# Patient Record
Sex: MALE | Race: WHITE | NOT HISPANIC OR LATINO | Employment: OTHER | URBAN - METROPOLITAN AREA
[De-identification: names, ages, dates, MRNs, and addresses within clinical notes are randomized per-mention and may not be internally consistent; named-entity substitution may affect disease eponyms.]

---

## 2017-03-28 ENCOUNTER — AMBULATORY - HEALTHEAST (OUTPATIENT)
Dept: CARDIOLOGY | Facility: CLINIC | Age: 72
End: 2017-03-28

## 2017-03-31 ENCOUNTER — OFFICE VISIT - HEALTHEAST (OUTPATIENT)
Dept: CARDIOLOGY | Facility: CLINIC | Age: 72
End: 2017-03-31

## 2017-03-31 ENCOUNTER — AMBULATORY - HEALTHEAST (OUTPATIENT)
Dept: CARDIOLOGY | Facility: CLINIC | Age: 72
End: 2017-03-31

## 2017-03-31 DIAGNOSIS — I10 ESSENTIAL HYPERTENSION: ICD-10-CM

## 2017-03-31 DIAGNOSIS — R00.1 BRADYCARDIA: ICD-10-CM

## 2017-03-31 LAB
ATRIAL RATE - MUSE: 43 BPM
DIASTOLIC BLOOD PRESSURE - MUSE: NORMAL MMHG
INTERPRETATION ECG - MUSE: NORMAL
P AXIS - MUSE: 3 DEGREES
PR INTERVAL - MUSE: 142 MS
QRS DURATION - MUSE: 104 MS
QT - MUSE: 480 MS
QTC - MUSE: 405 MS
R AXIS - MUSE: 72 DEGREES
SYSTOLIC BLOOD PRESSURE - MUSE: NORMAL MMHG
T AXIS - MUSE: 11 DEGREES
VENTRICULAR RATE- MUSE: 43 BPM

## 2017-03-31 ASSESSMENT — MIFFLIN-ST. JEOR: SCORE: 1751.86

## 2017-04-03 ENCOUNTER — COMMUNICATION - HEALTHEAST (OUTPATIENT)
Dept: CARDIOLOGY | Facility: CLINIC | Age: 72
End: 2017-04-03

## 2017-04-03 ENCOUNTER — AMBULATORY - HEALTHEAST (OUTPATIENT)
Dept: CARDIOLOGY | Facility: CLINIC | Age: 72
End: 2017-04-03

## 2017-04-03 DIAGNOSIS — R00.1 BRADYCARDIA: ICD-10-CM

## 2017-04-03 DIAGNOSIS — I10 HTN (HYPERTENSION): ICD-10-CM

## 2017-04-07 ENCOUNTER — HOSPITAL ENCOUNTER (OUTPATIENT)
Dept: CARDIOLOGY | Facility: HOSPITAL | Age: 72
Discharge: HOME | End: 2017-04-07
Attending: INTERNAL MEDICINE

## 2017-04-07 DIAGNOSIS — I10 HTN (HYPERTENSION): ICD-10-CM

## 2017-04-07 DIAGNOSIS — R00.1 BRADYCARDIA: ICD-10-CM

## 2017-04-07 LAB
CV STRESS CURRENT BP HE: NORMAL
CV STRESS CURRENT HR HE: 103
CV STRESS CURRENT HR HE: 109
CV STRESS CURRENT HR HE: 49
CV STRESS CURRENT HR HE: 51
CV STRESS CURRENT HR HE: 56
CV STRESS CURRENT HR HE: 56
CV STRESS CURRENT HR HE: 57
CV STRESS CURRENT HR HE: 58
CV STRESS CURRENT HR HE: 60
CV STRESS CURRENT HR HE: 63
CV STRESS CURRENT HR HE: 65
CV STRESS CURRENT HR HE: 66
CV STRESS CURRENT HR HE: 67
CV STRESS CURRENT HR HE: 69
CV STRESS CURRENT HR HE: 74
CV STRESS CURRENT HR HE: 76
CV STRESS CURRENT HR HE: 77
CV STRESS CURRENT HR HE: 82
CV STRESS CURRENT HR HE: 93
CV STRESS CURRENT HR HE: 98
CV STRESS DEVIATION TIME HE: NORMAL
CV STRESS ECHO PERCENT HR HE: NORMAL
CV STRESS EXERCISE STAGE HE: NORMAL
CV STRESS FINAL RESTING BP HE: NORMAL
CV STRESS FINAL RESTING HR HE: 56
CV STRESS MAX HR HE: 111
CV STRESS MAX TREADMILL GRADE HE: 12
CV STRESS MAX TREADMILL SPEED HE: 2.5
CV STRESS PEAK DIA BP HE: NORMAL
CV STRESS PEAK SYS BP HE: NORMAL
CV STRESS PHASE HE: NORMAL
CV STRESS PROTOCOL HE: NORMAL
CV STRESS RESTING PT POSITION HE: NORMAL
CV STRESS RESTING PT POSITION HE: NORMAL
CV STRESS ST DEVIATION AMOUNT HE: NORMAL
CV STRESS ST DEVIATION ELEVATION HE: NORMAL
CV STRESS ST EVELATION AMOUNT HE: NORMAL
CV STRESS TEST TYPE HE: NORMAL
CV STRESS TOTAL STAGE TIME MIN 1 HE: NORMAL
STRESS ECHO BASELINE BP: NORMAL
STRESS ECHO BASELINE HR: 52
STRESS ECHO CALCULATED PERCENT HR: 74 %
STRESS ECHO LAST STRESS BP: NORMAL
STRESS ECHO LAST STRESS HR: 98
STRESS ECHO POST ESTIMATED WORKLOAD: 7.1
STRESS ECHO POST EXERCISE DUR MIN: 5
STRESS ECHO POST EXERCISE DUR SEC: 45
STRESS ECHO TARGET HR: 127

## 2017-07-07 ENCOUNTER — RECORDS - HEALTHEAST (OUTPATIENT)
Dept: LAB | Facility: CLINIC | Age: 72
End: 2017-07-07

## 2017-07-07 LAB
CHOLEST SERPL-MCNC: 176 MG/DL
FASTING STATUS PATIENT QL REPORTED: NORMAL
HDLC SERPL-MCNC: 46 MG/DL
LDLC SERPL CALC-MCNC: 116 MG/DL
PSA SERPL-MCNC: 0.3 NG/ML (ref 0–6.5)
TRIGL SERPL-MCNC: 70 MG/DL

## 2019-01-24 ENCOUNTER — RECORDS - HEALTHEAST (OUTPATIENT)
Dept: LAB | Facility: CLINIC | Age: 74
End: 2019-01-24

## 2019-01-24 LAB
ALBUMIN SERPL-MCNC: 3.7 G/DL (ref 3.5–5)
ALP SERPL-CCNC: 66 U/L (ref 45–120)
ALT SERPL W P-5'-P-CCNC: 25 U/L (ref 0–45)
ANION GAP SERPL CALCULATED.3IONS-SCNC: 14 MMOL/L (ref 5–18)
AST SERPL W P-5'-P-CCNC: 22 U/L (ref 0–40)
BILIRUB SERPL-MCNC: 0.4 MG/DL (ref 0–1)
BUN SERPL-MCNC: 20 MG/DL (ref 8–28)
CALCIUM SERPL-MCNC: 9.3 MG/DL (ref 8.5–10.5)
CHLORIDE BLD-SCNC: 101 MMOL/L (ref 98–107)
CO2 SERPL-SCNC: 25 MMOL/L (ref 22–31)
CREAT SERPL-MCNC: 0.97 MG/DL (ref 0.7–1.3)
GFR SERPL CREATININE-BSD FRML MDRD: >60 ML/MIN/1.73M2
GLUCOSE BLD-MCNC: 94 MG/DL (ref 70–125)
POTASSIUM BLD-SCNC: 4.3 MMOL/L (ref 3.5–5)
PROT SERPL-MCNC: 6.9 G/DL (ref 6–8)
PSA SERPL-MCNC: 0.4 NG/ML (ref 0–6.5)
SODIUM SERPL-SCNC: 140 MMOL/L (ref 136–145)

## 2019-02-12 ENCOUNTER — SURGERY - HEALTHEAST (OUTPATIENT)
Dept: CARDIOLOGY | Facility: CLINIC | Age: 74
End: 2019-02-12

## 2019-02-12 ASSESSMENT — MIFFLIN-ST. JEOR
SCORE: 1841.23
SCORE: 1838.96

## 2019-02-19 ENCOUNTER — AMBULATORY - HEALTHEAST (OUTPATIENT)
Dept: CARDIOLOGY | Facility: CLINIC | Age: 74
End: 2019-02-19

## 2019-02-19 DIAGNOSIS — Z95.0 CARDIAC PACEMAKER IN SITU: ICD-10-CM

## 2019-02-19 LAB
HCC DEVICE COMMENTS: NORMAL
HCC DEVICE IMPLANTING PROVIDER: NORMAL
HCC DEVICE MANUFACTURE: NORMAL
HCC DEVICE MODEL: NORMAL
HCC DEVICE SERIAL NUMBER: NORMAL
HCC DEVICE TYPE: NORMAL

## 2019-02-19 ASSESSMENT — MIFFLIN-ST. JEOR: SCORE: 1800.4

## 2019-03-14 ENCOUNTER — AMBULATORY - HEALTHEAST (OUTPATIENT)
Dept: CARDIOLOGY | Facility: CLINIC | Age: 74
End: 2019-03-14

## 2019-03-14 DIAGNOSIS — Z95.0 CARDIAC PACEMAKER IN SITU: ICD-10-CM

## 2019-03-20 ENCOUNTER — DOCUMENTATION ONLY (OUTPATIENT)
Dept: CARE COORDINATION | Facility: CLINIC | Age: 74
End: 2019-03-20

## 2019-03-26 ENCOUNTER — DOCUMENTATION ONLY (OUTPATIENT)
Dept: NEUROLOGY | Facility: CLINIC | Age: 74
End: 2019-03-26

## 2019-04-10 NOTE — TELEPHONE ENCOUNTER
RECORDS RECEIVED FROM: [facility]   DATE RECEIVED: 4/18/19   NOTES (FOR ALL VISITS) STATUS DETAILS   OFFICE NOTE from referring provider Received 3/13/19  12/10/18   OFFICE NOTE from other specialist N/A    DISCHARGE SUMMARY from hospital Care Everywhere [facility]:  2/11/19-2/12/19   DISCHARGE REPORT from the ER N/A    OPERATIVE REPORT N/A    MEDICATION LIST Received    IMAGING  (FOR ALL VISITS)     EMG N/A    EEG N/A    ECT N/A    MRI (HEAD, NECK, SPINE) N/A    CT (HEAD, NECK, SPINE) Care Everywhere [facility]:  CT Head 2/11/19     Phone Call:     Contact Name [facility]   Outcome Images will be pushed

## 2019-04-13 ASSESSMENT — ENCOUNTER SYMPTOMS
DIZZINESS: 1
TREMORS: 1
TINGLING: 1
HEMOPTYSIS: 0
NUMBNESS: 1
COUGH DISTURBING SLEEP: 0
DEPRESSION: 1
POSTURAL DYSPNEA: 0
COUGH: 1
SEIZURES: 0
PANIC: 0
PARALYSIS: 0
SPEECH CHANGE: 0
LOSS OF CONSCIOUSNESS: 0
DECREASED CONCENTRATION: 1
DISTURBANCES IN COORDINATION: 1
NERVOUS/ANXIOUS: 1
WHEEZING: 1
HEADACHES: 0
SHORTNESS OF BREATH: 1
SPUTUM PRODUCTION: 1
INSOMNIA: 1
MEMORY LOSS: 0
SNORES LOUDLY: 1
DYSPNEA ON EXERTION: 1

## 2019-04-15 PROBLEM — I49.9 CARDIAC ARRHYTHMIA: Status: ACTIVE | Noted: 2019-02-12

## 2019-04-15 PROBLEM — R00.1 SYMPTOMATIC SINUS BRADYCARDIA: Status: ACTIVE | Noted: 2019-04-15

## 2019-04-15 PROBLEM — R00.1 BRADYCARDIA: Status: ACTIVE | Noted: 2017-03-31

## 2019-04-15 RX ORDER — DULOXETIN HYDROCHLORIDE 30 MG/1
CAPSULE, DELAYED RELEASE ORAL
COMMUNITY
Start: 2019-01-02 | End: 2019-04-18

## 2019-04-15 RX ORDER — CLOBETASOL PROPIONATE 0.5 MG/G
OINTMENT TOPICAL
COMMUNITY
Start: 2015-09-16 | End: 2019-04-18

## 2019-04-15 RX ORDER — ACETAMINOPHEN 500 MG
500 TABLET ORAL
COMMUNITY
Start: 2019-02-13 | End: 2019-04-18

## 2019-04-15 RX ORDER — PREGABALIN 200 MG/1
CAPSULE ORAL
COMMUNITY
Start: 2018-12-15 | End: 2019-04-18

## 2019-04-15 RX ORDER — FLUOCINONIDE 0.5 MG/G
CREAM TOPICAL
COMMUNITY
Start: 2015-09-01 | End: 2019-04-18

## 2019-04-15 RX ORDER — PRIMIDONE 50 MG/1
50 TABLET ORAL
COMMUNITY
Start: 2010-01-02 | End: 2019-04-18

## 2019-04-15 RX ORDER — PROPRANOLOL HYDROCHLORIDE 80 MG/1
80 CAPSULE, EXTENDED RELEASE ORAL
COMMUNITY
End: 2019-04-18

## 2019-04-15 RX ORDER — HYDROXYZINE PAMOATE 25 MG/1
50 CAPSULE ORAL
COMMUNITY
End: 2019-04-18

## 2019-04-15 RX ORDER — TOPIRAMATE SPINKLE 25 MG/1
CAPSULE ORAL
COMMUNITY
Start: 2019-01-10 | End: 2019-04-18

## 2019-04-15 RX ORDER — ASCORBATE CALCIUM 500 MG
500 TABLET ORAL
COMMUNITY
End: 2019-04-18

## 2019-04-15 RX ORDER — LISINOPRIL 10 MG/1
TABLET ORAL
COMMUNITY
Start: 2018-01-01 | End: 2019-04-18

## 2019-04-15 RX ORDER — PRIMIDONE 250 MG/1
250 TABLET ORAL
COMMUNITY
End: 2019-04-18

## 2019-04-15 RX ORDER — METHADONE HYDROCHLORIDE 10 MG/1
TABLET ORAL
COMMUNITY
Start: 2019-04-04 | End: 2019-04-18

## 2019-04-15 RX ORDER — METHADONE HYDROCHLORIDE 10 MG/1
5-10 TABLET ORAL
COMMUNITY
Start: 2016-01-02 | End: 2019-04-18

## 2019-04-15 RX ORDER — SERTRALINE HYDROCHLORIDE 100 MG/1
50 TABLET, FILM COATED ORAL
COMMUNITY
Start: 2019-03-13 | End: 2019-04-18

## 2019-04-15 RX ORDER — IBUPROFEN 200 MG
200 TABLET ORAL
COMMUNITY
End: 2019-04-18

## 2019-04-15 RX ORDER — LIDOCAINE 50 MG/G
OINTMENT TOPICAL
COMMUNITY
Start: 2018-12-28 | End: 2019-04-18

## 2019-04-15 RX ORDER — TOPIRAMATE 25 MG/1
TABLET, FILM COATED ORAL
COMMUNITY
Start: 2018-12-10 | End: 2019-04-18

## 2019-04-15 RX ORDER — DIPHENHYDRAMINE HCL 25 MG
50 CAPSULE ORAL
COMMUNITY
End: 2019-04-18

## 2019-04-15 RX ORDER — METHADONE HYDROCHLORIDE 10 MG/1
TABLET ORAL
COMMUNITY
Start: 2019-03-01 | End: 2019-04-18

## 2019-04-15 RX ORDER — LISINOPRIL 5 MG/1
TABLET ORAL
COMMUNITY
Start: 2008-01-02 | End: 2019-04-18

## 2019-04-15 NOTE — PROGRESS NOTES
Summary and Recommendations:     Tremor and Neuropathy   He does not drink but he has had a response to alcohol  Medications on arrival    Medications     9am 12/1pm 5/6pm 11pm    Calcium carbonate vitamin D Should take it twice daily       Clobetasol  As needed       Diphenhydramine  As needed       Duloxetine cymbalta 1   1    Fluocinonide lidex As needed       Ibuprofen advil/motrin As needed       Lidocaine xylocaine 2.5% or 5% daily       Lisinopril prinivil/zestril 10mg  1/2       Magnesium dose 1 1 1     Methadone 10mg 1 1/2 1 1    Omega 3 fatty acids fish oil 1000mg 2       Pregabalin lyrica 200mg 1 1  1    Primidone mysoline 50mg  1  1     Topiramate topamax 25mg 1  1     Vitamin B complex 1                                 Pharmacy consultation to review his medications for neuropathic pain and the medications for tremor.   Discussed the complexity of his condition and the fact that he has both a severe neuropathy and tremor.   Discussed DBS class  Discussed surgical therapies for tremor including DBS, gamma knife radiosurgery, focused beam ultrasound, thalamotomy  He has tried some assistive devices - daughter has done some work in speech therapy and has tried to help on this.     Return to be determined.     PLAN  DBS class  Pharmacy consultation with Nenita Polo Monday.   Workup to be determined for tremor.   He set up kristina Corey MD  _____________________________________________________________________  PATIENT: Romaine Farah  73 year old male   : 1945  LORENA: 2019    Consult requested by pcp/other        Outside records reviewed and revealed - inserted.       History obtained from patient      History of Present Illness  74 yo man with tremor.    Did not tolerate a higher dose of primidone from 50mg twice daily to 250mg twice daily    Two sisters with tremor  Father had tremor and had alcohol issues  One of two Daughters with tremor.     He has had tremor since his early 40s.    He is right handed and has bilateral tremor  He had initial right hand tremor.   Thinks his left hand tremor is worse.   He has problems with ADLS such as using tools,, playing cards, board games, drinking from a cup.     He does not have falls but has a tendency to stagger at times and feels like he is going to lose his  Balance.     He has neuropathy for 20 or 30 yrs.  He has idiopathic neuropathy  But his mother and grandfather h ad diabetes  He has not been diagnosed with diabetes  Paola - sister has a history of neuropathy and tremor  She lives in [facility]    Ros  Cataract surgery  He had borderline glaucoma and is normal now.   Hearing is decreased.   He has not lost his sense of smell.   Denies lost of taste  He had right hip surgery  He denies other surgeries but had a pacemaker for low heart rate in 2019.  He has one wire.  Not clear if he is mri compatible but think it is compatible.   He has a SavySwap  Denies heart attack  He has had high blood pressure for a while and his dose of medication was reduced.   Denies skin cancer.   He has problems sleeping at night. Has problems falling asleep due to his feet  He thinks too much that affects his sleep  He snores at night but has not had a sleep study  Not clear if he has sleep apnea.   Not clear if he talks in his sleep.   Mood  - frustrated and anxious about his health.   Neuro - denies headaches  Has has constipation which he is managing with magnesium - most likely related to  Methadone  Bladder control is okay  He has arthritis.   Lungs  - last winter he had double pneumonia. Former smoker for 30 yrs. asbestos exposure. This was related to his work at a .   He has not had renal stones. He is taking topiramate.     Problems with high dose of primidone    Memory/cognition - he has had some problems with his memory.     Pregabalin/lyrica is helping the neuropath  Methadone as well   Primidone may be helping his tremor.    topiramate - topamax - for ? Tremor.   Duloxetine (cymbalta) - for ?neuropathy ?       Medications     9am 12/1pm 5/6pm 11pm    Acetaminophen tylenol 500mg Not taking       Calcium ascorbate Not taking       Calcium carbonate vitamin D Should take it twice daily       Clobetasol  As needed       Diphenhydramine  As needed       Duloxetine cymbalta 1   1    Fluocinonide lidex As needed       Hydroxyzine vistaril Not taking       Ibuprofen advil/motrin As needed       Lidocaine xylocaine 2.5% or 5% daily       Lisinopril prinivil/zestril 10mg  1/2       Magnesium dose 1 1 1     Methadone 10mg 1 1/2 1 1    Omega 3 fatty acids fish oil 1000mg 2       Pregabalin lyrica 200mg 1 1  1    Primidone mysoline 50mg  1  1     Topiramate topamax 25mg 1  1     Vitamin B complex 1                                     Medications     9am 12/1pm 5/6pm 11pm    Calcium carbonate vitamin D Should take it twice daily       Clobetasol  As needed       Diphenhydramine  As needed       Duloxetine cymbalta 1   1    Fluocinonide lidex As needed       Ibuprofen advil/motrin As needed       Lidocaine xylocaine 2.5% or 5% daily       Lisinopril prinivil/zestril 10mg  1/2       Magnesium dose 1 1 1     Methadone 10mg 1 1/2 1 1    Omega 3 fatty acids fish oil 1000mg 2       Pregabalin lyrica 200mg 1 1  1    Primidone mysoline 50mg  1  1     Topiramate topamax 25mg 1  1     Vitamin B complex 1                                 Answers for HPI/ROS submitted by the patient on 4/13/2019   General Symptoms: No  Skin Symptoms: No  HENT Symptoms: No  EYE SYMPTOMS: No  HEART SYMPTOMS: No  LUNG SYMPTOMS: Yes  INTESTINAL SYMPTOMS: No  URINARY SYMPTOMS: No  REPRODUCTIVE SYMPTOMS: No  SKELETAL SYMPTOMS: No  BLOOD SYMPTOMS: No  NERVOUS SYSTEM SYMPTOMS: Yes  MENTAL HEALTH SYMPTOMS: Yes  Cough: Yes  Sputum or phlegm: Yes  Coughing up blood: No  Difficulty breating or shortness of breath: Yes  Snoring: Yes  Wheezing: Yes  Difficulty breathing on exertion:  Yes  Nighttime Cough: No  Difficulty breathing when lying flat: No  Trouble with coordination: Yes  Dizziness or trouble with balance: Yes  Fainting or black-out spells: No  Memory loss: No  Headache: No  Seizures: No  Speech problems: No  Tingling: Yes  Tremor: Yes  Difficulty walking: No  Paralysis: No  Numbness: Yes  Nervous or Anxious: Yes  Depression: Yes  Trouble sleeping: Yes  Trouble thinking or concentrating: Yes  Mood changes: No  Panic attacks: No    14 Review of systems  are negative except for   Patient Active Problem List   Diagnosis     Acute blood loss anemia     Bradycardia     Cardiac arrhythmia     Cardiac pacemaker in situ     Chronic constipation     Controlled substance agreement signed     Essential tremor     High blood pressure     H/O asbestos exposure     Primary hypertension     History of repair of hip joint     Osteoarthrosis     Polyarthropathy or polyarthritis, hand     Osteoarthrosis, unspecified whether generalized or localized, pelvic region and thigh     Peripheral neuropathy     Sinus node dysfunction (H)     Symptomatic sinus bradycardia      Not on File  No past surgical history on file.  No past medical history on file.  Social History     Socioeconomic History     Marital status:      Spouse name: Not on file     Number of children: Not on file     Years of education: Not on file     Highest education level: Not on file   Occupational History     Not on file   Social Needs     Financial resource strain: Not on file     Food insecurity:     Worry: Not on file     Inability: Not on file     Transportation needs:     Medical: Not on file     Non-medical: Not on file   Tobacco Use     Smoking status: Not on file   Substance and Sexual Activity     Alcohol use: Not on file     Drug use: Not on file     Comment: cigars     Sexual activity: Not on file   Lifestyle     Physical activity:     Days per week: Not on file     Minutes per session: Not on file     Stress: Not on  file   Relationships     Social connections:     Talks on phone: Not on file     Gets together: Not on file     Attends Restorationism service: Not on file     Active member of club or organization: Not on file     Attends meetings of clubs or organizations: Not on file     Relationship status: Not on file     Intimate partner violence:     Fear of current or ex partner: Not on file     Emotionally abused: Not on file     Physically abused: Not on file     Forced sexual activity: Not on file   Other Topics Concern     Not on file   Social History Narrative     Not on file     No family history on file.  Current Outpatient Medications   Medication Sig Dispense Refill     acetaminophen (TYLENOL) 500 MG tablet Take 500 mg by mouth       clobetasol (TEMOVATE) 0.05 % external ointment        DULoxetine (CYMBALTA) 30 MG capsule        fluocinonide (LIDEX) 0.05 % external cream        lidocaine (XYLOCAINE) 2 % external gel        lidocaine (XYLOCAINE) 5 % external ointment Apply 4 Gm. TID both feet. Pain.       lisinopril (PRINIVIL/ZESTRIL) 10 MG tablet        lisinopril (PRINIVIL/ZESTRIL) 5 MG tablet        methadone (DOLOPHINE) 10 MG tablet 1 p.o. A.M ; 0.5 p.o. Noon; 1 p.o. Evening; 1 at H.S 03/04/2019-04/05/2019  max: 3.0-3.5 tabs. A day.       methadone (DOLOPHINE) 10 MG tablet Take 5-10 mg by mouth       Pregabalin (LYRICA) 200 MG capsule        primidone (MYSOLINE) 50 MG tablet Take 50 mg by mouth       sertraline (ZOLOFT) 100 MG tablet Take 50 mg by mouth       topiramate (TOPAMAX) 25 MG capsule        topiramate (TOPAMAX) 25 MG tablet        calcium ascorbate 500 MG TABS Take 500 mg by mouth       calcium carbonate-vitamin D (OYSTER SHELL CALCIUM/D) 500-200 MG-UNIT tablet Take 2 tablets by mouth       diphenhydrAMINE (BENADRYL) 25 MG capsule Take 50 mg by mouth       DOCOSAHEXAENOIC ACID PO Take 2 g by mouth       hydrOXYzine (VISTARIL) 25 MG capsule Take 50 mg by mouth       ibuprofen (ADVIL/MOTRIN) 200 MG tablet Take  200 mg by mouth       MAGNESIUM PO Take 2 tablets by mouth       primidone (MYSOLINE) 250 MG tablet Take 250 mg by mouth       propranolol ER (INDERAL LA) 80 MG 24 hr capsule Take 80 mg by mouth       vitamin (B COMPLEX) tablet Take 1 tablet by mouth       Examination  B/P: Data Unavailable, T: Data Unavailable, P: Data Unavailable, R: Data Unavailable 0 lbs 0 oz  There were no vitals taken for this visit., There is no height or weight on file to calculate BMI.    Vitals signs were added and reviewed if not above. Please refer to the chart from this visit.    General examination: well developed, nourished and normal affect  Carotid: No bruits. Chest CTA, Heart regular without gallops or murmurs. Abdomen soft nontender, no masses, bowel sounds intact. Periphery: normal pulses without edema. No skin lesions. MENTAL STATUS:  Alert, oriented x3.  Speech fluent with normal naming, repetition, comprehension.  Good right-left orientation, Can remember 3/3 objects.   5/5 on spelling world backwards.  CRANIAL NERVES:  Disks flat. Pupils are equal, round, reactive to light.  Normal vascularity and fields. Extraocular movements full.  Facial sensation and movement normal.  Hearing intact. Palate moves symmetrically.  Tongue midline.  Sternocleidomastoid and trapezius strength intact.  Neck strength was normal.  NEUROLOGIC:  Tone: normal and no bradykinesia. Motor in upper and lower extremities. 5/5.  Reflexes 0-1/4.  Toe signs downgoing.  Good finger-nose-finger, fine finger movement, heel-shin maneuver, sensation to light touch, position sense and vibration and temperature was abnormal with absent vibration right foot and reduced left foot and has decreased pp/temperature at the ankle Gait normal except for  - very slow and antalgic with good arm swing. Romberg and postural stability   Intact.  Tremor  - postural and action tremor. - especially proximally. `    Answers for HPI/ROS submitted by the patient on 4/13/2019    General Symptoms: No  Skin Symptoms: No  HENT Symptoms: No  EYE SYMPTOMS: No  HEART SYMPTOMS: No  LUNG SYMPTOMS: Yes  INTESTINAL SYMPTOMS: No  URINARY SYMPTOMS: No  REPRODUCTIVE SYMPTOMS: No  SKELETAL SYMPTOMS: No  BLOOD SYMPTOMS: No  NERVOUS SYSTEM SYMPTOMS: Yes  MENTAL HEALTH SYMPTOMS: Yes  Cough: Yes  Sputum or phlegm: Yes  Coughing up blood: No  Difficulty breating or shortness of breath: Yes  Snoring: Yes  Wheezing: Yes  Difficulty breathing on exertion: Yes  Nighttime Cough: No  Difficulty breathing when lying flat: No  Trouble with coordination: Yes  Dizziness or trouble with balance: Yes  Fainting or black-out spells: No  Memory loss: No  Headache: No  Seizures: No  Speech problems: No  Tingling: Yes  Tremor: Yes  Difficulty walking: No  Paralysis: No  Numbness: Yes  Nervous or Anxious: Yes  Depression: Yes  Trouble sleeping: Yes  Trouble thinking or concentrating: Yes  Mood changes: No  Panic attacks: No        Patient Demographics  - 73 y.o. Male; born [birth year]     Patient Address Communication Language Race / Ethnicity   [address] [place] [id number] [phone] (Home)  [phone] (Work) English - Written (Preferred) White / Not  or      Allergies      No Known Allergies    Medications      Medication Sig Dispensed Refills Start Date End Date Status   methadone (DOLOPHINE) 10 MG tablet   Take 5-10 mg by mouth see administration instructions. 10mg qam , 5mg(1/2 tab) noon, 10mg evening and 10mg qhs       0     Active   magnesium 250 mg Tab   Take 2 tablets by mouth daily.        0     Active   b complex vitamins tablet   Take 1 tablet by mouth daily.   0     Active   ascorbic acid (ASCORBIC ACID WITH KHADIJAH HIPS) 500 MG tablet   Take 500 mg by mouth daily.   0     Active   OMEGA-3/DHA/EPA/FISH OIL (FISH OIL-OMEGA-3 FATTY ACIDS) 300-1,000 mg capsule   Take 2 g by mouth daily.   0     Active   calcium-vitamin D 500 mg(1,250mg) -200 unit per tablet   Take 2 tablets by mouth  daily.   0     Active   primidone (MYSOLINE) 250 MG tablet   Take 250 mg by mouth 2 (two) times a day.   0     Active   diphenhydrAMINE (BENADRYL) 25 mg capsule   Take 50 mg by mouth 2 (two) times a day as needed for itching.   0     Active   hydrOXYzine pamoate (VISTARIL) 25 MG capsule   Take 25 mg by mouth 2 (two) times a day. 25mg qam and noon   0     Active   hydrOXYzine pamoate (VISTARIL) 25 MG capsule   Take 50 mg by mouth 2 (two) times a day. 50mg qevening and hs   0     Active   lidocaine (XYLOCAINE) 5 % ointment   Apply 1 application topically 3 (three) times a day as needed.   0     Active   topiramate (TOPAMAX) 25 MG tablet   Take 50 mg by mouth 2 (two) times a day.   0     Active   propranolol (INDERAL LA) 80 mg 24 hr capsule   Take 80 mg by mouth daily.   0     Active   pregabalin (LYRICA) 200 MG capsule   Take 200 mg by mouth 3 (three) times a day.   0     Active   lisinopril (PRINIVIL,ZESTRIL) 10 MG tablet   Take 10 mg by mouth daily.   0     Active   clobetasol (TEMOVATE) 0.05 % ointment   Apply 1 application topically 2 (two) times a day as needed.   0     Active   fluocinonide (LIDEX) 0.05 % cream   Apply 1 application topically 2 (two) times a day as needed.   0     Active   acetaminophen (TYLENOL) 500 MG tablet    Indications: Pain Take 1 tablet (500 mg total) by mouth every 6 (six) hours as needed for pain or fever.   0 02/13/2019   Active   ibuprofen (ADVIL,MOTRIN) 200 MG tablet   Take 200 mg by mouth as needed for pain.   0     Active     Active Problems      Problem Noted Date   Cardiac pacemaker in situ 02/19/2019   Overview:     LensAR L310 ACCOLADE MRI  DOI: 2/12/2019     Cardiac arrhythmia 02/12/2019   Bradycardia 03/31/2017   Acute blood loss anemia 02/03/2015   Status post right hip replacement 02/02/2015   Chronic constipation 02/02/2015   Osteoarthritis     Overview:     Created by Conversion    Replacement Utility updated for latest IMO load     Osteoarthritis Of The Hip      Overview:     Created by Conversion       Polyarthritis Of The Hand     Overview:     Created by Conversion    Replacement Utility updated for latest IMO load     H/O asbestos exposure     Tremor, essential     Primary hypertension     Neuropathy     Symptomatic sinus bradycardia     Sinus node dysfunction       Encounters  - from Last 3 Months    Date Type Specialty Care Team Description   03/14/2019 Device Check Cardiology   Cardiac pacemaker in situ (Primary Dx)   02/19/2019 RN Device Check Cardiology   Cardiac pacemaker in situ   02/12/2019 Surgery Cardiology Micheal Mahoney MD   EP Pacemaker Insertion   02/12/2019 - 02/13/2019 Hospital Encounter Cardiac Intensive Care Micheal Mahoney MD   Pain (Primary Dx);   Bradycardia   02/11/2019 - 02/12/2019 Hospital Encounter Cardiology Coty Thrasher DO K.C., Binod, MD   Bradycardia (Primary Dx);   Dizziness;   Hypotension, unspecified hypotension type;   Acute kidney injury (H)   02/11/2019 Travel           Immunizations  Reconcile with Patient's Chart    Name Administration Dates Next Due   Influenza, inj, historic,unspecified 10/13/2017, 11/02/2014     Pneumo Polysac 23-V 11/02/2014       Surgical History      Surgery Date Site/Laterality Comments   TOTAL HIP ARTHROPLASTY 2/2/2015 Hip/Right Procedure: #3 HIP TOTAL ARTHROPLASTY, RIGHT; Surgeon: Aryan Glass MD; Location: [facility]; Service:     Medical devices from this surgery are in the Implants section.     INSERT / REPLACE / REMOVE PACEMAKER 02/12/2019        EP PACEMAKER INSERT 2/12/2019 N/A Procedure: EP Pacemaker Insertion; Surgeon: Micheal Mahoney MD; Location: [facility]; Service: Cardiology    Medical devices from this surgery are in the Implants section.       Medical History      Medical History Date Comments   Hypertension       Neuropathy (H)       H/O asbestos exposure       Erectile dysfunction       Essential tremor       Chronic pain   toes   Bradycardia          Family History      Medical History Relation Name Comments   Esophageal cancer Father       COPD Mother       Diabetes Mother         Relation Name Status Comments   Father        Mother          Social History      Tobacco Use Types Packs/Day Years Used Date   Former Smoker           Smokeless Tobacco: Former User           Alcohol Use Drinks/Week oz/Week Comments   Yes     occasional     Sex Assigned at Birth Date Recorded   Not on file       Job Start Date Occupation Industry   Not on file Not on file Not on file     Travel History Travel Start Travel End   No recent travel history available.       [id number] [place] [address] [phone] (Home)  [phone] (Mobile) English - Spoken (Preferred) White / Not  or      Allergies      No Known Allergies    Medications  Reconcile with Patient's Chart    Medication Sig Dispensed Refills Start Date End Date Status   primidone (MYSOLINE) 50 mg tablet   Take 1 tablet by mouth 3 times daily.   0 2014   Active   lidocaine (XYLOCAINE) 2 % gel       11 2015   Active   fluocinonide 0.05% topical (LIDEX) 0.05 % cream       0 2015   Active   clobetasol 0.05% (TEMOVATE 0.05% OINTMENT) 0.05 % ointment       6 2015   Active   lisinopril (PRINIVIL; ZESTRIL) 10 mg tablet       1 2018   Active   LYRICA 200 mg capsule       1 12/15/2018   Active   topiramate (TOPAMAX) 25 mg tablet       3 12/10/2018   Active   lidocaine 5 % oint topical ointment    Indications: Neuropathy Apply 4 Gm. TID both feet. Pain. 240 g   2 2018   Active   sertraline (ZOLOFT) 100 mg tablet    Indications: Mood disorder (HC) Take 0.5 tablets by mouth once daily. 45 tablet   2 2019   Active   methadone (DOLOPHINE) 10 mg tablet    Indications: Neuropathy 1 p.o. A.M ; 0.5 p.o. Noon; 1 p.o. Evening; 1 at H.S Use dates: 3/29/19-19 max: 3.0-3.5 tabs. a day. 100 tablet   0 2019   Active   methadone (DOLOPHINE)  10 mg tablet    Indications: Neuropathy 1 p.o. A.M ; 0.5 p.o. Noon; 1 p.o. Evening; 1 at H.S 03/04/2019-04/05/2019 max: 3.0-3.5 tabs. A day. 95 tablet   0 03/01/2019 03/27/2019 Discontinued     Active Problems      Problem Noted Date   Controlled substance agreement signed 06/05/2018   Overview:     90 day reminder has been signed by pt       Controlled substance agreement signed 09/05/2014   Overview:     [facility]-9/2/14     Neuropathy 09/02/2014     Encounters  - from Last 3 Months    Date Type Specialty Care Team Description   03/27/2019 Telephone Pain Management Grazyna Barrett, NP   Refill Request (Methadone-[place] to [facility] pharm. Pt states he will be out of methadone tomorrow. Pt states his foot pain is unbearable without it. )   03/13/2019 Office Visit Pain Management Grazyna Barrett, NP   Follow Up (Feet pain)   03/13/2019 Travel         02/26/2019 Telephone Pain Management Grazyna Barrett, NP   Medication Management (hydroxixine (?) not avaailable from . please call to send something else)   02/22/2019 Refill Pain Management Grazyna Barrett, NP   Refill Request (med refil for his 2 medications methadone and hydroxine??? please refill and have Grazyna call him thank you)   01/31/2019 Telephone Pain Management Grazyna Barrett, NP   Refill Request (pt is completely out and had an appt with Grazyna today and was addressing his refill for methadone 4 tablets a day please call if any questions and when it is sent to [facility] [phone])   01/28/2019 Telephone Pain Management Grazyna Barrett, NP   Refill Request (Hydroxyzine Pamoate)     Medical History      Medical History Date Comments   Idiopathic peripheral neuropathy       HTN (hypertension)       Tobacco abuse   4 cigars a day.      Social History      Tobacco Use Types Packs/Day Years Used Date   Former Smoker Cigars     Quit: 12/01/2014   Smokeless Tobacco: Never Used           Comments: zero.      Alcohol Use  Drinks/Week oz/Week Comments   No 0 Standard drinks or equivalent   0.0 quit once put on Vicodin.      Sex Assigned at Birth Date Recorded   Not on file       Job Start Date Occupation Industry   Not on file Not on file Not on file     Travel History Travel Start Travel End   No recent travel history available.

## 2019-04-15 NOTE — TELEPHONE ENCOUNTER
Imaging Received  [facility]   Image Type (x): Disc:   PACS: X   Exam Date/Name CT Head 2/11/19 Comments: Images confirmed in PACS

## 2019-04-18 ENCOUNTER — PRE VISIT (OUTPATIENT)
Dept: NEUROLOGY | Facility: CLINIC | Age: 74
End: 2019-04-18

## 2019-04-18 ENCOUNTER — OFFICE VISIT (OUTPATIENT)
Dept: NEUROLOGY | Facility: CLINIC | Age: 74
End: 2019-04-18
Payer: MEDICARE

## 2019-04-18 VITALS
SYSTOLIC BLOOD PRESSURE: 117 MMHG | OXYGEN SATURATION: 95 % | HEIGHT: 71 IN | HEART RATE: 66 BPM | DIASTOLIC BLOOD PRESSURE: 78 MMHG | BODY MASS INDEX: 32.48 KG/M2 | TEMPERATURE: 98.2 F | WEIGHT: 232 LBS | RESPIRATION RATE: 16 BRPM

## 2019-04-18 DIAGNOSIS — G25.9 MOVEMENT DISORDER: Primary | ICD-10-CM

## 2019-04-18 DIAGNOSIS — R25.1 TREMOR: ICD-10-CM

## 2019-04-18 RX ORDER — LIDOCAINE 50 MG/G
OINTMENT TOPICAL DAILY
COMMUNITY
Start: 2019-04-18 | End: 2019-04-22

## 2019-04-18 RX ORDER — TOPIRAMATE 25 MG/1
50 TABLET, FILM COATED ORAL 2 TIMES DAILY
Qty: 180 TABLET | Refills: 3 | COMMUNITY
Start: 2019-04-18 | End: 2019-05-16

## 2019-04-18 RX ORDER — TOPIRAMATE 25 MG/1
TABLET, FILM COATED ORAL
Refills: 3 | COMMUNITY
Start: 2019-04-04 | End: 2019-04-18

## 2019-04-18 RX ORDER — PREGABALIN 200 MG/1
CAPSULE ORAL
Qty: 270 CAPSULE | Refills: 3 | COMMUNITY
Start: 2019-04-18 | End: 2021-03-01

## 2019-04-18 RX ORDER — FLUOCINONIDE 0.5 MG/G
CREAM TOPICAL 2 TIMES DAILY PRN
COMMUNITY
Start: 2019-04-18

## 2019-04-18 RX ORDER — LIDOCAINE 50 MG/G
OINTMENT TOPICAL DAILY
COMMUNITY
Start: 2019-04-18 | End: 2019-04-18

## 2019-04-18 RX ORDER — LISINOPRIL 5 MG/1
TABLET ORAL
Qty: 90 TABLET | Refills: 3 | COMMUNITY
Start: 2019-04-18 | End: 2019-04-18

## 2019-04-18 RX ORDER — OMEGA-3S/DHA/EPA/FISH OIL/D3 300MG-1000
2 CAPSULE ORAL EVERY MORNING
COMMUNITY
Start: 2019-04-18

## 2019-04-18 RX ORDER — DULOXETIN HYDROCHLORIDE 30 MG/1
60 CAPSULE, DELAYED RELEASE ORAL DAILY
Qty: 180 CAPSULE | Refills: 3 | COMMUNITY
Start: 2019-04-18 | End: 2021-06-18

## 2019-04-18 RX ORDER — OMEGA-3S/DHA/EPA/FISH OIL/D3 300MG-1000
CAPSULE ORAL
COMMUNITY
Start: 2019-04-18 | End: 2019-04-18

## 2019-04-18 RX ORDER — ALBUTEROL SULFATE 90 UG/1
AEROSOL, METERED RESPIRATORY (INHALATION)
Refills: 0 | COMMUNITY
Start: 2018-06-04 | End: 2019-04-18

## 2019-04-18 RX ORDER — CLOBETASOL PROPIONATE 0.5 MG/G
OINTMENT TOPICAL 2 TIMES DAILY PRN
Qty: 60 G | Refills: 11 | COMMUNITY
Start: 2019-04-18

## 2019-04-18 RX ORDER — TOPIRAMATE 25 MG/1
25 TABLET, FILM COATED ORAL 2 TIMES DAILY
Refills: 3 | COMMUNITY
Start: 2019-04-18 | End: 2019-04-18

## 2019-04-18 RX ORDER — METHADONE HYDROCHLORIDE 10 MG/1
10 TABLET ORAL 2 TIMES DAILY
Qty: 1 TABLET | Refills: 0 | Status: ON HOLD | COMMUNITY
Start: 2019-04-18 | End: 2024-10-02

## 2019-04-18 RX ORDER — IBUPROFEN 200 MG
200 TABLET ORAL EVERY 8 HOURS PRN
COMMUNITY
Start: 2019-04-18

## 2019-04-18 RX ORDER — LISINOPRIL 10 MG/1
TABLET ORAL
Qty: 45 TABLET | Refills: 3 | COMMUNITY
Start: 2019-04-18 | End: 2021-03-01

## 2019-04-18 RX ORDER — LISINOPRIL 5 MG/1
5 TABLET ORAL DAILY
COMMUNITY
Start: 2019-04-18 | End: 2019-04-18

## 2019-04-18 RX ORDER — PRIMIDONE 50 MG/1
50 TABLET ORAL 2 TIMES DAILY
Qty: 180 TABLET | Refills: 3 | COMMUNITY
Start: 2019-04-18 | End: 2019-04-18

## 2019-04-18 RX ORDER — GABAPENTIN 300 MG/1
CAPSULE ORAL
Refills: 11 | COMMUNITY
Start: 2019-01-08 | End: 2019-04-18

## 2019-04-18 RX ORDER — DIPHENHYDRAMINE HCL 25 MG
50 CAPSULE ORAL DAILY PRN
COMMUNITY
Start: 2019-04-18 | End: 2019-04-22

## 2019-04-18 RX ORDER — PRIMIDONE 50 MG/1
TABLET ORAL
Qty: 180 TABLET | Refills: 3 | COMMUNITY
Start: 2019-04-18 | End: 2019-05-16 | Stop reason: SINTOL

## 2019-04-18 ASSESSMENT — MIFFLIN-ST. JEOR: SCORE: 1819.48

## 2019-04-18 ASSESSMENT — PAIN SCALES - GENERAL: PAINLEVEL: NO PAIN (0)

## 2019-04-18 NOTE — PATIENT INSTRUCTIONS
Tremor and Neuropathy   He does not drink but he has had a response to alcohol  Medications on arrival    Medications     9am 12/1pm 5/6pm 11pm    Calcium carbonate vitamin D Should take it twice daily       Clobetasol  As needed       Diphenhydramine  As needed       Duloxetine cymbalta 1   1    Fluocinonide lidex As needed       Ibuprofen advil/motrin As needed       Lidocaine xylocaine 2.5% or 5% daily       Lisinopril prinivil/zestril 10mg  1/2       Magnesium dose 1 1 1     Methadone 10mg 1 1/2 1 1    Omega 3 fatty acids fish oil 1000mg 2       Pregabalin lyrica 200mg 1 1  1    Primidone mysoline 50mg  1  1     Topiramate topamax 25mg 1  1     Vitamin B complex 1                                 Pharmacy consultation to review his medications for neuropathic pain and the medications for tremor.   Discussed the complexity of his condition and the fact that he has both a severe neuropathy and tremor.   Discussed DBS class  Discussed surgical therapies for tremor including DBS, gamma knife radiosurgery, focused beam ultrasound, thalamotomy  He has tried some assistive devices - daughter has done some work in speech therapy and has tried to help on this.     Return to be determined.     PLAN  DBS class  Pharmacy consultation with Nenita Polo Monday.   Workup to be determined for tremor.   [facility]

## 2019-04-18 NOTE — LETTER
2019       RE: Romaine Farah  [address] [place] [id number]     Dear Colleague,    Thank you for referring your patient, Romaine Farah, to the [facility] NEUROLOGY at [facility]. Please see a copy of my visit note below.    Summary and Recommendations:     Tremor and Neuropathy   He does not drink but he has had a response to alcohol  Medications on arrival    Medications     9am 12/1pm 5/6pm 11pm    Calcium carbonate vitamin D Should take it twice daily       Clobetasol  As needed       Diphenhydramine  As needed       Duloxetine cymbalta 1   1    Fluocinonide lidex As needed       Ibuprofen advil/motrin As needed       Lidocaine xylocaine 2.5% or 5% daily       Lisinopril prinivil/zestril 10mg  1/2       Magnesium dose 1 1 1     Methadone 10mg 1 1/2 1 1    Omega 3 fatty acids fish oil 1000mg 2       Pregabalin lyrica 200mg 1 1  1    Primidone mysoline 50mg  1  1     Topiramate topamax 25mg 1  1     Vitamin B complex 1                                 Pharmacy consultation to review his medications for neuropathic pain and the medications for tremor.   Discussed the complexity of his condition and the fact that he has both a severe neuropathy and tremor.   Discussed DBS class  Discussed surgical therapies for tremor including DBS, gamma knife radiosurgery, focused beam ultrasound, thalamotomy  He has tried some assistive devices - daughter has done some work in speech therapy and has tried to help on this.     Return to be determined.     PLAN  DBS class  Pharmacy consultation with Nenita Polo Monday.   Workup to be determined for tremor.   He set up kristina Corey MD  _____________________________________________________________________  PATIENT: Romaine Farah  73 year old male   : 1945  LORENA: 2019    Consult requested by pcp/other        Outside records reviewed and revealed - inserted.       History obtained from patient      History of  Present Illness  74 yo man with tremor.    Did not tolerate a higher dose of primidone from 50mg twice daily to 250mg twice daily    Two sisters with tremor  Father had tremor and had alcohol issues  One of two Daughters with tremor.     He has had tremor since his early 40s.   He is right handed and has bilateral tremor  He had initial right hand tremor.   Thinks his left hand tremor is worse.   He has problems with ADLS such as using tools,, playing cards, board games, drinking from a cup.     He does not have falls but has a tendency to stagger at times and feels like he is going to lose his  Balance.     He has neuropathy for 20 or 30 yrs.  He has idiopathic neuropathy  But his mother and grandfather h ad diabetes  He has not been diagnosed with diabetes  Paola - sister has a history of neuropathy and tremor  She lives in [facility]    Ros  Cataract surgery  He had borderline glaucoma and is normal now.   Hearing is decreased.   He has not lost his sense of smell.   Denies lost of taste  He had right hip surgery  He denies other surgeries but had a pacemaker for low heart rate in 2019.  He has one wire.  Not clear if he is mri compatible but think it is compatible.   He has a Transcriptic  Denies heart attack  He has had high blood pressure for a while and his dose of medication was reduced.   Denies skin cancer.   He has problems sleeping at night. Has problems falling asleep due to his feet  He thinks too much that affects his sleep  He snores at night but has not had a sleep study  Not clear if he has sleep apnea.   Not clear if he talks in his sleep.   Mood  - frustrated and anxious about his health.   Neuro - denies headaches  Has has constipation which he is managing with magnesium - most likely related to  Methadone  Bladder control is okay  He has arthritis.   Lungs  - last winter he had double pneumonia. Former smoker for 30 yrs. asbestos exposure. This was related to his work at a  [employer].   He has not had renal stones. He is taking topiramate.     Problems with high dose of primidone    Memory/cognition - he has had some problems with his memory.     Pregabalin/lyrica is helping the neuropath  Methadone as well   Primidone may be helping his tremor.   topiramate - topamax - for ? Tremor.   Duloxetine (cymbalta) - for ?neuropathy ?       Medications     9am 12/1pm 5/6pm 11pm    Acetaminophen tylenol 500mg Not taking       Calcium ascorbate Not taking       Calcium carbonate vitamin D Should take it twice daily       Clobetasol  As needed       Diphenhydramine  As needed       Duloxetine cymbalta 1   1    Fluocinonide lidex As needed       Hydroxyzine vistaril Not taking       Ibuprofen advil/motrin As needed       Lidocaine xylocaine 2.5% or 5% daily       Lisinopril prinivil/zestril 10mg  1/2       Magnesium dose 1 1 1     Methadone 10mg 1 1/2 1 1    Omega 3 fatty acids fish oil 1000mg 2       Pregabalin lyrica 200mg 1 1  1    Primidone mysoline 50mg  1  1     Topiramate topamax 25mg 1  1     Vitamin B complex 1                                     Medications     9am 12/1pm 5/6pm 11pm    Calcium carbonate vitamin D Should take it twice daily       Clobetasol  As needed       Diphenhydramine  As needed       Duloxetine cymbalta 1   1    Fluocinonide lidex As needed       Ibuprofen advil/motrin As needed       Lidocaine xylocaine 2.5% or 5% daily       Lisinopril prinivil/zestril 10mg  1/2       Magnesium dose 1 1 1     Methadone 10mg 1 1/2 1 1    Omega 3 fatty acids fish oil 1000mg 2       Pregabalin lyrica 200mg 1 1  1    Primidone mysoline 50mg  1  1     Topiramate topamax 25mg 1  1     Vitamin B complex 1                                 Answers for HPI/ROS submitted by the patient on 4/13/2019   General Symptoms: No  Skin Symptoms: No  HENT Symptoms: No  EYE SYMPTOMS: No  HEART SYMPTOMS: No  LUNG SYMPTOMS: Yes  INTESTINAL SYMPTOMS: No  URINARY SYMPTOMS: No  REPRODUCTIVE SYMPTOMS:  No  SKELETAL SYMPTOMS: No  BLOOD SYMPTOMS: No  NERVOUS SYSTEM SYMPTOMS: Yes  MENTAL HEALTH SYMPTOMS: Yes  Cough: Yes  Sputum or phlegm: Yes  Coughing up blood: No  Difficulty breating or shortness of breath: Yes  Snoring: Yes  Wheezing: Yes  Difficulty breathing on exertion: Yes  Nighttime Cough: No  Difficulty breathing when lying flat: No  Trouble with coordination: Yes  Dizziness or trouble with balance: Yes  Fainting or black-out spells: No  Memory loss: No  Headache: No  Seizures: No  Speech problems: No  Tingling: Yes  Tremor: Yes  Difficulty walking: No  Paralysis: No  Numbness: Yes  Nervous or Anxious: Yes  Depression: Yes  Trouble sleeping: Yes  Trouble thinking or concentrating: Yes  Mood changes: No  Panic attacks: No    14 Review of systems  are negative except for   Patient Active Problem List   Diagnosis     Acute blood loss anemia     Bradycardia     Cardiac arrhythmia     Cardiac pacemaker in situ     Chronic constipation     Controlled substance agreement signed     Essential tremor     High blood pressure     H/O asbestos exposure     Primary hypertension     History of repair of hip joint     Osteoarthrosis     Polyarthropathy or polyarthritis, hand     Osteoarthrosis, unspecified whether generalized or localized, pelvic region and thigh     Peripheral neuropathy     Sinus node dysfunction (H)     Symptomatic sinus bradycardia      Not on File  No past surgical history on file.  No past medical history on file.  Social History     Socioeconomic History     Marital status:      Spouse name: Not on file     Number of children: Not on file     Years of education: Not on file     Highest education level: Not on file   Occupational History     Not on file   Social Needs     Financial resource strain: Not on file     Food insecurity:     Worry: Not on file     Inability: Not on file     Transportation needs:     Medical: Not on file     Non-medical: Not on file   Tobacco Use     Smoking status:  Not on file   Substance and Sexual Activity     Alcohol use: Not on file     Drug use: Not on file     Comment: cigars     Sexual activity: Not on file   Lifestyle     Physical activity:     Days per week: Not on file     Minutes per session: Not on file     Stress: Not on file   Relationships     Social connections:     Talks on phone: Not on file     Gets together: Not on file     Attends Zoroastrianism service: Not on file     Active member of club or organization: Not on file     Attends meetings of clubs or organizations: Not on file     Relationship status: Not on file     Intimate partner violence:     Fear of current or ex partner: Not on file     Emotionally abused: Not on file     Physically abused: Not on file     Forced sexual activity: Not on file   Other Topics Concern     Not on file   Social History Narrative     Not on file     No family history on file.  Current Outpatient Medications   Medication Sig Dispense Refill     acetaminophen (TYLENOL) 500 MG tablet Take 500 mg by mouth       clobetasol (TEMOVATE) 0.05 % external ointment        DULoxetine (CYMBALTA) 30 MG capsule        fluocinonide (LIDEX) 0.05 % external cream        lidocaine (XYLOCAINE) 2 % external gel        lidocaine (XYLOCAINE) 5 % external ointment Apply 4 Gm. TID both feet. Pain.       lisinopril (PRINIVIL/ZESTRIL) 10 MG tablet        lisinopril (PRINIVIL/ZESTRIL) 5 MG tablet        methadone (DOLOPHINE) 10 MG tablet 1 p.o. A.M ; 0.5 p.o. Noon; 1 p.o. Evening; 1 at H.S 03/04/2019-04/05/2019  max: 3.0-3.5 tabs. A day.       methadone (DOLOPHINE) 10 MG tablet Take 5-10 mg by mouth       Pregabalin (LYRICA) 200 MG capsule        primidone (MYSOLINE) 50 MG tablet Take 50 mg by mouth       sertraline (ZOLOFT) 100 MG tablet Take 50 mg by mouth       topiramate (TOPAMAX) 25 MG capsule        topiramate (TOPAMAX) 25 MG tablet        calcium ascorbate 500 MG TABS Take 500 mg by mouth       calcium carbonate-vitamin D (OYSTER SHELL CALCIUM/D)  500-200 MG-UNIT tablet Take 2 tablets by mouth       diphenhydrAMINE (BENADRYL) 25 MG capsule Take 50 mg by mouth       DOCOSAHEXAENOIC ACID PO Take 2 g by mouth       hydrOXYzine (VISTARIL) 25 MG capsule Take 50 mg by mouth       ibuprofen (ADVIL/MOTRIN) 200 MG tablet Take 200 mg by mouth       MAGNESIUM PO Take 2 tablets by mouth       primidone (MYSOLINE) 250 MG tablet Take 250 mg by mouth       propranolol ER (INDERAL LA) 80 MG 24 hr capsule Take 80 mg by mouth       vitamin (B COMPLEX) tablet Take 1 tablet by mouth       Examination  B/P: Data Unavailable, T: Data Unavailable, P: Data Unavailable, R: Data Unavailable 0 lbs 0 oz  There were no vitals taken for this visit., There is no height or weight on file to calculate BMI.    Vitals signs were added and reviewed if not above. Please refer to the chart from this visit.    General examination: well developed, nourished and normal affect  Carotid: No bruits. Chest CTA, Heart regular without gallops or murmurs. Abdomen soft nontender, no masses, bowel sounds intact. Periphery: normal pulses without edema. No skin lesions. MENTAL STATUS:  Alert, oriented x3.  Speech fluent with normal naming, repetition, comprehension.  Good right-left orientation, Can remember 3/3 objects.   5/5 on spelling world backwards.  CRANIAL NERVES:  Disks flat. Pupils are equal, round, reactive to light.  Normal vascularity and fields. Extraocular movements full.  Facial sensation and movement normal.  Hearing intact. Palate moves symmetrically.  Tongue midline.  Sternocleidomastoid and trapezius strength intact.  Neck strength was normal.  NEUROLOGIC:  Tone: normal and no bradykinesia. Motor in upper and lower extremities. 5/5.  Reflexes 0-1/4.  Toe signs downgoing.  Good finger-nose-finger, fine finger movement, heel-shin maneuver, sensation to light touch, position sense and vibration and temperature was abnormal with absent vibration right foot and reduced left foot and has  decreased pp/temperature at the ankle Gait normal except for  - very slow and antalgic with good arm swing. Romberg and postural stability   Intact.  Tremor  - postural and action tremor. - especially proximally. `    Answers for HPI/ROS submitted by the patient on 4/13/2019   General Symptoms: No  Skin Symptoms: No  HENT Symptoms: No  EYE SYMPTOMS: No  HEART SYMPTOMS: No  LUNG SYMPTOMS: Yes  INTESTINAL SYMPTOMS: No  URINARY SYMPTOMS: No  REPRODUCTIVE SYMPTOMS: No  SKELETAL SYMPTOMS: No  BLOOD SYMPTOMS: No  NERVOUS SYSTEM SYMPTOMS: Yes  MENTAL HEALTH SYMPTOMS: Yes  Cough: Yes  Sputum or phlegm: Yes  Coughing up blood: No  Difficulty breating or shortness of breath: Yes  Snoring: Yes  Wheezing: Yes  Difficulty breathing on exertion: Yes  Nighttime Cough: No  Difficulty breathing when lying flat: No  Trouble with coordination: Yes  Dizziness or trouble with balance: Yes  Fainting or black-out spells: No  Memory loss: No  Headache: No  Seizures: No  Speech problems: No  Tingling: Yes  Tremor: Yes  Difficulty walking: No  Paralysis: No  Numbness: Yes  Nervous or Anxious: Yes  Depression: Yes  Trouble sleeping: Yes  Trouble thinking or concentrating: Yes  Mood changes: No  Panic attacks: No        Patient Demographics  - 73 y.o. Male; born [birth year]     Patient Address Communication Language Race / Ethnicity   [address] [place] [address] [phone] (Home)  [phone] (Work) English - Written (Preferred) White / Not  or      Allergies      No Known Allergies    Medications      Medication Sig Dispensed Refills Start Date End Date Status   methadone (DOLOPHINE) 10 MG tablet   Take 5-10 mg by mouth see administration instructions. 10mg qam , 5mg(1/2 tab) noon, 10mg evening and 10mg qhs       0     Active   magnesium 250 mg Tab   Take 2 tablets by mouth daily.        0     Active   b complex vitamins tablet   Take 1 tablet by mouth daily.   0     Active   ascorbic acid (ASCORBIC ACID  WITH KHADIJAH HIPS) 500 MG tablet   Take 500 mg by mouth daily.   0     Active   OMEGA-3/DHA/EPA/FISH OIL (FISH OIL-OMEGA-3 FATTY ACIDS) 300-1,000 mg capsule   Take 2 g by mouth daily.   0     Active   calcium-vitamin D 500 mg(1,250mg) -200 unit per tablet   Take 2 tablets by mouth daily.   0     Active   primidone (MYSOLINE) 250 MG tablet   Take 250 mg by mouth 2 (two) times a day.   0     Active   diphenhydrAMINE (BENADRYL) 25 mg capsule   Take 50 mg by mouth 2 (two) times a day as needed for itching.   0     Active   hydrOXYzine pamoate (VISTARIL) 25 MG capsule   Take 25 mg by mouth 2 (two) times a day. 25mg qam and noon   0     Active   hydrOXYzine pamoate (VISTARIL) 25 MG capsule   Take 50 mg by mouth 2 (two) times a day. 50mg qevening and hs   0     Active   lidocaine (XYLOCAINE) 5 % ointment   Apply 1 application topically 3 (three) times a day as needed.   0     Active   topiramate (TOPAMAX) 25 MG tablet   Take 50 mg by mouth 2 (two) times a day.   0     Active   propranolol (INDERAL LA) 80 mg 24 hr capsule   Take 80 mg by mouth daily.   0     Active   pregabalin (LYRICA) 200 MG capsule   Take 200 mg by mouth 3 (three) times a day.   0     Active   lisinopril (PRINIVIL,ZESTRIL) 10 MG tablet   Take 10 mg by mouth daily.   0     Active   clobetasol (TEMOVATE) 0.05 % ointment   Apply 1 application topically 2 (two) times a day as needed.   0     Active   fluocinonide (LIDEX) 0.05 % cream   Apply 1 application topically 2 (two) times a day as needed.   0     Active   acetaminophen (TYLENOL) 500 MG tablet    Indications: Pain Take 1 tablet (500 mg total) by mouth every 6 (six) hours as needed for pain or fever.   0 02/13/2019   Active   ibuprofen (ADVIL,MOTRIN) 200 MG tablet   Take 200 mg by mouth as needed for pain.   0     Active     Active Problems      Problem Noted Date   Cardiac pacemaker in situ 02/19/2019   Overview:     1EQ L310 ACCOLADE MRI  DOI: 2/12/2019     Cardiac arrhythmia 02/12/2019    Bradycardia 03/31/2017   Acute blood loss anemia 02/03/2015   Status post right hip replacement 02/02/2015   Chronic constipation 02/02/2015   Osteoarthritis     Overview:     Created by Conversion    Replacement Utility updated for latest IMO load     Osteoarthritis Of The Hip     Overview:     Created by Conversion       Polyarthritis Of The Hand     Overview:     Created by Conversion    Replacement Utility updated for latest IMO load     H/O asbestos exposure     Tremor, essential     Primary hypertension     Neuropathy     Symptomatic sinus bradycardia     Sinus node dysfunction       Encounters  - from Last 3 Months    Date Type Specialty Care Team Description   03/14/2019 Device Check Cardiology   Cardiac pacemaker in situ (Primary Dx)   02/19/2019 RN Device Check Cardiology   Cardiac pacemaker in situ   02/12/2019 Surgery Cardiology Micheal Mahoney MD   EP Pacemaker Insertion   02/12/2019 - 02/13/2019 Hospital Encounter Cardiac Intensive Care Micheal Mahoney MD   Pain (Primary Dx);   Bradycardia   02/11/2019 - 02/12/2019 Hospital Encounter Cardiology Coty Thrasher DO K.C., Binod, MD   Bradycardia (Primary Dx);   Dizziness;   Hypotension, unspecified hypotension type;   Acute kidney injury (H)   02/11/2019 Travel           Immunizations  Reconcile with Patient's Chart    Name Administration Dates Next Due   Influenza, inj, historic,unspecified 10/13/2017, 11/02/2014     Pneumo Polysac 23-V 11/02/2014       Surgical History      Surgery Date Site/Laterality Comments   TOTAL HIP ARTHROPLASTY 2/2/2015 Hip/Right Procedure: #3 HIP TOTAL ARTHROPLASTY, RIGHT; Surgeon: Aryan Glass MD; Location: [facility]; Service:     Medical devices from this surgery are in the Implants section.     INSERT / REPLACE / REMOVE PACEMAKER 02/12/2019        EP PACEMAKER INSERT 2/12/2019 N/A Procedure: EP Pacemaker Insertion; Surgeon: Micheal Mahoney MD; Location: [facility]; Service: Cardiology     Medical devices from this surgery are in the Implants section.       Medical History      Medical History Date Comments   Hypertension       Neuropathy (H)       H/O asbestos exposure       Erectile dysfunction       Essential tremor       Chronic pain   toes   Bradycardia         Family History      Medical History Relation Name Comments   Esophageal cancer Father       COPD Mother       Diabetes Mother         Relation Name Status Comments   Father        Mother          Social History      Tobacco Use Types Packs/Day Years Used Date   Former Smoker           Smokeless Tobacco: Former User           Alcohol Use Drinks/Week oz/Week Comments   Yes     occasional     Sex Assigned at Birth Date Recorded   Not on file       Job Start Date Occupation Industry   Not on file Not on file Not on file     Travel History Travel Start Travel End   No recent travel history available.       [address] [place] [address] [phone] (Home)  [phone] (Mobile) English - Spoken (Preferred) White / Not  or      Allergies      No Known Allergies    Medications  Reconcile with Patient's Chart    Medication Sig Dispensed Refills Start Date End Date Status   primidone (MYSOLINE) 50 mg tablet   Take 1 tablet by mouth 3 times daily.   0 2014   Active   lidocaine (XYLOCAINE) 2 % gel       11 2015   Active   fluocinonide 0.05% topical (LIDEX) 0.05 % cream       0 2015   Active   clobetasol 0.05% (TEMOVATE 0.05% OINTMENT) 0.05 % ointment       6 2015   Active   lisinopril (PRINIVIL; ZESTRIL) 10 mg tablet       1 2018   Active   LYRICA 200 mg capsule       1 12/15/2018   Active   topiramate (TOPAMAX) 25 mg tablet       3 12/10/2018   Active   lidocaine 5 % oint topical ointment    Indications: Neuropathy Apply 4 Gm. TID both feet. Pain. 240 g   2 2018   Active   sertraline (ZOLOFT) 100 mg tablet    Indications: Mood disorder (HC) Take 0.5 tablets by  mouth once daily. 45 tablet   2 03/13/2019   Active   methadone (DOLOPHINE) 10 mg tablet    Indications: Neuropathy 1 p.o. A.M ; 0.5 p.o. Noon; 1 p.o. Evening; 1 at H.S Use dates: 3/29/19-4/27/19 max: 3.0-3.5 tabs. a day. 100 tablet   0 04/04/2019   Active   methadone (DOLOPHINE) 10 mg tablet    Indications: Neuropathy 1 p.o. A.M ; 0.5 p.o. Noon; 1 p.o. Evening; 1 at H.S 03/04/2019-04/05/2019 max: 3.0-3.5 tabs. A day. 95 tablet   0 03/01/2019 03/27/2019 Discontinued     Active Problems      Problem Noted Date   Controlled substance agreement signed 06/05/2018   Overview:     90 day reminder has been signed by pt       Controlled substance agreement signed 09/05/2014   Overview:     [facility]-9/2/14     Neuropathy 09/02/2014     Encounters  - from Last 3 Months    Date Type Specialty Care Team Description   03/27/2019 Telephone Pain Management Grazyna Barrett NP   Refill Request (Methadone-[place] to [facility] pharm. Pt states he will be out of methadone tomorrow. Pt states his foot pain is unbearable without it. )   03/13/2019 Office Visit Pain Management Grazyna Barrett NP   Follow Up (Feet pain)   03/13/2019 Travel         02/26/2019 Telephone Pain Management Grazyna Barrett NP   Medication Management (hydroxixine (?) not avaailable from . please call to send something else)   02/22/2019 Refill Pain Management Grazyna Barrett, NP   Refill Request (med refil for his 2 medications methadone and hydroxine??? please refill and have Grazyna call him thank you)   01/31/2019 Telephone Pain Management Grazyna Barrett NP   Refill Request (pt is completely out and had an appt with Grazyna today and was addressing his refill for methadone 4 tablets a day please call if any questions and when it is sent to [facility] in [facility] b/rice  [phone])   01/28/2019 Telephone Pain Management Grazyna Barrett NP   Refill Request (Hydroxyzine Pamoate)     Medical History      Medical History Date Comments    Idiopathic peripheral neuropathy       HTN (hypertension)       Tobacco abuse   4 cigars a day.      Social History      Tobacco Use Types Packs/Day Years Used Date   Former Smoker Cigars     Quit: 12/01/2014   Smokeless Tobacco: Never Used           Comments: zero.      Alcohol Use Drinks/Week oz/Week Comments   No 0 Standard drinks or equivalent   0.0 quit once put on Vicodin.      Sex Assigned at Birth Date Recorded   Not on file       Job Start Date Occupation Industry   Not on file Not on file Not on file     Travel History Travel Start Travel End   No recent travel history available.         Again, thank you for allowing me to participate in the care of your patient.      Sincerely,    Israel Corey MD

## 2019-04-18 NOTE — Clinical Note
2019       RE: Romaine Farah  [facility] [address] [place] [id number]     Dear Colleague,    Thank you for referring your patient, Romaine Farah, to the [facility] NEUROLOGY at [facility]. Please see a copy of my visit note below.    Summary and Recommendations:         Israel Corey MD  _____________________________________________________________________  PATIENT: Romaine Farah  73 year old male   : 1945  LORENA: 2019    Consult requested by pcp/other        Outside records reviewed and revealed - inserted.       History obtained from patient      History of Present Illness  72 yo man with tremor.      Medications            Acetaminophen tylenol 500mg        Calcium ascorbate        Calcium carbonate        Clobetasol         Diphenhydramine         Docosahexanoic acid         Duloxetine cymbalta        Fluocinonide lidex        Gabapentin neurontin 300mg        Hydroxyzine vistaril        Ibuprofen advil/motrin        Lidocaine xylocaine 2.5% or 5%        Lisinopril prinivil/zestril 10mg or 5mg        Magnesium         Methadone 10mg        Pregabaline lyrica 200mg        Primidone mysoline 250mg        Primidone mysoline 50mg         Propranolol ER inderal LA 80mg 24 hr capsule        Sertraline zoloft 100mg        Topiramate topamax 25mg        Vitamin B complex                                  Answers for HPI/ROS submitted by the patient on 2019   General Symptoms: No  Skin Symptoms: No  HENT Symptoms: No  EYE SYMPTOMS: No  HEART SYMPTOMS: No  LUNG SYMPTOMS: Yes  INTESTINAL SYMPTOMS: No  URINARY SYMPTOMS: No  REPRODUCTIVE SYMPTOMS: No  SKELETAL SYMPTOMS: No  BLOOD SYMPTOMS: No  NERVOUS SYSTEM SYMPTOMS: Yes  MENTAL HEALTH SYMPTOMS: Yes  Cough: Yes  Sputum or phlegm: Yes  Coughing up blood: No  Difficulty breating or shortness of breath: Yes  Snoring: Yes  Wheezing: Yes  Difficulty breathing on exertion: Yes  Nighttime Cough: No  Difficulty breathing  when lying flat: No  Trouble with coordination: Yes  Dizziness or trouble with balance: Yes  Fainting or black-out spells: No  Memory loss: No  Headache: No  Seizures: No  Speech problems: No  Tingling: Yes  Tremor: Yes  Difficulty walking: No  Paralysis: No  Numbness: Yes  Nervous or Anxious: Yes  Depression: Yes  Trouble sleeping: Yes  Trouble thinking or concentrating: Yes  Mood changes: No  Panic attacks: No    14 Review of systems  are negative except for   Patient Active Problem List   Diagnosis     Acute blood loss anemia     Bradycardia     Cardiac arrhythmia     Cardiac pacemaker in situ     Chronic constipation     Controlled substance agreement signed     Essential tremor     High blood pressure     H/O asbestos exposure     Primary hypertension     History of repair of hip joint     Osteoarthrosis     Polyarthropathy or polyarthritis, hand     Osteoarthrosis, unspecified whether generalized or localized, pelvic region and thigh     Peripheral neuropathy     Sinus node dysfunction (H)     Symptomatic sinus bradycardia      Not on File  No past surgical history on file.  No past medical history on file.  Social History     Socioeconomic History     Marital status:      Spouse name: Not on file     Number of children: Not on file     Years of education: Not on file     Highest education level: Not on file   Occupational History     Not on file   Social Needs     Financial resource strain: Not on file     Food insecurity:     Worry: Not on file     Inability: Not on file     Transportation needs:     Medical: Not on file     Non-medical: Not on file   Tobacco Use     Smoking status: Not on file   Substance and Sexual Activity     Alcohol use: Not on file     Drug use: Not on file     Comment: cigars     Sexual activity: Not on file   Lifestyle     Physical activity:     Days per week: Not on file     Minutes per session: Not on file     Stress: Not on file   Relationships     Social connections:      Talks on phone: Not on file     Gets together: Not on file     Attends [employer] service: Not on file     Active member of club or organization: Not on file     Attends meetings of clubs or organizations: Not on file     Relationship status: Not on file     Intimate partner violence:     Fear of current or ex partner: Not on file     Emotionally abused: Not on file     Physically abused: Not on file     Forced sexual activity: Not on file   Other Topics Concern     Not on file   Social History Narrative     Not on file     No family history on file.  Current Outpatient Medications   Medication Sig Dispense Refill     acetaminophen (TYLENOL) 500 MG tablet Take 500 mg by mouth       clobetasol (TEMOVATE) 0.05 % external ointment        DULoxetine (CYMBALTA) 30 MG capsule        fluocinonide (LIDEX) 0.05 % external cream        lidocaine (XYLOCAINE) 2 % external gel        lidocaine (XYLOCAINE) 5 % external ointment Apply 4 Gm. TID both feet. Pain.       lisinopril (PRINIVIL/ZESTRIL) 10 MG tablet        lisinopril (PRINIVIL/ZESTRIL) 5 MG tablet        methadone (DOLOPHINE) 10 MG tablet 1 p.o. A.M ; 0.5 p.o. Noon; 1 p.o. Evening; 1 at H.S 03/04/2019-04/05/2019  max: 3.0-3.5 tabs. A day.       methadone (DOLOPHINE) 10 MG tablet Take 5-10 mg by mouth       Pregabalin (LYRICA) 200 MG capsule        primidone (MYSOLINE) 50 MG tablet Take 50 mg by mouth       sertraline (ZOLOFT) 100 MG tablet Take 50 mg by mouth       topiramate (TOPAMAX) 25 MG capsule        topiramate (TOPAMAX) 25 MG tablet        calcium ascorbate 500 MG TABS Take 500 mg by mouth       calcium carbonate-vitamin D (OYSTER SHELL CALCIUM/D) 500-200 MG-UNIT tablet Take 2 tablets by mouth       diphenhydrAMINE (BENADRYL) 25 MG capsule Take 50 mg by mouth       DOCOSAHEXAENOIC ACID PO Take 2 g by mouth       hydrOXYzine (VISTARIL) 25 MG capsule Take 50 mg by mouth       ibuprofen (ADVIL/MOTRIN) 200 MG tablet Take 200 mg by mouth       MAGNESIUM PO Take 2  tablets by mouth       primidone (MYSOLINE) 250 MG tablet Take 250 mg by mouth       propranolol ER (INDERAL LA) 80 MG 24 hr capsule Take 80 mg by mouth       vitamin (B COMPLEX) tablet Take 1 tablet by mouth       Examination  B/P: Data Unavailable, T: Data Unavailable, P: Data Unavailable, R: Data Unavailable 0 lbs 0 oz  There were no vitals taken for this visit., There is no height or weight on file to calculate BMI.    Vitals signs were added and reviewed if not above. Please refer to the chart from this visit.    General examination: well developed, nourished and normal affect  Carotid: No bruits. Chest CTA, Heart regular without gallops or murmurs. Abdomen soft nontender, no masses, bowel sounds intact. Periphery: normal pulses without edema. No skin lesions. MENTAL STATUS:  Alert, oriented x3.  Speech fluent with normal naming, repetition, comprehension.  Good right-left orientation, Can remember ***/3 objects.   CRANIAL NERVES:  Disks flat. Pupils are equal, round, reactive to light.  Normal vascularity and fields. Extraocular movements full.  Facial sensation and movement normal.  Hearing intact. Palate moves symmetrically.  Tongue midline.  Sternocleidomastoid and trapezius strength intact.  Neck strength was normal.  NEUROLOGIC:  Tone: ***. Motor in upper and lower extremities. 5/5.  Reflexes 2/4.  Toe signs downgoing.  Good finger-nose-finger, fine finger movement, heel-shin maneuver, sensation to light touch, position sense and vibration and temperature was normal. Gait normal except for ***. Romberg and postural stability *** Tremor ***      Answers for HPI/ROS submitted by the patient on 4/13/2019   General Symptoms: No  Skin Symptoms: No  HENT Symptoms: No  EYE SYMPTOMS: No  HEART SYMPTOMS: No  LUNG SYMPTOMS: Yes  INTESTINAL SYMPTOMS: No  URINARY SYMPTOMS: No  REPRODUCTIVE SYMPTOMS: No  SKELETAL SYMPTOMS: No  BLOOD SYMPTOMS: No  NERVOUS SYSTEM SYMPTOMS: Yes  MENTAL HEALTH SYMPTOMS: Yes  Cough:  Yes  Sputum or phlegm: Yes  Coughing up blood: No  Difficulty breating or shortness of breath: Yes  Snoring: Yes  Wheezing: Yes  Difficulty breathing on exertion: Yes  Nighttime Cough: No  Difficulty breathing when lying flat: No  Trouble with coordination: Yes  Dizziness or trouble with balance: Yes  Fainting or black-out spells: No  Memory loss: No  Headache: No  Seizures: No  Speech problems: No  Tingling: Yes  Tremor: Yes  Difficulty walking: No  Paralysis: No  Numbness: Yes  Nervous or Anxious: Yes  Depression: Yes  Trouble sleeping: Yes  Trouble thinking or concentrating: Yes  Mood changes: No  Panic attacks: No        Patient Demographics  - 73 y.o. Male; born [birth year]     Patient Address Communication Language Race / Ethnicity   [address] [place] [id number] [phone] (Home)  [phone] (Work) English - Written (Preferred) White / Not  or      Allergies      No Known Allergies    Medications      Medication Sig Dispensed Refills Start Date End Date Status   methadone (DOLOPHINE) 10 MG tablet   Take 5-10 mg by mouth see administration instructions. 10mg qam , 5mg(1/2 tab) noon, 10mg evening and 10mg qhs       0     Active   magnesium 250 mg Tab   Take 2 tablets by mouth daily.        0     Active   b complex vitamins tablet   Take 1 tablet by mouth daily.   0     Active   ascorbic acid (ASCORBIC ACID WITH KHADIJAH HIPS) 500 MG tablet   Take 500 mg by mouth daily.   0     Active   OMEGA-3/DHA/EPA/FISH OIL (FISH OIL-OMEGA-3 FATTY ACIDS) 300-1,000 mg capsule   Take 2 g by mouth daily.   0     Active   calcium-vitamin D 500 mg(1,250mg) -200 unit per tablet   Take 2 tablets by mouth daily.   0     Active   primidone (MYSOLINE) 250 MG tablet   Take 250 mg by mouth 2 (two) times a day.   0     Active   diphenhydrAMINE (BENADRYL) 25 mg capsule   Take 50 mg by mouth 2 (two) times a day as needed for itching.   0     Active   hydrOXYzine pamoate (VISTARIL) 25 MG capsule   Take 25 mg  by mouth 2 (two) times a day. 25mg qam and noon   0     Active   hydrOXYzine pamoate (VISTARIL) 25 MG capsule   Take 50 mg by mouth 2 (two) times a day. 50mg qevening and hs   0     Active   lidocaine (XYLOCAINE) 5 % ointment   Apply 1 application topically 3 (three) times a day as needed.   0     Active   topiramate (TOPAMAX) 25 MG tablet   Take 50 mg by mouth 2 (two) times a day.   0     Active   propranolol (INDERAL LA) 80 mg 24 hr capsule   Take 80 mg by mouth daily.   0     Active   pregabalin (LYRICA) 200 MG capsule   Take 200 mg by mouth 3 (three) times a day.   0     Active   lisinopril (PRINIVIL,ZESTRIL) 10 MG tablet   Take 10 mg by mouth daily.   0     Active   clobetasol (TEMOVATE) 0.05 % ointment   Apply 1 application topically 2 (two) times a day as needed.   0     Active   fluocinonide (LIDEX) 0.05 % cream   Apply 1 application topically 2 (two) times a day as needed.   0     Active   acetaminophen (TYLENOL) 500 MG tablet    Indications: Pain Take 1 tablet (500 mg total) by mouth every 6 (six) hours as needed for pain or fever.   0 02/13/2019   Active   ibuprofen (ADVIL,MOTRIN) 200 MG tablet   Take 200 mg by mouth as needed for pain.   0     Active     Active Problems      Problem Noted Date   Cardiac pacemaker in situ 02/19/2019   Overview:     Zuse L310 ACCOLADE MRI  DOI: 2/12/2019     Cardiac arrhythmia 02/12/2019   Bradycardia 03/31/2017   Acute blood loss anemia 02/03/2015   Status post right hip replacement 02/02/2015   Chronic constipation 02/02/2015   Osteoarthritis     Overview:     Created by Conversion    Replacement Utility updated for latest IMO load     Osteoarthritis Of The Hip     Overview:     Created by Conversion       Polyarthritis Of The Hand     Overview:     Created by Conversion    Replacement Utility updated for latest IMO load     H/O asbestos exposure     Tremor, essential     Primary hypertension     Neuropathy     Symptomatic sinus bradycardia     Sinus node  dysfunction       Encounters  - from Last 3 Months    Date Type Specialty Care Team Description   2019 Device Check Cardiology   Cardiac pacemaker in situ (Primary Dx)   2019 RN Device Check Cardiology   Cardiac pacemaker in situ   2019 Surgery Cardiology Micheal Mahoney MD   EP Pacemaker Insertion   2019 - 2019 Hospital Encounter Cardiac Intensive Care Micheal Mahoney MD   Pain (Primary Dx);   Bradycardia   2019 - 2019 Hospital Encounter Cardiology Coty Thrasher DO K.C., Binod, MD   Bradycardia (Primary Dx);   Dizziness;   Hypotension, unspecified hypotension type;   Acute kidney injury (H)   2019 Travel           Immunizations  Reconcile with Patient's Chart    Name Administration Dates Next Due   Influenza, inj, historic,unspecified 10/13/2017, 2014     Pneumo Polysac 23-V 2014       Surgical History      Surgery Date Site/Laterality Comments   TOTAL HIP ARTHROPLASTY 2015 Hip/Right Procedure: #3 HIP TOTAL ARTHROPLASTY, RIGHT; Surgeon: Aryan Glass MD; Location: [facility]; Service:     Medical devices from this surgery are in the Implants section.     INSERT / REPLACE / REMOVE PACEMAKER 2019        EP PACEMAKER INSERT 2019 N/A Procedure: EP Pacemaker Insertion; Surgeon: Micheal Mahoney MD; Location: [facility]; Service: Cardiology    Medical devices from this surgery are in the Implants section.       Medical History      Medical History Date Comments   Hypertension       Neuropathy (H)       H/O asbestos exposure       Erectile dysfunction       Essential tremor       Chronic pain   toes   Bradycardia         Family History      Medical History Relation Name Comments   Esophageal cancer Father       COPD Mother       Diabetes Mother         Relation Name Status Comments   Father        Mother          Social History      Tobacco Use Types Packs/Day Years Used Date   Former Smoker            Smokeless Tobacco: Former User           Alcohol Use Drinks/Week oz/Week Comments   Yes     occasional     Sex Assigned at Birth Date Recorded   Not on file       Job Start Date Occupation Industry   Not on file Not on file Not on file     Travel History Travel Start Travel End   No recent travel history available.       [address] [place] [address] [phone] (Home)  [phone] (Mobile) English - Spoken (Preferred) White / Not  or      Allergies      No Known Allergies    Medications  Reconcile with Patient's Chart    Medication Sig Dispensed Refills Start Date End Date Status   primidone (MYSOLINE) 50 mg tablet   Take 1 tablet by mouth 3 times daily.   0 09/02/2014   Active   lidocaine (XYLOCAINE) 2 % gel       11 09/27/2015   Active   fluocinonide 0.05% topical (LIDEX) 0.05 % cream       0 09/01/2015   Active   clobetasol 0.05% (TEMOVATE 0.05% OINTMENT) 0.05 % ointment       6 09/16/2015   Active   lisinopril (PRINIVIL; ZESTRIL) 10 mg tablet       1 01/01/2018   Active   LYRICA 200 mg capsule       1 12/15/2018   Active   topiramate (TOPAMAX) 25 mg tablet       3 12/10/2018   Active   lidocaine 5 % oint topical ointment    Indications: Neuropathy Apply 4 Gm. TID both feet. Pain. 240 g   2 12/28/2018   Active   sertraline (ZOLOFT) 100 mg tablet    Indications: Mood disorder (HC) Take 0.5 tablets by mouth once daily. 45 tablet   2 03/13/2019   Active   methadone (DOLOPHINE) 10 mg tablet    Indications: Neuropathy 1 p.o. A.M ; 0.5 p.o. Noon; 1 p.o. Evening; 1 at H.S Use dates: 3/29/19-4/27/19 max: 3.0-3.5 tabs. a day. 100 tablet   0 04/04/2019   Active   methadone (DOLOPHINE) 10 mg tablet    Indications: Neuropathy 1 p.o. A.M ; 0.5 p.o. Noon; 1 p.o. Evening; 1 at H.S 03/04/2019-04/05/2019 max: 3.0-3.5 tabs. A day. 95 tablet   0 03/01/2019 03/27/2019 Discontinued     Active Problems      Problem Noted Date   Controlled substance agreement signed 06/05/2018   Overview:      90 day reminder has been signed by pt       Controlled substance agreement signed 09/05/2014   Overview:     [facility]-9/2/14     Neuropathy 09/02/2014     Encounters  - from Last 3 Months    Date Type Specialty Care Team Description   03/27/2019 Telephone Pain Management Grazyna Barrett, NP   Refill Request (Methadone-[place] to [facility] pharm. Pt states he will be out of methadone tomorrow. Pt states his foot pain is unbearable without it. )   03/13/2019 Office Visit Pain Management Grazyna Barrett, NP   Follow Up (Feet pain)   03/13/2019 Travel         02/26/2019 Telephone Pain Management Grazyna Barrett, NP   Medication Management (hydroxixine (?) not avaailable from . please call to send something else)   02/22/2019 Refill Pain Management Grazyna Barrett, JOHN   Refill Request (med refil for his 2 medications methadone and hydroxine??? please refill and have Grazyna call him thank you)   01/31/2019 Telephone Pain Management Grazyna Barrett NP   Refill Request (pt is completely out and had an appt with Grazyna today and was addressing his refill for methadone 4 tablets a day please call if any questions and when it is sent to [facility] in [facility] bio[facility] [phone])   01/28/2019 Telephone Pain Management Grazyna Barrett NP   Refill Request (Hydroxyzine Pamoate)     Medical History      Medical History Date Comments   Idiopathic peripheral neuropathy       HTN (hypertension)       Tobacco abuse   4 cigars a day.      Social History      Tobacco Use Types Packs/Day Years Used Date   Former Smoker Cigars     Quit: 12/01/2014   Smokeless Tobacco: Never Used           Comments: zero.      Alcohol Use Drinks/Week oz/Week Comments   No 0 Standard drinks or equivalent   0.0 quit once put on Vicodin.      Sex Assigned at Birth Date Recorded   Not on file       Job Start Date Occupation Industry   Not on file Not on file Not on file     Travel History Travel Start Travel End   No recent travel  history available.           Summary and Recommendations:     Tremor and Neuropathy   He does not drink but he has had a response to alcohol  Medications on arrival    Medications     9am 12/1pm 5/6pm 11pm    Calcium carbonate vitamin D Should take it twice daily       Clobetasol  As needed       Diphenhydramine  As needed       Duloxetine cymbalta 1   1    Fluocinonide lidex As needed       Ibuprofen advil/motrin As needed       Lidocaine xylocaine 2.5% or 5% daily       Lisinopril prinivil/zestril 10mg  1/2       Magnesium dose 1 1 1     Methadone 10mg 1 1/2 1 1    Omega 3 fatty acids fish oil 1000mg 2       Pregabalin lyrica 200mg 1 1  1    Primidone mysoline 50mg  1  1     Topiramate topamax 25mg 1  1     Vitamin B complex 1                                 Pharmacy consultation to review his medications for neuropathic pain and the medications for tremor.   Discussed the complexity of his condition and the fact that he has both a severe neuropathy and tremor.   Discussed DBS class  Discussed surgical therapies for tremor including DBS, gamma knife radiosurgery, focused beam ultrasound, thalamotomy  He has tried some assistive devices - daughter has done some work in speech therapy and has tried to help on this.     Return to be determined.     PLAN  DBS class  Pharmacy consultation with Nenita Polo Monday.   Workup to be determined for tremor.   He set up kristina Corey MD  _____________________________________________________________________  PATIENT: Romaine Farah  73 year old male   : 1945  LORENA: 2019    Consult requested by pcp/other        Outside records reviewed and revealed - inserted.       History obtained from patient      History of Present Illness  72 yo man with tremor.    Did not tolerate a higher dose of primidone from 50mg twice daily to 250mg twice daily    Two sisters with tremor  Father had tremor and had alcohol issues  One of two Daughters with tremor.     He has  had tremor since his early 40s.   He is right handed and has bilateral tremor  He had initial right hand tremor.   Thinks his left hand tremor is worse.   He has problems with ADLS such as using tools,, playing cards, board games, drinking from a cup.     He does not have falls but has a tendency to stagger at times and feels like he is going to lose his  Balance.     He has neuropathy for 20 or 30 yrs.  He has idiopathic neuropathy  But his mother and grandfather h ad diabetes  He has not been diagnosed with diabetes  Paola - sister has a history of neuropathy and tremor  She lives in [facility]    Ros  Cataract surgery  He had borderline glaucoma and is normal now.   Hearing is decreased.   He has not lost his sense of smell.   Denies lost of taste  He had right hip surgery  He denies other surgeries but had a pacemaker for low heart rate in 2019.  He has one wire.  Not clear if he is mri compatible but think it is compatible.   He has a Unata  Denies heart attack  He has had high blood pressure for a while and his dose of medication was reduced.   Denies skin cancer.   He has problems sleeping at night. Has problems falling asleep due to his feet  He thinks too much that affects his sleep  He snores at night but has not had a sleep study  Not clear if he has sleep apnea.   Not clear if he talks in his sleep.   Mood  - frustrated and anxious about his health.   Neuro - denies headaches  Has has constipation which he is managing with magnesium - most likely related to  Methadone  Bladder control is okay  He has arthritis.   Lungs  - last winter he had double pneumonia. Former smoker for 30 yrs. asbestos exposure. This was related to his work at a .   He has not had renal stones. He is taking topiramate.     Problems with high dose of primidone    Memory/cognition - he has had some problems with his memory.     Pregabalin/lyrica is helping the neuropath  Methadone as well   Primidone may  be helping his tremor.   topiramate - topamax - for ? Tremor.   Duloxetine (cymbalta) - for ?neuropathy ?       Medications     9am 12/1pm 5/6pm 11pm    Acetaminophen tylenol 500mg Not taking       Calcium ascorbate Not taking       Calcium carbonate vitamin D Should take it twice daily       Clobetasol  As needed       Diphenhydramine  As needed       Duloxetine cymbalta 1   1    Fluocinonide lidex As needed       Hydroxyzine vistaril Not taking       Ibuprofen advil/motrin As needed       Lidocaine xylocaine 2.5% or 5% daily       Lisinopril prinivil/zestril 10mg  1/2       Magnesium dose 1 1 1     Methadone 10mg 1 1/2 1 1    Omega 3 fatty acids fish oil 1000mg 2       Pregabalin lyrica 200mg 1 1  1    Primidone mysoline 50mg  1  1     Topiramate topamax 25mg 1  1     Vitamin B complex 1                                     Medications     9am 12/1pm 5/6pm 11pm    Calcium carbonate vitamin D Should take it twice daily       Clobetasol  As needed       Diphenhydramine  As needed       Duloxetine cymbalta 1   1    Fluocinonide lidex As needed       Ibuprofen advil/motrin As needed       Lidocaine xylocaine 2.5% or 5% daily       Lisinopril prinivil/zestril 10mg  1/2       Magnesium dose 1 1 1     Methadone 10mg 1 1/2 1 1    Omega 3 fatty acids fish oil 1000mg 2       Pregabalin lyrica 200mg 1 1  1    Primidone mysoline 50mg  1  1     Topiramate topamax 25mg 1  1     Vitamin B complex 1                                 Answers for HPI/ROS submitted by the patient on 4/13/2019   General Symptoms: No  Skin Symptoms: No  HENT Symptoms: No  EYE SYMPTOMS: No  HEART SYMPTOMS: No  LUNG SYMPTOMS: Yes  INTESTINAL SYMPTOMS: No  URINARY SYMPTOMS: No  REPRODUCTIVE SYMPTOMS: No  SKELETAL SYMPTOMS: No  BLOOD SYMPTOMS: No  NERVOUS SYSTEM SYMPTOMS: Yes  MENTAL HEALTH SYMPTOMS: Yes  Cough: Yes  Sputum or phlegm: Yes  Coughing up blood: No  Difficulty breating or shortness of breath: Yes  Snoring: Yes  Wheezing: Yes  Difficulty  breathing on exertion: Yes  Nighttime Cough: No  Difficulty breathing when lying flat: No  Trouble with coordination: Yes  Dizziness or trouble with balance: Yes  Fainting or black-out spells: No  Memory loss: No  Headache: No  Seizures: No  Speech problems: No  Tingling: Yes  Tremor: Yes  Difficulty walking: No  Paralysis: No  Numbness: Yes  Nervous or Anxious: Yes  Depression: Yes  Trouble sleeping: Yes  Trouble thinking or concentrating: Yes  Mood changes: No  Panic attacks: No    14 Review of systems  are negative except for   Patient Active Problem List   Diagnosis     Acute blood loss anemia     Bradycardia     Cardiac arrhythmia     Cardiac pacemaker in situ     Chronic constipation     Controlled substance agreement signed     Essential tremor     High blood pressure     H/O asbestos exposure     Primary hypertension     History of repair of hip joint     Osteoarthrosis     Polyarthropathy or polyarthritis, hand     Osteoarthrosis, unspecified whether generalized or localized, pelvic region and thigh     Peripheral neuropathy     Sinus node dysfunction (H)     Symptomatic sinus bradycardia      Not on File  No past surgical history on file.  No past medical history on file.  Social History     Socioeconomic History     Marital status:      Spouse name: Not on file     Number of children: Not on file     Years of education: Not on file     Highest education level: Not on file   Occupational History     Not on file   Social Needs     Financial resource strain: Not on file     Food insecurity:     Worry: Not on file     Inability: Not on file     Transportation needs:     Medical: Not on file     Non-medical: Not on file   Tobacco Use     Smoking status: Not on file   Substance and Sexual Activity     Alcohol use: Not on file     Drug use: Not on file     Comment: cigars     Sexual activity: Not on file   Lifestyle     Physical activity:     Days per week: Not on file     Minutes per session: Not on file      Stress: Not on file   Relationships     Social connections:     Talks on phone: Not on file     Gets together: Not on file     Attends Islam service: Not on file     Active member of club or organization: Not on file     Attends meetings of clubs or organizations: Not on file     Relationship status: Not on file     Intimate partner violence:     Fear of current or ex partner: Not on file     Emotionally abused: Not on file     Physically abused: Not on file     Forced sexual activity: Not on file   Other Topics Concern     Not on file   Social History Narrative     Not on file     No family history on file.  Current Outpatient Medications   Medication Sig Dispense Refill     acetaminophen (TYLENOL) 500 MG tablet Take 500 mg by mouth       clobetasol (TEMOVATE) 0.05 % external ointment        DULoxetine (CYMBALTA) 30 MG capsule        fluocinonide (LIDEX) 0.05 % external cream        lidocaine (XYLOCAINE) 2 % external gel        lidocaine (XYLOCAINE) 5 % external ointment Apply 4 Gm. TID both feet. Pain.       lisinopril (PRINIVIL/ZESTRIL) 10 MG tablet        lisinopril (PRINIVIL/ZESTRIL) 5 MG tablet        methadone (DOLOPHINE) 10 MG tablet 1 p.o. A.M ; 0.5 p.o. Noon; 1 p.o. Evening; 1 at H.S 03/04/2019-04/05/2019  max: 3.0-3.5 tabs. A day.       methadone (DOLOPHINE) 10 MG tablet Take 5-10 mg by mouth       Pregabalin (LYRICA) 200 MG capsule        primidone (MYSOLINE) 50 MG tablet Take 50 mg by mouth       sertraline (ZOLOFT) 100 MG tablet Take 50 mg by mouth       topiramate (TOPAMAX) 25 MG capsule        topiramate (TOPAMAX) 25 MG tablet        calcium ascorbate 500 MG TABS Take 500 mg by mouth       calcium carbonate-vitamin D (OYSTER SHELL CALCIUM/D) 500-200 MG-UNIT tablet Take 2 tablets by mouth       diphenhydrAMINE (BENADRYL) 25 MG capsule Take 50 mg by mouth       DOCOSAHEXAENOIC ACID PO Take 2 g by mouth       hydrOXYzine (VISTARIL) 25 MG capsule Take 50 mg by mouth       ibuprofen (ADVIL/MOTRIN)  200 MG tablet Take 200 mg by mouth       MAGNESIUM PO Take 2 tablets by mouth       primidone (MYSOLINE) 250 MG tablet Take 250 mg by mouth       propranolol ER (INDERAL LA) 80 MG 24 hr capsule Take 80 mg by mouth       vitamin (B COMPLEX) tablet Take 1 tablet by mouth       Examination  B/P: Data Unavailable, T: Data Unavailable, P: Data Unavailable, R: Data Unavailable 0 lbs 0 oz  There were no vitals taken for this visit., There is no height or weight on file to calculate BMI.    Vitals signs were added and reviewed if not above. Please refer to the chart from this visit.    General examination: well developed, nourished and normal affect  Carotid: No bruits. Chest CTA, Heart regular without gallops or murmurs. Abdomen soft nontender, no masses, bowel sounds intact. Periphery: normal pulses without edema. No skin lesions. MENTAL STATUS:  Alert, oriented x3.  Speech fluent with normal naming, repetition, comprehension.  Good right-left orientation, Can remember 3/3 objects.   5/5 on spelling world backwards.  CRANIAL NERVES:  Disks flat. Pupils are equal, round, reactive to light.  Normal vascularity and fields. Extraocular movements full.  Facial sensation and movement normal.  Hearing intact. Palate moves symmetrically.  Tongue midline.  Sternocleidomastoid and trapezius strength intact.  Neck strength was normal.  NEUROLOGIC:  Tone: normal and no bradykinesia. Motor in upper and lower extremities. 5/5.  Reflexes 0-1/4.  Toe signs downgoing.  Good finger-nose-finger, fine finger movement, heel-shin maneuver, sensation to light touch, position sense and vibration and temperature was abnormal with absent vibration right foot and reduced left foot and has decreased pp/temperature at the ankle Gait normal except for  - very slow and antalgic with good arm swing. Romberg and postural stability   Intact.  Tremor  - postural and action tremor. - especially proximally. `    Answers for HPI/ROS submitted by the patient  on 4/13/2019   General Symptoms: No  Skin Symptoms: No  HENT Symptoms: No  EYE SYMPTOMS: No  HEART SYMPTOMS: No  LUNG SYMPTOMS: Yes  INTESTINAL SYMPTOMS: No  URINARY SYMPTOMS: No  REPRODUCTIVE SYMPTOMS: No  SKELETAL SYMPTOMS: No  BLOOD SYMPTOMS: No  NERVOUS SYSTEM SYMPTOMS: Yes  MENTAL HEALTH SYMPTOMS: Yes  Cough: Yes  Sputum or phlegm: Yes  Coughing up blood: No  Difficulty breating or shortness of breath: Yes  Snoring: Yes  Wheezing: Yes  Difficulty breathing on exertion: Yes  Nighttime Cough: No  Difficulty breathing when lying flat: No  Trouble with coordination: Yes  Dizziness or trouble with balance: Yes  Fainting or black-out spells: No  Memory loss: No  Headache: No  Seizures: No  Speech problems: No  Tingling: Yes  Tremor: Yes  Difficulty walking: No  Paralysis: No  Numbness: Yes  Nervous or Anxious: Yes  Depression: Yes  Trouble sleeping: Yes  Trouble thinking or concentrating: Yes  Mood changes: No  Panic attacks: No        Patient Demographics  - 73 y.o. Male; born [birth year]     Patient Address Communication Language Race / Ethnicity   [address] [place], [place] [id number] [phone] (Home)  [phone] (Work) English - Written (Preferred) White / Not  or      Allergies      No Known Allergies    Medications      Medication Sig Dispensed Refills Start Date End Date Status   methadone (DOLOPHINE) 10 MG tablet   Take 5-10 mg by mouth see administration instructions. 10mg qam , 5mg(1/2 tab) noon, 10mg evening and 10mg qhs       0     Active   magnesium 250 mg Tab   Take 2 tablets by mouth daily.        0     Active   b complex vitamins tablet   Take 1 tablet by mouth daily.   0     Active   ascorbic acid (ASCORBIC ACID WITH KHADIJAH HIPS) 500 MG tablet   Take 500 mg by mouth daily.   0     Active   OMEGA-3/DHA/EPA/FISH OIL (FISH OIL-OMEGA-3 FATTY ACIDS) 300-1,000 mg capsule   Take 2 g by mouth daily.   0     Active   calcium-vitamin D 500 mg(1,250mg) -200 unit per tablet   Take 2  tablets by mouth daily.   0     Active   primidone (MYSOLINE) 250 MG tablet   Take 250 mg by mouth 2 (two) times a day.   0     Active   diphenhydrAMINE (BENADRYL) 25 mg capsule   Take 50 mg by mouth 2 (two) times a day as needed for itching.   0     Active   hydrOXYzine pamoate (VISTARIL) 25 MG capsule   Take 25 mg by mouth 2 (two) times a day. 25mg qam and noon   0     Active   hydrOXYzine pamoate (VISTARIL) 25 MG capsule   Take 50 mg by mouth 2 (two) times a day. 50mg qevening and hs   0     Active   lidocaine (XYLOCAINE) 5 % ointment   Apply 1 application topically 3 (three) times a day as needed.   0     Active   topiramate (TOPAMAX) 25 MG tablet   Take 50 mg by mouth 2 (two) times a day.   0     Active   propranolol (INDERAL LA) 80 mg 24 hr capsule   Take 80 mg by mouth daily.   0     Active   pregabalin (LYRICA) 200 MG capsule   Take 200 mg by mouth 3 (three) times a day.   0     Active   lisinopril (PRINIVIL,ZESTRIL) 10 MG tablet   Take 10 mg by mouth daily.   0     Active   clobetasol (TEMOVATE) 0.05 % ointment   Apply 1 application topically 2 (two) times a day as needed.   0     Active   fluocinonide (LIDEX) 0.05 % cream   Apply 1 application topically 2 (two) times a day as needed.   0     Active   acetaminophen (TYLENOL) 500 MG tablet    Indications: Pain Take 1 tablet (500 mg total) by mouth every 6 (six) hours as needed for pain or fever.   0 02/13/2019   Active   ibuprofen (ADVIL,MOTRIN) 200 MG tablet   Take 200 mg by mouth as needed for pain.   0     Active     Active Problems      Problem Noted Date   Cardiac pacemaker in situ 02/19/2019   Overview:     [employer] L310 ACCOLADE MRI  DOI: 2/12/2019     Cardiac arrhythmia 02/12/2019   Bradycardia 03/31/2017   Acute blood loss anemia 02/03/2015   Status post right hip replacement 02/02/2015   Chronic constipation 02/02/2015   Osteoarthritis     Overview:     Created by Conversion    Replacement Utility updated for latest IMO load      Osteoarthritis Of The Hip     Overview:     Created by Conversion       Polyarthritis Of The Hand     Overview:     Created by Conversion    Replacement Utility updated for latest IMO load     H/O asbestos exposure     Tremor, essential     Primary hypertension     Neuropathy     Symptomatic sinus bradycardia     Sinus node dysfunction       Encounters  - from Last 3 Months    Date Type Specialty Care Team Description   03/14/2019 Device Check Cardiology   Cardiac pacemaker in situ (Primary Dx)   02/19/2019 RN Device Check Cardiology   Cardiac pacemaker in situ   02/12/2019 Surgery Cardiology Micheal Mahoney MD   EP Pacemaker Insertion   02/12/2019 - 02/13/2019 Hospital Encounter Cardiac Intensive Care Micheal Mahoney MD   Pain (Primary Dx);   Bradycardia   02/11/2019 - 02/12/2019 Hospital Encounter Cardiology Coty Thrasher DO K.C., Binod, MD   Bradycardia (Primary Dx);   Dizziness;   Hypotension, unspecified hypotension type;   Acute kidney injury (H)   02/11/2019 Travel           Immunizations  Reconcile with Patient's Chart    Name Administration Dates Next Due   Influenza, inj, historic,unspecified 10/13/2017, 11/02/2014     Pneumo Polysac 23-V 11/02/2014       Surgical History      Surgery Date Site/Laterality Comments   TOTAL HIP ARTHROPLASTY 2/2/2015 Hip/Right Procedure: #3 HIP TOTAL ARTHROPLASTY, RIGHT; Surgeon: Aryan Glass MD; Location: [facility]; Service:     Medical devices from this surgery are in the Implants section.     INSERT / REPLACE / REMOVE PACEMAKER 02/12/2019        EP PACEMAKER INSERT 2/12/2019 N/A Procedure: EP Pacemaker Insertion; Surgeon: Micheal Mahoney MD; Location: [facility]; Service: Cardiology    Medical devices from this surgery are in the Implants section.       Medical History      Medical History Date Comments   Hypertension       Neuropathy (H)       H/O asbestos exposure       Erectile dysfunction       Essential tremor       Chronic  pain   toes   Bradycardia         Family History      Medical History Relation Name Comments   Esophageal cancer Father       COPD Mother       Diabetes Mother         Relation Name Status Comments   Father        Mother          Social History      Tobacco Use Types Packs/Day Years Used Date   Former Smoker           Smokeless Tobacco: Former User           Alcohol Use Drinks/Week oz/Week Comments   Yes     occasional     Sex Assigned at Birth Date Recorded   Not on file       Job Start Date Occupation Industry   Not on file Not on file Not on file     Travel History Travel Start Travel End   No recent travel history available.       [id number] [place] [address] [phone] (Home)  [phone] (Mobile) English - Spoken (Preferred) White / Not  or      Allergies      No Known Allergies    Medications  Reconcile with Patient's Chart    Medication Sig Dispensed Refills Start Date End Date Status   primidone (MYSOLINE) 50 mg tablet   Take 1 tablet by mouth 3 times daily.   0 2014   Active   lidocaine (XYLOCAINE) 2 % gel       11 2015   Active   fluocinonide 0.05% topical (LIDEX) 0.05 % cream       0 2015   Active   clobetasol 0.05% (TEMOVATE 0.05% OINTMENT) 0.05 % ointment       6 2015   Active   lisinopril (PRINIVIL; ZESTRIL) 10 mg tablet       1 2018   Active   LYRICA 200 mg capsule       1 12/15/2018   Active   topiramate (TOPAMAX) 25 mg tablet       3 12/10/2018   Active   lidocaine 5 % oint topical ointment    Indications: Neuropathy Apply 4 Gm. TID both feet. Pain. 240 g   2 2018   Active   sertraline (ZOLOFT) 100 mg tablet    Indications: Mood disorder (HC) Take 0.5 tablets by mouth once daily. 45 tablet   2 2019   Active   methadone (DOLOPHINE) 10 mg tablet    Indications: Neuropathy 1 p.o. A.M ; 0.5 p.o. Noon; 1 p.o. Evening; 1 at H.S Use dates: 3/29/19-19 max: 3.0-3.5 tabs. a day. 100 tablet   0 2019    Active   methadone (DOLOPHINE) 10 mg tablet    Indications: Neuropathy 1 p.o. A.M ; 0.5 p.o. Noon; 1 p.o. Evening; 1 at H.S 03/04/2019-04/05/2019 max: 3.0-3.5 tabs. A day. 95 tablet   0 03/01/2019 03/27/2019 Discontinued     Active Problems      Problem Noted Date   Controlled substance agreement signed 06/05/2018   Overview:     90 day reminder has been signed by pt       Controlled substance agreement signed 09/05/2014   Overview:     [facility]-9/2/14     Neuropathy 09/02/2014     Encounters  - from Last 3 Months    Date Type Specialty Care Team Description   03/27/2019 Telephone Pain Management Grazyna Barrett, NP   Refill Request (Methadone-[place] to [facility] pharm. Pt states he will be out of methadone tomorrow. Pt states his foot pain is unbearable without it. )   03/13/2019 Office Visit Pain Management Grazyna Barrett, NP   Follow Up (Feet pain)   03/13/2019 Travel         02/26/2019 Telephone Pain Management Grazyna Barrett, NP   Medication Management (hydroxixine (?) not avaailable from . please call to send something else)   02/22/2019 Refill Pain Management Grazyna Barrett, NP   Refill Request (med refil for his 2 medications methadone and hydroxine??? please refill and have Grazyna call him thank you)   01/31/2019 Telephone Pain Management Grazyna Barrett, NP   Refill Request (pt is completely out and had an appt with Grazyna today and was addressing his refill for methadone 4 tablets a day please call if any questions and when it is sent to [facility] in [facility] [phone])   01/28/2019 Telephone Pain Management Grazyna Barrett, NP   Refill Request (Hydroxyzine Pamoate)     Medical History      Medical History Date Comments   Idiopathic peripheral neuropathy       HTN (hypertension)       Tobacco abuse   4 cigars a day.      Social History      Tobacco Use Types Packs/Day Years Used Date   Former Smoker Cigars     Quit: 12/01/2014   Smokeless Tobacco: Never Used            Comments: zero.      Alcohol Use Drinks/Week oz/Week Comments   No 0 Standard drinks or equivalent   0.0 quit once put on Vicodin.      Sex Assigned at Birth Date Recorded   Not on file       Job Start Date Occupation Industry   Not on file Not on file Not on file     Travel History Travel Start Travel End   No recent travel history available.           Again, thank you for allowing me to participate in the care of your patient.      Sincerely,    Israel Corey MD

## 2019-04-18 NOTE — NURSING NOTE
Chief Complaint   Patient presents with     Tremors     P NEW MOVEMENT DISORDER- ESSENTIAL TREMORS CONSULT       Luke Toure, EMT

## 2019-04-22 ENCOUNTER — ALLIED HEALTH/NURSE VISIT (OUTPATIENT)
Dept: PHARMACY | Facility: CLINIC | Age: 74
End: 2019-04-22
Payer: COMMERCIAL

## 2019-04-22 DIAGNOSIS — G47.00 INSOMNIA, UNSPECIFIED TYPE: ICD-10-CM

## 2019-04-22 DIAGNOSIS — F32.A DEPRESSION, UNSPECIFIED DEPRESSION TYPE: ICD-10-CM

## 2019-04-22 DIAGNOSIS — I10 PRIMARY HYPERTENSION: ICD-10-CM

## 2019-04-22 DIAGNOSIS — M19.90 OSTEOARTHRITIS, UNSPECIFIED OSTEOARTHRITIS TYPE, UNSPECIFIED SITE: ICD-10-CM

## 2019-04-22 DIAGNOSIS — G25.0 ESSENTIAL TREMOR: Primary | ICD-10-CM

## 2019-04-22 DIAGNOSIS — F41.9 ANXIETY: ICD-10-CM

## 2019-04-22 DIAGNOSIS — L85.3 DRY SKIN: ICD-10-CM

## 2019-04-22 DIAGNOSIS — G62.9 PERIPHERAL POLYNEUROPATHY: ICD-10-CM

## 2019-04-22 DIAGNOSIS — Z78.9 TAKES DIETARY SUPPLEMENTS: ICD-10-CM

## 2019-04-22 PROCEDURE — 99607 MTMS BY PHARM ADDL 15 MIN: CPT | Performed by: PHARMACIST

## 2019-04-22 PROCEDURE — 99605 MTMS BY PHARM NP 15 MIN: CPT | Performed by: PHARMACIST

## 2019-04-22 NOTE — Clinical Note
As you know, this lety is really complicated. Seems like he hasn't had any benefit from primidone for tremor and even when he was on 250 mg BID for 2 months he had no benefit. I think we should taper off and try titrating the topiramate instead. Thoughts?

## 2019-04-22 NOTE — PROGRESS NOTES
"SUBJECTIVE/OBJECTIVE:                           Romaine Farah is a 73 year old male called for an initial visit for Medication Therapy Management.  He was referred to me from Dr. Corey. Wife, Archana, also present on the call today.     Chief Complaint: initial MTM visit     Neurology: Dr. Epperson and Dr. Corey  PCP: Dr. Franko Schmidt - [place] [facility]    Allergies/ADRs: Reviewed in Epic  Tobacco: History of tobacco dependence - quit many years ago  Alcohol: none  Caffeine: not discussed  Activity: not discussed  PMH: Reviewed in Epic    Medication Adherence/Access:  Patient takes medications directly from bottles.  Patient takes medications 4 time(s) per day.   Per patient, misses medication a few times per week - typically suppertime doses  Medication barriers: none.     Tremor: Takes primidone 50 mg twice daily and topiramate 25 mg twice daily. States his tremor is in both hands and primarily affects his hand-writing. If he applies pressure with his hands the tremor increases (ie: screw  or pen). States that he previously tried primidone at a higher dose (250 mg twice daily) and reports that this negatively affected his qualify of life. He had low \"activity level\" and felt \"lazy\" on the high-dose primidone and he stayed on that dose for about 2 months with no change in his tremor. Patient also attempted to take propranolol ER for tremor but reports he had cloudy thinking and felt \"off\" for the 2 weeks he was on it. Patient states that he is unable to tell if adding topiramate has improved his tremor. Does report that alcohol improved tremor but he is not currently drinking alcohol.     Neuropathy: Currently taking duloxetine 30 mg twice daily, pregabalin 200 mg 3 times daily, topiramate 50 mg twice daily, and methadone 10 mg in the morning, 5 mg at 1 pm, 10 mg at 5 pm, and 10 mg at 10-11 pm. States that his neurologist switched gabapentin to pregabalin and patient states he had a significant " "improvement in neurology with that change. In the midst of that change, propranolol and topiramate were also started and wife states that there was an \"extreme change\" with his personality and motivation. These symptoms have improved since going off propranolol. Patient was on gabapentin 900 mg 4 times daily for about 15 years prior to switching to pregabalin. Patient states his Rx for pregabalin indicates he can increase to 4 pills/day. Methadone dose has been decreased since he started pregabalin and he states that goal is to go off methadone. He has been on methadone for 4 years and the highest dose he has taken was 40 mg/day (currently at 35 mg/day). Patient endorses difficulty with word finding but cannot correlate with specific combinations of medications. Patient plans to continue seeing his local neurologist, Dr. Epperson, every 3-6 months.    Anxiety/depression: Currently taking duloxetine 30 mg twice a day. Reports he is no longer on hydroxyzine. He previously took hydroxyzine pamoate and hydroxyzine HCl this year but didn't think they were effective. States that his pain provider prescribed these medications. Patient states that he has been more anxious about his health this year. Since starting duloxetine he feels his mood has improved and family has commented on improvement in his mood. Wife states before duloxetine he seemed \"distanced\" and had less desire to do things.    Insomnia: Not currently taking anything. States that he has difficulty falling asleep but usually can stay asleep. He goes to bed at about 10:30 pm and wakes up at 10:30 am. Typically takes him until 1-1:30 am to fall asleep. Not using Benadryl for sleep (this is for dry skin, itching).     Arthritis pain: Currently taking ibuprofen 4 tablets (800 mg) as needed, about one time per week. Patient states pain is in his lower back, hips, shoulder, elbow. Has had one hip replacement and may have the other one replaced. He also uses " lidocaine gel on his feet.      Hypertension: Current medications include lisinopril 5 mg.  Patient does not self-monitor BP.  Patient reports no current medication side effects. Patient states he had a pacemaker placed this year and his HR was 35 but now is 62 with pacemaker.    BP Readings from Last 3 Encounters:   04/18/19 117/78   08/07/11 120/66     Dry, itchy skin: Using lotion daily and takes diphenhydramine as needed, as well as fluocinonide topically as needed. Patient reports having large red splotches on the trunk of his body. He sees a dermatologist. States he has relatives with eczema and psoriasis and he is unsure what has caused his dermatologic issues.       Vitamins/supplements: Taking magnesium (250 mg each) 3 times/day (for constipation), 2 fish oil 1000 mg in the morning daily (for dry skin), calcium/vitamin D 600 mg/20 mcg 2 tablets daily in the morning, 2 super B complex/Vit C tablets in the morning (for neuropathy). States his wife recommended most of the supplements for him.    ASSESSMENT:                             Current medications were reviewed today. Medicare Part D topics discussed:OTC products, Self-monitoring, Medication safety, Recommendations to doctor, Medication changes and Timing of medication    Medication Adherence: excellent, no issues identified    Tremor: Needs improvement. It appears as though the patient has not had a noticeable benefit of any medications on his tremor. Propranolol and higher doses of primidone caused intolerable side effects. It is unclear if topiramate is providing any benefit and therefore we could consider slowly increasing the dose and monitoring for any additional cognitive side effects with topiramate.     Neuropathy: Needs improvement. Patient has benefited from pregabalin over gabapentin, but 800 mg/day exceeds the recommended maximum dose, so would not recommend increasing beyond the dose he is currently taking. Agree to continue to taper  methadone, as tolerated.    Anxiety/depression: Needs improvement. Patient may benefit from taking his whole dose of duloxetine in the morning and the medication does have a long half life (12 hours) for daily dosing and the second dose may be impairing his sleep.     Insomnia: Needs improvement. Patient seems to have a difficult time with sleep initiation but he is able to sleep long enough into the morning to be well rested during the day. Pateint may benefit from taking melatonin 2-6 hours before bedtime to help shift his circadian rhythm and allow him to go to bed at an early time. Ideally melatonin should be paired with 20-30 min of light therapy with a 10,000 lux lamp.     Arthritis pain: Needs improvement. Patient is taking a high dose of ibuprofen to manage his arthritis pain. We discussed that acetaminophen may be safer given his age, and could be more effective when taken scheduled. Also educated the patient to take ibuprofen with food if he does need to use it.     Hypertension: Stable.     Dry, itchy skin: Needs improvement. Encouraged patient to avoid using diphenhydramine given the risk of cognitive impairment and falls with this medication in elderly patients. He can continue to use his topical products and follow up with dermatology if topicals are not sufficient. Also gave him the option of trying a second-generation antihistamine like cetirizine.     Vitamins/supplements: Needs improvement. Patient likely does not need 2 tablets of Vitamin B complex and could benefit from just taking 1 tablet/day.    PLAN:                            1. Will discuss with Dr. Corey regarding possibly tapering off primidone and optimizing topiramate for tremor and neuropathy. Hold off on increasing pregabalin at this time.   Dr. Corey agrees with tapering off primidone (one-half tablet, 25 mg BID x 1 week then 25 mg daily x 1 week, then stop) and reassessing tremor at that time. He also suggested that the patient  consider deep brain simulation since he hasn't responded to other medications. May titrate topiramate in the future.   2. Consider taking all of the duloxetine dose in the morning.   3. Consider taking melatonin 1-3 mg at night, paired with light therapy (20-30 minutes with 10,000 lux lamp) in the morning, to shift circadian rhythm.  4. Consider taking acetaminophen for arthritis pain. Could try higher doses of 650-1000 mg at a time, up to 3 times daily. If need to take ibuprofen, take with food.   5. Avoid diphenhydramine (Benadryl) for dry, itchy skin. Continue using the lotion and topical products from dermatologist and you could try cetirizine (Zyrtec), loratadine (Claritin), or fexofenadine (Allegra) as an alternative to Benadryl.   6. Decrease Vitamin B complex to 1 tablet daily.     I spent 60 minutes with this patient today (an extra 15 minutes was spent creating the Medication Action Plan). I offer these suggestions for consideration by Dr. Corey. A copy of the visit note was provided to the patient's referring provider.    Will follow up in one month by phone.    The patient was mailed a summary of these recommendations as an after visit summary.     Nenita Polo, Pharm.D.  Medication Therapy Management Pharmacist  Phone: [phone]

## 2019-04-22 NOTE — LETTER
"     [facility] NEUROLOGY CLINIC MT     Date: 2019    Romaine Farah  [address] [place] [id number]    Dear Mr. Farah,    Thank you for talking with me on 19 about your health and medications. Medicare s MTM (Medication Therapy Management) program helps you understand your medications and use them safely.      This letter includes an action plan (Medication Action Plan) and medication list (Personal Medication List). The action plan has steps you should take to help you get the best results from your medications. The medication list will help you keep track of your medications and how to use them the right way.       Have your action plan and medication list with you when you talk with your doctors, pharmacists, and other healthcare providers in your care team.     Ask your doctors, pharmacists, and other healthcare providers to update the action plan and medication list at every visit.     Take your medication list with you if you go to the hospital or emergency room.     Give a copy of the action plan and medication list to your family or caregivers.     If you want to talk about this letter or any of the papers with it, please call   [phone].   We look forward to working with you, your doctors, and other healthcare providers to help you stay healthy.     Sincerely,    Nenita Polo      Enclosed: Medication Action Plan and Personal Medication List    MEDICATION ACTION PLAN FOR Romaine Farah,  1945     This action plan will help you get the best results from your medications if you:   1. Read \"What we talked about.\"   2. Take the steps listed in the \"What I need to do\" boxes.   3. Fill in \"What I did and when I did it.\"   4. Fill in \"My follow-up plan\" and \"Questions I want to ask.\"     Have this action plan with you when you talk with your doctors, pharmacists, and other healthcare providers in your care team. Share this with your family or caregivers too.  DATE PREPARED: " 4/25/2019  What we talked about: tremor medications                                                What I need to do: Taper off of primidone, by decreasing to one-half pill twice a day for a week, then one-half pill once a day for a week then stop. We may increase topiramate in the future.  What I did and when I did it:                                              What we talked about: duloxetine                                                 What I need to do: take the whole dose in the morning (both pills) What I did and when I did it:                                                       What we talked about: sleep                                                 What I need to do: consider trying 1-3 mg of melatonin 2-6 hours before going to sleep; you can also pair this with 20-30 min with 10,000 lux lamp in the morning     What I did and when I did it:                                               What we talked about: arthrtis                                                 What I need to do: try taking acetaminophen (Tylenol) 650-1000 mg up to 3 times daily; if you need to take ibuprofen, take with food   What I did and when I did it:                                               What we talked about: dry skin                                                  What I need to do: avoid taking diphenhydramine (Bendaryl) and instead use your lotion/creams and if you need something else, you could try cetirizine (Zyrtec)   What I did and when I did it:                                               What we talked about: vitamins                                                 What I need to do: decrease the vitamin B complex to 1 pill per day       What I did and when I did it:                                               My follow-up plan:  Have another telephone appointment with Nenita Polo PharmD in one month             Questions I want to ask:              If you have any questions about your action plan,  call Nenita Polo, Phone: [phone] , Monday-Friday 8-4:30pm.           MEDICATION LIST FOR LAURENT Killian 1945     This medication list was made for you after we talked. We also used information from your doctor's chart.      Use blank rows to add new medications. Then fill in the dates you started using them.    Cross out medications when you no longer use them. Then write the date and why you stopped using them.    Ask your doctors, pharmacists, and other healthcare providers to update this list at every visit. Keep this list up-to-date with:       Prescription medications    Over the counter drugs     Herbals    Vitamins    Minerals      If you go to the hospital or emergency room, take this list with you. Share this with your family or caregivers too.     DATE PREPARED: 2019  Allergies or side effects: Hydroxyzine; Primidone; and Propranolol     Medication:  B COMPLEX PO TABS      How I use it:  Take 1 tablet by mouth daily @ 9am      Why I use it:  general health    Prescriber:  OTC      Date I started using it:       Date I stopped using it:         Why I stopped using it:              Medication:  CALCIUM-VITAMIN D 500-200 MG-UNIT PO TABS      How I use it:  Take 2 tabs by mouth daily @ 9am      Why I use it:  general health    Prescriber:  OTC      Date I started using it:       Date I stopped using it:         Why I stopped using it:            Medication:  CLOBETASOL PROPIONATE 0.05 % EX OINT      How I use it:  As needed      Why I use it:  dry skin    Prescriber:  dermatologist      Date I started using it:       Date I stopped using it:         Why I stopped using it:            Medication:  DULOXETINE HCL 30 MG PO CPEP      How I use it:  Take one 30mg capsule by mouth twice daily @ 9am and 11pm      Why I use it:  Anxiety/depression    Prescriber:  Pain provider      Date I started using it:       Date I stopped using it:         Why I stopped using it:            Medication:  FISH  OIL BURP-LESS 1000 MG PO CAPS      How I use it:  Take 2 capsules by mouth daily in morning @ 9am      Why I use it:  dry skin    Prescriber:  OTC      Date I started using it:       Date I stopped using it:         Why I stopped using it:            Medication:  FLUOCINONIDE 0.05 % EX CREA      How I use it:  As needed      Why I use it:  dry skin    Prescriber:  dermatologist      Date I started using it:       Date I stopped using it:         Why I stopped using it:            Medication:  IBUPROFEN 200 MG PO TABS      How I use it:  Take 4 tablets as needed for pain      Why I use it:  arthritis    Prescriber:  OTC      Date I started using it:       Date I stopped using it:         Why I stopped using it:              Medication:  LIDOCAINE HCL 2 % EX GEL      How I use it:  Apply topically daily To feet      Why I use it: pain    Prescriber:  OTC      Date I started using it:       Date I stopped using it:         Why I stopped using it:            Medication:  LISINOPRIL 10 MG PO TABS      How I use it:  Take 1/2 of 10mg tab by mouth daily @ 9am      Why I use it:  high blood pressure    Prescriber:  Dr. Schmidt      Date I started using it:       Date I stopped using it:         Why I stopped using it:            Medication:  MAGNESIUM 250 MG PO TABS      How I use it:  Take 1 tablet by mouth 3 times daily      Why I use it:  constipation    Prescriber:  OTC      Date I started using it:       Date I stopped using it:         Why I stopped using it:            Medication:  METHADONE HCL 10 MG PO TABS      How I use it:  Take 1 pill by mouth daily @ 9am, 1/2 pill @ 1pm, 1 pill @ 5pm and 1 pill @ 10/11pm   - 3.5 pills per day      Why I use it:  Neuropathy    Prescriber:  Pain provider      Date I started using it:       Date I stopped using it:         Why I stopped using it:            Medication:  PREGABALIN 200 MG PO CAPS      How I use it:  Take 200mg capsule by mouth 3/day @9am, 1pm and 10pm      Why I  use it:  Neuropathy    Prescriber:  Dr. Epperson      Date I started using it:       Date I stopped using it:         Why I stopped using it:            Medication:  PRIMIDONE 50 MG PO TABS      How I use it:  Take 50mg tab by mouth twice daily @9am and 530/6pm - tapering off this medication      Why I use it:  Tremor    Prescriber:  Israel Corey MD      Date I started using it:       Date I stopped using it:         Why I stopped using it:            Medication:  TOPIRAMATE 25 MG PO TABS      How I use it:  Take 50 mg by mouth 2 times daily @ 9am and 530/6pm      Why I use it:  Neuropathy and tremor    Prescriber:  Israel Corey MD      Date I started using it:       Date I stopped using it:         Why I stopped using it:            Medication:         How I use it:         Why I use it:      Prescriber:         Date I started using it:       Date I stopped using it:         Why I stopped using it:            Medication:         How I use it:         Why I use it:      Prescriber:         Date I started using it:       Date I stopped using it:         Why I stopped using it:            Medication:         How I use it:         Why I use it:      Prescriber:         Date I started using it:       Date I stopped using it:         Why I stopped using it:              Other Information:     If you have any questions about your action plan, call [phone].    According to the Paperwork Reduction Act of 1995, no persons are required to respond to a collection of information unless it displays a valid OMB control number. The valid OMB number for this information collection is [id number]. The time required to complete this information collection is estimated to average 40 minutes per response, including the time to review instructions, searching existing data resources, gather the data needed, and complete and review the information collection. If you have any comments concerning the accuracy of the time  estimate(s) or suggestions for improving this form, please write to: CMS, Attn: [employer] Reports Clearance Officer, [address] [place] [address], [place], [place] [address].

## 2019-04-25 RX ORDER — MULTIVITAMIN WITH IRON
1 TABLET ORAL 3 TIMES DAILY
COMMUNITY
End: 2024-09-22

## 2019-04-25 NOTE — PATIENT INSTRUCTIONS
Recommendations from today's MTM visit:                                                    MTM (medication therapy management) is a service provided by a clinical pharmacist designed to help you get the most of out of your medicines.     1. We are going to try tapering off primidone, by decreasing to one-half tablet twice a day for a week, then one-half tablet once daily for a week, then stop. We may try increasing topiramate after this. In the meantime, Dr. Corey would like for you to consider deep brain stimulation surgery since other medications have not worked for tremor.  2. Consider taking all of the duloxetine dose (both pills) in the morning.   3. Consider taking melatonin 1-3 mg at night, paired with light therapy (20-30 minutes with 10,000 lux lamp) in the morning, to shift circadian rhythm.  4. Consider taking acetaminophen for arthritis pain. Could try higher doses of 650-1000 mg at a time, up to 3 times daily. If need to take ibuprofen, take with food.   5. Avoid diphenhydramine (Benadryl) for dry, itchy skin. Continue using the lotion and topical products from dermatologist and you could try cetirizine (Zyrtec), loratadine (Claritin), or fexofenadine (Allegra) as an alternative to Benadryl.   6. Decrease Vitamin B complex to 1 tablet daily.     Next MTM visit: 5/16/19 at 11:30 am - I will call you!    To schedule another MTM appointment, please call the clinic directly or you may call the MTM scheduling line at [phone] or toll-free at [phone].     My Clinical Pharmacist's contact information:                                                      It was a pleasure talking with you today!  Please feel free to contact me with any questions or concerns you have.      Nenita Polo, Pharm.D.  Medication Therapy Management Pharmacist  Phone: [phone]    You may receive a survey about the MTM services you received by email and/or US Mail.  I would appreciate your feedback to help me serve you  better in the future. Your comments will be anonymous.

## 2019-04-29 ENCOUNTER — PATIENT OUTREACH (OUTPATIENT)
Dept: NEUROSURGERY | Facility: CLINIC | Age: 74
End: 2019-04-29

## 2019-04-29 NOTE — PROGRESS NOTES
Per Dr. Corey, spoke with pt about our DBS class. Next available is 6/19. I will send a letter with all pertinent information. No further questions at this time.

## 2019-05-01 ENCOUNTER — AMBULATORY - HEALTHEAST (OUTPATIENT)
Dept: CARDIOLOGY | Facility: CLINIC | Age: 74
End: 2019-05-01

## 2019-05-01 ENCOUNTER — RECORDS - HEALTHEAST (OUTPATIENT)
Dept: ADMINISTRATIVE | Facility: OTHER | Age: 74
End: 2019-05-01

## 2019-05-03 ENCOUNTER — AMBULATORY - HEALTHEAST (OUTPATIENT)
Dept: CARDIOLOGY | Facility: CLINIC | Age: 74
End: 2019-05-03

## 2019-05-03 ENCOUNTER — OFFICE VISIT - HEALTHEAST (OUTPATIENT)
Dept: CARDIOLOGY | Facility: CLINIC | Age: 74
End: 2019-05-03

## 2019-05-03 DIAGNOSIS — I49.5 SINUS NODE DYSFUNCTION (H): ICD-10-CM

## 2019-05-03 DIAGNOSIS — Z95.0 CARDIAC PACEMAKER IN SITU: ICD-10-CM

## 2019-05-03 DIAGNOSIS — I10 ESSENTIAL HYPERTENSION: ICD-10-CM

## 2019-05-03 ASSESSMENT — MIFFLIN-ST. JEOR: SCORE: 1775.45

## 2019-05-16 ENCOUNTER — ALLIED HEALTH/NURSE VISIT (OUTPATIENT)
Dept: PHARMACY | Facility: CLINIC | Age: 74
End: 2019-05-16
Payer: COMMERCIAL

## 2019-05-16 DIAGNOSIS — G25.0 ESSENTIAL TREMOR: Primary | ICD-10-CM

## 2019-05-16 DIAGNOSIS — G62.9 PERIPHERAL POLYNEUROPATHY: ICD-10-CM

## 2019-05-16 PROCEDURE — 99606 MTMS BY PHARM EST 15 MIN: CPT | Performed by: PHARMACIST

## 2019-05-16 RX ORDER — TOPIRAMATE 25 MG/1
TABLET, FILM COATED ORAL
Qty: 240 TABLET | Refills: 0 | Status: SHIPPED | OUTPATIENT
Start: 2019-05-16 | End: 2019-06-25

## 2019-05-16 NOTE — Clinical Note
He was able to taper off primidone and has felt a lot better without it - more alert and less instability, but a little worse tremor. He is going to the DBS class next month. In the meantime, we are thinking next step will be titrating up topiramate. He is on topiramate 50 mg BID currently. I was reading that doses above 200 mg are most effective. I'm thinking we try titrating up to 100 mg BID over the next month then reassess? Are you okay with this?

## 2019-05-16 NOTE — PATIENT INSTRUCTIONS
Recommendations from today's MTM visit:                                                      1. Stay off primidone.    2. Increase topiramate as follows:   Week 1: 75 mg AM/50 mg PM  Week 2: 75 mg AM/75 mg PM   Week 3: 100 mg AM/75 mg PM  Week 4: 100 mg AM/100 mg PM     If the higher dose of topiramate works for you, then we can switch to 50 mg or 100 mg pills.     Next MTM visit: 6/20/19 at 11:30 am - I will call you!    To schedule another MTM appointment, please call the clinic directly or you may call the MTM scheduling line at [phone] or toll-free at [phone].     My Clinical Pharmacist's contact information:                                                      It was a pleasure talking with you today!  Please feel free to contact me with any questions or concerns you have.      Nenita Polo, Pharm.D.  Medication Therapy Management Pharmacist  Phone: [phone]    You may receive a survey about the MTM services you received by email and/or US Mail.  I would appreciate your feedback to help me serve you better in the future. Your comments will be anonymous.

## 2019-06-20 ENCOUNTER — ALLIED HEALTH/NURSE VISIT (OUTPATIENT)
Dept: PHARMACY | Facility: CLINIC | Age: 74
End: 2019-06-20
Payer: COMMERCIAL

## 2019-06-20 DIAGNOSIS — J30.2 SEASONAL ALLERGIC RHINITIS, UNSPECIFIED TRIGGER: ICD-10-CM

## 2019-06-20 DIAGNOSIS — G25.0 ESSENTIAL TREMOR: Primary | ICD-10-CM

## 2019-06-20 PROCEDURE — 99606 MTMS BY PHARM EST 15 MIN: CPT | Performed by: PHARMACIST

## 2019-06-20 PROCEDURE — 99607 MTMS BY PHARM ADDL 15 MIN: CPT | Performed by: PHARMACIST

## 2019-06-20 NOTE — PROGRESS NOTES
"SUBJECTIVE/OBJECTIVE:                Romaine Farah is a 73 year old male called for a follow-up visit for Medication Therapy Management.  He was referred to me from Dr. Corey.     Chief Complaint: Follow up from our visit on 5/16/19  Tobacco: History of tobacco dependence - quit many years ago  Alcohol: not currently using    Medication Adherence/Access: no issues reported    Tremor: Currently taking topiramate 75 mg twice daily and primidone 100 mg twice daily. Patient states that he didn't understand he was supposed to stay off primidone after our last visit but feels as though this combination has been somewaht effective in managing his tremor (in both hands), better than other regimens he has been on in the past. He has had tremor for about 30 years. Higher doses of primidone caused him to feel drowsy and \"tippy\" and he would prefer to not increase topiramate dose. Patient states that without primidone tremor is \"awful.\" He uses heavy silverware at home to help him eat. 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 Of note, patient is still interested in DBS and he attempted to go to the DBS class yesterday but states he didn't know where to go in the building and never found the class. Wife states he has had a few unpredictable involuntary movements where he will experience \"jerking\" of his shoulder, hand, or leg. This is not very bothersome, just something they have recently noticed.     Allergic rhinitis: Current medications include levocetirizine as needed OTC. Patient states that he has remained off Benadryl and thinks this has made a \"tremendous\" difference in stability and drowsiness. He does report that he's recovering from an episode of bronchitis and is on a short-course of prednisone for 5 days.     Last vitals:   BP Readings from Last 1 Encounters:   04/18/19 117/78     Pulse Readings from Last 1 Encounters:   04/18/19 66     ASSESSMENT:              Current medications were " reviewed today as discussed above.      Medication Adherence: excellent, no issues identified    Tremor: Improved. Will send message to Cadence Lozoya RN regarding DBS class, as the patient would like to reschedule this.     Allergic rhinitis: Improved.      PLAN:                  1. No medication changes at this time  2. Will inform Cadence Lozoya RN that the patient missed the DBS class last week    I spent 20 minutes with this patient today. All changes were made via verbal approval with Dr. Corey. A copy of the visit note was provided to the patient's referring provider.     Will follow up in one year or sooner if needed    The patient declined a summary of these recommendations as an after visit summary.    Nenita Polo, Pharm.D.  Medication Therapy Management Pharmacist  Phone: [phone]

## 2019-06-20 NOTE — Clinical Note
GELY seems things are stable with his tremor; he missed the DBS class last week so I sent a message to Cadence to get him scheduled for a different class. No meds changes at this time.

## 2019-06-25 ENCOUNTER — TELEPHONE (OUTPATIENT)
Dept: NEUROSURGERY | Facility: CLINIC | Age: 74
End: 2019-06-25

## 2019-06-25 RX ORDER — PRIMIDONE 50 MG/1
100 TABLET ORAL 2 TIMES DAILY
COMMUNITY
End: 2020-12-18

## 2019-06-25 RX ORDER — TOPIRAMATE 25 MG/1
75 TABLET, FILM COATED ORAL 2 TIMES DAILY
COMMUNITY
End: 2019-10-14

## 2019-06-25 RX ORDER — LEVOCETIRIZINE DIHYDROCHLORIDE 5 MG/1
5 TABLET, FILM COATED ORAL EVERY EVENING
COMMUNITY

## 2019-06-25 NOTE — TELEPHONE ENCOUNTER
Pt was supposed to attend DBS class in June but had a mixup and did not attend. Would like to attend DBS class on August 21. Will send letter with all pertinent information.

## 2019-07-24 DIAGNOSIS — G25.0 ESSENTIAL TREMOR: ICD-10-CM

## 2019-07-24 DIAGNOSIS — G62.9 PERIPHERAL POLYNEUROPATHY: ICD-10-CM

## 2019-07-24 RX ORDER — TOPIRAMATE 25 MG/1
TABLET, FILM COATED ORAL
Qty: 540 TABLET | Refills: 1 | Status: SHIPPED | OUTPATIENT
Start: 2019-07-24 | End: 2021-02-22

## 2019-07-24 NOTE — TELEPHONE ENCOUNTER
Last Written Prescription Date:  06/25/2019  Last Fill Quantity: 240,  # refills: none listed (see chart)    Last office visit: 6/20/2019 with prescribing provider:   Future Office Visit:

## 2019-07-29 ENCOUNTER — RECORDS - HEALTHEAST (OUTPATIENT)
Dept: LAB | Facility: CLINIC | Age: 74
End: 2019-07-29

## 2019-07-29 LAB
ALBUMIN SERPL-MCNC: 3.6 G/DL (ref 3.5–5)
ALP SERPL-CCNC: 72 U/L (ref 45–120)
ALT SERPL W P-5'-P-CCNC: 21 U/L (ref 0–45)
ANION GAP SERPL CALCULATED.3IONS-SCNC: 8 MMOL/L (ref 5–18)
AST SERPL W P-5'-P-CCNC: 19 U/L (ref 0–40)
BILIRUB SERPL-MCNC: 0.2 MG/DL (ref 0–1)
BUN SERPL-MCNC: 17 MG/DL (ref 8–28)
CALCIUM SERPL-MCNC: 9.3 MG/DL (ref 8.5–10.5)
CHLORIDE BLD-SCNC: 107 MMOL/L (ref 98–107)
CO2 SERPL-SCNC: 26 MMOL/L (ref 22–31)
CREAT SERPL-MCNC: 1.09 MG/DL (ref 0.7–1.3)
ERYTHROCYTE [DISTWIDTH] IN BLOOD BY AUTOMATED COUNT: 13.8 % (ref 11–14.5)
FOLATE SERPL-MCNC: 4.4 NG/ML
GFR SERPL CREATININE-BSD FRML MDRD: >60 ML/MIN/1.73M2
GLUCOSE BLD-MCNC: 96 MG/DL (ref 70–125)
HCT VFR BLD AUTO: 44.3 % (ref 40–54)
HGB BLD-MCNC: 14.2 G/DL (ref 14–18)
MCH RBC QN AUTO: 29 PG (ref 27–34)
MCHC RBC AUTO-ENTMCNC: 32.1 G/DL (ref 32–36)
MCV RBC AUTO: 90 FL (ref 80–100)
PLATELET # BLD AUTO: 196 THOU/UL (ref 140–440)
PMV BLD AUTO: 10.1 FL (ref 8.5–12.5)
POTASSIUM BLD-SCNC: 4.4 MMOL/L (ref 3.5–5)
PROT SERPL-MCNC: 6.7 G/DL (ref 6–8)
RBC # BLD AUTO: 4.9 MILL/UL (ref 4.4–6.2)
SODIUM SERPL-SCNC: 141 MMOL/L (ref 136–145)
TSH SERPL DL<=0.005 MIU/L-ACNC: 3.64 UIU/ML (ref 0.3–5)
VIT B12 SERPL-MCNC: 449 PG/ML (ref 213–816)
WBC: 7.5 THOU/UL (ref 4–11)

## 2019-07-30 ENCOUNTER — AMBULATORY - HEALTHEAST (OUTPATIENT)
Dept: CARDIOLOGY | Facility: CLINIC | Age: 74
End: 2019-07-30

## 2019-07-30 DIAGNOSIS — Z95.0 CARDIAC PACEMAKER IN SITU: ICD-10-CM

## 2019-10-07 ENCOUNTER — RECORDS - HEALTHEAST (OUTPATIENT)
Dept: ADMINISTRATIVE | Facility: OTHER | Age: 74
End: 2019-10-07

## 2019-10-09 ENCOUNTER — ALLIED HEALTH/NURSE VISIT (OUTPATIENT)
Dept: PHARMACY | Facility: PHYSICIAN GROUP | Age: 74
End: 2019-10-09
Payer: COMMERCIAL

## 2019-10-09 DIAGNOSIS — L85.3 DRY SKIN: ICD-10-CM

## 2019-10-09 DIAGNOSIS — F32.A DEPRESSION, UNSPECIFIED DEPRESSION TYPE: ICD-10-CM

## 2019-10-09 DIAGNOSIS — I10 PRIMARY HYPERTENSION: ICD-10-CM

## 2019-10-09 DIAGNOSIS — G25.0 ESSENTIAL TREMOR: Primary | ICD-10-CM

## 2019-10-09 DIAGNOSIS — F41.9 ANXIETY: ICD-10-CM

## 2019-10-09 DIAGNOSIS — Z78.9 TAKES DIETARY SUPPLEMENTS: ICD-10-CM

## 2019-10-09 DIAGNOSIS — G62.9 PERIPHERAL POLYNEUROPATHY: ICD-10-CM

## 2019-10-09 DIAGNOSIS — G47.00 INSOMNIA, UNSPECIFIED TYPE: ICD-10-CM

## 2019-10-09 DIAGNOSIS — L03.90 CELLULITIS, UNSPECIFIED CELLULITIS SITE: ICD-10-CM

## 2019-10-09 DIAGNOSIS — M19.90 OSTEOARTHRITIS, UNSPECIFIED OSTEOARTHRITIS TYPE, UNSPECIFIED SITE: ICD-10-CM

## 2019-10-09 DIAGNOSIS — J30.2 SEASONAL ALLERGIC RHINITIS, UNSPECIFIED TRIGGER: ICD-10-CM

## 2019-10-09 PROCEDURE — 99607 MTMS BY PHARM ADDL 15 MIN: CPT | Performed by: PHARMACIST

## 2019-10-09 PROCEDURE — 99606 MTMS BY PHARM EST 15 MIN: CPT | Performed by: PHARMACIST

## 2019-10-14 RX ORDER — DOXYCYCLINE HYCLATE 100 MG
100 TABLET ORAL 2 TIMES DAILY
COMMUNITY
Start: 2019-10-07 | End: 2019-10-17

## 2019-10-14 NOTE — PATIENT INSTRUCTIONS
Recommendations from today's MTM visit:                                                    MTM (medication therapy management) is a service provided by a clinical pharmacist designed to help you get the most of out of your medicines.   Today we reviewed what your medicines are for, how to know if they are working, that your medicines are safe and how to make your medicine regimen as easy as possible.     1)  We discussed that you could try using an over the counter Lidocaine 4% patch for the nerve pain in your feet.  These patches can be cut into a smaller piece to fit onto the painful area of your foot.  Some people will wear a sock over the patch, or wrap athletic tape around the patch to keep it in place.  If you do this make sure not to wrap it too tightly.  The lidocaine patched can be placed onto the skin for up to 12 hours, and then require 12 hours off.      It was great to speak with you today.  I value your experience and would be very thankful for your time with providing feedback on our clinic survey. You may receive a survey via email or text message in the next few days.     Next MTM visit: as needed    To schedule another MTM appointment, please call the clinic directly or you may call the MTM scheduling line at [phone] or toll-free at [phone].     My Clinical Pharmacist's contact information:                                                      It was a pleasure talking with you today!  Please feel free to contact me with any questions or concerns you have.      Yun Saini, PharmD  Medication Therapy Management Pharmacist  Pager: [phone]        Using Blood Thinners (Anticoagulants)  Blood thinners or anticoagulants are medicines that help prevent blood clots from forming. They include warfarin, heparin, dabigatran, rivaroxaban, apixaban, and edoxaban. Your healthcare provider will help you decide which medicine is best for you.    Taking an anticoagulant safely  When you are  taking a blood thinner, you will need to take certain steps to stay safe. Too much blood thinner puts you at risk for bleeding. Too little puts you at risk for stroke. Follow these guidelines. Also follow any others that your healthcare provider gives you.    You may be told you need regular lab testing while taking these medicines. Warfarin requires routine testing while the other medicines do not.    Tell your doctor about all medicines you take. This includes over-the-counter medicines, supplements, or herbal remedies. Don't take any medicines (including ones you buy over-the-counter) that your doctor doesn t know about. Some medicines can interact with blood thinners and cause serious problems.    Tell any healthcare provider that you see for care (such as doctors, dentists, chiropractors, home health nurses) that you take a blood thinner.    Carry a medical ID card or wear a medical-alert bracelet that says you take an anticoagulant.    Before taking aspirin, check with your doctor. Aspirin can significantly increase your risk of bleeding.    This medicine makes bleeding harder to stop. To protect yourself:  ? Don't do any activities that may cause injury. If you fall or are injured, contact your healthcare provider right away. Blood thinners prevent clotting, so you could be bleeding inside without realizing it.  ? Use a soft-bristle toothbrush and waxed dental floss. Shave with an electric razor rather than a blade.  ? Don t go barefoot. Don t trim corns or calluses yourself.  Warfarin: Other important information  Several precautions are especially important when you are taking warfarin. Always keep these points in mind:    Be sure to follow your healthcare provider's instructions for taking warfarin.    Take this medicine at the same time each day. Take it with a full glass of water, with or without food. If you miss a dose, contact your doctor to find out how much to take. Don't take a double  dose.    Warfarin is an effective medicine, but it can be dangerous if not taken correctly. It makes your blood less likely to form clots. If you take too much, it can cause serious internal or external bleeding.    You will need to have regular monitoring while you are taking warfarin. This includes blood tests to check your international normalized ratio (INR) and prothrombin time (PT). These tests show how quickly your blood clots. You will also have a complete blood count (CBC) once in a while. This looks at your blood and platelet levels. Both of these need to be followed while you're on warfarin. Talk with your healthcare provider about whether you need to visit the clinic every week, or if services are available for monitoring in your home.    Certain medicines can affect your INR and PT levels. Tell your healthcare provider if there are any changes in your medicines. This includes any over-the-counter medicines, supplements like vitamin K, or herbal remedies.    Your diet can also affect your INR and PT levels. Because of this, it's important to eat a consistent diet. It is especially important to eat a consistent amount of foods that are high in vitamin K. Talk with your healthcare provider before making any big changes in your diet.    Remember that warfarin increases your risk of bleeding. Be careful not to injure yourself. If you have a significant injury, contact your healthcare provider right away. It's important to alert your provider if you've fallen or hurt yourself, even if you don't break your skin. You could be bleeding inside your body without realizing it.     Warfarin: Watch your INR/PT blood levels  Two tests are used to find out how your blood is clotting. One is prothrombin time (PT), the other is the international normalized ratio (INR).    Go for your blood tests (INR/PT) as often as directed. Your diet and the other medicines you take can affect your INR/PT levels.    My next INR/PT  blood draw is due on _____________ (date) at ___________ (time) by ___________ (name of doctor or clinic).    The name of the doctor who is monitoring my anticoagulation therapy is _____________________ and the phone number is _________________.    Follow up with your doctor or as advised by his or her staff. It usually takes a few hours for your doctor to get the results of your clotting tests. Call to get your lab results to find out if your doctor needs to make further changes to your warfarin dose.    If your blood is drawn for these tests at a location other than your doctor's office, tell your doctor as soon as you get your lab results.   Warfarin: Watch what you eat  Vitamin K helps your blood clot. So you have to watch how much you eat of foods that contain vitamin K. These foods can affect the way warfarin works. They don't affect the other non-warfarin blood thinners. Here are some specific tips:    Try to keep your diet about the same each day. If you change your diet for any reason, such as for illness or to lose weight, tell your doctor.    Each day, eat the same amount of foods that are high in vitamin K. These include asparagus, avocado, broccoli, cabbage, kale, spinach, and some other leafy green vegetables. Oils, such as soybean, canola, and olive oils, are also high in vitamin K.    Limit fats to 2 to 4 tablespoons a day.    Ask your healthcare provider if you should not drink alcohol while you are taking a blood thinner.    Avoid teas that contain sweet clover, sweet gilberto, or tonka beans. These can affect how your medicine works.    Talk with your healthcare provider and pharmacist about specific foods or special diets that can affect anticoagulant levels. These include grapefruit juice, cranberries and cranberry juice, fish oil supplements, garlic, andrew, licorice, turmeric, and herbal teas and supplements.  Talk with your healthcare provider if you have concerns about these or other food  products and their effects on warfarin.     When to call your healthcare provider  Call your provider right away if you have any of these:    Bleeding that doesn t stop in 10 minutes    A heavier-than-normal menstrual period or bleeding between periods    Coughing or throwing up blood    Bloody diarrhea or bleeding hemorrhoids     Dark-colored urine or black stools    Red or black-and-blue marks on the skin that get larger    Dizziness or fatigue    Chest pain or trouble breathing  Allergic reactions:    Rash    Itching    Swelling    Trouble swallowing or breathing   Medical conditions and anticoagulants  Before starting a blood thinner, tell your healthcare provider if you have any of these conditions:    Stomach ulcer now or in the past    Vomited blood or had bloody stools (black or red color)    Aneurysm, pericarditis, or pericardial effusion    Blood disorder    Recent surgery, stroke, mini-stroke, or spinal puncture    Kidney or liver disease, uncontrolled high blood pressure, diabetes, vasculitis, heart failure, lupus, or other collagen-vascular disease, or high cholesterol    Pregnancy or breastfeeding    Younger than 18 years old    Recent or planned dental procedure  Medicine interactions and anticoagulants  Many medicines interfere with the effect of blood thinners. Before starting these medicines, tell your healthcare provider about any prescription, over-the-counter, or herbal supplements you take. In particular, tell your provider about:    Antibiotics    Heart medicines    Cimetidine    Aspirin or other anti-inflammatory drugs such as ibuprofen, naproxen, ketoprofen, or other arthritis medicines    Medicines for depression, cancer, HIV (protease inhibitors), diabetes, seizures, gout, high cholesterol, or thyroid replacement    Vitamins containing vitamin K or herbal products such as ginkgo, Co-Q10, garlic, or Millry's wort     Note: This information topic may not include all directions,  precautions, medical conditions, medicine/food interactions, and warnings for these medicines. Check with your doctor, nurse, or pharmacist for any questions you have.

## 2019-10-21 ENCOUNTER — COMMUNICATION - HEALTHEAST (OUTPATIENT)
Dept: ADMINISTRATIVE | Facility: CLINIC | Age: 74
End: 2019-10-21

## 2019-10-21 ENCOUNTER — RECORDS - HEALTHEAST (OUTPATIENT)
Dept: ADMINISTRATIVE | Facility: OTHER | Age: 74
End: 2019-10-21

## 2019-10-21 ENCOUNTER — AMBULATORY - HEALTHEAST (OUTPATIENT)
Dept: PULMONOLOGY | Facility: OTHER | Age: 74
End: 2019-10-21

## 2019-10-21 DIAGNOSIS — R06.02 SOB (SHORTNESS OF BREATH): ICD-10-CM

## 2019-10-25 ENCOUNTER — OFFICE VISIT - HEALTHEAST (OUTPATIENT)
Dept: PHYSICAL THERAPY | Facility: REHABILITATION | Age: 74
End: 2019-10-25

## 2019-10-25 DIAGNOSIS — M62.81 MUSCLE WEAKNESS (GENERALIZED): ICD-10-CM

## 2019-10-25 DIAGNOSIS — R26.81 GAIT INSTABILITY: ICD-10-CM

## 2019-10-25 DIAGNOSIS — R29.818 DIFFICULTY BALANCING: ICD-10-CM

## 2019-10-29 ENCOUNTER — RECORDS - HEALTHEAST (OUTPATIENT)
Dept: ADMINISTRATIVE | Facility: OTHER | Age: 74
End: 2019-10-29

## 2019-10-30 ENCOUNTER — AMBULATORY - HEALTHEAST (OUTPATIENT)
Dept: CARDIOLOGY | Facility: CLINIC | Age: 74
End: 2019-10-30

## 2019-10-30 DIAGNOSIS — I49.5 SICK SINUS SYNDROME (H): ICD-10-CM

## 2019-10-30 DIAGNOSIS — Z95.0 CARDIAC PACEMAKER IN SITU: ICD-10-CM

## 2019-11-08 ENCOUNTER — OFFICE VISIT - HEALTHEAST (OUTPATIENT)
Dept: PHYSICAL THERAPY | Facility: REHABILITATION | Age: 74
End: 2019-11-08

## 2019-11-08 DIAGNOSIS — R26.81 GAIT INSTABILITY: ICD-10-CM

## 2019-11-08 DIAGNOSIS — R29.818 DIFFICULTY BALANCING: ICD-10-CM

## 2019-11-08 DIAGNOSIS — M62.81 MUSCLE WEAKNESS (GENERALIZED): ICD-10-CM

## 2019-11-15 ENCOUNTER — OFFICE VISIT - HEALTHEAST (OUTPATIENT)
Dept: PHYSICAL THERAPY | Facility: REHABILITATION | Age: 74
End: 2019-11-15

## 2019-11-15 DIAGNOSIS — M62.81 MUSCLE WEAKNESS (GENERALIZED): ICD-10-CM

## 2019-11-15 DIAGNOSIS — R26.81 GAIT INSTABILITY: ICD-10-CM

## 2019-11-15 DIAGNOSIS — R29.818 DIFFICULTY BALANCING: ICD-10-CM

## 2019-11-22 ENCOUNTER — OFFICE VISIT - HEALTHEAST (OUTPATIENT)
Dept: PHYSICAL THERAPY | Facility: REHABILITATION | Age: 74
End: 2019-11-22

## 2019-11-22 DIAGNOSIS — R26.81 GAIT INSTABILITY: ICD-10-CM

## 2019-11-22 DIAGNOSIS — M62.81 MUSCLE WEAKNESS (GENERALIZED): ICD-10-CM

## 2019-11-22 DIAGNOSIS — R29.818 DIFFICULTY BALANCING: ICD-10-CM

## 2019-11-27 ENCOUNTER — RECORDS - HEALTHEAST (OUTPATIENT)
Dept: ADMINISTRATIVE | Facility: OTHER | Age: 74
End: 2019-11-27

## 2019-11-27 ENCOUNTER — OFFICE VISIT - HEALTHEAST (OUTPATIENT)
Dept: PULMONOLOGY | Facility: OTHER | Age: 74
End: 2019-11-27

## 2019-11-27 ENCOUNTER — RECORDS - HEALTHEAST (OUTPATIENT)
Dept: PULMONOLOGY | Facility: OTHER | Age: 74
End: 2019-11-27

## 2019-11-27 DIAGNOSIS — J41.0 SIMPLE CHRONIC BRONCHITIS (H): ICD-10-CM

## 2019-11-27 DIAGNOSIS — R06.09 DYSPNEA ON EXERTION: ICD-10-CM

## 2019-11-27 DIAGNOSIS — J18.9 PNEUMONIA OF LEFT LOWER LOBE DUE TO INFECTIOUS ORGANISM: ICD-10-CM

## 2019-11-27 DIAGNOSIS — R06.02 SHORTNESS OF BREATH: ICD-10-CM

## 2019-11-27 LAB — HGB BLD-MCNC: 14.7 G/DL

## 2019-11-27 ASSESSMENT — MIFFLIN-ST. JEOR: SCORE: 1800.4

## 2019-12-06 ENCOUNTER — COMMUNICATION - HEALTHEAST (OUTPATIENT)
Dept: PULMONOLOGY | Facility: OTHER | Age: 74
End: 2019-12-06

## 2019-12-06 ENCOUNTER — OFFICE VISIT - HEALTHEAST (OUTPATIENT)
Dept: PHYSICAL THERAPY | Facility: REHABILITATION | Age: 74
End: 2019-12-06

## 2019-12-06 DIAGNOSIS — R26.81 GAIT INSTABILITY: ICD-10-CM

## 2019-12-06 DIAGNOSIS — M62.81 MUSCLE WEAKNESS (GENERALIZED): ICD-10-CM

## 2019-12-06 DIAGNOSIS — R29.818 DIFFICULTY BALANCING: ICD-10-CM

## 2019-12-16 ENCOUNTER — OFFICE VISIT - HEALTHEAST (OUTPATIENT)
Dept: PHYSICAL THERAPY | Facility: REHABILITATION | Age: 74
End: 2019-12-16

## 2019-12-16 DIAGNOSIS — R29.818 DIFFICULTY BALANCING: ICD-10-CM

## 2019-12-16 DIAGNOSIS — R26.81 GAIT INSTABILITY: ICD-10-CM

## 2019-12-16 DIAGNOSIS — M62.81 MUSCLE WEAKNESS (GENERALIZED): ICD-10-CM

## 2020-01-30 ENCOUNTER — AMBULATORY - HEALTHEAST (OUTPATIENT)
Dept: CARDIOLOGY | Facility: CLINIC | Age: 75
End: 2020-01-30

## 2020-01-30 DIAGNOSIS — Z95.0 CARDIAC PACEMAKER IN SITU: ICD-10-CM

## 2020-01-30 DIAGNOSIS — I49.5 SICK SINUS SYNDROME (H): ICD-10-CM

## 2020-02-03 ENCOUNTER — RECORDS - HEALTHEAST (OUTPATIENT)
Dept: LAB | Facility: CLINIC | Age: 75
End: 2020-02-03

## 2020-02-03 ENCOUNTER — RECORDS - HEALTHEAST (OUTPATIENT)
Dept: ADMINISTRATIVE | Facility: OTHER | Age: 75
End: 2020-02-03

## 2020-02-05 LAB — BACTERIA SPEC CULT: ABNORMAL

## 2020-02-13 ENCOUNTER — AMBULATORY - HEALTHEAST (OUTPATIENT)
Dept: VASCULAR SURGERY | Facility: CLINIC | Age: 75
End: 2020-02-13

## 2020-02-13 ENCOUNTER — RECORDS - HEALTHEAST (OUTPATIENT)
Dept: ADMINISTRATIVE | Facility: OTHER | Age: 75
End: 2020-02-13

## 2020-02-13 DIAGNOSIS — L03.032 CELLULITIS OF LEFT TOE: ICD-10-CM

## 2020-02-14 ENCOUNTER — RECORDS - HEALTHEAST (OUTPATIENT)
Dept: CARDIOLOGY | Facility: CLINIC | Age: 75
End: 2020-02-14

## 2020-02-14 ENCOUNTER — OFFICE VISIT - HEALTHEAST (OUTPATIENT)
Dept: VASCULAR SURGERY | Facility: CLINIC | Age: 75
End: 2020-02-14

## 2020-02-14 ENCOUNTER — AMBULATORY - HEALTHEAST (OUTPATIENT)
Dept: CARDIOLOGY | Facility: CLINIC | Age: 75
End: 2020-02-14

## 2020-02-14 DIAGNOSIS — L03.119 CELLULITIS AND ABSCESS OF FOOT: ICD-10-CM

## 2020-02-14 DIAGNOSIS — L02.619 CELLULITIS AND ABSCESS OF FOOT: ICD-10-CM

## 2020-02-14 DIAGNOSIS — L97.524 SKIN ULCER OF LEFT FOOT WITH NECROSIS OF BONE (H): ICD-10-CM

## 2020-02-14 DIAGNOSIS — M86.9 OSTEOMYELITIS OF ANKLE AND FOOT (H): ICD-10-CM

## 2020-02-14 ASSESSMENT — MIFFLIN-ST. JEOR: SCORE: 1800.4

## 2020-02-17 ENCOUNTER — RECORDS - HEALTHEAST (OUTPATIENT)
Dept: VASCULAR ULTRASOUND | Facility: CLINIC | Age: 75
End: 2020-02-17

## 2020-02-17 ENCOUNTER — AMBULATORY - HEALTHEAST (OUTPATIENT)
Dept: VASCULAR SURGERY | Facility: CLINIC | Age: 75
End: 2020-02-17

## 2020-02-17 DIAGNOSIS — L97.524 NON-PRESSURE CHRONIC ULCER OF OTHER PART OF LEFT FOOT WITH NECROSIS OF BONE (H): ICD-10-CM

## 2020-02-18 ENCOUNTER — HOSPITAL ENCOUNTER (OUTPATIENT)
Dept: MRI IMAGING | Facility: HOSPITAL | Age: 75
Discharge: HOME | End: 2020-02-18
Attending: INTERNAL MEDICINE

## 2020-02-18 ENCOUNTER — SURGERY - HEALTHEAST (OUTPATIENT)
Dept: PODIATRY | Facility: CLINIC | Age: 75
End: 2020-02-18

## 2020-02-18 ENCOUNTER — AMBULATORY - HEALTHEAST (OUTPATIENT)
Dept: VASCULAR SURGERY | Facility: CLINIC | Age: 75
End: 2020-02-18

## 2020-02-18 ENCOUNTER — HOSPITAL ENCOUNTER (OUTPATIENT)
Dept: MRI IMAGING | Facility: HOSPITAL | Age: 75
Discharge: HOME | End: 2020-02-18
Attending: PODIATRIST

## 2020-02-18 ENCOUNTER — HOSPITAL ENCOUNTER (OUTPATIENT)
Dept: RADIOLOGY | Facility: HOSPITAL | Age: 75
Discharge: HOME | End: 2020-02-18
Attending: PODIATRIST

## 2020-02-18 DIAGNOSIS — L02.619 CELLULITIS AND ABSCESS OF FOOT: ICD-10-CM

## 2020-02-18 DIAGNOSIS — L03.119 CELLULITIS AND ABSCESS OF FOOT: ICD-10-CM

## 2020-02-18 DIAGNOSIS — L97.524 SKIN ULCER OF LEFT FOOT WITH NECROSIS OF BONE (H): ICD-10-CM

## 2020-02-18 DIAGNOSIS — M86.9 OSTEOMYELITIS OF ANKLE AND FOOT (H): ICD-10-CM

## 2020-02-18 LAB
CREAT BLD-MCNC: 1.2 MG/DL (ref 0.7–1.3)
GFR SERPL CREATININE-BSD FRML MDRD: 59 ML/MIN/1.73M2

## 2020-02-19 ENCOUNTER — HOSPITAL ENCOUNTER (OUTPATIENT)
Dept: CT IMAGING | Facility: HOSPITAL | Age: 75
Discharge: HOME | End: 2020-02-19
Attending: INTERNAL MEDICINE

## 2020-02-19 ENCOUNTER — OFFICE VISIT - HEALTHEAST (OUTPATIENT)
Dept: PULMONOLOGY | Facility: OTHER | Age: 75
End: 2020-02-19

## 2020-02-19 ENCOUNTER — RECORDS - HEALTHEAST (OUTPATIENT)
Dept: LAB | Facility: CLINIC | Age: 75
End: 2020-02-19

## 2020-02-19 DIAGNOSIS — J18.1 LOBAR PNEUMONIA, UNSPECIFIED ORGANISM (H): ICD-10-CM

## 2020-02-19 DIAGNOSIS — R91.1 PULMONARY NODULE: ICD-10-CM

## 2020-02-19 DIAGNOSIS — J18.9 PNEUMONIA OF LEFT LOWER LOBE DUE TO INFECTIOUS ORGANISM: ICD-10-CM

## 2020-02-19 DIAGNOSIS — J41.0 SIMPLE CHRONIC BRONCHITIS (H): ICD-10-CM

## 2020-02-19 LAB
ANION GAP SERPL CALCULATED.3IONS-SCNC: 8 MMOL/L (ref 5–18)
BUN SERPL-MCNC: 17 MG/DL (ref 8–28)
CALCIUM SERPL-MCNC: 9 MG/DL (ref 8.5–10.5)
CHLORIDE BLD-SCNC: 105 MMOL/L (ref 98–107)
CO2 SERPL-SCNC: 28 MMOL/L (ref 22–31)
CREAT SERPL-MCNC: 1 MG/DL (ref 0.7–1.3)
GFR SERPL CREATININE-BSD FRML MDRD: >60 ML/MIN/1.73M2
GLUCOSE BLD-MCNC: 93 MG/DL (ref 70–125)
POTASSIUM BLD-SCNC: 4.6 MMOL/L (ref 3.5–5)
SODIUM SERPL-SCNC: 141 MMOL/L (ref 136–145)

## 2020-02-19 ASSESSMENT — MIFFLIN-ST. JEOR
SCORE: 1786.8
SCORE: 1795.87

## 2020-02-20 ENCOUNTER — ANESTHESIA - HEALTHEAST (OUTPATIENT)
Dept: SURGERY | Facility: HOSPITAL | Age: 75
End: 2020-02-20

## 2020-02-20 ENCOUNTER — SURGERY - HEALTHEAST (OUTPATIENT)
Dept: SURGERY | Facility: HOSPITAL | Age: 75
End: 2020-02-20

## 2020-02-21 ENCOUNTER — AMBULATORY - HEALTHEAST (OUTPATIENT)
Dept: VASCULAR SURGERY | Facility: CLINIC | Age: 75
End: 2020-02-21

## 2020-02-21 ENCOUNTER — COMMUNICATION - HEALTHEAST (OUTPATIENT)
Dept: VASCULAR SURGERY | Facility: CLINIC | Age: 75
End: 2020-02-21

## 2020-02-21 DIAGNOSIS — L97.524 SKIN ULCER OF LEFT FOOT WITH NECROSIS OF BONE (H): ICD-10-CM

## 2020-02-22 ENCOUNTER — HOME CARE/HOSPICE - HEALTHEAST (OUTPATIENT)
Dept: HOME HEALTH SERVICES | Facility: HOME HEALTH | Age: 75
End: 2020-02-22

## 2020-02-28 ENCOUNTER — OFFICE VISIT - HEALTHEAST (OUTPATIENT)
Dept: VASCULAR SURGERY | Facility: CLINIC | Age: 75
End: 2020-02-28

## 2020-02-28 DIAGNOSIS — M86.9 OSTEOMYELITIS OF ANKLE AND FOOT (H): ICD-10-CM

## 2020-03-01 ENCOUNTER — HEALTH MAINTENANCE LETTER (OUTPATIENT)
Age: 75
End: 2020-03-01

## 2020-03-06 ENCOUNTER — OFFICE VISIT - HEALTHEAST (OUTPATIENT)
Dept: VASCULAR SURGERY | Facility: CLINIC | Age: 75
End: 2020-03-06

## 2020-03-06 DIAGNOSIS — M86.9 OSTEOMYELITIS OF ANKLE AND FOOT (H): ICD-10-CM

## 2020-03-17 ENCOUNTER — OFFICE VISIT - HEALTHEAST (OUTPATIENT)
Dept: PODIATRY | Facility: CLINIC | Age: 75
End: 2020-03-17

## 2020-03-17 DIAGNOSIS — M86.9 OSTEOMYELITIS OF ANKLE AND FOOT (H): ICD-10-CM

## 2020-03-26 ENCOUNTER — COMMUNICATION - HEALTHEAST (OUTPATIENT)
Dept: VASCULAR SURGERY | Facility: CLINIC | Age: 75
End: 2020-03-26

## 2020-03-27 ENCOUNTER — OFFICE VISIT - HEALTHEAST (OUTPATIENT)
Dept: VASCULAR SURGERY | Facility: CLINIC | Age: 75
End: 2020-03-27

## 2020-03-27 DIAGNOSIS — M86.9 OSTEOMYELITIS OF ANKLE AND FOOT (H): ICD-10-CM

## 2020-03-31 ENCOUNTER — AMBULATORY - HEALTHEAST (OUTPATIENT)
Dept: PULMONOLOGY | Facility: OTHER | Age: 75
End: 2020-03-31

## 2020-03-31 ENCOUNTER — COMMUNICATION - HEALTHEAST (OUTPATIENT)
Dept: PULMONOLOGY | Facility: OTHER | Age: 75
End: 2020-03-31

## 2020-03-31 DIAGNOSIS — J41.0 SIMPLE CHRONIC BRONCHITIS (H): ICD-10-CM

## 2020-05-13 ENCOUNTER — OFFICE VISIT - HEALTHEAST (OUTPATIENT)
Dept: PULMONOLOGY | Facility: OTHER | Age: 75
End: 2020-05-13

## 2020-05-13 DIAGNOSIS — J41.0 SIMPLE CHRONIC BRONCHITIS (H): ICD-10-CM

## 2020-05-13 ASSESSMENT — MIFFLIN-ST. JEOR: SCORE: 1800.4

## 2020-05-26 ENCOUNTER — COMMUNICATION - HEALTHEAST (OUTPATIENT)
Dept: VASCULAR SURGERY | Facility: CLINIC | Age: 75
End: 2020-05-26

## 2020-05-29 ENCOUNTER — OFFICE VISIT - HEALTHEAST (OUTPATIENT)
Dept: VASCULAR SURGERY | Facility: CLINIC | Age: 75
End: 2020-05-29

## 2020-05-29 DIAGNOSIS — M20.41 HAMMER TOE OF RIGHT FOOT: ICD-10-CM

## 2020-05-29 DIAGNOSIS — B35.1 ONYCHOMYCOSIS: ICD-10-CM

## 2020-05-29 DIAGNOSIS — L60.1 ONYCHOLYSIS: ICD-10-CM

## 2020-05-29 DIAGNOSIS — L97.511 SKIN ULCER OF RIGHT FOOT, LIMITED TO BREAKDOWN OF SKIN (H): ICD-10-CM

## 2020-05-29 DIAGNOSIS — I73.9 PAD (PERIPHERAL ARTERY DISEASE) (H): ICD-10-CM

## 2020-06-10 ENCOUNTER — OFFICE VISIT - HEALTHEAST (OUTPATIENT)
Dept: PULMONOLOGY | Facility: OTHER | Age: 75
End: 2020-06-10

## 2020-06-10 DIAGNOSIS — J41.0 SIMPLE CHRONIC BRONCHITIS (H): ICD-10-CM

## 2020-06-10 DIAGNOSIS — R91.8 PULMONARY NODULES: ICD-10-CM

## 2020-06-10 DIAGNOSIS — R06.09 DYSPNEA ON EXERTION: ICD-10-CM

## 2020-06-10 ASSESSMENT — MIFFLIN-ST. JEOR: SCORE: 1800.4

## 2020-06-12 ENCOUNTER — COMMUNICATION - HEALTHEAST (OUTPATIENT)
Dept: VASCULAR SURGERY | Facility: CLINIC | Age: 75
End: 2020-06-12

## 2020-06-15 ENCOUNTER — OFFICE VISIT - HEALTHEAST (OUTPATIENT)
Dept: VASCULAR SURGERY | Facility: CLINIC | Age: 75
End: 2020-06-15

## 2020-06-15 ENCOUNTER — RECORDS - HEALTHEAST (OUTPATIENT)
Dept: ADMINISTRATIVE | Facility: OTHER | Age: 75
End: 2020-06-15

## 2020-06-15 DIAGNOSIS — L02.619 CELLULITIS AND ABSCESS OF FOOT: ICD-10-CM

## 2020-06-15 DIAGNOSIS — L03.119 CELLULITIS AND ABSCESS OF FOOT: ICD-10-CM

## 2020-06-15 DIAGNOSIS — M20.41 HAMMER TOE OF RIGHT FOOT: ICD-10-CM

## 2020-06-15 DIAGNOSIS — L97.513 SKIN ULCER OF RIGHT FOOT WITH NECROSIS OF MUSCLE (H): ICD-10-CM

## 2020-06-16 ENCOUNTER — COMMUNICATION - HEALTHEAST (OUTPATIENT)
Dept: VASCULAR SURGERY | Facility: CLINIC | Age: 75
End: 2020-06-16

## 2020-06-16 ENCOUNTER — AMBULATORY - HEALTHEAST (OUTPATIENT)
Dept: CARDIOLOGY | Facility: CLINIC | Age: 75
End: 2020-06-16

## 2020-06-16 DIAGNOSIS — Z95.0 CARDIAC PACEMAKER IN SITU: ICD-10-CM

## 2020-06-18 LAB
BACTERIA SPEC CULT: ABNORMAL
BACTERIA SPEC CULT: ABNORMAL
GRAM STAIN RESULT: ABNORMAL

## 2020-06-25 ENCOUNTER — HOSPITAL ENCOUNTER (OUTPATIENT)
Dept: RADIOLOGY | Facility: CLINIC | Age: 75
Discharge: HOME | End: 2020-06-25
Attending: PODIATRIST

## 2020-06-25 ENCOUNTER — HOSPITAL ENCOUNTER (OUTPATIENT)
Dept: MRI IMAGING | Facility: CLINIC | Age: 75
Discharge: HOME | End: 2020-06-25
Attending: PODIATRIST

## 2020-06-25 ENCOUNTER — RECORDS - HEALTHEAST (OUTPATIENT)
Dept: ADMINISTRATIVE | Facility: OTHER | Age: 75
End: 2020-06-25

## 2020-06-25 DIAGNOSIS — L02.619 CELLULITIS AND ABSCESS OF FOOT: ICD-10-CM

## 2020-06-25 DIAGNOSIS — L03.119 CELLULITIS AND ABSCESS OF FOOT: ICD-10-CM

## 2020-06-25 DIAGNOSIS — M20.41 HAMMER TOE OF RIGHT FOOT: ICD-10-CM

## 2020-06-25 DIAGNOSIS — L97.513 SKIN ULCER OF RIGHT FOOT WITH NECROSIS OF MUSCLE (H): ICD-10-CM

## 2020-06-25 LAB
CREAT BLD-MCNC: 1.2 MG/DL (ref 0.7–1.3)
GFR SERPL CREATININE-BSD FRML MDRD: 59 ML/MIN/1.73M2

## 2020-06-26 ENCOUNTER — AMBULATORY - HEALTHEAST (OUTPATIENT)
Dept: PODIATRY | Facility: CLINIC | Age: 75
End: 2020-06-26

## 2020-06-26 ENCOUNTER — AMBULATORY - HEALTHEAST (OUTPATIENT)
Dept: VASCULAR SURGERY | Facility: CLINIC | Age: 75
End: 2020-06-26

## 2020-06-26 ENCOUNTER — SURGERY - HEALTHEAST (OUTPATIENT)
Dept: PODIATRY | Facility: CLINIC | Age: 75
End: 2020-06-26

## 2020-06-26 DIAGNOSIS — M20.41 HAMMER TOE OF RIGHT FOOT: ICD-10-CM

## 2020-06-26 DIAGNOSIS — Z11.59 ENCOUNTER FOR SCREENING FOR OTHER VIRAL DISEASES: ICD-10-CM

## 2020-06-26 DIAGNOSIS — L97.513 SKIN ULCER OF RIGHT FOOT WITH NECROSIS OF MUSCLE (H): ICD-10-CM

## 2020-06-26 DIAGNOSIS — L02.619 CELLULITIS AND ABSCESS OF FOOT: ICD-10-CM

## 2020-06-26 DIAGNOSIS — L03.119 CELLULITIS AND ABSCESS OF FOOT: ICD-10-CM

## 2020-06-26 DIAGNOSIS — M86.9 OSTEOMYELITIS OF ANKLE AND FOOT (H): ICD-10-CM

## 2020-07-02 ASSESSMENT — MIFFLIN-ST. JEOR: SCORE: 1816.27

## 2020-07-05 ENCOUNTER — AMBULATORY - HEALTHEAST (OUTPATIENT)
Dept: FAMILY MEDICINE | Facility: CLINIC | Age: 75
End: 2020-07-05

## 2020-07-05 DIAGNOSIS — Z11.59 ENCOUNTER FOR SCREENING FOR OTHER VIRAL DISEASES: ICD-10-CM

## 2020-07-06 ENCOUNTER — RECORDS - HEALTHEAST (OUTPATIENT)
Dept: LAB | Facility: CLINIC | Age: 75
End: 2020-07-06

## 2020-07-06 ENCOUNTER — ANESTHESIA - HEALTHEAST (OUTPATIENT)
Dept: SURGERY | Facility: HOSPITAL | Age: 75
End: 2020-07-06

## 2020-07-06 LAB
ANION GAP SERPL CALCULATED.3IONS-SCNC: 10 MMOL/L (ref 5–18)
BUN SERPL-MCNC: 19 MG/DL (ref 8–28)
CALCIUM SERPL-MCNC: 8.9 MG/DL (ref 8.5–10.5)
CHLORIDE BLD-SCNC: 105 MMOL/L (ref 98–107)
CO2 SERPL-SCNC: 25 MMOL/L (ref 22–31)
CREAT SERPL-MCNC: 1.18 MG/DL (ref 0.7–1.3)
GFR SERPL CREATININE-BSD FRML MDRD: 60 ML/MIN/1.73M2
GLUCOSE BLD-MCNC: 89 MG/DL (ref 70–125)
POTASSIUM BLD-SCNC: 4.4 MMOL/L (ref 3.5–5)
SODIUM SERPL-SCNC: 140 MMOL/L (ref 136–145)

## 2020-07-07 ENCOUNTER — SURGERY - HEALTHEAST (OUTPATIENT)
Dept: SURGERY | Facility: HOSPITAL | Age: 75
End: 2020-07-07

## 2020-07-14 ENCOUNTER — OFFICE VISIT - HEALTHEAST (OUTPATIENT)
Dept: PODIATRY | Facility: CLINIC | Age: 75
End: 2020-07-14

## 2020-07-14 DIAGNOSIS — M86.9 OSTEOMYELITIS OF ANKLE AND FOOT (H): ICD-10-CM

## 2020-07-29 ENCOUNTER — OFFICE VISIT - HEALTHEAST (OUTPATIENT)
Dept: VASCULAR SURGERY | Facility: CLINIC | Age: 75
End: 2020-07-29

## 2020-07-29 DIAGNOSIS — L97.511 SKIN ULCER OF RIGHT FOOT, LIMITED TO BREAKDOWN OF SKIN (H): ICD-10-CM

## 2020-07-29 ASSESSMENT — MIFFLIN-ST. JEOR: SCORE: 1800.4

## 2020-08-11 ENCOUNTER — COMMUNICATION - HEALTHEAST (OUTPATIENT)
Dept: VASCULAR SURGERY | Facility: CLINIC | Age: 75
End: 2020-08-11

## 2020-08-19 ENCOUNTER — OFFICE VISIT - HEALTHEAST (OUTPATIENT)
Dept: VASCULAR SURGERY | Facility: CLINIC | Age: 75
End: 2020-08-19

## 2020-08-19 DIAGNOSIS — L97.511 SKIN ULCER OF RIGHT FOOT, LIMITED TO BREAKDOWN OF SKIN (H): ICD-10-CM

## 2020-08-31 ENCOUNTER — OFFICE VISIT - HEALTHEAST (OUTPATIENT)
Dept: VASCULAR SURGERY | Facility: CLINIC | Age: 75
End: 2020-08-31

## 2020-08-31 DIAGNOSIS — L97.511 SKIN ULCER OF RIGHT FOOT, LIMITED TO BREAKDOWN OF SKIN (H): ICD-10-CM

## 2020-08-31 DIAGNOSIS — M20.32 HALLUX HAMMERTOE, LEFT: ICD-10-CM

## 2020-08-31 DIAGNOSIS — B35.1 ONYCHOMYCOSIS: ICD-10-CM

## 2020-09-04 ENCOUNTER — OFFICE VISIT - HEALTHEAST (OUTPATIENT)
Dept: VASCULAR SURGERY | Facility: CLINIC | Age: 75
End: 2020-09-04

## 2020-09-04 DIAGNOSIS — L97.511 SKIN ULCER OF RIGHT FOOT, LIMITED TO BREAKDOWN OF SKIN (H): ICD-10-CM

## 2020-09-25 ENCOUNTER — OFFICE VISIT - HEALTHEAST (OUTPATIENT)
Dept: VASCULAR SURGERY | Facility: CLINIC | Age: 75
End: 2020-09-25

## 2020-09-25 DIAGNOSIS — M25.371 ANKLE INSTABILITY, RIGHT: ICD-10-CM

## 2020-09-25 DIAGNOSIS — L97.511 SKIN ULCER OF RIGHT FOOT, LIMITED TO BREAKDOWN OF SKIN (H): ICD-10-CM

## 2020-10-16 ENCOUNTER — OFFICE VISIT - HEALTHEAST (OUTPATIENT)
Dept: VASCULAR SURGERY | Facility: CLINIC | Age: 75
End: 2020-10-16

## 2020-10-16 DIAGNOSIS — L97.511 SKIN ULCER OF RIGHT FOOT, LIMITED TO BREAKDOWN OF SKIN (H): ICD-10-CM

## 2020-10-22 ENCOUNTER — TELEPHONE (OUTPATIENT)
Dept: NEUROLOGY | Facility: CLINIC | Age: 75
End: 2020-10-22

## 2020-10-22 NOTE — TELEPHONE ENCOUNTER
According to dennis patient was prescribed refills on 2020. Patient confused as to what was needed. I called pharmacy and the issue is not that there are no refills on file, but that the PA has . Will initiate PA to team. Patient is aware.   Bernardino Pete CMA on 10/22/2020 at 3:37 PM

## 2020-10-26 NOTE — TELEPHONE ENCOUNTER
Can I have you review? [facility] rx states the below dose and quantity.   Bernardino Pete, GAY on 10/23/2020 at 7:37 AM    
Central Prior Authorization Team  Phone: [phone]    PA Initiation    Medication: Pregabalin   Insurance Company: [employer] - Phone [phone] Fax [phone]  Pharmacy Filling the Rx: [id number] - [place] [[address]  Filling Pharmacy Phone: [phone]  Filling Pharmacy Fax:    Start Date: 10/22/2020      
Noted thank you, patient informed as well   Bernardino Pete CMA on 10/26/2020 at 12:07 PM    
Please clarify the direction and quantity for this medication. The rx on file shows  taking 1 capsule tid. However, the rx at the pharmacy is 1 QID. Please clarify the pt's current dose.   
Prior Authorization Approval    Authorization Effective Date: 10/26/2020  Authorization Expiration Date: 12/31/2020  Medication: Pregabalin- APPROVED   Approved Dose/Quantity:   Reference #:     Insurance Company: [employer] - Phone [phone] Fax [phone]  Expected CoPay:       CoPay Card Available:      Foundation Assistance Needed:    Which Pharmacy is filling the prescription (Not needed for infusion/clinic administered): [id number] - [place] [[address]  Pharmacy Notified: Yes  Patient Notified:  **Instructed pharmacy to notify patient when script is ready to /ship.**      
Prior Authorization Retail Medication Request    Medication/Dose: Pregabalin 200mg caps PO QID   ICD code (if different than what is on RX):  G60.0  Previously Tried and Failed:    Rationale:      Insurance Name:    Insurance ID:        Pharmacy Information (if different than what is on RX)  Name:  [facility]   Phone:  [phone]    Bernardino Pete CMA on 10/22/2020 at 3:38 PM       
Thank you. That is what was placed in request.   Bernardino Pete CMA on 10/23/2020 at 1:54 PM    
Yes, QID.  Last time we saw him was 4-1-2020, pregabalin prescribed at that time, 200 mg QID. Thanks. 
stated

## 2020-11-11 ENCOUNTER — OFFICE VISIT - HEALTHEAST (OUTPATIENT)
Dept: VASCULAR SURGERY | Facility: CLINIC | Age: 75
End: 2020-11-11

## 2020-11-11 DIAGNOSIS — E55.9 VITAMIN D DEFICIENCY: ICD-10-CM

## 2020-11-11 DIAGNOSIS — L03.119 CELLULITIS AND ABSCESS OF FOOT: ICD-10-CM

## 2020-11-11 DIAGNOSIS — L02.619 CELLULITIS AND ABSCESS OF FOOT: ICD-10-CM

## 2020-11-11 DIAGNOSIS — L97.511 SKIN ULCER OF RIGHT FOOT, LIMITED TO BREAKDOWN OF SKIN (H): ICD-10-CM

## 2020-11-12 LAB — 25(OH)D3 SERPL-MCNC: 28.5 NG/ML (ref 30–80)

## 2020-11-13 LAB
BACTERIA SPEC CULT: NORMAL
GRAM STAIN RESULT: NORMAL
GRAM STAIN RESULT: NORMAL

## 2020-11-15 LAB — VIT C SERPL-MCNC: 17 UMOL/L (ref 23–114)

## 2020-11-18 ENCOUNTER — OFFICE VISIT - HEALTHEAST (OUTPATIENT)
Dept: VASCULAR SURGERY | Facility: CLINIC | Age: 75
End: 2020-11-18

## 2020-11-18 DIAGNOSIS — L97.511 SKIN ULCER OF RIGHT FOOT, LIMITED TO BREAKDOWN OF SKIN (H): ICD-10-CM

## 2020-11-18 DIAGNOSIS — M20.41 HAMMER TOE OF RIGHT FOOT: ICD-10-CM

## 2020-12-09 ENCOUNTER — OFFICE VISIT - HEALTHEAST (OUTPATIENT)
Dept: VASCULAR SURGERY | Facility: CLINIC | Age: 75
End: 2020-12-09

## 2020-12-09 DIAGNOSIS — L97.511 SKIN ULCER OF RIGHT FOOT, LIMITED TO BREAKDOWN OF SKIN (H): ICD-10-CM

## 2020-12-14 ENCOUNTER — HEALTH MAINTENANCE LETTER (OUTPATIENT)
Age: 75
End: 2020-12-14

## 2020-12-18 DIAGNOSIS — G25.9 MOVEMENT DISORDER: Primary | ICD-10-CM

## 2020-12-18 RX ORDER — PRIMIDONE 50 MG/1
100 TABLET ORAL 2 TIMES DAILY
Qty: 120 TABLET | Refills: 0 | Status: SHIPPED | OUTPATIENT
Start: 2020-12-18 | End: 2021-01-29

## 2020-12-18 NOTE — TELEPHONE ENCOUNTER
Due for follow up   Medication T'd for review and signature  Bernardino Pete CMA on 12/18/2020 at 11:06 AM

## 2020-12-30 ENCOUNTER — OFFICE VISIT - HEALTHEAST (OUTPATIENT)
Dept: VASCULAR SURGERY | Facility: CLINIC | Age: 75
End: 2020-12-30

## 2020-12-30 DIAGNOSIS — L97.511 SKIN ULCER OF RIGHT FOOT, LIMITED TO BREAKDOWN OF SKIN (H): ICD-10-CM

## 2021-01-20 ENCOUNTER — OFFICE VISIT - HEALTHEAST (OUTPATIENT)
Dept: VASCULAR SURGERY | Facility: CLINIC | Age: 76
End: 2021-01-20

## 2021-01-20 DIAGNOSIS — I73.9 PAD (PERIPHERAL ARTERY DISEASE) (H): ICD-10-CM

## 2021-01-20 DIAGNOSIS — B35.1 ONYCHOMYCOSIS: ICD-10-CM

## 2021-01-20 DIAGNOSIS — L97.511 SKIN ULCER OF RIGHT FOOT, LIMITED TO BREAKDOWN OF SKIN (H): ICD-10-CM

## 2021-01-29 DIAGNOSIS — G25.9 MOVEMENT DISORDER: ICD-10-CM

## 2021-01-29 RX ORDER — PRIMIDONE 50 MG/1
TABLET ORAL
Qty: 120 TABLET | Refills: 0 | Status: SHIPPED | OUTPATIENT
Start: 2021-01-29 | End: 2021-02-23

## 2021-01-29 NOTE — TELEPHONE ENCOUNTER
Medication T'd for review and signature  Due for follow up   Bernardino Pete CMA on 1/29/2021 at 1:03 PM

## 2021-02-10 ENCOUNTER — HOSPITAL ENCOUNTER (OUTPATIENT)
Dept: CT IMAGING | Facility: HOSPITAL | Age: 76
Discharge: HOME | End: 2021-02-10
Attending: INTERNAL MEDICINE

## 2021-02-10 DIAGNOSIS — R91.8 PULMONARY NODULES: ICD-10-CM

## 2021-02-17 ENCOUNTER — OFFICE VISIT - HEALTHEAST (OUTPATIENT)
Dept: PULMONOLOGY | Facility: OTHER | Age: 76
End: 2021-02-17

## 2021-02-17 DIAGNOSIS — J41.0 SIMPLE CHRONIC BRONCHITIS (H): ICD-10-CM

## 2021-02-17 DIAGNOSIS — R06.09 DYSPNEA ON EXERTION: ICD-10-CM

## 2021-02-22 DIAGNOSIS — G25.0 ESSENTIAL TREMOR: ICD-10-CM

## 2021-02-22 DIAGNOSIS — G62.9 PERIPHERAL POLYNEUROPATHY: ICD-10-CM

## 2021-02-22 RX ORDER — TOPIRAMATE 25 MG/1
TABLET, FILM COATED ORAL
Qty: 540 TABLET | Refills: 1 | Status: SHIPPED | OUTPATIENT
Start: 2021-02-22 | End: 2021-03-01

## 2021-02-22 NOTE — TELEPHONE ENCOUNTER
Pt called requesting Topiramate refill be sent to [facility] pharmacy in [place]. [phone].

## 2021-02-22 NOTE — TELEPHONE ENCOUNTER
Due for follow up   Medication T'd for review and signature  Bernardino Pete CMA on 2/22/2021 at 12:31 PM

## 2021-02-23 DIAGNOSIS — G25.9 MOVEMENT DISORDER: ICD-10-CM

## 2021-02-23 NOTE — TELEPHONE ENCOUNTER
Refill request for primidone.  Pt has upcoming appt on 3/1/21.  Medication T'd for review and signature      Shanita Wu LPN on 2/23/2021 at 3:09 PM

## 2021-02-24 RX ORDER — PRIMIDONE 50 MG/1
100 TABLET ORAL 2 TIMES DAILY
Qty: 120 TABLET | Refills: 0 | Status: SHIPPED | OUTPATIENT
Start: 2021-02-24 | End: 2021-03-01

## 2021-02-26 PROBLEM — Z57.39 OCCUPATIONAL EXPOSURE TO OTHER AIR CONTAMINANTS: Status: ACTIVE | Noted: 2021-02-10

## 2021-02-26 PROBLEM — E66.9 OBESITY: Status: ACTIVE | Noted: 2021-02-26

## 2021-02-26 PROBLEM — L02.619 CELLULITIS AND ABSCESS OF FOOT: Status: ACTIVE | Noted: 2020-11-11

## 2021-02-26 PROBLEM — Z91.81 HISTORY OF FALL: Status: ACTIVE | Noted: 2021-02-10

## 2021-02-26 PROBLEM — Z95.0 CARDIAC PACEMAKER IN SITU: Status: ACTIVE | Noted: 2019-02-12

## 2021-02-26 PROBLEM — Z79.51 LONG TERM CURRENT USE OF INHALED STEROID: Status: ACTIVE | Noted: 2021-02-10

## 2021-02-26 PROBLEM — Z89.429: Status: ACTIVE | Noted: 2021-02-10

## 2021-02-26 PROBLEM — Z79.52 LONG TERM CURRENT USE OF SYSTEMIC STEROIDS: Status: ACTIVE | Noted: 2021-02-10

## 2021-02-26 PROBLEM — L03.119 CELLULITIS AND ABSCESS OF FOOT: Status: ACTIVE | Noted: 2020-11-11

## 2021-02-26 PROBLEM — M86.9 OSTEOMYELITIS OF ANKLE AND FOOT (H): Status: ACTIVE | Noted: 2020-02-18

## 2021-02-26 PROBLEM — R41.82 ALTERED MENTAL STATUS: Status: ACTIVE | Noted: 2020-07-29

## 2021-02-26 PROBLEM — J44.9 CHRONIC OBSTRUCTIVE PULMONARY DISEASE (H): Status: ACTIVE | Noted: 2020-07-29

## 2021-02-26 PROBLEM — L97.511 SKIN ULCER OF RIGHT FOOT, LIMITED TO BREAKDOWN OF SKIN (H): Status: ACTIVE | Noted: 2020-02-14

## 2021-02-26 PROBLEM — G60.0 HEREDITARY SENSORIMOTOR NEUROPATHY: Status: ACTIVE | Noted: 2021-02-26

## 2021-02-26 PROBLEM — R41.3 MEMORY DEFICIT: Status: ACTIVE | Noted: 2020-07-29

## 2021-02-26 PROBLEM — M19.90 OSTEOARTHROSIS: Status: ACTIVE | Noted: 2019-04-15

## 2021-02-26 PROBLEM — H91.90 HEARING LOSS: Status: ACTIVE | Noted: 2021-02-10

## 2021-02-26 PROBLEM — I49.5 SICK SINUS SYNDROME (H): Status: ACTIVE | Noted: 2019-04-15

## 2021-02-26 PROBLEM — Z79.1 LONG TERM (CURRENT) USE OF NON-STEROIDAL ANTI-INFLAMMATORIES (NSAID): Status: ACTIVE | Noted: 2021-02-10

## 2021-02-26 PROBLEM — G93.32 CHRONIC FATIGUE SYNDROME: Status: ACTIVE | Noted: 2021-02-26

## 2021-02-26 PROBLEM — M13.0 POLYARTHROPATHY OR POLYARTHRITIS, HAND: Status: ACTIVE | Noted: 2019-04-15

## 2021-02-26 PROBLEM — Z77.090 HISTORY OF ASBESTOS EXPOSURE: Status: ACTIVE | Noted: 2019-04-15

## 2021-02-26 PROBLEM — L03.90 CELLULITIS: Status: ACTIVE | Noted: 2019-10-04

## 2021-02-26 PROBLEM — R25.9 ABNORMAL INVOLUNTARY MOVEMENT: Status: ACTIVE | Noted: 2021-02-26

## 2021-02-26 PROBLEM — R55 SYNCOPE, UNSPECIFIED SYNCOPE TYPE: Status: ACTIVE | Noted: 2021-02-26

## 2021-02-26 PROBLEM — M20.41 HAMMER TOE OF RIGHT FOOT: Status: ACTIVE | Noted: 2020-05-29

## 2021-02-26 PROBLEM — J18.9 HEALTHCARE-ASSOCIATED PNEUMONIA: Status: ACTIVE | Noted: 2021-02-26

## 2021-02-26 PROBLEM — G89.29 OTHER CHRONIC PAIN: Status: ACTIVE | Noted: 2021-02-26

## 2021-02-26 PROBLEM — I95.1 ORTHOSTATIC SYNCOPE: Status: ACTIVE | Noted: 2021-02-26

## 2021-02-26 PROBLEM — I95.1 ORTHOSTATIC HYPOTENSION: Status: ACTIVE | Noted: 2021-02-26

## 2021-02-26 PROBLEM — N52.9 IMPOTENCE OF ORGANIC ORIGIN: Status: ACTIVE | Noted: 2021-02-26

## 2021-02-26 PROBLEM — M16.10 OSTEOARTHROSIS, UNSPECIFIED WHETHER GENERALIZED OR LOCALIZED, PELVIC REGION AND THIGH: Status: ACTIVE | Noted: 2019-04-15

## 2021-02-26 PROBLEM — Z87.01 HISTORY OF PNEUMONIA: Status: ACTIVE | Noted: 2021-02-10

## 2021-02-26 PROBLEM — F11.20 OPIOID DEPENDENCE (H): Status: ACTIVE | Noted: 2021-02-10

## 2021-02-26 PROBLEM — E53.9 VITAMIN B DEFICIENCY: Status: ACTIVE | Noted: 2021-02-10

## 2021-02-26 PROBLEM — T79.6XXA TRAUMATIC RHABDOMYOLYSIS (H): Status: ACTIVE | Noted: 2019-10-06

## 2021-02-26 PROBLEM — I10 ESSENTIAL HYPERTENSION: Status: ACTIVE | Noted: 2019-04-15

## 2021-03-01 ENCOUNTER — VIRTUAL VISIT (OUTPATIENT)
Dept: NEUROLOGY | Facility: CLINIC | Age: 76
End: 2021-03-01
Payer: COMMERCIAL

## 2021-03-01 VITALS — WEIGHT: 240 LBS | HEIGHT: 71 IN | BODY MASS INDEX: 33.6 KG/M2

## 2021-03-01 DIAGNOSIS — G25.9 MOVEMENT DISORDER: ICD-10-CM

## 2021-03-01 DIAGNOSIS — G25.0 ESSENTIAL TREMOR: ICD-10-CM

## 2021-03-01 DIAGNOSIS — G62.9 PERIPHERAL POLYNEUROPATHY: ICD-10-CM

## 2021-03-01 PROCEDURE — 99213 OFFICE O/P EST LOW 20 MIN: CPT | Mod: 95 | Performed by: PSYCHIATRY & NEUROLOGY

## 2021-03-01 RX ORDER — LIDOCAINE HYDROCHLORIDE 20 MG/ML
JELLY TOPICAL PRN
Qty: 90 ML | Refills: 11 | Status: SHIPPED | OUTPATIENT
Start: 2021-03-01 | End: 2022-02-16

## 2021-03-01 RX ORDER — PRIMIDONE 50 MG/1
100 TABLET ORAL 2 TIMES DAILY
Qty: 360 TABLET | Refills: 3 | Status: SHIPPED | OUTPATIENT
Start: 2021-03-01 | End: 2022-03-28

## 2021-03-01 RX ORDER — TOPIRAMATE 25 MG/1
75 TABLET, FILM COATED ORAL 2 TIMES DAILY
Qty: 540 TABLET | Refills: 3 | Status: SHIPPED | OUTPATIENT
Start: 2021-03-01 | End: 2022-04-19

## 2021-03-01 RX ORDER — PREGABALIN 200 MG/1
200 CAPSULE ORAL 4 TIMES DAILY
Qty: 360 CAPSULE | Refills: 3 | Status: SHIPPED | OUTPATIENT
Start: 2021-03-01 | End: 2022-05-06

## 2021-03-01 ASSESSMENT — MIFFLIN-ST. JEOR: SCORE: 1845.76

## 2021-03-01 NOTE — NURSING NOTE
Chief Complaint   Patient presents with     Follow Up     Annual follow up-Patient is in need of medication refills and states he has had two toe amputations but other than that, has been ok      Phone call  #  [phone] unable to connect   Bernardino Pete CMA on 3/1/2021 at 2:02 PM

## 2021-03-01 NOTE — PROGRESS NOTES
[facility]  [place]    Romaine Farah MRN# [id number]   Age: 75 year old YOB: 1945               Assessment and Plan:   Assessment:   Essential tremor  Painful neuropathy        Plan:     I renewed the primidone, pregabalin, topiramate and topical lidocaine through his pharmacy electronically.  We will plan to meet again in 6 months, sooner if need be.  I encouraged him to drink extra water to stay well-hydrated as this can help with the myoclonic jerks he describes (which are no doubt due to the pregabalin).             Chief Complaint/HPI:     I spoke to Bill on the phone for a telephone visit with his permission.  We were unable to establish a video link.  Things have been stable since we saw him last spring.  After discussing deep brain stimulator surgery with Dr. Corey he has decided against that for now.  We will continue with the primidone, he takes 100 mg twice a day, higher doses caused side effects including balance difficulty.  For the most part his neuropathy pain is under control, though some days are still bad.  In addition to the pregabalin and topiramate and lidocaine topical gel he also has some methadone to help with the pain.    He does mention some occasional involuntary jerking movements, shoulder and arm or leg.  This is no doubt due to the pregabalin.            Past Medical History:    has a past medical history of Hypertension.          Past Surgical History:    has a past surgical history that includes hip surgery (Right); Pacemaker/ICD check (2019); and Eye surgery (Bilateral).          Social History:     Social History     Tobacco Use     Smoking status: Former Smoker     Types: Cigars, cigarillos or filtered cigars     Smokeless tobacco: Never Used   Substance Use Topics     Alcohol use: Not Currently             Family History:     Family History   Problem Relation Age of Onset     LUNG DISEASE Mother      Diabetes Mother      Esophageal Cancer Father  "     Tremor Father      Alcoholism Father      Tremor Sister      Neuropathy Sister      Arthritis Brother      Other - See Comments Brother      Other - See Comments Brother      Tremor Sister      Lupus Daughter      Tremor Daughter      Thyroid Disease Daughter                 Allergies:     Allergies   Allergen Reactions     Hydroxyzine      The Hcl formulation caused side effects; tolerated pamoate     Primidone      Side effects: drowsiness, staggered walking and instability at  250mg BID (also at lower dose of 50 mg BID)     Propranolol      Side effects: cloudy thinking, felt \"off\"             Medications:     Current Outpatient Medications:      calcium carbonate-vitamin D (OYSTER SHELL CALCIUM/D) 500-200 MG-UNIT tablet, Dose unknown - [facility] - taking 2 tabs by mouth daily @ 9am, Disp: , Rfl:      clobetasol (TEMOVATE) 0.05 % external ointment, As needed, Disp: 60 g, Rfl: 11     DULoxetine (CYMBALTA) 30 MG capsule, Take 60 mg by mouth daily @ 9am, Disp: 180 capsule, Rfl: 3     fluocinonide (LIDEX) 0.05 % external cream, As needed, Disp: , Rfl:      ibuprofen (ADVIL/MOTRIN) 200 MG tablet, Take 1 tablet (200 mg) by mouth every 8 hours as needed for mild pain, Disp: , Rfl:      levocetirizine (XYZAL) 5 MG tablet, Take 5 mg by mouth as needed for allergies, Disp: , Rfl:      lidocaine (XYLOCAINE) 2 % external gel, Apply topically daily To feet, Disp: , Rfl:      magnesium 250 MG tablet, Take 1 tablet by mouth 3 times daily, Disp: , Rfl:      methadone (DOLOPHINE) 10 MG tablet, 1 pill by mouth daily @ 9am, 1/2 pill @ 1pm, 1 pill @ 5pm and 1 pill @ 10/11pm   - 3.5 pills per day, Disp: 1 tablet, Rfl: 0     Omega-3 Fatty Acids (FISH OIL BURP-LESS) 1000 MG CAPS, 2 by mouth daily in morning @ 9am, Disp: , Rfl:      Pregabalin (LYRICA) 200 MG capsule, 200mg capsule by mouth 3/day @9am, 1pm and 10pm, Disp: 270 capsule, Rfl: 3     primidone (MYSOLINE) 50 MG tablet, Take 2 tablets (100 mg) by mouth 2 times daily, " Disp: 120 tablet, Rfl: 0     topiramate (TOPAMAX) 25 MG tablet, Take 3 tablets by mouth in the morning and 3 tablets in the evening, Disp: 540 tablet, Rfl: 1     TRELEGY ELLIPTA 100-62.5-25 MCG/INH oral inhaler, Inhale 1 Inhalation daily., Disp: , Rfl:      vitamin (B COMPLEX) tablet, Take 1 tablet by mouth daily @ 9am, Disp: , Rfl:               Physical Exam:   Awake and alert with no aphasia no dysarthria  Speech is clear and coherent  Further exam is not feasible over the phone.       14 minutes were spent on the phone today.      Aryan Epperson MD

## 2021-03-01 NOTE — LETTER
3/1/2021         RE: Romaine Farah  [facility] [address] [place] [id number]        Dear Colleague,    Thank you for referring your patient, Romaine Farah, to the [facility] [place]. Please see a copy of my visit note below.    [facility]  [place]    Romaine Farah MRN# [id number]   Age: 75 year old YOB: 1945               Assessment and Plan:   Assessment:   Essential tremor  Painful neuropathy        Plan:     I renewed the primidone, pregabalin, topiramate and topical lidocaine through his pharmacy electronically.  We will plan to meet again in 6 months, sooner if need be.  I encouraged him to drink extra water to stay well-hydrated as this can help with the myoclonic jerks he describes (which are no doubt due to the pregabalin).             Chief Complaint/HPI:     I spoke to Bill on the phone for a telephone visit with his permission.  We were unable to establish a video link.  Things have been stable since we saw him last spring.  After discussing deep brain stimulator surgery with Dr. Corey he has decided against that for now.  We will continue with the primidone, he takes 100 mg twice a day, higher doses caused side effects including balance difficulty.  For the most part his neuropathy pain is under control, though some days are still bad.  In addition to the pregabalin and topiramate and lidocaine topical gel he also has some methadone to help with the pain.    He does mention some occasional involuntary jerking movements, shoulder and arm or leg.  This is no doubt due to the pregabalin.            Past Medical History:    has a past medical history of Hypertension.          Past Surgical History:    has a past surgical history that includes hip surgery (Right); Pacemaker/ICD check (2019); and Eye surgery (Bilateral).          Social History:     Social History     Tobacco Use     Smoking status: Former Smoker     Types: Cigars,  "cigarillos or filtered cigars     Smokeless tobacco: Never Used   Substance Use Topics     Alcohol use: Not Currently             Family History:     Family History   Problem Relation Age of Onset     LUNG DISEASE Mother      Diabetes Mother      Esophageal Cancer Father      Tremor Father      Alcoholism Father      Tremor Sister      Neuropathy Sister      Arthritis Brother      Other - See Comments Brother      Other - See Comments Brother      Tremor Sister      Lupus Daughter      Tremor Daughter      Thyroid Disease Daughter                 Allergies:     Allergies   Allergen Reactions     Hydroxyzine      The Hcl formulation caused side effects; tolerated pamoate     Primidone      Side effects: drowsiness, staggered walking and instability at  250mg BID (also at lower dose of 50 mg BID)     Propranolol      Side effects: cloudy thinking, felt \"off\"             Medications:     Current Outpatient Medications:      calcium carbonate-vitamin D (OYSTER SHELL CALCIUM/D) 500-200 MG-UNIT tablet, Dose unknown - [facility] - taking 2 tabs by mouth daily @ 9am, Disp: , Rfl:      clobetasol (TEMOVATE) 0.05 % external ointment, As needed, Disp: 60 g, Rfl: 11     DULoxetine (CYMBALTA) 30 MG capsule, Take 60 mg by mouth daily @ 9am, Disp: 180 capsule, Rfl: 3     fluocinonide (LIDEX) 0.05 % external cream, As needed, Disp: , Rfl:      ibuprofen (ADVIL/MOTRIN) 200 MG tablet, Take 1 tablet (200 mg) by mouth every 8 hours as needed for mild pain, Disp: , Rfl:      levocetirizine (XYZAL) 5 MG tablet, Take 5 mg by mouth as needed for allergies, Disp: , Rfl:      lidocaine (XYLOCAINE) 2 % external gel, Apply topically daily To feet, Disp: , Rfl:      magnesium 250 MG tablet, Take 1 tablet by mouth 3 times daily, Disp: , Rfl:      methadone (DOLOPHINE) 10 MG tablet, 1 pill by mouth daily @ 9am, 1/2 pill @ 1pm, 1 pill @ 5pm and 1 pill @ 10/11pm   - 3.5 pills per day, Disp: 1 tablet, Rfl: 0     Omega-3 Fatty Acids (FISH OIL " BURP-LESS) 1000 MG CAPS, 2 by mouth daily in morning @ 9am, Disp: , Rfl:      Pregabalin (LYRICA) 200 MG capsule, 200mg capsule by mouth 3/day @9am, 1pm and 10pm, Disp: 270 capsule, Rfl: 3     primidone (MYSOLINE) 50 MG tablet, Take 2 tablets (100 mg) by mouth 2 times daily, Disp: 120 tablet, Rfl: 0     topiramate (TOPAMAX) 25 MG tablet, Take 3 tablets by mouth in the morning and 3 tablets in the evening, Disp: 540 tablet, Rfl: 1     TRELEGY ELLIPTA 100-62.5-25 MCG/INH oral inhaler, Inhale 1 Inhalation daily., Disp: , Rfl:      vitamin (B COMPLEX) tablet, Take 1 tablet by mouth daily @ 9am, Disp: , Rfl:               Physical Exam:   Awake and alert with no aphasia no dysarthria  Speech is clear and coherent  Further exam is not feasible over the phone.       14 minutes were spent on the phone today.      Aryan Epperson MD            Again, thank you for allowing me to participate in the care of your patient.        Sincerely,        Aryan Epperson MD

## 2021-03-30 ENCOUNTER — TELEPHONE (OUTPATIENT)
Dept: NEUROLOGY | Facility: CLINIC | Age: 76
End: 2021-03-30

## 2021-03-30 NOTE — TELEPHONE ENCOUNTER
Prior Authorization Retail Medication Request    Medication/Dose: Topiramate 25 mg three tablets in the morning and 3 tablets in the evening  ICD code (if different than what is on RX):  *  Previously Tried and Failed:    Rationale:    Please see chart note  Insurance Name:  [employer]  Insurance ID:  [id number]      Pharmacy Information (if different than what is on RX)  Name:  Anaid hernandez  Phone:  [phone]    Latonia Cunningham RN on 3/30/2021 at 8:40 AM

## 2021-03-31 NOTE — TELEPHONE ENCOUNTER
Central Prior Authorization Team   Phone: [phone]    [place] Initiation    Medication: Topiramate 25mg   Insurance Company: [email or web address] - Phone [phone] Fax [phone]  Pharmacy Filling the Rx: [id number] [[address]  Filling Pharmacy Phone: [phone]  Filling Pharmacy Fax: [phone]  Start Date: 3/31/2021

## 2021-03-31 NOTE — TELEPHONE ENCOUNTER
Prior Authorization Approval    Authorization Effective Date: 3/31/2021  Authorization Expiration Date: 12/31/2022  Medication: Topiramate 25mg-APPROVED  Approved Dose/Quantity:    Reference #:     Insurance Company: [employer] - Phone [phone] Fax [phone]  Expected CoPay:       CoPay Card Available:      Foundation Assistance Needed:    Which Pharmacy is filling the prescription (Not needed for infusion/clinic administered): [id number] - [place] [[address]  Pharmacy Notified: Yes  Patient Notified: Yes  **Instructed pharmacy to notify patient when script is ready to /ship.**

## 2021-04-18 ENCOUNTER — HEALTH MAINTENANCE LETTER (OUTPATIENT)
Age: 76
End: 2021-04-18

## 2021-05-26 VITALS — HEART RATE: 72 BPM | SYSTOLIC BLOOD PRESSURE: 126 MMHG | TEMPERATURE: 98.1 F | DIASTOLIC BLOOD PRESSURE: 82 MMHG

## 2021-05-26 VITALS — DIASTOLIC BLOOD PRESSURE: 78 MMHG | SYSTOLIC BLOOD PRESSURE: 124 MMHG | TEMPERATURE: 97.5 F | RESPIRATION RATE: 18 BRPM

## 2021-05-26 VITALS — RESPIRATION RATE: 18 BRPM | DIASTOLIC BLOOD PRESSURE: 80 MMHG | SYSTOLIC BLOOD PRESSURE: 128 MMHG

## 2021-05-26 VITALS
SYSTOLIC BLOOD PRESSURE: 118 MMHG | TEMPERATURE: 98 F | HEART RATE: 60 BPM | RESPIRATION RATE: 16 BRPM | DIASTOLIC BLOOD PRESSURE: 68 MMHG

## 2021-05-26 VITALS
HEART RATE: 60 BPM | DIASTOLIC BLOOD PRESSURE: 84 MMHG | RESPIRATION RATE: 16 BRPM | TEMPERATURE: 97.5 F | SYSTOLIC BLOOD PRESSURE: 124 MMHG

## 2021-05-27 VITALS
TEMPERATURE: 97.9 F | RESPIRATION RATE: 14 BRPM | HEART RATE: 60 BPM | SYSTOLIC BLOOD PRESSURE: 122 MMHG | DIASTOLIC BLOOD PRESSURE: 94 MMHG

## 2021-05-27 VITALS
RESPIRATION RATE: 16 BRPM | HEART RATE: 64 BPM | SYSTOLIC BLOOD PRESSURE: 118 MMHG | TEMPERATURE: 97.9 F | DIASTOLIC BLOOD PRESSURE: 72 MMHG

## 2021-05-27 VITALS
RESPIRATION RATE: 18 BRPM | HEART RATE: 64 BPM | TEMPERATURE: 98.2 F | DIASTOLIC BLOOD PRESSURE: 62 MMHG | SYSTOLIC BLOOD PRESSURE: 104 MMHG

## 2021-05-27 VITALS — TEMPERATURE: 97.8 F | SYSTOLIC BLOOD PRESSURE: 120 MMHG | DIASTOLIC BLOOD PRESSURE: 68 MMHG | HEART RATE: 62 BPM

## 2021-05-27 VITALS — RESPIRATION RATE: 18 BRPM | HEART RATE: 64 BPM | DIASTOLIC BLOOD PRESSURE: 78 MMHG | SYSTOLIC BLOOD PRESSURE: 130 MMHG

## 2021-05-27 VITALS
SYSTOLIC BLOOD PRESSURE: 120 MMHG | HEART RATE: 68 BPM | DIASTOLIC BLOOD PRESSURE: 78 MMHG | RESPIRATION RATE: 16 BRPM | TEMPERATURE: 98.2 F

## 2021-05-27 VITALS
HEART RATE: 62 BPM | RESPIRATION RATE: 16 BRPM | DIASTOLIC BLOOD PRESSURE: 64 MMHG | TEMPERATURE: 98.2 F | SYSTOLIC BLOOD PRESSURE: 108 MMHG

## 2021-05-27 VITALS — TEMPERATURE: 98.6 F | SYSTOLIC BLOOD PRESSURE: 120 MMHG | HEART RATE: 60 BPM | DIASTOLIC BLOOD PRESSURE: 70 MMHG

## 2021-05-28 NOTE — PROGRESS NOTES
In clinic device check with Device RN followed by office visit with Dr. Hinojosa.  Please see link for full device report.  Patient was informed of results and next follow up during today's visit.

## 2021-05-30 VITALS — BODY MASS INDEX: 29.23 KG/M2 | HEIGHT: 72 IN | WEIGHT: 215.8 LBS

## 2021-06-02 VITALS — BODY MASS INDEX: 32.06 KG/M2 | HEIGHT: 71 IN | WEIGHT: 229 LBS

## 2021-06-02 VITALS — WEIGHT: 230 LBS | BODY MASS INDEX: 32.2 KG/M2 | HEIGHT: 71 IN

## 2021-06-03 VITALS — BODY MASS INDEX: 32.64 KG/M2 | HEIGHT: 70 IN | WEIGHT: 228 LBS

## 2021-06-03 NOTE — PROGRESS NOTES
[facility] Rehabilitation Daily Progress     Patient Name: Romaine Farah  Date: 11/22/2019  Visit #: 4  PTA visit #:    Referral Diagnosis: Cellulitis of right lower extremity  Referring provider: Simon Schmidt MD  Visit Diagnosis:     ICD-10-CM    1. Muscle weakness (generalized) M62.81    2. Gait instability R26.81    3. Difficulty balancing R29.818        Initial Eval Assessment: Romaine Farah is a 74 y.o. male who presents to therapy today with chief complaints of weakness, difficulty balancing and falls after 2 hospitalizations due to a septic cellulitic infection and pneumonia, it was noted that the weakness started before this as he has become less active recently and has had an increase in idiopathic neuropathy and essential tremors. Patient tested at an increase in falls risk category on the sit to stands, gait speed and four square step test. Patient also demonstrated decreased functional endurance and strength based on 2 minute walk test, gait speed and sit to stand test. The patient will benefit from skilled therapy to increase strength, improve function and decrease falls risk.   No data recorded    Assessment:   Patient returns to follow up physical therapy appointment for difficulty balancing, weakness and recent falls. Treatment was progressed with working on strengthening, functional mobility and balance. Patient presents with slow gait speed and shuffling of gait with some instability. Patient is tolerating treatment well.   HEP/POC compliance is  fair .  Patient is benefitting from skilled physical therapy and is making steady progress toward functional goals.  Patient is appropriate to continue with skilled physical therapy intervention, as indicated by initial plan of care.    Goal Status:  Pt. will demonstrate/verbalize independence in self-management of condition in : 6 weeks  Pt. will be independent with home exercise program in : 6 weeks  Pt. will improve gait speed : up to  1.0m/s;for decreased risk of falls;for improved community ambulation;in 6 weeks    Pt will: increase 30 sec sit to stands to >9 steps for decreased risk for falls in 8 weeks  Pt will: increased 2 minute walk distance to >110 meters for imoroved functional activity tolerance and endurance in 8 weeks  Pt will: decrease four square step test to less than 15 seconds for decrease risk for falls in 8 weeks       Plan / Patient Education:     Continue with initial plan of care.  Progress with home program as tolerated.  Plan for next visit: work on gait (shuffling toes),  faster walking, balance and functional strength    Subjective:     Pain Ratin, knees are getting sore  Patient reports the breathing is the same. He will be seeing a pulmonologist at the end of the month. His knees has been more painful. The legs look the same as the other, same size and color. He has been using a neubulizer to decrease the wheeze he has.       Objective:   O2 sat - 89% after NuStep, recover to 94% in 60 sec   O2 sat after standing exercises 97% with heavier breathing  HR  during session      Therapeutic Exercises   NuStep WL5, x 10 min avg 58 (hard for patient to go fast)    - Sit to stand : 1 x 10 repetitions with foam in chair (some increase in knee pain with this)   - Side stepping with L1 band for hip strengthening 10steps x3 julio  - standing hip abduction L1 band 2 x 10 reps B   - standing hip extension L1 band 2 x 10 reps B   - Forward step ups on step w/2 risers x 20 reps total alternating sides     Neuromuscular Re-education:     - Dynamic steps standing on read foam   - forward steps using // bars as needed for balance alternating feet x 20 reps    - lateral steps alternating sides using bars as needed x 20 reps    - backwards steps alternating feet, using bars frequently x 20 reps     Static Balance:  NBOS on foam [id number]  Tandem balance [id number] bilaterally (semi tandem education of performance for home)      Appt  time: 1130 - 1222  Treatment Today     TREATMENT MINUTES COMMENTS   Evaluation     Self-care/ Home management     Manual therapy     Neuromuscular Re-education 23 See above   Therapeutic Activity     Therapeutic Exercises 32 See above, educated on purpose of exercise and related to everyday activity   Gait training     Modality__________________                Total 55    Blank areas are intentional and mean the treatment did not include these items.       Mehreen Johnson, PT, DPT  11/22/2019

## 2021-06-03 NOTE — PROGRESS NOTES
[facility] Rehabilitation Daily Progress     Patient Name: Romaine Farah  Date: 11/15/2019  Visit #: 3  PTA visit #:    Referral Diagnosis: Cellulitis of right lower extremity  Referring provider: Simon Schmidt MD  Visit Diagnosis:     ICD-10-CM    1. Muscle weakness (generalized) M62.81    2. Gait instability R26.81    3. Difficulty balancing R29.818        Initial Eval Assessment: Romaine Farah is a 74 y.o. male who presents to therapy today with chief complaints of weakness, difficulty balancing and falls after 2 hospitalizations due to a septic cellulitic infection and pneumonia, it was noted that the weakness started before this as he has become less active recently and has had an increase in idiopathic neuropathy and essential tremors. Patient tested at an increase in falls risk category on the sit to stands, gait speed and four square step test. Patient also demonstrated decreased functional endurance and strength based on 2 minute walk test, gait speed and sit to stand test. The patient will benefit from skilled therapy to increase strength, improve function and decrease falls risk.   No data recorded    Assessment:   Patient returns to follow up physical therapy appointment for difficulty balancing, weakness and recent falls. Treatment was progressed with working on strengthening, functional mobility and balance. Patient presents with slow gait speed and shuffling of gait with some instability. Patient is tolerating treatment well.   HEP/POC compliance is  fair .  Patient is benefitting from skilled physical therapy and is making steady progress toward functional goals.  Patient is appropriate to continue with skilled physical therapy intervention, as indicated by initial plan of care.    Goal Status:  Pt. will demonstrate/verbalize independence in self-management of condition in : 6 weeks  Pt. will be independent with home exercise program in : 6 weeks  Pt. will improve gait speed : up to  1.0m/s;for decreased risk of falls;for improved community ambulation;in 6 weeks    Pt will: increase 30 sec sit to stands to >9 steps for decreased risk for falls in 8 weeks  Pt will: increased 2 minute walk distance to >110 meters for imoroved functional activity tolerance and endurance in 8 weeks  Pt will: decrease four square step test to less than 15 seconds for decrease risk for falls in 8 weeks       Plan / Patient Education:     Continue with initial plan of care.  Progress with home program as tolerated.  Plan for next visit: work on gait (shuffling toes),  faster walking, balance and functional strength    Subjective:     Pain Ratin, neuropathy in the feet and cellulitis     Patient reports he has been having some trouble with his breathing. This has been the past couple years. He has COPD and a history of [place]. Nothing else to complain of, L foot pain more than R today. Improved compliance to HEP this week patient likes step up exercises.    Objective:     NuStep WL5, 6min avg 55 (hard for patient to go fast)    HEP performed and educated on in session provided hand outs with images  Exercise #1: STS x10  Comment #1: tandem balance x30sec julio (educated on semi tandem to start)  Exercise #2: step up forward and side x10  Comment #2: hamstring stretch seated x30sec julio    Exercises in session:  STS : 1x6 repetitions without foam 2x10 with foam [place] under bottom  Standing forward lunge/big step onto foam square 2x10 each leg  Side step lunging/big step onto foam square 2x10 each leg  Side stepping with L1 band for hip strengthening 10steps x3 julio  - scapula retraction row with band while balancing NBOS on foam 3x10    Static Balance:  NBOS on foam [id number]  Tandem balance [id number] bilaterally (semi tandem education of performance for home)    Dynamic balance:  Above stepping exercises  Standing marching with tapping of cones reaching in front and across with LEs while maintaining balance [id number]  High  step marching walking over 8inch hurdles 8 hurdles x5 repetitions  Foam [place] stepping: varrying levels of instability two steps per five circles x4 repetitions, one step per 5 [place] x4 repetitions   Marching 3x10 bilaterally, toe tapping onto cones x20 both legs    Therapeutic activity:  Picking things up off of floor:  - practice with scarves in bucket picking up standing tall and throwing overhead x12 each arm.  - Education on stepping one foot in front of other to be able to do partial lunge to help   - Patient has tight hamstrings limiting ability to     - hamstring stretch 2x30 sec bilaterally (patient preferred sitting- add to HEP)  Educated on options at home to get a chair and reach down when needed to decrease risk of falling.     Appt time: 1130 - 1223    Treatment Today     TREATMENT MINUTES COMMENTS   Evaluation     Self-care/ Home management     Manual therapy     Neuromuscular Re-education 23 See above   Therapeutic Activity 10 See above   Therapeutic Exercises 22 See above, educated on purpose of exercise and related to everyday activity   Gait training     Modality__________________                Total 55    Blank areas are intentional and mean the treatment did not include these items.       Rosa Isela Leavitt, PT, DPT  11/15/2019

## 2021-06-03 NOTE — PATIENT INSTRUCTIONS - HE
Medication Plan  START  Anoro one inhalation daily - take this every day    Continue  Albuterol nebulizer 2-4 times daily as needed    Action Plan  Prednisone 20 mg daily x 7 days  Zpack

## 2021-06-03 NOTE — PROGRESS NOTES
[facility] Rehabilitation Daily Progress     Patient Name: Romaine Farah  Date: 11/8/2019  Visit #: 2  PTA visit #:    Referral Diagnosis: Cellulitis of right lower extremity  Referring provider: Simon Schmidt MD  Visit Diagnosis:     ICD-10-CM    1. Muscle weakness (generalized) M62.81    2. Gait instability R26.81    3. Difficulty balancing R29.818        Initial Eval Assessment: Romaine Farah is a 74 y.o. male who presents to therapy today with chief complaints of weakness, difficulty balancing and falls after 2 hospitalizations due to a septic cellulitic infection and pneumonia, it was noted that the weakness started before this as he has become less active recently and has had an increase in idiopathic neuropathy and essential tremors. Patient tested at an increase in falls risk category on the sit to stands, gait speed and four square step test. Patient also demonstrated decreased functional endurance and strength based on 2 minute walk test, gait speed and sit to stand test. The patient will benefit from skilled therapy to increase strength, improve function and decrease falls risk.   No data recorded    Assessment:   Patient returns to follow up physical therapy appointment for difficulty balancing, weakness and recent falls. Treatment was initiated with working on strengthening, functional mobility and balance. Patient presents with slow gait speed and shuffling of gait with some instability. Patient is tolerating treatment well.   HEP/POC compliance is  fair .  Patient is benefitting from skilled physical therapy and is making steady progress toward functional goals.  Patient is appropriate to continue with skilled physical therapy intervention, as indicated by initial plan of care.    Goal Status:  Pt. will demonstrate/verbalize independence in self-management of condition in : 6 weeks  Pt. will be independent with home exercise program in : 6 weeks  Pt. will improve gait speed : up to  1.0m/s;for decreased risk of falls;for improved community ambulation;in 6 weeks    Pt will: increase 30 sec sit to stands to >9 steps for decreased risk for falls in 8 weeks  Pt will: increased 2 minute walk distance to >110 meters for imoroved functional activity tolerance and endurance in 8 weeks  Pt will: decrease four square step test to less than 15 seconds for decrease risk for falls in 8 weeks       Plan / Patient Education:     Continue with initial plan of care.  Progress with home program as tolerated.  Plan for next visit: work on gait (shuffling toes), picking stuff up off of floor strengthening, faster walking    Subjective:     Pain Ratin, neuropathy in the feel and cellulitis     Patient reports that he hasn't been that compliant with HEP since last seen. Reports having diarrhea not feeling the best so had a hard time. Patient has a treadmill at home.     Objective:     NuStep WL5, 6min avg 55 (hard for patient to go fast)    HEP performed and educated on in session provided hand outs with images  Exercise #1: STS x10  Comment #1: tandem balance x10  Exercise #2: step up forward and side x10    Exercises in session:  STS : 1x6 repetitions without foam 2x10 with foam [place] under bottom  Standing forward lunge/big step onto foam square 2x10 each leg  Side step lunging/big step onto foam square 2x10 each leg  Side stepping with L1 band for hip strengthening 10steps x3 julio  Step ups onto stair 2x10 julio forward and to side while holding 10lb weighted ball    Static Balance:  NBOS on foam [id number]  Tandem balance [id number] bilaterally    Dynamic balance:  Above stepping exercises  Foam [place] stepping: varrying levels of instability two steps per five circles x4 repetitions, one step per 5 [place] x4 repetitions       Appt time: 1130 - 1225    Treatment Today     TREATMENT MINUTES COMMENTS   Evaluation     Self-care/ Home management     Manual therapy     Neuromuscular Re-education 25 See above    Therapeutic Activity     Therapeutic Exercises 30 See above, educated on purpose of exercise and related to everyday activity   Gait training     Modality__________________                Total 55    Blank areas are intentional and mean the treatment did not include these items.       Rosa Isela Leavitt, PT, DPT  11/8/2019

## 2021-06-03 NOTE — PROGRESS NOTES
[facility] Pulmonary Clinic Visit    Problems Addressed Today  Problem List Items Addressed This Visit     None      Visit Diagnoses     Simple chronic bronchitis (H)    -  Primary    Relevant Medications    umeclidinium-vilanterol (ANORO ELLIPTA) 62.5-25 mcg/actuation inhaler    predniSONE (DELTASONE) 20 MG tablet    azithromycin (ZITHROMAX) 250 MG tablet    albuterol (PROVENTIL) 2.5 mg /3 mL (0.083 %) nebulizer solution    Other Relevant Orders    Ambulatory Referral to Pulmonary Rehab    Pneumonia of left lower lobe due to infectious organism (H)        Relevant Medications    umeclidinium-vilanterol (ANORO ELLIPTA) 62.5-25 mcg/actuation inhaler    azithromycin (ZITHROMAX) 250 MG tablet    albuterol (PROVENTIL) 2.5 mg /3 mL (0.083 %) nebulizer solution    Other Relevant Orders    CT Chest Without Contrast    Dyspnea on exertion        Relevant Orders    Ambulatory Referral to Pulmonary Rehab        Assessment:74M with moderate COPD and frequent exacerbations and dyspnea.    Plans and recommendations:  Start LAMA/LABA  Action Plan  Exercise plan and pulm rehab referral given    Chief Complaint:shortness of breath    HPI:Shortness of breath of breath more the last few years.  Worse with exertion.  Also seems to get worse with chest cold or pneumonia. IMproves with rest and with nebulizer.  Localizes to the chest.  Pertinent positives include pneumonia episodes, sputum production.  Pertinent negatives include no hemoptysis.  No chest pain.    He doesn't do much physical activity.  He is worried about cancer.    He smoked in the past but not in many years.    His daughters and wife accompany him and add details to history.    Current Outpatient Medications on File Prior to Visit   Medication Sig Dispense Refill     acetaminophen (TYLENOL) 500 MG tablet Take 1 tablet (500 mg total) by mouth every 6 (six) hours as needed for pain or fever.  0     albuterol (PROAIR HFA;PROVENTIL HFA;VENTOLIN HFA) 90 mcg/actuation  inhaler Inhale 2 puffs 4 (four) times a day as needed.  1     ascorbic acid (ASCORBIC ACID WITH KHADIJAH HIPS) 500 MG tablet Take 500 mg by mouth daily as needed.              b complex vitamins tablet Take 1 tablet by mouth daily as needed.              calcium-vitamin D 500 mg(1,250mg) -200 unit per tablet Take 2 tablets by mouth daily.       clobetasol (TEMOVATE) 0.05 % ointment Apply 1 application topically 2 (two) times a day as needed.       cyanocobalamin 1000 MCG tablet Take 1 tablet (1,000 mcg total) by mouth daily.  0     DULoxetine (CYMBALTA) 30 MG capsule Take 60 mg by mouth daily.              fluocinonide (LIDEX) 0.05 % cream Apply 1 application topically 2 (two) times a day as needed.       ibuprofen (ADVIL,MOTRIN) 200 MG tablet Take 800 mg by mouth every 8 (eight) hours as needed for pain.              lidocaine (XYLOCAINE) 5 % ointment Apply 1 application topically 3 (three) times a day as needed.       magnesium 250 mg Tab Take 2 tablets by mouth 2 (two) times a day with meals. Takes a different dose at noon             magnesium 250 mg Tab Take 1 tablet by mouth daily with lunch. Takes a different dose in the morning and at supper             methadone (DOLOPHINE) 10 MG tablet Take 5-10 mg by mouth see administration instructions. 10mg qam , 5mg(1/2 tab) noon, 10mg evening and 10mg qhs             OMEGA-3/DHA/EPA/FISH OIL (FISH OIL-OMEGA-3 FATTY ACIDS) 300-1,000 mg capsule Take 2 g by mouth daily.       pregabalin (LYRICA) 200 MG capsule Take 200 mg by mouth 3 (three) times a day.       primidone (MYSOLINE) 50 MG tablet Take 100 mg by mouth 2 (two) times a day.       topiramate (TOPAMAX) 25 MG tablet Take 75 mg by mouth 2 (two) times a day.              [DISCONTINUED] lisinopril (PRINIVIL,ZESTRIL) 5 MG tablet Take 5 mg by mouth daily.       No current facility-administered medications on file prior to visit.        Review of Systems  Medical History  Active Ambulatory (Non-Hospital) Problems     "Diagnosis     Healthcare-associated pneumonia     Other chronic pain     Severe sepsis (H)     Traumatic rhabdomyolysis (H)     Orthostatic syncope     Sepsis without acute organ dysfunction, due to unspecified organism (H)     Syncope, unspecified syncope type     Idiopathic hypotension     Exertional dyspnea     Cellulitis     Cardiac pacemaker in situ     Cardiac arrhythmia     Symptomatic sinus bradycardia     SSS (sick sinus syndrome) (H)     Bradycardia     Acute blood loss anemia     Status post right hip replacement     Chronic constipation     H/O asbestos exposure     Tremor, essential     Primary hypertension     Neuropathy     Osteoarthritis     Osteoarthritis Of The Hip     Polyarthritis Of The Hand     Past Medical History:   Diagnosis Date     Bradycardia      Chronic pain      Erectile dysfunction      Essential tremor      H/O asbestos exposure      Hypertension      Neuropathy         Surgical History  He  has a past surgical history that includes Total hip arthroplasty (Right, 2/2/2015); Insert / replace / remove pacemaker (02/12/2019); and EP Pacemaker Insertion (N/A, 2/12/2019).       Social History  Reviewed, and he  reports that he has quit smoking. He has quit using smokeless tobacco. He reports current alcohol use. He reports that he does not use drugs.    Worked with asbestos   Allergies  Allergies   Allergen Reactions     Hydroxyzine      The Hcl formulation caused side effects; tolerated pamoate     Propranolol      Side effects: cloudy thinking, felt \"off\"    Family History  Reviewed, and family history includes COPD in his mother; Diabetes in his mother; Esophageal cancer in his father.          /60   Pulse 62   Resp 12   Ht 5' 11\" (1.803 m)   Wt (!) 230 lb (104.3 kg)   SpO2 94%   BMI 32.08 kg/m    General: calm, normal body habitus  Eyes: no scleral injection or icterus, pupils equal and round  Nose: patent nares  Mouth: oropharynx benign, MP3  Neck: supple, no lymph " nodes  Chest: nontender to palpation, no deformity  Lungs: small bilateral wheezes  CV: palpable radial pulses, RRR, no m/r/g, normal PMI  Abd: soft, nontender  Ext: no clubbing, no ulnar deviation of digits  Neuro: alert and interactive, no tremor or abnormal movements  Pscyh: normal affect appropriate to clinical scenario, no hallucination      Data Review - imaging, labs, and ekgs listed below were reviewed by me.  Chest XRay and chest CT images and EKG tracings interpreted personally.     Past Labs  Ancillary Procedure on 11/27/2019   Component Date Value     Hgb 11/27/2019 14.7    Device Check on 10/30/2019   Component Date Value     Device Manufacture 10/30/2019 [employer] ALEKSEY      Device Model 10/30/2019 L310 ACCOLADE MRI      Device Serial Number 10/30/2019 [id number]      Device Type 10/30/2019 Pacemaker      Device Implanting Provid* 10/30/2019 ELLEN Mahoney      Comments/Summary 10/30/2019                      Value:Type: routine remote pacemaker transmission.  Presenting rhythm: atrial pacing and sensing, rate 60 bpm, with presumed intrinsic ventricular conduction (no RV lead).  Battery/lead status: stable  Arrhythmias: since 7/30/19, none detected  Comments: normal magnet and pacemaker function. prd     Admission on 10/17/2019, Discharged on 10/19/2019   Component Date Value     Sodium 10/17/2019 138      Potassium 10/17/2019 4.4      Chloride 10/17/2019 106      CO2 10/17/2019 24      Anion Gap, Calculation 10/17/2019 8      Glucose 10/17/2019 109      BUN 10/17/2019 22      Creatinine 10/17/2019 1.15      GFR MDRD Af Amer 10/17/2019 >60      GFR MDRD Non Af Amer 10/17/2019 >60      Bilirubin, Total 10/17/2019 0.5      Calcium 10/17/2019 8.9      Protein, Total 10/17/2019 7.1      Albumin 10/17/2019 3.2*     Alkaline Phosphatase 10/17/2019 59      AST 10/17/2019 16      ALT 10/17/2019 13      Lactic Acid 10/17/2019 1.9      Color, UA 10/17/2019 Yellow      Clarity, UA 10/17/2019 Turbid*      Glucose, UA 10/17/2019 Negative      Bilirubin, UA 10/17/2019 Negative      Ketones, UA 10/17/2019 Negative      Specific Gravity, UA 10/17/2019 1.014      Blood, UA 10/17/2019 Negative      pH, UA 10/17/2019 8.0      Protein, UA 10/17/2019 Negative      Urobilinogen, UA 10/17/2019 <2.0 E.U./dL      Nitrite, UA 10/17/2019 Negative      Leukocytes, UA 10/17/2019 Negative      Bacteria, UA 10/17/2019 None Seen      RBC, UA 10/17/2019 0-2      WBC, UA 10/17/2019 5-10*     Squam Epithel, UA 10/17/2019 0-5      Amorphous, UA 10/17/2019 Many*     Procalcitonin 10/17/2019 0.15      pH, Venous 10/17/2019 7.37      pCO2, Venous 10/17/2019 43      pO2, Osmin 10/17/2019 93*     Base Excess, Venous 10/17/2019 -0.6      HCO3, Venous 10/17/2019 24.4      Oxyhemoglobin 10/17/2019 94.7*     O2 Sat, Venous 10/17/2019 98.7*     Influenza  A, Rapid Anti* 10/17/2019 No Influenza A antigen detected      Influenza B, Rapid Antig* 10/17/2019 No Influenza B antigen detected      WBC 10/17/2019 21.7*     RBC 10/17/2019 4.78      Hemoglobin 10/17/2019 13.9*     Hematocrit 10/17/2019 42.6      MCV 10/17/2019 89      MCH 10/17/2019 29.1      MCHC 10/17/2019 32.6      RDW 10/17/2019 14.1      Platelets 10/17/2019 283      MPV 10/17/2019 9.7      Neutrophils % 10/17/2019 88*     Lymphocytes % 10/17/2019 6*     Monocytes % 10/17/2019 5      Eosinophils % 10/17/2019 0      Basophils % 10/17/2019 0      Neutrophils Absolute 10/17/2019 19.1*     Lymphocytes Absolute 10/17/2019 1.2      Monocytes Absolute 10/17/2019 1.2*     Eosinophils Absolute 10/17/2019 0.1      Basophils Absolute 10/17/2019 0.1      D-Dimer, Quant 10/17/2019 2.00*     Color, UA 10/17/2019 Yellow      Clarity, UA 10/17/2019 Cloudy*     Glucose, UA 10/17/2019 Negative      Bilirubin, UA 10/17/2019 Negative      Ketones, UA 10/17/2019 Negative      Specific Gravity, UA 10/17/2019 1.014      Blood, UA 10/17/2019 Negative      pH, UA 10/17/2019 7.5      Protein, UA 10/17/2019  Negative      Urobilinogen, UA 10/17/2019 <2.0 E.U./dL      Nitrite, UA 10/17/2019 Negative      Leukocytes, UA 10/17/2019 Negative      Bacteria, UA 10/17/2019 None Seen      RBC, UA 10/17/2019 0-2      WBC, UA 10/17/2019 0-5      Squam Epithel, UA 10/17/2019 0-5      Amorphous, UA 10/17/2019 Many*     Mucus, UA 10/17/2019 Few*     Culture 10/17/2019 No Growth      LV volume diastolic 10/18/2019 108      LV volume systolic 10/18/2019 36.3      HR 10/18/2019 65      IVSd 10/18/2019 0.834      LVIDd 10/18/2019 4.08*     LVIDs 10/18/2019 2.1*     LVOT diam 10/18/2019 2.1      LVOT mean gradient 10/18/2019 1      LVOT peak VTI 10/18/2019 21      LVOT mean rm 10/18/2019 54.8      LVOT peak rm 10/18/2019 77.6      LVOT peak gradient 10/18/2019 2      LV PWd 10/18/2019 0.93      MV E' lat rm 10/18/2019 9.38      MV E' med rm 10/18/2019 7.35      AV mean rm 10/18/2019 81.7      AV mean gradient 10/18/2019 3      AV VTI 10/18/2019 24.9      AV peak rm 10/18/2019 115      AO root 10/18/2019 3.8      LA size 10/18/2019 3.4      LA length 10/18/2019 5.7      LA/AO root ratio 10/18/2019 0.895      MV decel slope 10/18/2019 6,130      MV decel time 10/18/2019 148      MV P 1/2 time 10/18/2019 43      MV peak A rm 10/18/2019 55.8      MV peak E rm 10/18/2019 90.7      TR peak rm 10/18/2019 291      LA area 2 10/18/2019 22.8      LA area 1 10/18/2019 25.7      BSA 10/18/2019 2.3      Hieght 10/18/2019 71      Weight 10/18/2019 3,737.6      BP 10/18/2019 110/62      IVS/PW ratio 10/18/2019 0.9      TR peak gradent 10/18/2019 33.9      LV FS 10/18/2019 48.5      Echo LVEF calculated 10/18/2019 66      LA volume 10/18/2019 87.4      LV mass 10/18/2019 110.1      AV area 10/18/2019 2.9      AV DIM IND rm 10/18/2019 0.7      MV area p 1/2 time 10/18/2019 5.1      MV E/A Ratio 10/18/2019 1.6      LVOT area 10/18/2019 3.46      LVOT SV 10/18/2019 72.7      AV peak gradient 10/18/2019 5.3      LV systolic volume index 10/18/2019  15.8      LV diastolic volume index 10/18/2019 47.0      LA volume index 10/18/2019 38.0      LV mass index 10/18/2019 47.9      LV SVi 10/18/2019 31.6      TAPSE 10/18/2019 2.6      MV med E/e' ratio 10/18/2019 12.3      MV lat E/e' ratio 10/18/2019 9.7      LV CO 10/18/2019 4.7      LV Ci 10/18/2019 2.1      Height 10/18/2019 71.0      Weight 10/18/2019 234      MV Avg E/e' Ratio 10/18/2019 10.8      AV DIM IND VTI 10/18/2019 0.8      CK, Total 10/18/2019 68      WBC 10/18/2019 10.6      RBC 10/18/2019 3.87*     Hemoglobin 10/18/2019 11.2*     Hematocrit 10/18/2019 35.2*     MCV 10/18/2019 91      MCH 10/18/2019 28.9      MCHC 10/18/2019 31.8*     RDW 10/18/2019 14.3      Platelets 10/18/2019 208      MPV 10/18/2019 9.5      Neutrophils % 10/18/2019 71*     Lymphocytes % 10/18/2019 18*     Monocytes % 10/18/2019 8      Eosinophils % 10/18/2019 2      Basophils % 10/18/2019 1      Neutrophils Absolute 10/18/2019 7.6      Lymphocytes Absolute 10/18/2019 1.9      Monocytes Absolute 10/18/2019 0.8      Eosinophils Absolute 10/18/2019 0.2      Basophils Absolute 10/18/2019 0.1    Admission on 10/04/2019, Discharged on 10/07/2019   Component Date Value     Magnesium 10/04/2019 2.1      Lactic Acid 10/04/2019 1.1      Sodium 10/04/2019 141      Potassium 10/04/2019 4.0      Chloride 10/04/2019 107      CO2 10/04/2019 26      Anion Gap, Calculation 10/04/2019 8      Glucose 10/04/2019 114      Calcium 10/04/2019 8.9      BUN 10/04/2019 17      Creatinine 10/04/2019 1.18      GFR MDRD Af Amer 10/04/2019 >60      GFR MDRD Non Af Amer 10/04/2019 60*     Bilirubin, Total 10/04/2019 0.4      Bilirubin, Direct 10/04/2019 0.2      Protein, Total 10/04/2019 7.0      Albumin 10/04/2019 3.4*     Alkaline Phosphatase 10/04/2019 53      AST 10/04/2019 24      ALT 10/04/2019 17      Alcohol, Blood 10/04/2019 <10      WBC 10/04/2019 18.7*     RBC 10/04/2019 4.77      Hemoglobin 10/04/2019 13.9*     Hematocrit 10/04/2019 42.6      MCV  10/04/2019 89      MCH 10/04/2019 29.1      MCHC 10/04/2019 32.6      RDW 10/04/2019 14.5      Platelets 10/04/2019 173      MPV 10/04/2019 9.7      Troponin I 10/04/2019 <0.01      VENTRICULAR RATE 10/04/2019 60      ATRIAL RATE 10/04/2019 60      P-R INTERVAL 10/04/2019 156      QRS DURATION 10/04/2019 88      Q-T INTERVAL 10/04/2019 390      QTC CALCULATION (BEZET) 10/04/2019 390      R AXIS 10/04/2019 119      T AXIS 10/04/2019 20      MUSE DIAGNOSIS 10/04/2019                      Value:Normal sinus rhythm  Ectopic atrial rhythm  Inferior-posterior infarct , age undetermined  Abnormal ECG  When compared with ECG of 11-FEB-2019 11:29,  has not changed  Confirmed by DIALLO APPIAH MD LOC: ([id number]) on 10/5/2019 3:38:49 PM       Color, UA 10/05/2019 Yellow      Clarity, UA 10/05/2019 Cloudy*     Glucose, UA 10/05/2019 Negative      Bilirubin, UA 10/05/2019 Negative      Ketones, UA 10/05/2019 Negative      Specific Gravity, UA 10/05/2019 1.014      Blood, UA 10/05/2019 Trace*     pH, UA 10/05/2019 7.5      Protein, UA 10/05/2019 30 mg/dL*     Urobilinogen, UA 10/05/2019 <2.0 E.U./dL      Nitrite, UA 10/05/2019 Negative      Leukocytes, UA 10/05/2019 Trace*     Bacteria, UA 10/05/2019 None Seen      RBC, UA 10/05/2019 0-2      WBC, UA 10/05/2019 0-5      Squam Epithel, UA 10/05/2019 0-5      Mucus, UA 10/05/2019 Few*     Anaerobic Blood Culture * 10/04/2019 No Growth      Aerobic Blood Culture Jonathan* 10/04/2019 No Growth      Vitamin D, Total (25-Hyd* 10/04/2019 33.4      Culture 10/05/2019 No Growth      VENTRICULAR RATE 10/05/2019 60      ATRIAL RATE 10/05/2019 60      P-R INTERVAL 10/05/2019 180      QRS DURATION 10/05/2019 92      Q-T INTERVAL 10/05/2019 418      QTC CALCULATION (BEZET) 10/05/2019 418      R AXIS 10/05/2019 79      T AXIS 10/05/2019 11      MUSE DIAGNOSIS 10/05/2019                      Value:Electronic atrial pacemaker ; APVS pattern   Nonspecific T wave abnormality  Abnormal ECG  When  compared with ECG of 04-OCT-2019 19:34,  has not changed  Confirmed by DIALLO APPIAH MD LOC: ([id number]) on 10/5/2019 5:50:12 PM       Vitamin B-12 10/06/2019 247      Sodium 10/06/2019 138      Potassium 10/06/2019 3.9      Chloride 10/06/2019 112*     CO2 10/06/2019 21*     Anion Gap, Calculation 10/06/2019 5      Glucose 10/06/2019 95      BUN 10/06/2019 11      Creatinine 10/06/2019 0.82      GFR MDRD Af Amer 10/06/2019 >60      GFR MDRD Non Af Amer 10/06/2019 >60      Bilirubin, Total 10/06/2019 0.2      Calcium 10/06/2019 8.1*     Protein, Total 10/06/2019 5.8*     Albumin 10/06/2019 2.7*     Alkaline Phosphatase 10/06/2019 46      AST 10/06/2019 36      ALT 10/06/2019 17      CK, Total 10/06/2019 1,174*     WBC 10/06/2019 8.2      RBC 10/06/2019 4.36*     Hemoglobin 10/06/2019 12.6*     Hematocrit 10/06/2019 38.9*     MCV 10/06/2019 89      MCH 10/06/2019 28.9      MCHC 10/06/2019 32.4      RDW 10/06/2019 14.4      Platelets 10/06/2019 126*     MPV 10/06/2019 10.0      Neutrophils % 10/06/2019 66      Lymphocytes % 10/06/2019 18*     Monocytes % 10/06/2019 10      Eosinophils % 10/06/2019 6      Basophils % 10/06/2019 1      Neutrophils Absolute 10/06/2019 5.4      Lymphocytes Absolute 10/06/2019 1.5      Monocytes Absolute 10/06/2019 0.8      Eosinophils Absolute 10/06/2019 0.5*     Basophils Absolute 10/06/2019 0.0      Glucose 10/06/2019 95      CK, Total 10/06/2019 1,103*     CK, Total 10/07/2019 572*       * Cannot find OR log *    Past Imaging  CT chest reviewed and interpreted by me: no PE, patchy areas of consolidation L side  Xr Chest 1 View Portable    Result Date: 10/17/2019  EXAM: XR CHEST 1 VIEW PORTABLE LOCATION: [facility] DATE/TIME: 10/17/2019 12:44 PM INDICATION: Fever. COMPARISON: 10/4/2019 and 2/13/2019.     Mild chronic pleural thickening throughout the left hemithorax and multiple strands of fibrosis mid and lower left lung unchanged. Chronic volume loss in the lingula and  prominent cardiac fat pad obscure the left heart border, unchanged. Shallow inspiration exaggerates heart size and pulmonary vascularity which may be mildly increased. No focal pulmonary consolidation identified although the inferior left hemithorax is not well evaluated radiographically.     Cta Chest [place] Run    Result Date: 10/17/2019  EXAM: CTA CHEST PE RUN LOCATION: [facility] DATE/TIME: 10/17/2019 5:13 PM INDICATION: febrile, hypotensive, elevated dimer, recent hospitalization COMPARISON: Chest x-ray 10/17/2019 TECHNIQUE: CT angiogram chest during arterial phase injection IV contrast. 2D and 3D MIP reconstructions were performed by the CT technologist. Dose reduction techniques were used. CONTRAST: Iohexol (Omni) 350 100 mL FINDINGS: ANGIOGRAM CHEST: Pulmonary arteries are normal caliber and negative for pulmonary emboli. Thoracic aorta is negative for dissection. No CT evidence of right heart strain. LUNGS AND PLEURA: The central airways are clear. Trace bilateral pleural effusions with minimal adjacent consolidation and patchy groundglass as well as bandlike opacities. Mild emphysema. Scattered patchy left upper, lower lobar and lingular airspace opacities. Dominant 4 mm left upper lobe nodule image 113 series 3. MEDIASTINUM/AXILLAE: Mild mediastinal adenopathy with a dominant precarinal measuring 1.2 cm short axis dimension image 103 series 2. Similar mildly enlarged subcarinal lymph node. Mild hilar adenopathy with a dominant right hilar node measuring 1.3 cm short axis dimension. Similar mild left hilar adenopathy. Upper normal heart size. No pericardial effusion. Left-sided pacemaker device. Mild coronary artery calcifications. Small hiatal hernia. UPPER ABDOMEN: Incidental small duodenal lipoma. Symmetric bilateral perinephric stranding is likely chronic. MUSCULOSKELETAL: Normal.     1.  No pulmonary artery embolism or thoracic aortic dissection. 2.  Diffuse left lung airspace opacities likely  reflecting pneumonia. Recommend follow-up to resolution. 3.  Trace bilateral pleural effusions with adjacent opacities which may reflect atelectasis or infiltrates. 4.  Mild mediastinal and hilar adenopathy which is favored to be reactive. Recommend attention on follow-up.    Ct Abdomen Pelvis Without Oral With Iv Contrast    Result Date: 10/17/2019  EXAM: CT ABDOMEN PELVIS WO ORAL W IV CONTRAST LOCATION: [facility] DATE/TIME: 10/17/2019 5:16 PM INDICATION: septic pt with wbc 21k, hypotensive, elevated dimer, recent hospitalization COMPARISON: None. TECHNIQUE: CT scan of the abdomen and pelvis was performed following injection of IV contrast. Multiplanar reformats were obtained. Dose reduction techniques were used. CONTRAST: 100 cc Omnipaque 350 FINDINGS: LOWER CHEST: Please see the concurrent CT chest report. HEPATOBILIARY: Mild left lobe predominant intrahepatic biliary ductal dilatation. No obstructing stone or mass is identified. Unremarkable gallbladder and liver. PANCREAS: Normal. SPLEEN: Normal. KIDNEYS/BLADDER: Symmetric bilateral perinephric stranding is likely chronic. No hydronephrosis. ADRENAL GLANDS: Normal. BOWEL: Small hiatal hernia. Incidental small duodenal lipoma. Small fat-containing periumbilical hernia. Appendicoliths within the otherwise unremarkable appendix. Transverse segment colonic lipoma. Mild fecal retention within the nondistended colon. Distal colonic diverticulosis without evidence of acute diverticulitis. No free air or free fluid. Patent central SMA and SMV. LYMPH NODES: Normal. PELVIC ORGANS: Small bilateral fat-containing inguinal hernias. Heterogeneous lobulated prostate gland with multiple calcifications. Unremarkable urinary bladder. MUSCULOSKELETAL: Left anterior muscular compartment intramuscular lipoma measuring up to 6.4 cm. Right total hip arthroplasty. OTHER: None.     1.  No acute findings in the abdomen or pelvis to explain the patient's symptoms. No evidence  of an inflammatory process or abscess. 2.  Chronic findings as described above.     PFTs reviewed: moderate obstruction.    Outside records reviewed and summarized here: notes from [facility].  He was evaluated there after his hospitalization for pneumonia.  There was some questions of COPD.  He was given albuterol and referred to pulmonary.

## 2021-06-04 VITALS
HEIGHT: 71 IN | BODY MASS INDEX: 31.94 KG/M2 | DIASTOLIC BLOOD PRESSURE: 70 MMHG | TEMPERATURE: 97.5 F | SYSTOLIC BLOOD PRESSURE: 128 MMHG | RESPIRATION RATE: 12 BRPM | HEART RATE: 60 BPM

## 2021-06-04 VITALS
HEART RATE: 62 BPM | DIASTOLIC BLOOD PRESSURE: 60 MMHG | SYSTOLIC BLOOD PRESSURE: 106 MMHG | HEIGHT: 71 IN | OXYGEN SATURATION: 94 % | BODY MASS INDEX: 32.2 KG/M2 | WEIGHT: 230 LBS | RESPIRATION RATE: 12 BRPM

## 2021-06-04 VITALS
TEMPERATURE: 98.7 F | BODY MASS INDEX: 31.94 KG/M2 | RESPIRATION RATE: 16 BRPM | HEIGHT: 71 IN | SYSTOLIC BLOOD PRESSURE: 122 MMHG | HEART RATE: 64 BPM | DIASTOLIC BLOOD PRESSURE: 70 MMHG

## 2021-06-04 VITALS — WEIGHT: 230 LBS | HEIGHT: 71 IN | BODY MASS INDEX: 32.2 KG/M2

## 2021-06-04 VITALS
DIASTOLIC BLOOD PRESSURE: 62 MMHG | TEMPERATURE: 97.7 F | HEART RATE: 60 BPM | OXYGEN SATURATION: 93 % | BODY MASS INDEX: 32.2 KG/M2 | HEIGHT: 71 IN | WEIGHT: 230 LBS | SYSTOLIC BLOOD PRESSURE: 106 MMHG

## 2021-06-04 VITALS
WEIGHT: 229 LBS | RESPIRATION RATE: 12 BRPM | OXYGEN SATURATION: 93 % | SYSTOLIC BLOOD PRESSURE: 122 MMHG | HEIGHT: 71 IN | BODY MASS INDEX: 32.06 KG/M2 | HEART RATE: 71 BPM | DIASTOLIC BLOOD PRESSURE: 62 MMHG

## 2021-06-04 VITALS — BODY MASS INDEX: 32.2 KG/M2 | HEIGHT: 71 IN | WEIGHT: 230 LBS

## 2021-06-04 VITALS — WEIGHT: 230 LBS | BODY MASS INDEX: 31.15 KG/M2 | HEIGHT: 72 IN

## 2021-06-04 VITALS
SYSTOLIC BLOOD PRESSURE: 130 MMHG | BODY MASS INDEX: 32.2 KG/M2 | WEIGHT: 230 LBS | DIASTOLIC BLOOD PRESSURE: 64 MMHG | HEART RATE: 60 BPM | HEIGHT: 71 IN | RESPIRATION RATE: 20 BRPM | TEMPERATURE: 98.7 F

## 2021-06-04 VITALS — WEIGHT: 227 LBS | BODY MASS INDEX: 31.78 KG/M2 | HEIGHT: 71 IN

## 2021-06-04 NOTE — PROGRESS NOTES
[facility] Rehabilitation Daily Progress     Patient Name: Romaine Farah  Date: 12/16/2019  Visit #: 6  PTA visit #:    Referral Diagnosis: Cellulitis of right lower extremity  Referring provider: Simon Schmidt MD  Visit Diagnosis:     ICD-10-CM    1. Muscle weakness (generalized) M62.81    2. Gait instability R26.81    3. Difficulty balancing R29.818        Initial Eval Assessment: Romaine Farah is a 74 y.o. male who presents to therapy today with chief complaints of weakness, difficulty balancing and falls after 2 hospitalizations due to a septic cellulitic infection and pneumonia, it was noted that the weakness started before this as he has become less active recently and has had an increase in idiopathic neuropathy and essential tremors. Patient tested at an increase in falls risk category on the sit to stands, gait speed and four square step test. Patient also demonstrated decreased functional endurance and strength based on 2 minute walk test, gait speed and sit to stand test. The patient will benefit from skilled therapy to increase strength, improve function and decrease falls risk.   No data recorded    Assessment:   Patient returns to follow up physical therapy appointment for difficulty balancing, weakness and recent falls. Treatment was progressed with working on strengthening, functional mobility and balance. Patient is most limited by knee pain and shortness of breath on this date. Session focused on strengthening exercises for LE and balance with progression of HEP.    HEP/POC compliance is  fair .  Patient is benefitting from skilled physical therapy and is making steady progress toward functional goals.  Patient is appropriate to continue with skilled physical therapy intervention, as indicated by initial plan of care.    Goal Status:  Pt. will demonstrate/verbalize independence in self-management of condition in : 6 weeks  Pt. will be independent with home exercise program in : 6  weeks  Pt. will improve gait speed : up to 1.0m/s;for decreased risk of falls;for improved community ambulation;in 6 weeks    Pt will: increase 30 sec sit to stands to >9 steps for decreased risk for falls in 8 weeks  Pt will: increased 2 minute walk distance to >110 meters for imoroved functional activity tolerance and endurance in 8 weeks  Pt will: decrease four square step test to less than 15 seconds for decrease risk for falls in 8 weeks       Plan / Patient Education:     Continue with initial plan of care.  Progress with home program as tolerated.  Plan for next visit: work on gait (shuffling toes),  faster walking, balance and functional strength    Subjective:     Pain Ratin    Patient reports he is doing okay. Nothing new to report. He is doing the step ups forward and to the side and STS. He saw the pulmonologist and learned he has COPD, now on new medication. Knees are really his problem, they are what bother him the most. Do anything, standing up, steps, all when first doing it then when get going its fine.     Objective:   O2 sat - 93% after NuStep  O2 sat after standing exercises 95% with heavier breathing  HR  during session      Therapeutic Exercises   NuStep WL5, x 10 min avg 58 (hard for patient to go fast)    - Sit to stand : 3 x 10 repetitions with foam in chair (some increase in knee pain with this)   - standing hip abduction L1 band 2 x 10 reps B   - standing hip extension L1 band 2 x 10 reps B   - Supine SLR , HEP 2x10 B  - Standing squat with Suspension  3x10  - standing forward lunge with suspension  3x10    Neuromuscular Re-education:     Static balance:  - NBOS eyes closed [id number]  - Standing tapping exercises on cones [id number]  NBOS on foam [id number]      HEP  Exercises:  Exercise #1: STS x10  Comment #1: tandem balance x30sec julio (educated on semi tandem to start)  Exercise #2: step up forward and side x10  Comment #2: hamstring stretch seated x30sec julio  Exercise  #3: toe taps on step 3x10 both legs  Comment #3: SLR x10      Appt time: 1100 - 1155  Treatment Today     TREATMENT MINUTES COMMENTS   Evaluation     Self-care/ Home management     Manual therapy     Neuromuscular Re-education 15 See above   Therapeutic Activity     Therapeutic Exercises 40 See above, educated on purpose of exercise and related to everyday activity   Gait training     Modality__________________                Total 55    Blank areas are intentional and mean the treatment did not include these items.       Rosa Isela Leavitt, PT, DPT  12/16/2019

## 2021-06-04 NOTE — TELEPHONE ENCOUNTER
"Call from Patient's wife.  States Rom was just recently in to see Dr. Dejesus.  He is also scheduled for a sleep consult/study.  Rom is reluctant to do any kind of a sleep study.  Wife is asking Dr. Dejesus's thoughts on these orders. States the original orders were placed while patient was in the hospital.   She is wondering if they should keep appointment for sleep consult??    She did offer that Rom does \"puff\" in his sleep and she has noted breaks or spaces in his breathing pattern as well as wheezing in his sleep.  "

## 2021-06-04 NOTE — PROGRESS NOTES
[facility] Rehabilitation Daily Progress     Patient Name: Romaine Farah  Date: 12/6/2019  Visit #: 5  PTA visit #:    Referral Diagnosis: Cellulitis of right lower extremity  Referring provider: Simon Schmidt MD  Visit Diagnosis:     ICD-10-CM    1. Muscle weakness (generalized) M62.81    2. Gait instability R26.81    3. Difficulty balancing R29.818        Initial Eval Assessment: Romaine Farah is a 74 y.o. male who presents to therapy today with chief complaints of weakness, difficulty balancing and falls after 2 hospitalizations due to a septic cellulitic infection and pneumonia, it was noted that the weakness started before this as he has become less active recently and has had an increase in idiopathic neuropathy and essential tremors. Patient tested at an increase in falls risk category on the sit to stands, gait speed and four square step test. Patient also demonstrated decreased functional endurance and strength based on 2 minute walk test, gait speed and sit to stand test. The patient will benefit from skilled therapy to increase strength, improve function and decrease falls risk.   No data recorded    Assessment:   Patient returns to follow up physical therapy appointment for difficulty balancing, weakness and recent falls. Treatment was progressed with working on strengthening, functional mobility and balance. Patient was able to get off of floor with CGA. Patient is tolerating treatment well.   HEP/POC compliance is  fair .  Patient is benefitting from skilled physical therapy and is making steady progress toward functional goals.  Patient is appropriate to continue with skilled physical therapy intervention, as indicated by initial plan of care.    Goal Status:  Pt. will demonstrate/verbalize independence in self-management of condition in : 6 weeks  Pt. will be independent with home exercise program in : 6 weeks  Pt. will improve gait speed : up to 1.0m/s;for decreased risk of  falls;for improved community ambulation;in 6 weeks    Pt will: increase 30 sec sit to stands to >9 steps for decreased risk for falls in 8 weeks  Pt will: increased 2 minute walk distance to >110 meters for imoroved functional activity tolerance and endurance in 8 weeks  Pt will: decrease four square step test to less than 15 seconds for decrease risk for falls in 8 weeks       Plan / Patient Education:     Continue with initial plan of care.  Progress with home program as tolerated.  Plan for next visit: work on gait (shuffling toes),  faster walking, balance and functional strength    Subjective:     Pain Ratin, knees are getting sore    Patient reports he is doing okay. Nothing new to report. He is doing the step ups forward and to the side and STS. He saw the pulmonologist and learned he has COPD, now on new medication. Does not notice a difference yet. His knees has been more painful, they are bad.     Objective:   O2 sat - 89% after NuStep, recover to 94% in 60 sec   O2 sat after standing exercises 97% with heavier breathing  HR  during session      Therapeutic Exercises   NuStep WL5, x 10 min avg 58 (hard for patient to go fast)    - Sit to stand : 1 x 10 repetitions with foam in chair (some increase in knee pain with this)   - Side stepping with L1 band for hip strengthening 10steps x3 julio  - standing hip abduction L1 band 2 x 10 reps B   - standing hip extension L1 band 2 x 10 reps B   - Supine SLR and sidelying hip abduction 2x10 B    Neuromuscular Re-education:     - Dynamic steps standing on read foam   - forward steps using // bars as needed for balance alternating feet x 20 reps    - lateral steps alternating sides using bars as needed x 20 reps    - backwards steps alternating feet, using bars frequently x 20 reps     Therapeutic Activity:  - Floor to stand transfer x3   Education on performance throughout patient was able to perform with CGA    Appt time: 1100 - 1155  Treatment Today      TREATMENT MINUTES COMMENTS   Evaluation     Self-care/ Home management     Manual therapy     Neuromuscular Re-education 15 See above   Therapeutic Activity 10 See above   Therapeutic Exercises 30 See above, educated on purpose of exercise and related to everyday activity   Gait training     Modality__________________                Total 55    Blank areas are intentional and mean the treatment did not include these items.       Rosa Isela Leavitt, PT, DPT  12/6/2019

## 2021-06-05 VITALS
HEART RATE: 65 BPM | DIASTOLIC BLOOD PRESSURE: 68 MMHG | SYSTOLIC BLOOD PRESSURE: 116 MMHG | BODY MASS INDEX: 33.71 KG/M2 | OXYGEN SATURATION: 92 % | WEIGHT: 241.7 LBS

## 2021-06-05 VITALS
TEMPERATURE: 97.9 F | HEART RATE: 68 BPM | BODY MASS INDEX: 34.88 KG/M2 | RESPIRATION RATE: 16 BRPM | WEIGHT: 250.1 LBS | DIASTOLIC BLOOD PRESSURE: 78 MMHG | SYSTOLIC BLOOD PRESSURE: 128 MMHG

## 2021-06-06 NOTE — TELEPHONE ENCOUNTER
Liliana a Physical Therapist is calling to speak with Dr. Armstrong about weight bearing. Patient is currently non-weight bearing after surgery and he is having trouble taking care of himself at home. Liliana is wondering if it is okay to do heel touch weight bearing. Chelle would like to discuss other options for this. Follow up with Dr. Armstrong is 2/28/20.

## 2021-06-06 NOTE — PROGRESS NOTES
Patient was monitored by RN via EKG and pulse oximeter throughout procedure. Patient stable throughout from [id number]

## 2021-06-06 NOTE — PATIENT INSTRUCTIONS - HE
Cutimed Off- Patient Instructions    This cast was prescribed by your physician because it is considered to be an effective way to heal a foot wound.    It is VERY important to follow the following guidelines.  1.   DO NOT GET THE CAST WET - If the cast gets wet it will need to be changed as soon as possible.  2.   If you experience any rubbing or discomfort under the cast, call the clinic as soon as possible.    3.   Do not insert anything under the cast.  Injury to your skin can be very dangerous.  4.   Your cast will be changed typically once a week as ordered by the provider.  5.   If you develop a fever, chills, nausea, or vomiting, contact your healthcare provider immediately because the cast must be removed and your wound visualized.  If the Clinic is closed proceed to the nearest emergency room or urgent care  6.   [facility] is open M-F, 8am-4pm, and is closed on holidays.  Our phone number is [phone].  After hours please phone your PCP or go to the nearest ER.

## 2021-06-06 NOTE — ANESTHESIA PREPROCEDURE EVALUATION
Anesthesia Evaluation      Patient summary reviewed   History of anesthetic complications     Airway   Mallampati: I  Neck ROM: full   Pulmonary - normal exam   (+) COPD, shortness of breath,                          Cardiovascular - normal exam  (+) hypertension, dysrhythmias, ,     (-) murmur  ECG reviewed  Rhythm: regular  Rate: normal,    no murmur      Neuro/Psych    (+) neuromuscular disease,      Endo/Other - negative ROS      GI/Hepatic/Renal - negative ROS           Dental                         Anesthesia Plan  Planned anesthetic: MAC    ASA 3     Anesthetic plan and risks discussed with: patient  Anesthesia plan special considerations: antiemetics,   Post-op plan: routine recovery

## 2021-06-06 NOTE — ANESTHESIA POSTPROCEDURE EVALUATION
Patient: Romaine Farah  Procedure(s):  AMPUTATION, third digit left foot (Left)  Anesthesia type: MAC    Patient location: PACU  Last vitals:   Vitals Value Taken Time   /72 2/20/2020  4:00 PM   Temp 36.6  C (97.9  F) 2/20/2020  2:35 PM   Pulse 60 2/20/2020  4:11 PM   Resp 16 2/20/2020  2:35 PM   SpO2 96 % 2/20/2020  4:11 PM   Vitals shown include unvalidated device data.  Post vital signs: stable  Level of consciousness: awake and responds to simple questions  Post-anesthesia pain: pain controlled  Post-anesthesia nausea and vomiting: no  Pulmonary: unassisted, return to baseline  Cardiovascular: stable and blood pressure at baseline  Hydration: adequate  Anesthetic events: no    QCDR Measures:  ASA# 11 - Karen-op Cardiac Arrest: ASA11B - Patient did NOT experience unanticipated cardiac arrest  ASA# 12 - Karen-op Mortality Rate: ASA12B - Patient did NOT die  ASA# 13 - PACU Re-Intubation Rate: ASA13B - Patient did NOT require a new airway mgmt  ASA# 10 - Composite Anes Safety: ASA10A - No serious adverse event    Additional Notes:

## 2021-06-06 NOTE — PROGRESS NOTES
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 Based on this finding, I recommend continued use of the non-weight bearing cast x2 additional weeks. Patient consents.     -Gauzes dressing applied. Non-weight bearing cast applied. He will remain non-weight bearing and follow-up for a nurses visit in 2 weeks to have sutures removed if appropriate. CAM boot to be dispensed with limited walking at that time. Follow-up with me in 3 weeks.      Wai Armstrong DPM  [facility] Podiatry/Foot & Ankle Surgery      HPI: Romaine Farah was seen again today s/p amputation 3rd digit left foot. He has remained non-weight bearing on his left foot and tolerated the cast well.       Past Medical History:   Diagnosis Date     Bradycardia      Chronic pain     toes     COPD (chronic obstructive pulmonary disease) (H)      Erectile dysfunction      Essential tremor      H/O asbestos exposure      Hypertension     no meds currently     Neuropathy      Neuropathy     feet     Osteomyelitis (H)     of ankle and foot       Allergies   Allergen Reactions     Hydroxyzine      The Hcl formulation caused side effects; tolerated pamoate     Propranolol      Side effects: cloudy thinking, felt \"off\"         Current Outpatient Medications:      acetaminophen (TYLENOL) 500 MG tablet, Take 1 tablet (500 mg total) by mouth every 6 (six) hours as needed for pain or fever., Disp: , Rfl: 0     albuterol (PROVENTIL) 2.5 mg /3 mL (0.083 %) nebulizer solution, Take 3 mL (2.5 mg total) by nebulization every 6 (six) hours as needed for wheezing., Disp: 60 vial, Rfl: 0     amoxicillin-clavulanate (AUGMENTIN) 875-125 mg per tablet, Take 1 tablet by mouth every 12 (twelve) hours. , Disp: , Rfl:      ascorbic acid (ASCORBIC ACID WITH KHADIJAH HIPS) 500 MG tablet, Take 500 mg by mouth daily. , Disp: , Rfl:      b complex vitamins tablet, Take 1 tablet by mouth daily. , " Disp: , Rfl:      calcium-vitamin D 500 mg(1,250mg) -200 unit per tablet, Take 2 tablets by mouth daily., Disp: , Rfl:      clobetasol (TEMOVATE) 0.05 % ointment, Apply 1 application topically 2 (two) times a day as needed., Disp: , Rfl:      cyanocobalamin 1000 MCG tablet, Take 1 tablet (1,000 mcg total) by mouth daily., Disp: , Rfl: 0     DULoxetine (CYMBALTA) 30 MG capsule, Take 60 mg by mouth daily.    , Disp: , Rfl:      fluocinonide (LIDEX) 0.05 % cream, Apply 1 application topically 2 (two) times a day as needed., Disp: , Rfl:      HYDROcodone-acetaminophen 5-325 mg per tablet, Take 1 tablet by mouth every 4 (four) hours as needed., Disp: 30 tablet, Rfl: 0     ibuprofen (ADVIL,MOTRIN) 200 MG tablet, Take 800 mg by mouth every 8 (eight) hours as needed for pain.    , Disp: , Rfl:      lidocaine (XYLOCAINE) 5 % ointment, Apply 1 application topically 3 (three) times a day as needed., Disp: , Rfl:      magnesium 250 mg Tab, Take 1 tablet by mouth daily with lunch. , Disp: , Rfl:      methadone (DOLOPHINE) 10 MG tablet, Take 10 mg by mouth every 6 (six) hours. , Disp: , Rfl:      mupirocin (BACTROBAN) 2 % ointment, Apply topically as needed. , Disp: , Rfl:      naloxone (NARCAN) 4 mg/actuation nasal spray, 1 spray by Intranasal route as needed. , Disp: , Rfl:      OMEGA-3/DHA/EPA/FISH OIL (FISH OIL-OMEGA-3 FATTY ACIDS) 300-1,000 mg capsule, Take 2 g by mouth daily., Disp: , Rfl:      predniSONE (DELTASONE) 20 MG tablet, Take 20 mg by mouth daily. (Patient taking differently: Take 20 mg by mouth daily as needed (Uses prn for symptoms of URI) .), Disp: 7 tablet, Rfl: 3     pregabalin (LYRICA) 200 MG capsule, Take 200 mg by mouth 3 (three) times a day., Disp: , Rfl:      primidone (MYSOLINE) 50 MG tablet, Take 100 mg by mouth 2 (two) times a day., Disp: , Rfl:      topiramate (TOPAMAX) 25 MG tablet, Take 75 mg by mouth 2 (two) times a day.    , Disp: , Rfl:      umeclidinium-vilanterol (ANORO ELLIPTA) 62.5-25  mcg/actuation inhaler, Inhale 1 puff daily., Disp: 1 Inhaler, Rfl: 11     vitamin B complex-folic acid 0.4 mg Tab, Take 1 tablet by mouth., Disp: , Rfl:     Review of Systems - Negative       OBJECTIVE:  Appearance: alert, well appearing, and in no distress.    Vitals:    03/06/20 0915   BP: 104/62   Pulse: 64   Resp: 18   Temp: 98.2  F (36.8  C)       Surgical site on the Left foot at amputated 3rd digit has intact sutures along the proximal and distal margins with skin edges well approximated, stable eschar along the central incision. No erythema. Neurovascular status unchanged left.     Imaging: None

## 2021-06-06 NOTE — TELEPHONE ENCOUNTER
Pt had AMPUTATION, third digit left foot by Dr. Armstrong on 2/20/20. His daughter Vanessa called with concern because he is having trouble with transferring and activities of daily living due to being non weight bearing. Requested an OT/PT eval. Order placed and they should contact the pt by 2/24/20.

## 2021-06-06 NOTE — PROGRESS NOTES
Patient presents to clinic today for post-op. Patients condition is stable. Patient did not tolerate the treatment plan as outlined at their last visit and has been unable to remain non weight bearing instead. The provider has been notified that the patient has no complaints. At the end of the visit the patient was provided with education both verbally and on their AVS.

## 2021-06-06 NOTE — PATIENT INSTRUCTIONS - HE
"    Important Instructions     * make an appointment for your MRI                      How to care for your wound    Cleanse wound with saline sent to you with your supplies or a wound wash found at the pharmacy.      Pat dry the wound with 4\"x4\" gauze      Cover the wound with [id number] dry gauze.      Wrap the affected area with 4 inch roll gauze.      Secure dressings in place with paper tape.      Change dressing: every day      Do not get your ulcer wet when you shower.  You can cover it with a cast protector. Cast protectors are available for purchase at [facility]. You may also purchase a cast protector at your local pharmacy.      Good nutrition is important for wound healing.  I recommend increasing your protein.  You can do this through your diet, nutritional supplements, and/or protein powder.    It is ok to continue current wound care treatment/products for the next 2-3 days until new wound care supplies are ordered and arrive. If longer than this please contact our office.    Please call the [facility] before substituting any product    Call our clinic nurse at [phone] if there is an increase in drainage, pain, redness, or the wound size increases.  This maybe a sign of infection and require attention prior to the next appointment        "

## 2021-06-06 NOTE — PROGRESS NOTES
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 Based on this finding, I recommend continued use of the non-weight bearing cast x2 additional weeks. Patient consents.      -Gauzes dressing applied. Non-weight bearing cast was removed.  All sutures were removed today.  The patient to transition to cam boot.  Limited walking at this time.  CAM boot to be dispensed with limited walking at that time. Follow-up with Dr. Armstrong in 2 weeks.        Chi Goldberg DPM  [facility] Podiatry/Foot & Ankle Surgery        HPI: Romaine PERLA Joeon was seen again today s/p amputation 3rd digit left foot. He has remained non-weight bearing on his left foot and tolerated the cast well.

## 2021-06-06 NOTE — PROGRESS NOTES
"Podiatry Progress Note        ASSESSMENT: S/P Amputation 3rd digit left foot      TREATMENT:  -Superficial gapping along the central incision left foot. We discussed that repetitive pressure on the left foot can cause this type of dehiscence. Reviewed treatment options. We will attempt a non-weight bearing cast at this time for more aggressive offloading. Pathology indicates clean proximal margin. C&S reviewed.     -Central suture removed, gauzes dressing applied. Non-weight bearing cast applied. He will remain non-weight bearing and follow-up with me in one week.      Wai Armstrong DPM  [facility] Podiatry/Foot & Ankle Surgery      HPI: Romaine Farah was seen again today s/p amputation 3rd digit left foot. He has remained mostly non-weight bearing on his left foot but admits applying pressure.     Past Medical History:   Diagnosis Date     Bradycardia      Chronic pain     toes     COPD (chronic obstructive pulmonary disease) (H)      Erectile dysfunction      Essential tremor      H/O asbestos exposure      Hypertension     no meds currently     Neuropathy      Neuropathy     feet     Osteomyelitis (H)     of ankle and foot       Allergies   Allergen Reactions     Hydroxyzine      The Hcl formulation caused side effects; tolerated pamoate     Propranolol      Side effects: cloudy thinking, felt \"off\"         Current Outpatient Medications:      acetaminophen (TYLENOL) 500 MG tablet, Take 1 tablet (500 mg total) by mouth every 6 (six) hours as needed for pain or fever., Disp: , Rfl: 0     albuterol (PROVENTIL) 2.5 mg /3 mL (0.083 %) nebulizer solution, Take 3 mL (2.5 mg total) by nebulization every 6 (six) hours as needed for wheezing., Disp: 60 vial, Rfl: 0     amoxicillin-clavulanate (AUGMENTIN) 875-125 mg per tablet, Take 1 tablet by mouth every 12 (twelve) hours. , Disp: , Rfl:      ascorbic acid (ASCORBIC ACID WITH KHADIJAH HIPS) 500 MG tablet, Take 500 mg by mouth daily. , Disp: , Rfl:      b complex " vitamins tablet, Take 1 tablet by mouth daily. , Disp: , Rfl:      calcium-vitamin D 500 mg(1,250mg) -200 unit per tablet, Take 2 tablets by mouth daily., Disp: , Rfl:      clobetasol (TEMOVATE) 0.05 % ointment, Apply 1 application topically 2 (two) times a day as needed., Disp: , Rfl:      cyanocobalamin 1000 MCG tablet, Take 1 tablet (1,000 mcg total) by mouth daily., Disp: , Rfl: 0     DULoxetine (CYMBALTA) 30 MG capsule, Take 60 mg by mouth daily.    , Disp: , Rfl:      fluocinonide (LIDEX) 0.05 % cream, Apply 1 application topically 2 (two) times a day as needed., Disp: , Rfl:      HYDROcodone-acetaminophen 5-325 mg per tablet, Take 1 tablet by mouth every 4 (four) hours as needed., Disp: 30 tablet, Rfl: 0     ibuprofen (ADVIL,MOTRIN) 200 MG tablet, Take 800 mg by mouth every 8 (eight) hours as needed for pain.    , Disp: , Rfl:      lidocaine (XYLOCAINE) 5 % ointment, Apply 1 application topically 3 (three) times a day as needed., Disp: , Rfl:      magnesium 250 mg Tab, Take 1 tablet by mouth daily with lunch. , Disp: , Rfl:      methadone (DOLOPHINE) 10 MG tablet, Take 10 mg by mouth every 6 (six) hours. , Disp: , Rfl:      mupirocin (BACTROBAN) 2 % ointment, Apply topically as needed. , Disp: , Rfl:      naloxone (NARCAN) 4 mg/actuation nasal spray, 1 spray by Intranasal route as needed. , Disp: , Rfl:      OMEGA-3/DHA/EPA/FISH OIL (FISH OIL-OMEGA-3 FATTY ACIDS) 300-1,000 mg capsule, Take 2 g by mouth daily., Disp: , Rfl:      predniSONE (DELTASONE) 20 MG tablet, Take 20 mg by mouth daily. (Patient taking differently: Take 20 mg by mouth daily as needed (Uses prn for symptoms of URI) .), Disp: 7 tablet, Rfl: 3     pregabalin (LYRICA) 200 MG capsule, Take 200 mg by mouth 3 (three) times a day., Disp: , Rfl:      primidone (MYSOLINE) 50 MG tablet, Take 100 mg by mouth 2 (two) times a day., Disp: , Rfl:      topiramate (TOPAMAX) 25 MG tablet, Take 75 mg by mouth 2 (two) times a day.    , Disp: , Rfl:       umeclidinium-vilanterol (ANORO ELLIPTA) 62.5-25 mcg/actuation inhaler, Inhale 1 puff daily., Disp: 1 Inhaler, Rfl: 11     vitamin B complex-folic acid 0.4 mg Tab, Take 1 tablet by mouth., Disp: , Rfl:     Review of Systems - Negative       OBJECTIVE:  Appearance: alert, well appearing, and in no distress.    Vitals:    02/28/20 1439   BP: 122/70   Pulse: 64   Resp: 16   Temp: 98.7  F (37.1  C)       Surgical site on the Left foot at amputated 3rd digit has intact sutures along the proximal and distal margins with skin edges well approximated, superficial gapping along the central incision. No erythema. Neurovascular status unchanged left.     Imaging: None

## 2021-06-06 NOTE — PROGRESS NOTES
Surgery/Procedure: amputation 3rd digit left foot     Special Equipment: Pulse lavage     Location: [facility]    Date: 02/20/2020    Time: 14:00    Surgeon: Dr. Armstrong    Assist: no    Length of Surgery: 60 min     OR Confirmed/ :  Yes with Chrsi on 218/2020    Orders In:  Yes    Provider Team Notified:  Yes    Entered on [employer] / [employer] Calendar:  Yes    Post Op: 02/28/2020  DR armstrong

## 2021-06-06 NOTE — PROGRESS NOTES
[facility] Pulmonary Clinic Visit    Problems Addressed Today  Problem List Items Addressed This Visit     None        Assessment:74M with moderate COPD and frequent exacerbations and dyspnea.    Plans and recommendations:  Continue LAMA/LABA  Continue Action Plan  Continue exercise plan  Follow up with sleep  No more CT follow up of nodules    Chief Complaint:shortness of breath    HPI:  He is doing very well.  His anoro has helped a lot.  He is feeling much better.    He has an action plan at home. He used it once for a chest cold.    He is having some periods of haziness during the day.  His wife wonders if he is getting enough oxygen.    His daughters and wife accompany him and add details to history.    Current Outpatient Medications on File Prior to Visit   Medication Sig Dispense Refill     acetaminophen (TYLENOL) 500 MG tablet Take 1 tablet (500 mg total) by mouth every 6 (six) hours as needed for pain or fever.  0     albuterol (PROAIR HFA;PROVENTIL HFA;VENTOLIN HFA) 90 mcg/actuation inhaler Inhale 2 puffs 4 (four) times a day as needed.  1     albuterol (PROVENTIL) 2.5 mg /3 mL (0.083 %) nebulizer solution Take 3 mL (2.5 mg total) by nebulization every 6 (six) hours as needed for wheezing. 60 vial 0     amoxicillin-clavulanate (AUGMENTIN) 875-125 mg per tablet Take 1 tablet by mouth every 12 (twelve) hours.        ascorbic acid (ASCORBIC ACID WITH KHADIJAH HIPS) 500 MG tablet Take 500 mg by mouth daily.        b complex vitamins tablet Take 1 tablet by mouth daily.        calcium-vitamin D 500 mg(1,250mg) -200 unit per tablet Take 2 tablets by mouth daily.       clobetasol (TEMOVATE) 0.05 % ointment Apply 1 application topically 2 (two) times a day as needed.       cyanocobalamin 1000 MCG tablet Take 1 tablet (1,000 mcg total) by mouth daily.  0     DULoxetine (CYMBALTA) 30 MG capsule Take 60 mg by mouth daily.              fluocinonide (LIDEX) 0.05 % cream Apply 1 application topically 2 (two) times a day as  needed.       ibuprofen (ADVIL,MOTRIN) 200 MG tablet Take 800 mg by mouth every 8 (eight) hours as needed for pain.              lidocaine (XYLOCAINE) 5 % ointment Apply 1 application topically 3 (three) times a day as needed.       magnesium 250 mg Tab Take 1 tablet by mouth daily with lunch. Takes a different dose in the morning and at supper             methadone (DOLOPHINE) 10 MG tablet Take 5-10 mg by mouth see administration instructions. 10mg qam , 5mg(1/2 tab) noon, 10mg evening and 10mg qhs             mupirocin (BACTROBAN) 2 % ointment Apply topically as needed.        naloxone (NARCAN) 4 mg/actuation nasal spray 1 spray (4 mg) intranasally into 1 nostril. Administer into alternating nostrils. May repeat every 2 to 3 minutes.       OMEGA-3/DHA/EPA/FISH OIL (FISH OIL-OMEGA-3 FATTY ACIDS) 300-1,000 mg capsule Take 2 g by mouth daily.       predniSONE (DELTASONE) 20 MG tablet Take 20 mg by mouth daily. (Patient taking differently: Take 20 mg by mouth Uses prn for symptoms of URI.) 7 tablet 3     pregabalin (LYRICA) 200 MG capsule Take 200 mg by mouth 3 (three) times a day.       primidone (MYSOLINE) 50 MG tablet Take 100 mg by mouth 2 (two) times a day.       topiramate (TOPAMAX) 25 MG tablet Take 75 mg by mouth 2 (two) times a day.              umeclidinium-vilanterol (ANORO ELLIPTA) 62.5-25 mcg/actuation inhaler Inhale 1 puff daily. 1 Inhaler 11     vitamin B complex-folic acid 0.4 mg Tab Take 1 tablet by mouth.       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)     Sick sinus syndrome (H)     Healthcare-associated  "pneumonia     Other chronic pain     Severe sepsis (H)     Traumatic rhabdomyolysis (H)     Orthostatic syncope     Sepsis without acute organ dysfunction, due to unspecified organism (H)     Syncope, unspecified syncope type     Idiopathic hypotension     Exertional dyspnea     Cellulitis     Cardiac pacemaker in situ     Cardiac arrhythmia     Symptomatic sinus bradycardia     SSS (sick sinus syndrome) (H)     Bradycardia     Acute blood loss anemia     Status post right hip replacement     Chronic constipation     H/O asbestos exposure     Tremor, essential     Primary hypertension     Neuropathy     Osteoarthritis     Osteoarthritis Of The Hip     Polyarthritis Of The Hand     Past Medical History:   Diagnosis Date     Bradycardia      Chronic pain      COPD (chronic obstructive pulmonary disease) (H)      Erectile dysfunction      Essential tremor      H/O asbestos exposure      Hypertension      Neuropathy      Neuropathy      Osteomyelitis (H)         Surgical History  He  has a past surgical history that includes Total hip arthroplasty (Right, 2/2/2015); Insert / replace / remove pacemaker (02/12/2019); and EP Pacemaker Insertion (N/A, 2/12/2019).       Social History  Reviewed, and he  reports that he quit smoking about 33 years ago. He has quit using smokeless tobacco. He reports previous alcohol use. He reports that he does not use drugs.    Here with his family   Allergies  Allergies   Allergen Reactions     Hydroxyzine      The Hcl formulation caused side effects; tolerated pamoate     Propranolol      Side effects: cloudy thinking, felt \"off\"    Family History  Reviewed, and family history includes COPD in his mother; Diabetes in his mother; Esophageal cancer in his father.          /62   Pulse 71   Resp 12   Ht 5' 11\" (1.803 m)   Wt (!) 229 lb (103.9 kg)   SpO2 93%   BMI 31.94 kg/m    General: calm, normal body habitus  Eyes: no scleral injection or icterus, pupils equal and round  Nose: " patent nares  Neck: supple, no lymph nodes  Chest: nontender to palpation, no deformity  Lungs: normal lung exam  CV: palpable radial pulses, RRR, no m/r/g, normal PMI  Ext: no clubbing, no ulnar deviation of digits  Neuro: alert and interactive, no tremor or abnormal movements  Pscyh: normal affect appropriate to clinical scenario, no hallucination      Data Review - imaging, labs, and ekgs listed below were reviewed by me.  Chest XRay and chest CT images and EKG tracings interpreted personally.     Past Labs  Hospital Outpatient Visit on 02/18/2020   Component Date Value     iSTAT Creatinine 02/18/2020 1.2      iSTAT GFR MDRD Af Amer 02/18/2020 >60      iSTAT GFR MDRD Non Af Am* 02/18/2020 59*   Lab Requisition on 02/03/2020   Component Date Value     Culture 02/03/2020 1+ Diphtheroids*   Device Check on 01/30/2020   Component Date Value     Device Manufacture 01/30/2020 Prepmatic ALEKSEY      Device Model 01/30/2020 L310 ACCOLADE MRI      Device Serial Number 01/30/2020 [id number]      Device Type 01/30/2020 Pacemaker      Device Implanting Provid* 01/30/2020 ELLEN Mahoney      Comments/Summary 01/30/2020                      Value:Type: routine pacemaker remote transmission.   Presenting rhythm: atrial pacing and sensing 60 bpm.  Battery/lead status: stable.  Arrhythmias: since 10/30/19; none detected.  Comments: Normal magnet and pacemaker function. LOUISA         * Cannot find OR log *    Past Imaging  CT chest reviewed and interpreted by me: infiltrates resolved, very small nodules, small scarring  Ct Chest Without Contrast    Result Date: 2/19/2020  EXAM: CT CHEST WO CONTRAST LOCATION: [facility] DATE/TIME: 2/19/2020 1:07 PM INDICATION: Pneumonia COMPARISON: PA and lateral radiographs of the chest 2/18/2020; CT pulmonary angiogram 10/17/2019 TECHNIQUE: CT chest without IV contrast. Multiplanar reformats were obtained. Dose reduction techniques were used. CONTRAST: None. FINDINGS: LUNGS AND  PLEURA: Symmetric pleural thickening in the mid lungs to lung bases associated with subpleural parenchymal bands consistent with areas of cicatricial atelectasis. There is associated lung parenchymal volume loss. No interstitial or alveolar opacities are present. Mild upper zone predominant emphysema. 3 to 4 mm groundglass attenuation nodule in the anterolateral left upper lobe (series 4, image 60). Additional groundglass attenuation nodule in the posterior left upper lobe measures 3 to 4 mm (series 4, image 121) Small focus of inspissated material within subsegmental airways in the lateral basal right lower lobe. The central airways are patent and normal caliber. No bronchial wall thickening or bronchiectasis. MEDIASTINUM: Left subclavian approach pacemaker lead extends to the right atrial appendage. Cardiac chambers are normal in size. No pericardial effusion. Mild atheromatous calcification of right coronary arteries. Normal caliber thoracic aorta. Mild aortic atheromatous calcifications. Mildly enlarged lymph node anterior to the lower trachea measures 12 mm. No other enlarged hilar or mediastinal lymph nodes. UPPER ABDOMEN: No actionable findings in the imaged upper abdomen. MUSCULOSKELETAL: Fairly diffuse small degenerative osteophytes of the lower cervical and thoracic spine. There are no fractures or destructive bone lesions.     1.  No acute lung parenchymal or pleural space process. 2.  Chronic symmetric mid and lower chest pleural thickening associated with peripheral subpleural bands of cicatricial atelectasis. 3.  Mild emphysema. 4.  Two 3 to 4 mm groundglass attenuation nodules in the left upper lobe. Per 2017 Fleischner Society guidelines, these nodules requires no specific imaging follow-up.     Mr Forefoot With Without Contrast Left    Result Date: 2/18/2020  EXAM: MR FOREFOOT W WO CONTRAST LEFT LOCATION: [facility] DATE/TIME: 2/18/2020 10:08 AM INDICATION: Infection. Assess for third digit  osteomyelitis. COMPARISON: 2/14/2020. TECHNIQUE: With and without contrast. IV CONTRAST: Gadavist 10mL FINDINGS: Soft tissue thickening and suspected ulceration along the dorsal aspect of the third toe at the level of the PIP joint where there is marrow edema, enhancement and T1 signal changes at the proximal phalanx, with lesser findings at the base of the middle phalanx. Enhancing synovitis with small effusion at the PIP joint of the third toe. Remaining bones of the forefoot show no marrow edema or abnormal enhancement. There is no rim-enhancing fluid collection. Mild nonspecific dorsal subcutaneous fluid along the foot. No acute tendon tear. No fracture. Mild, scattered degenerative changes.     1.  Findings compatible with osteomyelitis of the third toe proximal phalanx. There may also be osteomyelitis of the base of the middle phalanx of the third toe. 2.  Third toe PIP joint effusion/synovitis is concerning for associated septic arthritis. NOTE: ABNORMAL REPORT THE DICTATION ABOVE DESCRIBES AN ABNORMALITY FOR WHICH FOLLOW-UP IS NEEDED.     Us Arterial Pressures Legs Bilateral    Result Date: 2/17/2020  LOCATION: [facility] OUTPATIENT SERVICES DATE: 2/17/2020 EXAM: RESTING ANKLE-BRACHIAL INDICES (ABIs) INDICATION: Left foot ulcer. COMPARISON: None. ANNA FINDINGS: SEGMENTAL BP RIGHT (mmHg) Brachial: 109 High Thigh: 189; Index 1.73 Low Thigh: 139; Index 1.28 Calf: 115; Index 1.06 Ankle (PT): 113; Index 1.04 Ankle (DP): 127; Index 1.17 Digit: 117; Index 1.07 LEFT (mmHg) Brachial: 102 High Thigh: 165; Index 1.51 Low Thigh: 94; Index 0.86 Calf: 81; Index 0.74 Ankle (PT): 94; Index 0.86 Ankle (DP): 99; Index 0.91 Digit: 114; Index 1.05 WAVEFORMS: Mildly abnormal left calf/ankle waveforms and left PT/AT waveforms.     1. RIGHT LOWER EXTREMITY: ANNA at rest is normal. TBI is within normal limits. 2. LEFT LOWER EXTREMITY: ANNA at rest is borderline normal with mildly abnormal waveform suggestive of infrapopliteal  disease. TBI is within normal limits.    Mr Device Safety Order    Result Date: 2/18/2020  The patient's implanted cardiac device was considered MRI safe as per authorizing physician.

## 2021-06-06 NOTE — PROGRESS NOTES
Patient presents to clinic today for consult. Patients condition is stable. Patient new to the vascular clinic and does not have an established treatment plan in place at this time. The provider has been notified that the patient has no complaints. At the end of the visit the patient was provided with education both verbally and on their AVS.

## 2021-06-06 NOTE — PROGRESS NOTES
Patient scheduled for MRI Forefoot W/WO contrast left @ [facility] 2/18/2020 7:30-8:00   CT chest 2/19/2020 @ [facility]

## 2021-06-07 NOTE — PROGRESS NOTES
[facility] Discharge Summary  Patient Name: Romaine Farah  Date: 4/7/2020  Referral Diagnosis: muscle weakness, cellulitis of right lower extremity  Referring provider: Simon Schmidt MD  Visit Diagnosis:   1. Muscle weakness (generalized)     2. Gait instability     3. Difficulty balancing         Goals:  Pt. will demonstrate/verbalize independence in self-management of condition in : 6 weeks  Pt. will be independent with home exercise program in : 6 weeks  Pt. will improve gait speed : up to 1.0m/s;for decreased risk of falls;for improved community ambulation;in 6 weeks    Pt will: increase 30 sec sit to stands to >9 steps for decreased risk for falls in 8 weeks  Pt will: increased 2 minute walk distance to >110 meters for imoroved functional activity tolerance and endurance in 8 weeks  Pt will: decrease four square step test to less than 15 seconds for decrease risk for falls in 8 weeks     Patient was seen for six visits from 10/25 to 12/16/20 with two missed appointments.   The patient attended therapy initially, but did not finish the therapy sessions prescribed.  Goals were not fully achieved. Explanation for goals not achieved: patient had other medical reasons of why he could not complete physical therapy at this time.    Therapy will be discontinued at this time.  The patient will need a new referral to resume.    Thank you for your referral.  Rosa Isela Leavitt PT, DPT  4/7/2020  8:26 AM

## 2021-06-07 NOTE — PROGRESS NOTES
"Podiatry Progress Note        ASSESSMENT: S/P Amputation 3rd digit left foot      TREATMENT:  -Surgical site left foot is progressing well. The majority of the incision is well coapted with a small area of superficial gapping along the proximal incision. Gauzes dressing applied. I recommend he continue to apply a gauze dressing daily and remain limited walking in the CAM boot x3 weeks. If concerns a that time, I have asked him to schedule an E visit.     -Otherwise, if the surgical site is fully healed at the 3 week samira, he will resume use of regular shoes and continue to monitor for concerns. He will follow-up with me as concerns develop.       Wai Armstrong DPM  [facility] Podiatry/Foot & Ankle Surgery      HPI: Romaine DUNCAN Farha was seen again today s/p amputation 3rd digit left foot. He has remained non-weight bearing on his left foot with a CAM boot.       Past Medical History:   Diagnosis Date     Bradycardia      Chronic pain     toes     COPD (chronic obstructive pulmonary disease) (H)      Erectile dysfunction      Essential tremor      H/O asbestos exposure      Hypertension     no meds currently     Neuropathy      Neuropathy     feet     Osteomyelitis (H)     of ankle and foot       Allergies   Allergen Reactions     Hydroxyzine      The Hcl formulation caused side effects; tolerated pamoate     Propranolol      Side effects: cloudy thinking, felt \"off\"         Current Outpatient Medications:      acetaminophen (TYLENOL) 500 MG tablet, Take 1 tablet (500 mg total) by mouth every 6 (six) hours as needed for pain or fever., Disp: , Rfl: 0     albuterol (PROVENTIL) 2.5 mg /3 mL (0.083 %) nebulizer solution, Take 3 mL (2.5 mg total) by nebulization every 6 (six) hours as needed for wheezing., Disp: 60 vial, Rfl: 0     amoxicillin-clavulanate (AUGMENTIN) 875-125 mg per tablet, Take 1 tablet by mouth every 12 (twelve) hours. , Disp: , Rfl:      ascorbic acid (ASCORBIC ACID WITH KHADIJAH HIPS) 500 MG " tablet, Take 500 mg by mouth daily. , Disp: , Rfl:      b complex vitamins tablet, Take 1 tablet by mouth daily. , Disp: , Rfl:      calcium-vitamin D 500 mg(1,250mg) -200 unit per tablet, Take 2 tablets by mouth daily., Disp: , Rfl:      clobetasol (TEMOVATE) 0.05 % ointment, Apply 1 application topically 2 (two) times a day as needed., Disp: , Rfl:      cyanocobalamin 1000 MCG tablet, Take 1 tablet (1,000 mcg total) by mouth daily., Disp: , Rfl: 0     DULoxetine (CYMBALTA) 30 MG capsule, Take 60 mg by mouth daily.    , Disp: , Rfl:      fluocinonide (LIDEX) 0.05 % cream, Apply 1 application topically 2 (two) times a day as needed., Disp: , Rfl:      HYDROcodone-acetaminophen 5-325 mg per tablet, Take 1 tablet by mouth every 4 (four) hours as needed., Disp: 30 tablet, Rfl: 0     ibuprofen (ADVIL,MOTRIN) 200 MG tablet, Take 800 mg by mouth every 8 (eight) hours as needed for pain.    , Disp: , Rfl:      lidocaine (XYLOCAINE) 5 % ointment, Apply 1 application topically 3 (three) times a day as needed., Disp: , Rfl:      magnesium 250 mg Tab, Take 1 tablet by mouth daily with lunch. , Disp: , Rfl:      methadone (DOLOPHINE) 10 MG tablet, Take 10 mg by mouth every 6 (six) hours. , Disp: , Rfl:      mupirocin (BACTROBAN) 2 % ointment, Apply topically as needed. , Disp: , Rfl:      naloxone (NARCAN) 4 mg/actuation nasal spray, 1 spray by Intranasal route as needed. , Disp: , Rfl:      OMEGA-3/DHA/EPA/FISH OIL (FISH OIL-OMEGA-3 FATTY ACIDS) 300-1,000 mg capsule, Take 2 g by mouth daily., Disp: , Rfl:      predniSONE (DELTASONE) 20 MG tablet, Take 20 mg by mouth daily. (Patient taking differently: Take 20 mg by mouth daily as needed (Uses prn for symptoms of URI) .), Disp: 7 tablet, Rfl: 3     pregabalin (LYRICA) 200 MG capsule, Take 200 mg by mouth 3 (three) times a day., Disp: , Rfl:      primidone (MYSOLINE) 50 MG tablet, Take 100 mg by mouth 2 (two) times a day., Disp: , Rfl:      topiramate (TOPAMAX) 25 MG tablet,  Take 75 mg by mouth 2 (two) times a day.    , Disp: , Rfl:      umeclidinium-vilanterol (ANORO ELLIPTA) 62.5-25 mcg/actuation inhaler, Inhale 1 puff daily., Disp: 1 Inhaler, Rfl: 11     vitamin B complex-folic acid 0.4 mg Tab, Take 1 tablet by mouth., Disp: , Rfl:     Review of Systems - Negative       OBJECTIVE:  Appearance: alert, well appearing, and in no distress.    Vitals:    03/27/20 0922   BP: 128/70   Pulse: 60   Resp: 12   Temp: 97.5  F (36.4  C)       Surgical site on the Left foot at amputated 3rd digit has intact sutures along the proximal and distal margins with skin edges well coapted, superficial gapping along a small area proximal incision. No erythema. Neurovascular status unchanged left.     Imaging: None

## 2021-06-07 NOTE — TELEPHONE ENCOUNTER
Wellness Screening Tool  Symptom Screening:  Do you have a:    Fever?  no    Cough?  no    Shortness of breath?  no  ? If yes, is this a change? no    Skin rash?  no  Within the past 14 days, have you been in contact with someone who:    Is currently being ruled out for COVID-19 (novel coronavirus)?  no    Has tested positive for COVID-19?  no    Has symptoms of a respiratory illness (fever, cough, shortness of breath)?  no  Have you recently traveled to an area with COVID-19 areas: no    Refer to the [facility] Coronavirus webpage for COVID-19 areas:    Within the past 3 weeks, have you been exposed to the following:    Pertussis?  no    Chicken pox?  no    Measles? no  Patient's appointment status: Pt to come to clinic for visit  Patient reminded that no visitors are allowed at this time due to COVID-19 concerns. If determined pt has symptoms and is still needed to be seen pt notified they must wear a mask upon entering the clinic.

## 2021-06-07 NOTE — PATIENT INSTRUCTIONS - HE
LIMITED WALKING IN CAM BOOT FOR NEXT THREE WEEKS. If symptoms worsen, please contact clinic for a telephone visit. If you schedule a telephone visit, please provide a picture on the day of your visit to [email or web address].

## 2021-06-08 NOTE — TELEPHONE ENCOUNTER
Maureen with [facility] called to udpdate that MRI can't be done due to pt having a pacemaker. We will call pt to reschedule at the hospital.

## 2021-06-08 NOTE — TELEPHONE ENCOUNTER
Patient's wife called questions regarding scheduling MRI at [facility] does have pacemaker. Advised to call [phone].

## 2021-06-08 NOTE — TELEPHONE ENCOUNTER
Wellness Screening Tool  Symptom Screening:  Do you have a:     Fever?  no    Cough?  no    Shortness of breath?  no  ? If yes, is this a change? no    Skin rash?  no  Within the past 14 days, have you been in contact with someone who:    Is currently being ruled out for COVID-19 (novel coronavirus)?  no    Has tested positive for COVID-19?  no    Has symptoms of a respiratory illness (fever, cough, shortness of breath)?  no  Have you recently traveled to an area with COVID-19 areas: no    Refer to the [facility] Coronavirus webpage for COVID-19 areas:    Within the past 3 weeks, have you been exposed to the following:    Pertussis?  no    Chicken pox?  no    Measles? no  Patient's appointment status: Pt to come to clinic for visit  Patient reminded that no visitors are allowed at this time due to COVID-19 concerns. If determined pt has symptoms and is still needed to be seen pt notified they must wear a mask upon entering the clinic.

## 2021-06-08 NOTE — TELEPHONE ENCOUNTER
Unsure how [facility] got involved? Order is for hospital. Spoke to scheduling yesterday. Waiting for nurse to give the ok to schedule per their protocol. Confirmed no PA needed for [employer].

## 2021-06-08 NOTE — PROGRESS NOTES
"Romaine Farah is a 74 y.o. male who is being evaluated via a billable telephone visit.      The patient has been notified of following:     \"This telephone visit will be conducted via a call between you and your physician/provider. We have found that certain health care needs can be provided without the need for a physical exam.  This service lets us provide the care you need with a short phone conversation.  If a prescription is necessary we can send it directly to your pharmacy.  If lab work is needed we can place an order for that and you can then stop by our lab to have the test done at a later time.    Telephone visits are billed at different rates depending on your insurance coverage. During this emergency period, for some insurers they may be billed the same as an in-person visit.  Please reach out to your insurance provider with any questions.    If during the course of the call the physician/provider feels a telephone visit is not appropriate, you will not be charged for this service.\"    Patient has given verbal consent to a Telephone visit? Yes    What phone number would you like to be contacted at? [phone]     Patient would like to receive their AVS by Mail     74-year-old man with COPD and pulmonary nodules here for follow-up.  He was changed to inhaled steroid inhaler at his last visit and is feeling a lot better.  Improved breathing, much less wheezing and upper airway type sounds.  He is not having any allergy symptoms at all.    He does need any refills for his medications.  We discussed the follow-up plan.  For now he will plan on seeing me in February with a follow-up CT scan and visit at that time.    We discussed the indications for earlier follow-up.  He will call us chest cold or bronchitis.  His action plan is prednisone 20 mg daily for 7 days and azithromycin.    Farhan Dejesus MD    Total time of this telephone call 6 minutes.  "

## 2021-06-08 NOTE — TELEPHONE ENCOUNTER
Pts daughter called with concern. Pt recently had Amputation 3rd digit left foot by Dr. Armstrong. She is concerned because his R second toe has a black spot underneath it that has been present for about 2 weeks. She will be emailing a photo and it can be sent to Dr. Armstrong for plan.

## 2021-06-08 NOTE — PATIENT INSTRUCTIONS - HE
Today we ordered your supplies from [employer]    The customer service number is [phone]. If you need to reorder supplies at any time please call this number to reorder more supplies.    Your supplies should arrive in approximately 3 business days.      Wear surgical shoe that you have at home.     Follow up in 2 weeks for office visit        Right second toe:        Dressing Application     1. Endoform should be cut to the size of the wound.  It should touch the edges of the ulcer.  It can overlap the edges just very small amount.  Then, moisten with saline. (If it is easier for you, you can moisten it before laying it in the wound)    2. Cover the top of the wound with dry gauze    3. Endoform is naturally used by the body over time so you may not find it in the wound when you change your dressing.  If you do find some, gently cleanse the wound with saline. Do not remove the remaining Endoform, which may appear as an off-white to tellez gel, just add Endoform on top.  Usually, more Endoform will need to be added every 5-7 days.     4.  Change your top dressing every 2-3 days depending on drainage.     -Endoform is a collagen dressing created from ovine (sheep) fore-stomach tissue. The collagen extracellular matrix transforms into a soft conforming sheet, which is naturally incorporated into the wound over time.  Collagen dressings are used to stimulate wound healing.   ,

## 2021-06-08 NOTE — PROGRESS NOTES
"Romaine Farah is a 74 y.o. male who is being evaluated via a billable telephone visit.      The patient has been notified of following:     \"This telephone visit will be conducted via a call between you and your physician/provider. We have found that certain health care needs can be provided without the need for a physical exam.  This service lets us provide the care you need with a short phone conversation.  If a prescription is necessary we can send it directly to your pharmacy.  If lab work is needed we can place an order for that and you can then stop by our lab to have the test done at a later time.    Telephone visits are billed at different rates depending on your insurance coverage. During this emergency period, for some insurers they may be billed the same as an in-person visit.  Please reach out to your insurance provider with any questions.    If during the course of the call the physician/provider feels a telephone visit is not appropriate, you will not be charged for this service.\"    Patient has given verbal consent to a Telephone visit? Yes    What phone number would you like to be contacted at? [phone]    Patient would like to receive their AVS by AVS Preference: Mail a copy.     CAT:  How often do you cough?: 3  How much phlegm (mucous) do you have in your chest?: 3  How tight does your chest feel?: 1  How breathless are you after climbing a hill or flight of stairs?: 4  How limited are your activities at home?: 4  How confident are you leaving home despite your lung condition?: 0  How soundly do you sleep?: 0  How much energy do you have?: 2  Total Score: 17    Overall having more trouble than previously.  He had surgery for neuropathy and was laid up for quite a while.  Since then he has been increasingly short of breath with exertion.  His foot has healed and he has been making efforts with physical therapy as well as walking on his own.  He can walk outside and is out of his walking " boot.    Has gained 15-20 lbs.    He is also having some postnasal drip and respiratory symptoms.  He had an episode of acute bronchitis and took some prednisone a couple weeks ago and this improved things quite a bit.  The symptoms seem separate from his dyspnea on exertion.    He has been using his Anoro and his albuterol once daily.  Albuterol helps his symptoms.    Overall consistent with deconditioning in the setting of limited physical activity, weight gain and underlying lung disease.  I am unsure if changing inhalers will help much.  He has used his action plan twice in the last 5 or 6 months which reflects suboptimal control.  I discussed that I do not think an inhaler will fix everything but we will advance him to adding an inhaled corticosteroid along with his long-acting anticholinergic and long-acting beta agonist.    His Anoro was stopped.  Trelegy started.  He will follow-up with me in 4 weeks.    We also discussed lung cancer risk.  He stopped smoking cigarettes in the early 80s and smoked cigars for 5 years after that.  I explained that he is outlived his cancer risk.  He had a CT with very small groundglass nodules in February of this year.  I explained we do not need to worry about those for now.  We can consider rechecking nodules in 1 year.    He and his wife are in agreement with the plan.  No other questions.    Phone call duration: 20 minutes    Farhan Dejesus MD  Pulmonary

## 2021-06-09 NOTE — ANESTHESIA PREPROCEDURE EVALUATION
Anesthesia Evaluation      Patient summary reviewed   History of anesthetic complications     Airway   Mallampati: II  Neck ROM: full   Pulmonary - normal exam   (+) COPD, shortness of breath, a smoker (quit 1987)    ROS comment: Asbestos exposure                           Cardiovascular - normal exam  Exercise tolerance: > or = 4 METS  (+) pacemaker (s/p PM for SSS), hypertension, dysrhythmias, ,     ECG reviewed        Neuro/Psych    (+) neuromuscular disease (tremor),  chronic pain (feet)    Endo/Other - negative ROS   (+) arthritis (s/p BECKA), obesity (bmi 31),      GI/Hepatic/Renal - negative ROS           Dental    (+) upper dentures and lower dentures                         Anesthesia Plan  Planned anesthetic: MAC    ASA 3     Anesthetic plan and risks discussed with: patient  Anesthesia plan special considerations: antiemetics,   Post-op plan: routine recovery      7/5/2020 covid pcr negative      Results for orders placed or performed in visit on 07/06/20   Basic Metabolic Panel   Result Value Ref Range    Sodium 140 136 - 145 mmol/L    Potassium 4.4 3.5 - 5.0 mmol/L    Chloride 105 98 - 107 mmol/L    CO2 25 22 - 31 mmol/L    Anion Gap, Calculation 10 5 - 18 mmol/L    Glucose 89 70 - 125 mg/dL    Calcium 8.9 8.5 - 10.5 mg/dL    BUN 19 8 - 28 mg/dL    Creatinine 1.18 0.70 - 1.30 mg/dL    GFR MDRD Af Amer >60 >60 mL/min/1.73m2    GFR MDRD Non Af Amer 60 (L) >60 mL/min/1.73m2

## 2021-06-09 NOTE — PROGRESS NOTES
"Podiatry Progress Note        ASSESSMENT: S/P Amputation 2nd digit right foot      TREATMENT:  -Surgical site right foot is progressing well. Pathology report indicates a clean proximal margin. Gauze dressing applied today, which will remain intact. No open lesions 2nd digit left foot, he will continue to monitor.     -Recommend non-weight bearing if possible, otherwise very limited walking in a surgical shoe. Follow-up with me in 2 weeks for suture removal.       Wai Armstrong DPM  [facility]/Foot & Ankle Surgery      HPI: Romaine Farah was seen again today s/p amputation 2nd digit right foot. Doing well today. Denies foot pain. He has remained mostly non-weight bearing.     Past Medical History:   Diagnosis Date     Bradycardia      Chronic pain     toes     COPD (chronic obstructive pulmonary disease) (H)      Erectile dysfunction      Essential tremor      H/O asbestos exposure      Hypertension     no meds currently     Neuropathy     feet     Osteomyelitis (H)     of ankle and foot       Allergies   Allergen Reactions     Hydroxyzine      The Hcl formulation caused side effects; tolerated pamoate     Propranolol      Side effects: cloudy thinking, felt \"off\"         Current Outpatient Medications:      acetaminophen (TYLENOL) 500 MG tablet, Take 1 tablet (500 mg total) by mouth every 6 (six) hours as needed for pain or fever., Disp: , Rfl: 0     acetaminophen-codeine (TYLENOL #3) 300-30 mg per tablet, Take 1 tablet by mouth every 6 (six) hours as needed for pain., Disp: 30 tablet, Rfl: 0     albuterol (PROVENTIL) 2.5 mg /3 mL (0.083 %) nebulizer solution, TAKE 3 ML (2.5 MG TOTAL) BY NEBULIZATION EVERY 6 (SIX) HOURS AS NEEDED FOR WHEEZING., Disp: 75 mL, Rfl: 11     ascorbic acid (ASCORBIC ACID WITH KHADIJAH HIPS) 500 MG tablet, Take 500 mg by mouth daily. , Disp: , Rfl:      b complex vitamins tablet, Take 1 tablet by mouth daily. , Disp: , Rfl:      calcium-vitamin D 500 mg(1,250mg) -200 unit " per tablet, Take 2 tablets by mouth daily., Disp: , Rfl:      clobetasol (TEMOVATE) 0.05 % ointment, Apply 1 application topically 2 (two) times a day as needed., Disp: , Rfl:      cyanocobalamin 1000 MCG tablet, Take 1 tablet (1,000 mcg total) by mouth daily., Disp: , Rfl: 0     DULoxetine (CYMBALTA) 30 MG capsule, Take 60 mg by mouth daily.    , Disp: , Rfl:      fluocinonide (LIDEX) 0.05 % cream, Apply 1 application topically 2 (two) times a day as needed., Disp: , Rfl:      fluticasone-umeclidinium-vilanterol (TRELEGY ELLIPTA) 100-62.5-25 mcg DsDv inhaler, Inhale 1 Inhalation daily., Disp: 1 each, Rfl: 11     ibuprofen (ADVIL,MOTRIN) 200 MG tablet, Take 800 mg by mouth every 8 (eight) hours as needed for pain.    , Disp: , Rfl:      lidocaine (XYLOCAINE) 5 % ointment, Apply 1 application topically 3 (three) times a day as needed., Disp: , Rfl:      magnesium 250 mg Tab, Take 1 tablet by mouth daily with lunch. , Disp: , Rfl:      methadone (DOLOPHINE) 10 MG tablet, Take 10 mg by mouth every 6 (six) hours. , Disp: , Rfl:      mupirocin (BACTROBAN) 2 % ointment, Apply topically as needed. , Disp: , Rfl:      OMEGA-3/DHA/EPA/FISH OIL (FISH OIL-OMEGA-3 FATTY ACIDS) 300-1,000 mg capsule, Take 2 g by mouth daily., Disp: , Rfl:      predniSONE (DELTASONE) 20 MG tablet, Take 20 mg by mouth daily. (Patient taking differently: Take 20 mg by mouth daily as needed (Uses prn for symptoms of URI) .), Disp: 7 tablet, Rfl: 3     pregabalin (LYRICA) 200 MG capsule, Take 200 mg by mouth 3 (three) times a day., Disp: , Rfl:      primidone (MYSOLINE) 50 MG tablet, Take 100 mg by mouth 2 (two) times a day., Disp: , Rfl:      topiramate (TOPAMAX) 25 MG tablet, Take 75 mg by mouth 2 (two) times a day.    , Disp: , Rfl:      vitamin B complex-folic acid 0.4 mg Tab, Take 1 tablet by mouth., Disp: , Rfl:     Review of Systems - Negative       OBJECTIVE:  Appearance: alert, well appearing, and in no distress.    Vitals:    07/14/20 1245    BP: 108/64   Pulse: 62   Resp: 16   Temp: 98.2  F (36.8  C)       Surgical site on the Right foot has intact sutures with skin edges well approximated at amputated 2nd digit right foot. No erythema. Neurovascular status unchanged right foot.     Imaging: None

## 2021-06-09 NOTE — ANESTHESIA CARE TRANSFER NOTE
Last vitals:   Vitals:    07/07/20 0756   BP: 118/66   Pulse: 60   Resp: 14   Temp: 36.7  C (98.1  F)   SpO2: 99%     Patient's level of consciousness is awake and drowsy  Spontaneous respirations: yes  Maintains airway independently: yes  Dentition unchanged: yes  Oropharynx: oropharynx clear of all foreign objects    QCDR Measures:  ASA# 20 - Surgical Safety Checklist: WHO surgical safety checklist completed prior to induction    PQRS# 430 - Adult PONV Prevention: 4558F-8P - Pt did NOT receive => 2 anti-emetic agents  ASA# 8 - Peds PONV Prevention: NA - Not pediatric patient, not GA or 2 or more risk factors NOT present  PQRS# 424 - Karen-op Temp Management: 4559F - At least one body temp DOCUMENTED => 35.5C or 95.9F within required timeframe  PQRS# 426 - PACU Transfer Protocol: - Transfer of care checklist used  ASA# 14 - Acute Post-op Pain: ASA14B - Patient did NOT experience pain >= 7 out of 10

## 2021-06-09 NOTE — ANESTHESIA POSTPROCEDURE EVALUATION
Patient: Romaine Farah  Procedure(s):  AMPUTATION, second digit right foot (Right)  Anesthesia type: MAC    Patient location: Phase II Recovery  Last vitals:   Vitals Value Taken Time   /63 7/7/2020  8:40 AM   Temp 36.6  C (97.9  F) 7/7/2020  7:56 AM   Pulse 60 7/7/2020  8:48 AM   Resp 16 7/7/2020  8:40 AM   SpO2 90 % 7/7/2020  8:48 AM   Vitals shown include unvalidated device data.  Post vital signs: stable  Level of consciousness: awake and responds to simple questions  Post-anesthesia pain: pain controlled  Post-anesthesia nausea and vomiting: no  Pulmonary: unassisted, return to baseline  Cardiovascular: stable and blood pressure at baseline  Hydration: adequate  Anesthetic events: no    QCDR Measures:  ASA# 11 - Karen-op Cardiac Arrest: ASA11B - Patient did NOT experience unanticipated cardiac arrest  ASA# 12 - Karen-op Mortality Rate: ASA12B - Patient did NOT die  ASA# 13 - PACU Re-Intubation Rate: NA - No ETT / LMA used for case  ASA# 10 - Composite Anes Safety: ASA10A - No serious adverse event    Additional Notes:

## 2021-06-09 NOTE — PROGRESS NOTES
"Thank you for asking the [facility] team to see Romaine Farah in consultation      Assessment/Plan:     Sinus bradycardia by holter monitor. He is relatively inactive and is on medications for his neuropathy that make it hard to determine if the bradycardia is symptomatic or not. Will have him do an exercise stress echo to see if his HR responds appropriately and what his pump function is. Will check a thyroid panel today as well.     We discussed a pacemaker and I have asked him to be active at home to see if he can elucidate any symptoms.     EKG today     Current History:   Romaine Farah is a 71 y.o. with severe idiopathic neuropathy and essential tremor who was found to have a \"very slow heart rate\" while getting a cataract surgery 3 weeks ago. His PCP obtained a holter monitor that showed sinus bradycardia with an average rate in the 40s, and a peak HR in the mid 60s with a low of 28 bpm. There was only rare ectopy. 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 He denies any exertional symptoms, dyspnea, lightheadedness or syncope. He has had a couple of mechanical falls without significant injury.     He and his wife note that he is on several medications for his neuropathy and tremor that make him more fatigued, but the pain prior to these medications was unbearable.      Past Medical History:     Past Medical History:   Diagnosis Date     Erectile dysfunction      Essential tremor      H/O asbestos exposure      Hypertension      Neuropathy        Past Surgical History:     Past Surgical History:   Procedure Laterality Date     TOTAL HIP ARTHROPLASTY Right 2/2/2015    Procedure: #3   HIP TOTAL ARTHROPLASTY, RIGHT;  Surgeon: Aryan Glass MD;  [facility]: [facility];  Service:        Family History:     Family History   Problem Relation Age of Onset     COPD Mother      Diabetes Mother      Esophageal cancer Father        Social " History:    reports that he has quit smoking. He does not have any smokeless tobacco history on file. He reports that he drinks alcohol. He reports that he does not use illicit drugs.    Meds:     Current Outpatient Prescriptions   Medication Sig     ascorbic acid (ASCORBIC ACID WITH KHADIJAH HIPS) 500 MG tablet Take 500 mg by mouth daily.     b complex vitamins tablet Take 1 tablet by mouth daily.     calcium, as carbonate, (OS-JOSSELYN) 500 mg calcium (1,250 mg) tablet Take 1 tablet by mouth daily.     cholecalciferol, vitamin D3, (VITAMIN D3) 1,000 unit capsule Take 1,000 Units by mouth daily.     fluocinolone (VANOS) 0.01 % cream Apply 1 application topically 2 (two) times a day as needed.      gabapentin (NEURONTIN) 300 MG capsule Take 900 mg by mouth 4 (four) times a day.      [START ON 5/10/2017] HYDROcodone-acetaminophen (NORCO )  mg per tablet Earliest Fill Date: 5/10/17 3 tabs. p.o. H.S.  5/13/17-6/11/17 may fill 3 days in advance.     lidocaine (XYLOCAINE) 2 % jelly      magnesium 250 mg Tab Take 250 mg by mouth 3 (three) times a day.      methadone (DOLOPHINE) 10 MG tablet Take 10 mg by mouth 4 (four) times a day.     OMEGA-3/DHA/EPA/FISH OIL (FISH OIL-OMEGA-3 FATTY ACIDS) 300-1,000 mg capsule Take 2 g by mouth daily.     PRIMIDONE ORAL Take 50 mg by mouth daily.     sildenafil (VIAGRA) 50 MG tablet Take 50 mg by mouth daily as needed for erectile dysfunction.     vitamin A-vitamin C-vit E-min (OCUVITE) Tab tablet Take by mouth daily.       Allergies:   Review of patient's allergies indicates no known allergies.    Review of Systems:   Review of Systems:   General: WNL  Eyes: WNL  Ears/Nose/Throat: WNL  Lungs: WNL  Heart: WNL, Shortness of Breath with activity  Stomach: Heartburn  Bladder: WNL  Muscle/Joints: Joint Pain  Skin: Rash  Nervous System: WNL  Mental Health: WNL     Blood: WNL       Objective:      Physical Exam  @LASTENCWT:3@  6' (1.829 m)  @BMI:3@  /62 (Patient Site: Right Arm,  Patient Position: Sitting, Cuff Size: Adult Regular)  Pulse (!) 52  Resp 16  Ht 6' (1.829 m)  Wt 215 lb 12.8 oz (97.9 kg)  BMI 29.27 kg/m2    General Appearance:   Alert, cooperative and in no acute distress.   HEENT:  No scleral icterus; the mucous membranes were pink and moist.   Neck: JVP flat. No thyromegaly. No HJR   Chest: The spine was straight. The chest was symmetric.   Lungs:   Respirations unlabored; the lungs are clear to auscultation.   Cardiovascular:   S1 and S2 normal and without murmur. No clicks or rubs. No carotid bruits noted. Right DP, PT, and radial pulses 2+. Left DP, PT, and radial pulses 2+.   Abdomen:  No organomegaly, masses, bruits, or tenderness. Bowels sounds are present   Extremities: No cyanosis, clubbing, or edema.   Skin: No xanthelasma.   Neurologic: Mood and affect are appropriate.         Lab Review   Lab Results   Component Value Date     01/12/2016     03/12/2015     (L) 01/27/2015    K 4.4 01/12/2016    K 4.4 03/12/2015    K 4.7 02/03/2015    CL 99 01/12/2016    CL 98 03/12/2015    CL 98 01/27/2015    CO2 30 01/12/2016    CO2 27 03/12/2015    CO2 23 01/27/2015    BUN 16 01/12/2016    BUN 13 03/12/2015    BUN 12 01/27/2015    CREATININE 0.88 01/12/2016    CREATININE 0.78 03/12/2015    CREATININE 0.75 01/27/2015    CALCIUM 9.6 01/12/2016    CALCIUM 9.2 03/12/2015    CALCIUM 9.2 01/27/2015     Lab Results   Component Value Date    HGB 14.1 02/04/2015    HGB 13.9 (L) 02/03/2015     Lab Results   Component Value Date    CHOL 194 01/12/2016    TRIG 54 01/12/2016    HDL 53 01/12/2016     No results found for: BNP      Natividad Monterroso M.D.

## 2021-06-09 NOTE — PATIENT INSTRUCTIONS - HE
Remain non-weightbearing on right foot.      Gauze has been placed on right foot incision and in-between toes.  Leave in place until follow up visit in two weeks.     Keep right foot dry, do not get wet.    [facility] Virtual Check-In Number    [phone]    When you arrive for your IN OFFICE visit. Please call this number from the parking lot.      The staff will then virtually check you in and meet you at the door to escort you to a room.    If you arrive early and a room is not yet ready for you staff may have you wait can call you back when they are ready.     Please remember to wear a mask into your appointment     No Visitors Allowed    If you are having any symptoms- cough, fever, rash, loss of taste and smell or shortness of breath we ask that you please call and reschedule your appointment.

## 2021-06-10 ENCOUNTER — RECORDS - HEALTHEAST (OUTPATIENT)
Dept: VASCULAR SURGERY | Facility: CLINIC | Age: 76
End: 2021-06-10

## 2021-06-10 DIAGNOSIS — L02.619 CUTANEOUS ABSCESS OF UNSPECIFIED FOOT: ICD-10-CM

## 2021-06-10 DIAGNOSIS — M20.41 OTHER HAMMER TOE(S) (ACQUIRED), RIGHT FOOT: ICD-10-CM

## 2021-06-10 DIAGNOSIS — L97.513 NON-PRESSURE CHRONIC ULCER OF OTHER PART OF RIGHT FOOT WITH NECROSIS OF MUSCLE (H): ICD-10-CM

## 2021-06-10 DIAGNOSIS — L03.119 CELLULITIS OF UNSPECIFIED PART OF LIMB: ICD-10-CM

## 2021-06-10 NOTE — PROGRESS NOTES
FOOT AND ANKLE SURGERY/PODIATRY Progress Note      ASSESSMENT:   Ulceration 3rd and 5th digits right  S/P Amputation 2nd digit right foot      TREATMENT:  -Ulcerations along the 3rd and 5th digits and distal incision are stable. No erythema, do not probe to deep tissues. I recommend continued limited walking with the CAM boot and use of endoform.    -After discussion of risk factors and consent obtained 2% Lidocaine HCL jelly was applied, under clean conditions, the right and foot ulceration(s) were debrided using currette.  Devitalized and nonviable tissue, along with any fibrin and slough, was removed to improve granulation tissue formation, stimulate wound healing, decrease overall bacteria load, disrupt biofilm formation and decrease edge senescence.  Total excisional debridement was 2 sq cm into the subcutaneous tissue with a depth of 0.2 cm.   Ulcers were improved afterwards and .  Measures were as noted on the flow sheet. Patient tolerated this well. Endoform with a gauze dresssing was applied. He will continue to apply Endoform with a gauze dresssing as directed.    -He will follow-up in 3 weeks.      Wai Armstrong DPM  [facility] [place]      HPI: Romaine Farah was seen again today for ulcerations 3rd and 5th digits right foot and s/p amputation 2nd digit right foot. Doing well. The patient's wife has been applying endoform as directed.       Past Medical History:   Diagnosis Date     Bradycardia      Chronic pain     toes     COPD (chronic obstructive pulmonary disease) (H)      Erectile dysfunction      Essential tremor      H/O asbestos exposure      Hypertension     no meds currently     Neuropathy     feet     Osteomyelitis (H)     of ankle and foot       Past Surgical History:   Procedure Laterality Date     EP PACEMAKER INSERT N/A 2/12/2019    Procedure: EP Pacemaker Insertion;  Surgeon: Micheal Mahoney MD;  Location: [facility];  Service: Cardiology      "INSERT / REPLACE / REMOVE PACEMAKER  02/12/2019     TOE AMPUTATION Left 2/20/2020    Procedure: AMPUTATION, third digit left foot;  Surgeon: Wai Armstrong DPM;  [place] [facility]  Service: Podiatry     TOE AMPUTATION Right 7/7/2020    Procedure: AMPUTATION, second digit right foot;  Surgeon: Wai Armstrong DPM;  [place] [facility];  Service: Podiatry     TOTAL HIP ARTHROPLASTY Right 2/2/2015    Procedure: #3   HIP TOTAL ARTHROPLASTY, RIGHT;  Surgeon: Aryan Glass MD;  Location: [facility] OR;  Service:        Allergies   Allergen Reactions     Hydroxyzine      The Hcl formulation caused side effects; tolerated pamoate     Propranolol      Side effects: cloudy thinking, felt \"off\"         Current Outpatient Medications:      acetaminophen (TYLENOL) 500 MG tablet, Take 1 tablet (500 mg total) by mouth every 6 (six) hours as needed for pain or fever., Disp: , Rfl: 0     acetaminophen-codeine (TYLENOL #3) 300-30 mg per tablet, Take 1 tablet by mouth every 6 (six) hours as needed for pain., Disp: 30 tablet, Rfl: 0     albuterol (PROVENTIL) 2.5 mg /3 mL (0.083 %) nebulizer solution, TAKE 3 ML (2.5 MG TOTAL) BY NEBULIZATION EVERY 6 (SIX) HOURS AS NEEDED FOR WHEEZING., Disp: 75 mL, Rfl: 11     ascorbic acid (ASCORBIC ACID WITH KHADIJAH HIPS) 500 MG tablet, Take 500 mg by mouth daily. , Disp: , Rfl:      b complex vitamins tablet, Take 1 tablet by mouth daily. , Disp: , Rfl:      calcium-vitamin D 500 mg(1,250mg) -200 unit per tablet, Take 2 tablets by mouth daily., Disp: , Rfl:      clobetasol (TEMOVATE) 0.05 % ointment, Apply 1 application topically 2 (two) times a day as needed., Disp: , Rfl:      cyanocobalamin 1000 MCG tablet, Take 1 tablet (1,000 mcg total) by mouth daily., Disp: , Rfl: 0     docosahexaenoic acid-epa 120-180 mg cap, Take 2 capsules by mouth daily., Disp: , Rfl:      DULoxetine (CYMBALTA) 30 MG capsule, Take 60 mg by mouth daily.    , Disp: , Rfl:      fluocinonide " (LIDEX) 0.05 % cream, Apply 1 application topically 2 (two) times a day as needed., Disp: , Rfl:      fluticasone-umeclidinium-vilanterol (TRELEGY ELLIPTA) 100-62.5-25 mcg DsDv inhaler, Inhale 1 Inhalation daily., Disp: 1 each, Rfl: 11     ibuprofen (ADVIL,MOTRIN) 200 MG tablet, Take 800 mg by mouth every 8 (eight) hours as needed for pain.    , Disp: , Rfl:      lidocaine (XYLOCAINE) 5 % ointment, Apply 1 application topically 3 (three) times a day as needed., Disp: , Rfl:      magnesium 250 mg Tab, Take 1 tablet by mouth daily with lunch. , Disp: , Rfl:      methadone (DOLOPHINE) 10 MG tablet, Take 10 mg by mouth every 6 (six) hours. , Disp: , Rfl:      mupirocin (BACTROBAN) 2 % ointment, Apply topically as needed. , Disp: , Rfl:      OMEGA-3/DHA/EPA/FISH OIL (FISH OIL-OMEGA-3 FATTY ACIDS) 300-1,000 mg capsule, Take 2 g by mouth daily., Disp: , Rfl:      predniSONE (DELTASONE) 20 MG tablet, Take 20 mg by mouth daily. (Patient taking differently: Take 20 mg by mouth daily as needed (Uses prn for symptoms of URI) .), Disp: 7 tablet, Rfl: 3     pregabalin (LYRICA) 200 MG capsule, Take 200 mg by mouth 3 (three) times a day., Disp: , Rfl:      primidone (MYSOLINE) 50 MG tablet, Take 100 mg by mouth 2 (two) times a day., Disp: , Rfl:      topiramate (TOPAMAX) 25 MG tablet, Take 75 mg by mouth 2 (two) times a day.    , Disp: , Rfl:      vitamin B complex-folic acid 0.4 mg Tab, Take 1 tablet by mouth., Disp: , Rfl:     Review of Systems - 10 point Review of Systems is negative except for foot ulcers which is noted in HPI.      OBJECTIVE:  Vitals:    08/19/20 0933   BP: 118/68   Pulse: 60   Resp: 16   Temp: 98  F (36.7  C)     General appearance: Patient is alert and fully cooperative with history & exam.  No sign of distress is noted during the visit.   Vascular: Dorsalis pedis non-palpableRight.  Dermatologic:    VASC Wound 02/14/20 (Active)   Pre Size Length 0.3 07/29/20 0900   Pre Size Width 0.2 07/29/20 0900   Pre  Size Depth 0.1 07/29/20 0900   Pre Total Sq cm 0.6 07/29/20 0900       VASC Wound 05/29/20 right second toe (Active)   Pre Size Length 0.3 06/15/20 1000   Pre Size Width 0.5 06/15/20 1000   Pre Size Depth 0.1 06/15/20 1000   Pre Total Sq cm 0.15 06/15/20 1000   Post Size Length 0.5 06/15/20 1000   Post Size Width 0.6 06/15/20 1000   Post Size Depth 0.5 06/15/20 1000   Post Total Sq cm 0.1 06/15/20 1000   Description scabbed 07/29/20 0900   Prodcut Used Gauze 07/29/20 0900       VAS Wound 07/29/20 righ foot- 5th toe (Active)   Pre Size Length 0.2 07/29/20 0900   Pre Size Width 0.2 07/29/20 0900   Pre Size Depth 0.1 07/29/20 0900   Pre Total Sq cm 0.4 07/29/20 0900   Description pink 07/29/20 0900   Prodcut Used Gauze 07/29/20 0900       Incision 02/20/20 Surgical Leg Left (Active)       Incision 07/07/20 Surgical Foot Right (Active)   Granular base digits 3,5 right and at distal incision at amputated 2nd digit right. No erythema.   Neurologic: Diminished to light touch Right.  Musculoskeletal: Contracted digits noted Right.    Imaging:     No results found.       Picture: None

## 2021-06-10 NOTE — TELEPHONE ENCOUNTER
Spoke with patients wife as she called with concerns of endoform adhered to toe amp site. I reviewed last visit note from Dr Armstrong. Read them to her. She was unaware steri strips would fall off. She applied endo form to incision. Instructed how to remove by using saline. Advised to cover with dry gauze every other day as Dr Armstrong recommended.Will call back with any further questions.

## 2021-06-11 NOTE — PATIENT INSTRUCTIONS - HE
Limited walking right foot with CAM boot for stability.    Start medi-honey on the 3rd and 5th digits on right foot:  1. Remove medi-honey.  2. Cleanse with normal saline, pat dry.  3. Apply Medihoney alginate into the wound. Try to avoid medihoney on the intact skin.   4. Cover with a gauze dressing and secure with Roll Gauze.  5. Change Every other day.    [facility] Virtual Check-In Number    [phone]    When you arrive for your IN OFFICE visit. Please call this number from the parking lot.      The staff will then virtually check you in and meet you at the door to escort you to a room.    If you arrive early and a room is not yet ready for you staff may have you wait can call you back when they are ready.     Please remember to wear a mask into your appointment     No Visitors Allowed    If you are having any symptoms- cough, fever, rash, loss of taste and smell or shortness of breath we ask that you please call and reschedule your appointment.

## 2021-06-11 NOTE — PROGRESS NOTES
FOOT AND ANKLE SURGERY/PODIATRY Progress Note      ASSESSMENT:   Ulceration 3rd and 5th digits right  Ankle Instability       TREATMENT:  -The ulcerations along digits 3,5 right foot are similar in size to his previous visit. We will d/c endoform and begin medihoney. Surgical site ulceration has resolved at amputated 2nd digit. CAM boot dispensed today, recommend limited walking.    -After discussion of risk factors and consent obtained 2% Lidocaine HCL jelly was applied, under clean conditions, the right and foot ulceration(s) were debrided using currette and #15 blade scalpel.  Devitalized and nonviable tissue, along with any fibrin and slough, was removed to improve granulation tissue formation, stimulate wound healing, decrease overall bacteria load, disrupt biofilm formation and decrease edge senescence.  Total excisional debridement was 0.02 sq cm into the subcutaneous tissue with a depth of 0.2 cm.   Ulcers were improved afterwards and .  Measures were as noted on the flow sheet. Patient tolerated this well. Medi-honey with a gauze dressing was applied. He will continue to apply Medi-honey with a gauze dressing as directed.    -He will follow-up in 3 weeks.    Wai Armstrong DPM  [facility] [place]      HPI: Romaine Farah was seen again today for right foot ulcerations. The patient's wife has been applying endoform as directed. He requests a new CAM boot. No new concerns.       Past Medical History:   Diagnosis Date     Bradycardia      Chronic pain     toes     COPD (chronic obstructive pulmonary disease) (H)      Erectile dysfunction      Essential tremor      H/O asbestos exposure      Hypertension     no meds currently     Neuropathy     feet     Osteomyelitis (H)     of ankle and foot       Past Surgical History:   Procedure Laterality Date     EP PACEMAKER INSERT N/A 2/12/2019    Procedure: EP Pacemaker Insertion;  Surgeon: Micheal Mahoney MD;  Location: [facility]  "Lab;  Service: Cardiology     INSERT / REPLACE / REMOVE PACEMAKER  02/12/2019     TOE AMPUTATION Left 2/20/2020    Procedure: AMPUTATION, third digit left foot;  Surgeon: Wai Armstrong DPM;  Location: [facility] OR;  Service: Podiatry     TOE AMPUTATION Right 7/7/2020    Procedure: AMPUTATION, second digit right foot;  Surgeon: Wai Armstrong DPM;  Location: [facility] OR;  Service: Podiatry     TOTAL HIP ARTHROPLASTY Right 2/2/2015    Procedure: #3   HIP TOTAL ARTHROPLASTY, RIGHT;  Surgeon: Aryan Glass MD;  Location: [facility] OR;  Service:        Allergies   Allergen Reactions     Hydroxyzine      The Hcl formulation caused side effects; tolerated pamoate     Propranolol      Side effects: cloudy thinking, felt \"off\"         Current Outpatient Medications:      acetaminophen (TYLENOL) 500 MG tablet, Take 1 tablet (500 mg total) by mouth every 6 (six) hours as needed for pain or fever., Disp: , Rfl: 0     acetaminophen-codeine (TYLENOL #3) 300-30 mg per tablet, Take 1 tablet by mouth every 6 (six) hours as needed for pain., Disp: 30 tablet, Rfl: 0     albuterol (PROVENTIL) 2.5 mg /3 mL (0.083 %) nebulizer solution, TAKE 3 ML (2.5 MG TOTAL) BY NEBULIZATION EVERY 6 (SIX) HOURS AS NEEDED FOR WHEEZING., Disp: 75 mL, Rfl: 11     ascorbic acid (ASCORBIC ACID WITH KHADIJAH HIPS) 500 MG tablet, Take 500 mg by mouth daily. , Disp: , Rfl:      b complex vitamins tablet, Take 1 tablet by mouth daily. , Disp: , Rfl:      calcium-vitamin D 500 mg(1,250mg) -200 unit per tablet, Take 2 tablets by mouth daily., Disp: , Rfl:      clobetasol (TEMOVATE) 0.05 % ointment, Apply 1 application topically 2 (two) times a day as needed., Disp: , Rfl:      cyanocobalamin 1000 MCG tablet, Take 1 tablet (1,000 mcg total) by mouth daily., Disp: , Rfl: 0     docosahexaenoic acid-epa 120-180 mg cap, Take 2 capsules by mouth daily., Disp: , Rfl:      DULoxetine (CYMBALTA) 30 MG capsule, Take 60 mg by mouth daily.    , Disp: , " Rfl:      fluocinonide (LIDEX) 0.05 % cream, Apply 1 application topically 2 (two) times a day as needed., Disp: , Rfl:      fluticasone-umeclidinium-vilanterol (TRELEGY ELLIPTA) 100-62.5-25 mcg DsDv inhaler, Inhale 1 Inhalation daily., Disp: 1 each, Rfl: 11     ibuprofen (ADVIL,MOTRIN) 200 MG tablet, Take 800 mg by mouth every 8 (eight) hours as needed for pain.    , Disp: , Rfl:      lidocaine (XYLOCAINE) 5 % ointment, Apply 1 application topically 3 (three) times a day as needed., Disp: , Rfl:      magnesium 250 mg Tab, Take 1 tablet by mouth daily with lunch. , Disp: , Rfl:      methadone (DOLOPHINE) 10 MG tablet, Take 10 mg by mouth every 6 (six) hours. , Disp: , Rfl:      mupirocin (BACTROBAN) 2 % ointment, Apply topically as needed. , Disp: , Rfl:      OMEGA-3/DHA/EPA/FISH OIL (FISH OIL-OMEGA-3 FATTY ACIDS) 300-1,000 mg capsule, Take 2 g by mouth daily., Disp: , Rfl:      predniSONE (DELTASONE) 20 MG tablet, Take 20 mg by mouth daily. (Patient taking differently: Take 20 mg by mouth daily as needed (Uses prn for symptoms of URI) .), Disp: 7 tablet, Rfl: 3     pregabalin (LYRICA) 200 MG capsule, Take 200 mg by mouth 3 (three) times a day., Disp: , Rfl:      primidone (MYSOLINE) 50 MG tablet, Take 50 mg by mouth., Disp: , Rfl:      topiramate (TOPAMAX) 25 MG tablet, Take 75 mg by mouth 2 (two) times a day.    , Disp: , Rfl:      vitamin B complex-folic acid 0.4 mg Tab, Take 1 tablet by mouth., Disp: , Rfl:     Review of Systems - 10 point Review of Systems is negative except for right foot ulcers which is noted in HPI.      OBJECTIVE:  Vitals:    09/25/20 0911   BP: 128/80   Resp: 18     General appearance: Patient is alert and fully cooperative with history & exam.  No sign of distress is noted during the visit.   Vascular: Dorsalis pedis non-palpableRight.  Dermatologic:    VASC Wound 02/14/20 (Active)   Pre Size Length 0.1 09/04/20 1041   Pre Size Width 0.2 09/04/20 1041   Pre Size Depth 0 09/04/20 1041    Pre Total Sq cm 0.4 08/19/20 1005   Post Size Length 0.1 09/25/20 0900   Post Size Width 0.2 09/25/20 0900   Post Size Depth 0.1 09/25/20 0900   Post Total Sq cm 0.02 09/25/20 0900   Description scabbed 09/04/20 1041       VAS Wound 05/29/20 right second toe (Active)   Pre Size Length 0.3 06/15/20 1000   Pre Size Width 0.5 06/15/20 1000   Pre Size Depth 0.1 06/15/20 1000   Pre Total Sq cm 0.15 06/15/20 1000   Post Size Length 0.5 06/15/20 1000   Post Size Width 0.6 06/15/20 1000   Post Size Depth 0.5 06/15/20 1000   Post Total Sq cm 0.1 06/15/20 1000   Description scabbed 07/29/20 0900   Prodcut Used Gauze 07/29/20 0900       [facility] Wound 07/29/20 righ foot- 5th toe (Active)   Pre Size Length 0.2 09/04/20 1041   Pre Size Width 0.2 09/04/20 1041   Pre Size Depth 0 09/04/20 1041   Pre Total Sq cm 0.04 09/04/20 1041   Post Size Length 0.2 09/25/20 0900   Post Size Width 0.2 09/25/20 0900   Post Size Depth 0.2 09/25/20 0900   Post Total Sq cm 0.04 09/25/20 0900   Description scabbed 09/04/20 1041   Prodcut Used Gauze 07/29/20 0900       [facility] Wound 08/19/20 surgical 2nd toe amp site (Active)   Pre Size Length 0.5 09/04/20 1041   Pre Size Width 0.2 09/04/20 1041   Pre Size Depth 0 09/04/20 1041   Pre Total Sq cm 0.1 09/04/20 1041   Post Size Length 0 09/25/20 0900   Post Size Width 0 09/25/20 0900   Post Size Depth 0 09/25/20 0900   Post Total Sq cm 0 09/25/20 0900   Description scabbed 09/04/20 1041       Incision 02/20/20 Surgical Leg Left (Active)       Incision 07/07/20 Surgical Foot Right (Active)   Granular base at ulcers on digits 3,5 right foot. No erythema.   Neurologic: Diminished to light touch Right.  Musculoskeletal: Contracted digits noted Right. Limited ROM right ankle.    Imaging:     No results found.       Picture: None

## 2021-06-11 NOTE — PROGRESS NOTES
FOOT AND ANKLE SURGERY/PODIATRY Progress Note      ASSESSMENT:   Ulceration 3rd and 5th digits right  S/P Amputation 2nd digit right foot      TREATMENT:  -Right foot ulcerations have improved since his last visit. I recommend he continue with limited walking in the CAM boot and use of endoform.     -After discussion of risk factors and consent obtained 2% Lidocaine HCL jelly was applied, under clean conditions, the right and foot ulceration(s) were debrided using #15 blade scalpel.  Devitalized and nonviable tissue, along with any fibrin and slough, was removed to improve granulation tissue formation, stimulate wound healing, decrease overall bacteria load, disrupt biofilm formation and decrease edge senescence.  Total excisional debridement was 2 sq cm into the subcutaneous tissue with a depth of 0.2 cm.   Ulcers were improved afterwards and .  Measures were as noted on the flow sheet. Patient tolerated this well. Endoform with a gauze dresssing was applied. He will continue to apply Endoform with a gauze dresssing as directed.    -He will follow-up in 3 weeks.    Wai Armstrong DPM  [facility] [place]        HPI: Romaine PERLA Farah was seen again today for right foot ulcerations. He is doing well today. Recently seen by Marisela Gamez and recommended to obtain an over the counter toe separators.     Past Medical History:   Diagnosis Date     Bradycardia      Chronic pain     toes     COPD (chronic obstructive pulmonary disease) (H)      Erectile dysfunction      Essential tremor      H/O asbestos exposure      Hypertension     no meds currently     Neuropathy     feet     Osteomyelitis (H)     of ankle and foot       Past Surgical History:   Procedure Laterality Date     EP PACEMAKER INSERT N/A 2/12/2019    Procedure: EP Pacemaker Insertion;  Surgeon: Micheal Mahoney MD;  Location: [facility];  Service: Cardiology     INSERT / REPLACE / REMOVE PACEMAKER  02/12/2019     TOE  "AMPUTATION Left 2/20/2020    Procedure: AMPUTATION, third digit left foot;  Surgeon: Wai Armstrong DPM;  Location: [facility] Main OR;  Service: Podiatry     TOE AMPUTATION Right 7/7/2020    Procedure: AMPUTATION, second digit right foot;  Surgeon: Wai Armstrong DPM;  Location: [facility] OR;  Service: Podiatry     TOTAL HIP ARTHROPLASTY Right 2/2/2015    Procedure: #3   HIP TOTAL ARTHROPLASTY, RIGHT;  Surgeon: Aryan Glass MD;  Location: [facility] OR;  Service:        Allergies   Allergen Reactions     Hydroxyzine      The Hcl formulation caused side effects; tolerated pamoate     Propranolol      Side effects: cloudy thinking, felt \"off\"         Current Outpatient Medications:      acetaminophen (TYLENOL) 500 MG tablet, Take 1 tablet (500 mg total) by mouth every 6 (six) hours as needed for pain or fever., Disp: , Rfl: 0     acetaminophen-codeine (TYLENOL #3) 300-30 mg per tablet, Take 1 tablet by mouth every 6 (six) hours as needed for pain., Disp: 30 tablet, Rfl: 0     albuterol (PROVENTIL) 2.5 mg /3 mL (0.083 %) nebulizer solution, TAKE 3 ML (2.5 MG TOTAL) BY NEBULIZATION EVERY 6 (SIX) HOURS AS NEEDED FOR WHEEZING., Disp: 75 mL, Rfl: 11     ascorbic acid (ASCORBIC ACID WITH KHADIJAH HIPS) 500 MG tablet, Take 500 mg by mouth daily. , Disp: , Rfl:      b complex vitamins tablet, Take 1 tablet by mouth daily. , Disp: , Rfl:      calcium-vitamin D 500 mg(1,250mg) -200 unit per tablet, Take 2 tablets by mouth daily., Disp: , Rfl:      clobetasol (TEMOVATE) 0.05 % ointment, Apply 1 application topically 2 (two) times a day as needed., Disp: , Rfl:      cyanocobalamin 1000 MCG tablet, Take 1 tablet (1,000 mcg total) by mouth daily., Disp: , Rfl: 0     docosahexaenoic acid-epa 120-180 mg cap, Take 2 capsules by mouth daily., Disp: , Rfl:      DULoxetine (CYMBALTA) 30 MG capsule, Take 60 mg by mouth daily.    , Disp: , Rfl:      fluocinonide (LIDEX) 0.05 % cream, Apply 1 application topically 2 (two) " times a day as needed., Disp: , Rfl:      fluticasone-umeclidinium-vilanterol (TRELEGY ELLIPTA) 100-62.5-25 mcg DsDv inhaler, Inhale 1 Inhalation daily., Disp: 1 each, Rfl: 11     ibuprofen (ADVIL,MOTRIN) 200 MG tablet, Take 800 mg by mouth every 8 (eight) hours as needed for pain.    , Disp: , Rfl:      lidocaine (XYLOCAINE) 5 % ointment, Apply 1 application topically 3 (three) times a day as needed., Disp: , Rfl:      magnesium 250 mg Tab, Take 1 tablet by mouth daily with lunch. , Disp: , Rfl:      methadone (DOLOPHINE) 10 MG tablet, Take 10 mg by mouth every 6 (six) hours. , Disp: , Rfl:      mupirocin (BACTROBAN) 2 % ointment, Apply topically as needed. , Disp: , Rfl:      OMEGA-3/DHA/EPA/FISH OIL (FISH OIL-OMEGA-3 FATTY ACIDS) 300-1,000 mg capsule, Take 2 g by mouth daily., Disp: , Rfl:      predniSONE (DELTASONE) 20 MG tablet, Take 20 mg by mouth daily. (Patient taking differently: Take 20 mg by mouth daily as needed (Uses prn for symptoms of URI) .), Disp: 7 tablet, Rfl: 3     pregabalin (LYRICA) 200 MG capsule, Take 200 mg by mouth 3 (three) times a day., Disp: , Rfl:      primidone (MYSOLINE) 50 MG tablet, Take 50 mg by mouth., Disp: , Rfl:      topiramate (TOPAMAX) 25 MG tablet, Take 75 mg by mouth 2 (two) times a day.    , Disp: , Rfl:      vitamin B complex-folic acid 0.4 mg Tab, Take 1 tablet by mouth., Disp: , Rfl:     Review of Systems - 10 point Review of Systems is negative except for right foot ulcers which is noted in HPI.      OBJECTIVE:  Vitals:    09/04/20 1038   BP: 118/72   Pulse: 64   Resp: 16   Temp: 97.9  F (36.6  C)     General appearance: Patient is alert and fully cooperative with history & exam.  No sign of distress is noted during the visit.   Vascular: Dorsalis pedis non-palpableRight.  Dermatologic:    VASC Wound 02/14/20 (Active)   Pre Size Length 0.1 09/04/20 1041   Pre Size Width 0.2 09/04/20 1041   Pre Size Depth 0 09/04/20 1041   Pre Total Sq cm 0.4 08/19/20 1005   Description  scabbed 09/04/20 1041       VASC Wound 05/29/20 right second toe (Active)   Pre Size Length 0.3 06/15/20 1000   Pre Size Width 0.5 06/15/20 1000   Pre Size Depth 0.1 06/15/20 1000   Pre Total Sq cm 0.15 06/15/20 1000   Post Size Length 0.5 06/15/20 1000   Post Size Width 0.6 06/15/20 1000   Post Size Depth 0.5 06/15/20 1000   Post Total Sq cm 0.1 06/15/20 1000   Description scabbed 07/29/20 0900   Prodcut Used Gauze 07/29/20 0900       VASC Wound 07/29/20 righ foot- 5th toe (Active)   Pre Size Length 0.2 09/04/20 1041   Pre Size Width 0.2 09/04/20 1041   Pre Size Depth 0 09/04/20 1041   Pre Total Sq cm 0.04 09/04/20 1041   Description scabbed 09/04/20 1041   Prodcut Used Gauze 07/29/20 0900       VASC Wound 08/19/20 surgical 2nd toe amp site (Active)   Pre Size Length 0.5 09/04/20 1041   Pre Size Width 0.2 09/04/20 1041   Pre Size Depth 0 09/04/20 1041   Pre Total Sq cm 0.1 09/04/20 1041   Post Size Length 0.5 08/19/20 1005   Post Size Width 0.3 08/19/20 1005   Post Size Depth 0.1 08/19/20 1005   Post Total Sq cm 0.15 08/19/20 1005   Description scabbed 09/04/20 1041       Incision 02/20/20 Surgical Leg Left (Active)       Incision 07/07/20 Surgical Foot Right (Active)   Granular base digits 3,5 right. No erythema.   Neurologic: Diminished to light touch Right.  Musculoskeletal: Contracted digits noted Right.    Imaging:     No results found.       Picture: None

## 2021-06-12 NOTE — PATIENT INSTRUCTIONS - HE
Limited walking right foot with CAM boot for stability.      medi-honey on the 3rd  1. Remove medi-honey.  2. Cleanse with normal saline, pat dry.  3. Apply Medihoney alginate into the wound. Try to avoid medihoney on the intact skin.   4. Cover with a gauze dressing and secure with Roll Gauze.  5. Change Every other day.     [facility] Virtual Check-In Number     [phone]     When you arrive for your IN OFFICE visit. Please call this number from the parking lot.       The staff will then virtually check you in and meet you at the door to escort you to a room.    If you arrive early and a room is not yet ready for you staff may have you wait can call you back when they are ready.     Please remember to wear a mask into your appointment     No Visitors Allowed    If you are having any symptoms- cough, fever, rash, loss of taste and smell or shortness of breath we ask that you please call and reschedule your appointment.

## 2021-06-13 NOTE — PROGRESS NOTES
FOOT AND ANKLE SURGERY/PODIATRY Progress Note      ASSESSMENT:   Cellulitis 3rd digit right foot   Ulceration 3rd digit right      TREATMENT:  -Localized erythema 3rd digit right, this is a new development. Wound culture obtained today. I will start him on doxycycline. We discussed difficulty healing the toe ulcer in the presence of a hammertoe deformity but will continue with local wound cares.     -After discussion of risk factors and consent obtained 2% Lidocaine HCL jelly was applied, under clean conditions, the right and foot ulceration(s) were debrided using currette.  Devitalized and nonviable tissue, along with any fibrin and slough, was removed to improve granulation tissue formation, stimulate wound healing, decrease overall bacteria load, disrupt biofilm formation and decrease edge senescence.  Total excisional debridement was 0.15 sq cm into the subcutaneous tissue with a depth of 0.2 cm.   Ulcers were improved afterwards and .  Measures were as noted on the flow sheet. Patient tolerated this well. Medi-honey with a gauze dressing was applied. He will continue to apply Medi-honey with a gauze dressing as directed.    -He will follow-up in one week.    Wai Armstrong DPM  [facility] [place]      HPI: Romaine Farah was seen again today for an ulceration 3rd digit right foot. The patient's wife states she noticed redness in the toe 2-3 days ago and believes the toe is rubbing in his CAM boot.      Past Medical History:   Diagnosis Date     Bradycardia      Chronic pain     toes     COPD (chronic obstructive pulmonary disease) (H)      Erectile dysfunction      Essential tremor      H/O asbestos exposure      Hypertension     no meds currently     Neuropathy     feet     Osteomyelitis (H)     of ankle and foot       Past Surgical History:   Procedure Laterality Date     EP PACEMAKER INSERT N/A 2/12/2019    Procedure: EP Pacemaker Insertion;  Surgeon: Micheal Mahoney MD;   "Location: [facility];  Service: Cardiology     INSERT / REPLACE / REMOVE PACEMAKER  02/12/2019     TOE AMPUTATION Left 2/20/2020    Procedure: AMPUTATION, third digit left foot;  Surgeon: Wai Armstrong DPM;  Location: [facility] Main OR;  Service: Podiatry     TOE AMPUTATION Right 7/7/2020    Procedure: AMPUTATION, second digit right foot;  Surgeon: Wai Armstrong DPM;  Location: [facility] OR;  Service: Podiatry     TOTAL HIP ARTHROPLASTY Right 2/2/2015    Procedure: #3   HIP TOTAL ARTHROPLASTY, RIGHT;  Surgeon: Aryan Glass MD;  Location: [facility] OR;  Service:        Allergies   Allergen Reactions     Hydroxyzine      The Hcl formulation caused side effects; tolerated pamoate     Propranolol      Side effects: cloudy thinking, felt \"off\"         Current Outpatient Medications:      acetaminophen (TYLENOL) 500 MG tablet, Take 1 tablet (500 mg total) by mouth every 6 (six) hours as needed for pain or fever., Disp: , Rfl: 0     acetaminophen-codeine (TYLENOL #3) 300-30 mg per tablet, Take 1 tablet by mouth every 6 (six) hours as needed for pain., Disp: 30 tablet, Rfl: 0     albuterol (PROVENTIL) 2.5 mg /3 mL (0.083 %) nebulizer solution, TAKE 3 ML (2.5 MG TOTAL) BY NEBULIZATION EVERY 6 (SIX) HOURS AS NEEDED FOR WHEEZING., Disp: 75 mL, Rfl: 11     ascorbic acid (ASCORBIC ACID WITH KHADIJAH HIPS) 500 MG tablet, Take 500 mg by mouth daily. , Disp: , Rfl:      b complex vitamins tablet, Take 1 tablet by mouth daily. , Disp: , Rfl:      calcium-vitamin D 500 mg(1,250mg) -200 unit per tablet, Take 2 tablets by mouth daily., Disp: , Rfl:      clobetasol (TEMOVATE) 0.05 % ointment, Apply 1 application topically 2 (two) times a day as needed., Disp: , Rfl:      cyanocobalamin 1000 MCG tablet, Take 1 tablet (1,000 mcg total) by mouth daily., Disp: , Rfl: 0     docosahexaenoic acid-epa 120-180 mg cap, Take 2 capsules by mouth daily., Disp: , Rfl:      DULoxetine (CYMBALTA) 30 MG capsule, Take 60 mg " by mouth daily.    , Disp: , Rfl:      fluocinonide (LIDEX) 0.05 % cream, Apply 1 application topically 2 (two) times a day as needed., Disp: , Rfl:      fluticasone-umeclidinium-vilanterol (TRELEGY ELLIPTA) 100-62.5-25 mcg DsDv inhaler, Inhale 1 Inhalation daily., Disp: 1 each, Rfl: 11     ibuprofen (ADVIL,MOTRIN) 200 MG tablet, Take 800 mg by mouth every 8 (eight) hours as needed for pain.    , Disp: , Rfl:      lidocaine (XYLOCAINE) 2 % jelly, Apply topically as needed., Disp: , Rfl:      lidocaine (XYLOCAINE) 5 % ointment, Apply 1 application topically 3 (three) times a day as needed., Disp: , Rfl:      magnesium 250 mg Tab, Take 1 tablet by mouth daily with lunch. , Disp: , Rfl:      methadone (DOLOPHINE) 10 MG tablet, Take 10 mg by mouth every 6 (six) hours. , Disp: , Rfl:      mupirocin (BACTROBAN) 2 % ointment, Apply topically as needed. , Disp: , Rfl:      OMEGA-3/DHA/EPA/FISH OIL (FISH OIL-OMEGA-3 FATTY ACIDS) 300-1,000 mg capsule, Take 2 g by mouth daily., Disp: , Rfl:      predniSONE (DELTASONE) 20 MG tablet, Take 20 mg by mouth daily. (Patient taking differently: Take 20 mg by mouth daily as needed (Uses prn for symptoms of URI) .), Disp: 7 tablet, Rfl: 3     pregabalin (LYRICA) 200 MG capsule, Take 200 mg by mouth 3 (three) times a day., Disp: , Rfl:      primidone (MYSOLINE) 50 MG tablet, Take 50 mg by mouth., Disp: , Rfl:      topiramate (TOPAMAX) 25 MG tablet, Take 75 mg by mouth 2 (two) times a day.    , Disp: , Rfl:      vitamin B complex-folic acid 0.4 mg Tab, Take 1 tablet by mouth., Disp: , Rfl:      doxycycline (MONODOX) 100 MG capsule, Take 1 capsule (100 mg total) by mouth 2 (two) times a day for 10 days., Disp: 20 capsule, Rfl: 0    Review of Systems - 10 point Review of Systems is negative except for right foot infection which is noted in HPI.      OBJECTIVE:  Vitals:    11/11/20 1400   BP: (!) 122/94   Pulse: 60   Resp: 14   Temp: 97.9  F (36.6  C)     General appearance: Patient is alert  and fully cooperative with history & exam.  No sign of distress is noted during the visit.   Vascular: Dorsalis pedis non-palpableRight.  Dermatologic:    [facility] Wound 02/14/20 (Active)   Pre Size Length 0.6 11/11/20 1400   Pre Size Width 0.6 11/11/20 1400   Pre Size Depth 0.2 11/11/20 1400   Pre Total Sq cm 0.36 11/11/20 1400   Post Size Length 0.1 09/25/20 0900   Post Size Width 0.2 09/25/20 0900   Post Size Depth 0.1 09/25/20 0900   Post Total Sq cm 0.02 09/25/20 0900   Description scabbed 09/04/20 1041       [facility] Wound 05/29/20 right second toe (Active)   Pre Size Length 0.3 06/15/20 1000   Pre Size Width 0.5 06/15/20 1000   Pre Size Depth 0.1 06/15/20 1000   Pre Total Sq cm 0.15 06/15/20 1000   Post Size Length 0.5 06/15/20 1000   Post Size Width 0.6 06/15/20 1000   Post Size Depth 0.5 06/15/20 1000   Post Total Sq cm 0.1 06/15/20 1000   Description healed 11/11/20 1400   Prodcut Used Gauze 07/29/20 0900       VAS Wound 07/29/20 righ foot- 5th toe (Active)   Pre Size Length 0.2 09/04/20 1041   Pre Size Width 0.2 09/04/20 1041   Pre Size Depth 0 09/04/20 1041   Pre Total Sq cm 0.04 09/04/20 1041   Post Size Length 0.2 09/25/20 0900   Post Size Width 0.2 09/25/20 0900   Post Size Depth 0.2 09/25/20 0900   Post Total Sq cm 0.04 09/25/20 0900   Description healed 11/11/20 1400   Prodcut Used Gauze 07/29/20 0900       Incision 02/20/20 Surgical Leg Left (Active)       Incision 07/07/20 Surgical Foot Right (Active)   Fibrotic base 3rd digit right with localized erythema. Wound does not probe to bone.   Neurologic: Diminished to light touch Right.  Musculoskeletal: Contracted digits noted Right.    Imaging:     No results found.       Picture: None

## 2021-06-13 NOTE — PATIENT INSTRUCTIONS - HE
Limited weight-bearing with CAM boot on right foot.    Continue santyl on right 3rd toe per instructions below:    How to Use Collagenase  SANTYL  Ointment    DAILY CLEANSE  Gently cleanse the wound with sterile saline    APPLY  Apply SANTYL  Ointment at the thickness of a nickel or the thickness of the cream inside an Oreo cookie    COVER  Cover the wound with a clean moistened gauze followed by dry gauze if wound bed is dry. Wounds with sufficient exudate will naturally activate the collagenase enzyme and do not need a moistened gauze applied. Do not use dressings that contain silver or iodine with SANTYL  Ointment, as they may stop SANTYL  Ointment       It is not ok to get your wound wet     Call the clinic for any questions regarding your wound or cares and if you experience signs or symptoms of infection including: fevers, chills, increased redness, increased drainage, foul odor, increased pain at [phone].        [facility] Virtual Check-In Number    [phone]    When you arrive for your IN OFFICE visit. Please call this number from the parking lot.      The staff will then virtually check you in and meet you at the door to escort you to a room.    If you arrive early and a room is not yet ready for you staff may have you wait can call you back when they are ready.     Please remember to wear a mask into your appointment     No Visitors Allowed    If you are having any symptoms- cough, fever, rash, loss of taste and smell or shortness of breath we ask that you please call and reschedule your appointment.

## 2021-06-13 NOTE — PATIENT INSTRUCTIONS - HE
"Please remain limited walking and continue to use the CAM boot.         How to care for your wound    Cleanse wound with saline sent to you with your supplies or a wound wash found at the pharmacy.      Pat dry the wound with 4\"x4\" gauze      Apply Santyl into wound.        Cover the wound with [id number] dry gauze.      Wrap the affected area with 4 inch roll gauze.      Secure dressings in place with paper tape.      Change dressing: every day      Do not get your ulcer wet when you shower.  You can cover it with a cast protector. Cast protectors are available for purchase at [facility]. You may also purchase a cast protector at your local pharmacy.      Good nutrition is important for wound healing.  I recommend increasing your protein.  You can do this through your diet, nutritional supplements, and/or protein powder.    It is ok to continue current wound care treatment/products for the next 2-3 days until new wound care supplies are ordered and arrive. If longer than this please contact our office.    Please call the [facility] before substituting any product    Call our clinic nurse at [phone] if there is an increase in drainage, pain, redness, or the wound size increases.  This maybe a sign of infection and require attention prior to the next appointment      Dressing change daily with Santyl    How to Use Collagenase  SANTYL  Ointment    DAILY CLEANSE  Gently cleanse the wound with sterile saline    APPLY  Apply SANTYL  Ointment at the thickness of a nickel or the thickness of the cream inside an Oreo cookie    COVER  Cover the wound with a clean moistened gauze followed by dry gauze if wound bed is dry. Wounds with sufficient exudate will naturally activate the collagenase enzyme and do not need a moistened gauze applied. Moisture helps release the active ingredient in SANTYL  to make the ointment most effective. Without the right amount of moisture, " SANTYL  Ointment may not work properly. May apply adaptic touch over Santyl ointment to hold into place. Secure with roll gauze. Do not use dressings that contain silver or iodine with SANTYL  Ointment, as they may stop SANTYL  Ointment

## 2021-06-13 NOTE — PATIENT INSTRUCTIONS - HE
Thank you for choosing [facility] as partners in your care.  Please read the following information about the procedure you had today.    Procedure:  Culture Swab    What is it?  A culture swab is the removal of a small sample of tissue from a wound to be examined in the lab.  This is done by a trained healthcare provider using a cotton swab (Q-tip).    Why we do it.   A culture swab is done to check for bacteria or fungus that may cause an infection.  The results of the culture determine the correct medication to treat the infection.    Post-Procedure Instructions:  Follow wound dressing instructions.  Take medications as directed.    About your results:  The results of your culture swab will be available in 3-5 business days and are able to be viewed on  [facility] My Chart . These results need to be reviewed by your provider.   All growth on cultures is not necessarily important and may not need to be treated.        Wound Care Instructions    every 2 days Cleanse right foot 3rd toe wound with Normal Saline or Wound Cleanser    Pat dry with non-sterile gauze    Apply Lotion to the intact skin surrounding your wound and other dry skin locations. Some good lotions include: Remedy Skin Repair Cream or Cetaphil    Apply medihoney into/onto the wounds    Cover with gauze    Secure with roll gauze as needed      It is ok to get your wound wet in the bath or shower    It is ok to continue current wound care treatment/products for the next 2-3 days until new wound care supplies are ordered and arrive. If longer than this please contact our office at [phone].    SEEK MEDICAL CARE IF:    You have an increase in swelling, pain, or redness around the wound.    You have an increase in the amount of pus coming from the wound.    There is a bad smell coming from the wound.    The wound appears to be worsening/enlarging    You have a fever greater than 101.5 F      If you have any questions, please contact  [facility] at [phone].  Toenail and Foot Care Providers     Affordable Foot Care  A nurse will come to your home for nail care. Initial visit $35. Maintenance $32.   Lexis Chanel RN & Foot Specialist   [phone]    [facility] Clinics, [facility]  [phone]    [facility]  [place]  [phone]    [facility]  [facility] and [place] [phone]    [facility]  [phone]    [facility]  [place] - Dr. Pratt on Tuesdays  [phone]    [facility]  [facility] They have several locations and a team of RN's that offer diabetic foot care. They do not bill insurance and the average cost is $37. Locations include:  [phone]    [facility]  They have several locations and a team of RN's that offer diabetic foot care. They do not bill insurance and the average cost is $37.   Locations include: [place], [place], [facility]  [phone]

## 2021-06-13 NOTE — PROGRESS NOTES
FOOT AND ANKLE SURGERY/PODIATRY Progress Note      ASSESSMENT:   Ulceration 3rd digit right  Hammertoe      TREATMENT:  -The ulceration on the 3rd digit right foot is measuring smaller today. I recommend he continue to use santyl as directed with limited walking in the CAM boot.     -After discussion of risk factors and consent obtained 2% Lidocaine HCL jelly was applied, under clean conditions, the right and foot ulceration(s) were debrided using #15 blade scalpel.  Devitalized and nonviable tissue, along with any fibrin and slough, was removed to improve granulation tissue formation, stimulate wound healing, decrease overall bacteria load, disrupt biofilm formation and decrease edge senescence.  Total excisional debridement was 0.04 sq cm into the subcutaneous tissue with a depth of 0.2 cm.   Ulcers were improved afterwards and .  Measures were as noted on the flow sheet. Patient tolerated this well. Gauze dressing applied today.    -He will follow-up in 3 weeks.    Wai Armstrong DPM  [facility]      HPI: Romaine Farah was seen again today for a sore on the 3rd digit right foot. He has used santyl as directed and limited walking in the CAM boot.       Past Medical History:   Diagnosis Date     Bradycardia      Chronic pain     toes     COPD (chronic obstructive pulmonary disease) (H)      Erectile dysfunction      Essential tremor      H/O asbestos exposure      Hypertension     no meds currently     Neuropathy     feet     Osteomyelitis (H)     of ankle and foot       Past Surgical History:   Procedure Laterality Date     EP PACEMAKER INSERT N/A 2/12/2019    Procedure: EP Pacemaker Insertion;  Surgeon: Micheal Mahoney MD;  Location: [facility];  Service: Cardiology     INSERT / REPLACE / REMOVE PACEMAKER  02/12/2019     TOE AMPUTATION Left 2/20/2020    Procedure: AMPUTATION, third digit left foot;  Surgeon: Wai Armstrong DPM;  Location: [facility] OR;   "Service: Podiatry     TOE AMPUTATION Right 7/7/2020    Procedure: AMPUTATION, second digit right foot;  Surgeon: Wai Armstrong DPM;  Location: [facility] Main OR;  Service: Podiatry     TOTAL HIP ARTHROPLASTY Right 2/2/2015    Procedure: #3   HIP TOTAL ARTHROPLASTY, RIGHT;  Surgeon: Aryan Glass MD;  Location: [facility] Main OR;  Service:        Allergies   Allergen Reactions     Hydroxyzine      The Hcl formulation caused side effects; tolerated pamoate     Propranolol      Side effects: cloudy thinking, felt \"off\"         Current Outpatient Medications:      acetaminophen (TYLENOL) 500 MG tablet, Take 1 tablet (500 mg total) by mouth every 6 (six) hours as needed for pain or fever., Disp: , Rfl: 0     acetaminophen-codeine (TYLENOL #3) 300-30 mg per tablet, Take 1 tablet by mouth every 6 (six) hours as needed for pain., Disp: 30 tablet, Rfl: 0     albuterol (PROVENTIL) 2.5 mg /3 mL (0.083 %) nebulizer solution, TAKE 3 ML (2.5 MG TOTAL) BY NEBULIZATION EVERY 6 (SIX) HOURS AS NEEDED FOR WHEEZING., Disp: 75 mL, Rfl: 11     ascorbic acid (ASCORBIC ACID WITH KHADIJAH HIPS) 500 MG tablet, Take 500 mg by mouth daily. , Disp: , Rfl:      b complex vitamins tablet, Take 1 tablet by mouth daily. , Disp: , Rfl:      calcium-vitamin D 500 mg(1,250mg) -200 unit per tablet, Take 2 tablets by mouth daily., Disp: , Rfl:      clobetasol (TEMOVATE) 0.05 % ointment, Apply 1 application topically 2 (two) times a day as needed., Disp: , Rfl:      collagenase (SANTYL) ointment, Apply qd, Disp: 15 g, Rfl: 2     cyanocobalamin 1000 MCG tablet, Take 1 tablet (1,000 mcg total) by mouth daily., Disp: , Rfl: 0     docosahexaenoic acid-epa 120-180 mg cap, Take 2 capsules by mouth daily., Disp: , Rfl:      DULoxetine (CYMBALTA) 30 MG capsule, Take 60 mg by mouth daily.    , Disp: , Rfl:      fluocinonide (LIDEX) 0.05 % cream, Apply 1 application topically 2 (two) times a day as needed., Disp: , Rfl:      fluticasone-umeclidinium-vilanterol " (TRELEGY ELLIPTA) 100-62.5-25 mcg DsDv inhaler, Inhale 1 Inhalation daily., Disp: 1 each, Rfl: 11     ibuprofen (ADVIL,MOTRIN) 200 MG tablet, Take 800 mg by mouth every 8 (eight) hours as needed for pain.    , Disp: , Rfl:      lidocaine (XYLOCAINE) 2 % jelly, Apply topically as needed., Disp: , Rfl:      lidocaine (XYLOCAINE) 5 % ointment, Apply 1 application topically 3 (three) times a day as needed., Disp: , Rfl:      magnesium 250 mg Tab, Take 1 tablet by mouth daily with lunch. , Disp: , Rfl:      methadone (DOLOPHINE) 10 MG tablet, Take 10 mg by mouth every 6 (six) hours. , Disp: , Rfl:      mupirocin (BACTROBAN) 2 % ointment, Apply topically as needed. , Disp: , Rfl:      OMEGA-3/DHA/EPA/FISH OIL (FISH OIL-OMEGA-3 FATTY ACIDS) 300-1,000 mg capsule, Take 2 g by mouth daily., Disp: , Rfl:      predniSONE (DELTASONE) 20 MG tablet, Take 20 mg by mouth daily. (Patient taking differently: Take 20 mg by mouth daily as needed (Uses prn for symptoms of URI) .), Disp: 7 tablet, Rfl: 3     pregabalin (LYRICA) 200 MG capsule, Take 200 mg by mouth 3 (three) times a day., Disp: , Rfl:      primidone (MYSOLINE) 50 MG tablet, Take 50 mg by mouth., Disp: , Rfl:      topiramate (TOPAMAX) 25 MG tablet, Take 75 mg by mouth 2 (two) times a day.    , Disp: , Rfl:      vitamin B complex-folic acid 0.4 mg Tab, Take 1 tablet by mouth., Disp: , Rfl:     Current Facility-Administered Medications:      lidocaine 2 % jelly (XYLOCAINE), , Topical, PRN, Wai Armstrong DPM, Given at 11/18/20 1431    Review of Systems - 10 point Review of Systems is negative except for right foot ulcer which is noted in HPI.      OBJECTIVE:  Vitals:    12/09/20 1404   BP: 120/78   Pulse: 68   Resp: 16   Temp: 98.2  F (36.8  C)     General appearance: Patient is alert and fully cooperative with history & exam.  No sign of distress is noted during the visit.   Vascular: Dorsalis pedis non-palpableRight.  Dermatologic:    VASC Wound 02/14/20 (Active)   Pre  Size Length 0.6 11/11/20 1400   Pre Size Width 0.6 11/11/20 1400   Pre Size Depth 0.2 11/11/20 1400   Pre Total Sq cm 0.36 11/11/20 1400   Post Size Length 0.2 12/09/20 1407   Post Size Width 0.2 12/09/20 1407   Post Size Depth 0.2 12/09/20 1407   Post Total Sq cm 0.04 12/09/20 1407   Description scabbed 11/18/20 1400       Incision 02/20/20 Surgical Leg Left (Active)       Incision 07/07/20 Surgical Foot Right (Active)   Fibrotic/granular base ulceration 3rd digit right foot. No erythema right foot.   Neurologic: Diminished to light touch Right.  Musculoskeletal: Contracted digits noted Right.    Imaging:   No results found.       Picture: None

## 2021-06-14 NOTE — PATIENT INSTRUCTIONS - HE
Wound Care Instructions    every 2 days Cleanse your  wound(s) with Normal Saline or Wound Cleanser    Pat dry with non-sterile gauze    Apply Lotion to the intact skin surrounding your wound and other dry skin locations. Some good lotions include: Remedy Skin Repair Cream or Cetaphil    Apply Medihoney into/onto the wounds    Cover with gauze    Secure with roll gauze as needed      Limited walking in CAM boot          SEEK MEDICAL CARE IF:    You have an increase in swelling, pain, or redness around the wound.    You have an increase in the amount of pus coming from the wound.    There is a bad smell coming from the wound.    The wound appears to be worsening/enlarging    You have a fever greater than 101.5 F

## 2021-06-14 NOTE — PATIENT INSTRUCTIONS - HE
No dressing needed today.     Remain in CAM boot for 1 month. Okay to return to regular shoes after 1 month.

## 2021-06-14 NOTE — PROGRESS NOTES
FOOT AND ANKLE SURGERY/PODIATRY Progress Note      ASSESSMENT:   Ulceration 3rd digit right  Hammertoe      TREATMENT:  -The ulceration on the 3rd digit right foot has improved with granular tissue, and no fibrotic tissue. I recommend he discontinue santyl and begin medihoney as directed with limited walking in the CAM boot.     -After discussion of risk factors and consent obtained 2% Lidocaine HCL jelly was applied, under clean conditions, the right and foot ulceration(s) were debrided using #15 blade scalpel.  Devitalized and nonviable tissue, along with any fibrin and slough, was removed to improve granulation tissue formation, stimulate wound healing, decrease overall bacteria load, disrupt biofilm formation and decrease edge senescence.  Total excisional debridement was 0.04 sq cm into the subcutaneous tissue with a depth of 0.2 cm.   Ulcers were improved afterwards and .  Measures were as noted on the flow sheet. Patient tolerated this well. Gauze dressing applied today.    -He will follow-up in 3 weeks.    Wai Armstrong DPM  [facility] [place]      HPI: Romaine DUNCAN Gagan was seen again today for a sore on the 3rd digit right foot. He has used santyl as directed and limited walking in the CAM boot.       Past Medical History:   Diagnosis Date     Bradycardia      Chronic pain     toes     COPD (chronic obstructive pulmonary disease) (H)      Erectile dysfunction      Essential tremor      H/O asbestos exposure      Hypertension     no meds currently     Neuropathy     feet     Osteomyelitis (H)     of ankle and foot       Past Surgical History:   Procedure Laterality Date     EP PACEMAKER INSERT N/A 2/12/2019    Procedure: EP Pacemaker Insertion;  Surgeon: Micheal Mahoney MD;  Location: [facility];  Service: Cardiology     INSERT / REPLACE / REMOVE PACEMAKER  02/12/2019     TOE AMPUTATION Left 2/20/2020    Procedure: AMPUTATION, third digit left foot;  Surgeon: Nathaniel  "Wai ASHLEY DPM;  Location: [facility] OR;  Service: Podiatry     TOE AMPUTATION Right 7/7/2020    Procedure: AMPUTATION, second digit right foot;  Surgeon: Wai Armstrong DPM;  Location: [facility] OR;  Service: Podiatry     TOTAL HIP ARTHROPLASTY Right 2/2/2015    Procedure: #3   HIP TOTAL ARTHROPLASTY, RIGHT;  Surgeon: Aryan Glass MD;  Location: [facility] OR;  Service:        Allergies   Allergen Reactions     Hydroxyzine      The Hcl formulation caused side effects; tolerated pamoate     Propranolol      Side effects: cloudy thinking, felt \"off\"         Current Outpatient Medications:      acetaminophen (TYLENOL) 500 MG tablet, Take 1 tablet (500 mg total) by mouth every 6 (six) hours as needed for pain or fever., Disp: , Rfl: 0     acetaminophen-codeine (TYLENOL #3) 300-30 mg per tablet, Take 1 tablet by mouth every 6 (six) hours as needed for pain., Disp: 30 tablet, Rfl: 0     albuterol (PROVENTIL) 2.5 mg /3 mL (0.083 %) nebulizer solution, TAKE 3 ML (2.5 MG TOTAL) BY NEBULIZATION EVERY 6 (SIX) HOURS AS NEEDED FOR WHEEZING., Disp: 75 mL, Rfl: 11     ascorbic acid (ASCORBIC ACID WITH KHADIJAH HIPS) 500 MG tablet, Take 500 mg by mouth daily. , Disp: , Rfl:      b complex vitamins tablet, Take 1 tablet by mouth daily. , Disp: , Rfl:      calcium-vitamin D 500 mg(1,250mg) -200 unit per tablet, Take 2 tablets by mouth daily., Disp: , Rfl:      clobetasol (TEMOVATE) 0.05 % ointment, Apply 1 application topically 2 (two) times a day as needed., Disp: , Rfl:      collagenase (SANTYL) ointment, Apply qd, Disp: 15 g, Rfl: 2     cyanocobalamin 1000 MCG tablet, Take 1 tablet (1,000 mcg total) by mouth daily., Disp: , Rfl: 0     docosahexaenoic acid-epa 120-180 mg cap, Take 2 capsules by mouth daily., Disp: , Rfl:      DULoxetine (CYMBALTA) 30 MG capsule, Take 60 mg by mouth daily.    , Disp: , Rfl:      fluocinonide (LIDEX) 0.05 % cream, Apply 1 application topically 2 (two) times a day as needed., Disp: , " Rfl:      fluticasone-umeclidinium-vilanterol (TRELEGY ELLIPTA) 100-62.5-25 mcg DsDv inhaler, Inhale 1 Inhalation daily., Disp: 1 each, Rfl: 11     ibuprofen (ADVIL,MOTRIN) 200 MG tablet, Take 800 mg by mouth every 8 (eight) hours as needed for pain.    , Disp: , Rfl:      lidocaine (XYLOCAINE) 2 % jelly, Apply topically as needed., Disp: , Rfl:      lidocaine (XYLOCAINE) 5 % ointment, Apply 1 application topically 3 (three) times a day as needed., Disp: , Rfl:      magnesium 250 mg Tab, Take 1 tablet by mouth daily with lunch. , Disp: , Rfl:      methadone (DOLOPHINE) 10 MG tablet, Take 10 mg by mouth every 6 (six) hours. , Disp: , Rfl:      mupirocin (BACTROBAN) 2 % ointment, Apply topically as needed. , Disp: , Rfl:      OMEGA-3/DHA/EPA/FISH OIL (FISH OIL-OMEGA-3 FATTY ACIDS) 300-1,000 mg capsule, Take 2 g by mouth daily., Disp: , Rfl:      predniSONE (DELTASONE) 20 MG tablet, Take 20 mg by mouth daily. (Patient taking differently: Take 20 mg by mouth daily as needed (Uses prn for symptoms of URI) .), Disp: 7 tablet, Rfl: 3     pregabalin (LYRICA) 200 MG capsule, Take 200 mg by mouth 3 (three) times a day., Disp: , Rfl:      primidone (MYSOLINE) 50 MG tablet, Take 50 mg by mouth., Disp: , Rfl:      topiramate (TOPAMAX) 25 MG tablet, Take 75 mg by mouth 2 (two) times a day.    , Disp: , Rfl:      vitamin B complex-folic acid 0.4 mg Tab, Take 1 tablet by mouth., Disp: , Rfl:     Current Facility-Administered Medications:      lidocaine 2 % jelly (XYLOCAINE), , Topical, PRN, Wai Armstrong, DPM, Given at 11/18/20 1431    Review of Systems - 10 point Review of Systems is negative except for right foot ulcer which is noted in HPI.      OBJECTIVE:  Vitals:    12/30/20 1453   BP: 128/78   Pulse: 68   Resp: 16   Temp: 97.9  F (36.6  C)     General appearance: Patient is alert and fully cooperative with history & exam.  No sign of distress is noted during the visit.   Vascular: Dorsalis pedis  non-palpableRight.  Dermatologic:    VASC Wound 02/14/20 (Active)   Pre Size Length 0.6 11/11/20 1400   Pre Size Width 0.6 11/11/20 1400   Pre Size Depth 0.2 11/11/20 1400   Pre Total Sq cm 0.36 11/11/20 1400   Post Size Length 0.2 12/09/20 1407   Post Size Width 0.2 12/09/20 1407   Post Size Depth 0.2 12/09/20 1407   Post Total Sq cm 0.04 12/09/20 1407   Description scabbed 11/18/20 1400       Incision 02/20/20 Surgical Leg Left (Active)       Incision 07/07/20 Surgical Foot Right (Active)   Granular base ulceration 3rd digit right foot. No erythema right foot.   Neurologic: Diminished to light touch Right.  Musculoskeletal: Contracted digits noted Right.    Imaging:   No results found.       Picture: None

## 2021-06-14 NOTE — PROGRESS NOTES
FOOT AND ANKLE SURGERY/PODIATRY Progress Note      ASSESSMENT:   Ulceration 3rd digit right  Hammertoe  Onychomycosis      TREATMENT:  -The ulceration on the 3rd digit right foot has resolved. I recommend he continue with the CAM boot x1 month and closely monitor for any skin irritation or skin breakdown.     -Debrided nails greater than 6 in length and thickness.    -He is discharged from my care at this time and will follow-up as concerns develop.    Wai Armstrong DPM  [facility]      HPI: Romaine Farah was seen again today for an ulceration on the 3rd digit right foot. Doing well today. He requests a nail trim and has been using medihoney as directed.      Past Medical History:   Diagnosis Date     Bradycardia      Chronic pain     toes     COPD (chronic obstructive pulmonary disease) (H)      Erectile dysfunction      Essential tremor      H/O asbestos exposure      Hypertension     no meds currently     Neuropathy     feet     Osteomyelitis (H)     of ankle and foot       Past Surgical History:   Procedure Laterality Date     EP PACEMAKER INSERT N/A 2/12/2019    Procedure: EP Pacemaker Insertion;  Surgeon: Micheal Mahoney MD;  Location: [facility] Cath Lab;  Service: Cardiology     INSERT / REPLACE / REMOVE PACEMAKER  02/12/2019     TOE AMPUTATION Left 2/20/2020    Procedure: AMPUTATION, third digit left foot;  Surgeon: Wai Armstrong DPM;  Location: [facility];  Service: Podiatry     TOE AMPUTATION Right 7/7/2020    Procedure: AMPUTATION, second digit right foot;  Surgeon: Wai Armstrong DPM;  Location: [facility] OR;  Service: Podiatry     TOTAL HIP ARTHROPLASTY Right 2/2/2015    Procedure: #3   HIP TOTAL ARTHROPLASTY, RIGHT;  Surgeon: Aryan Glass MD;  Location: [facility] OR;  Service:        Allergies   Allergen Reactions     Hydroxyzine      The Hcl formulation caused side effects; tolerated pamoate     Propranolol      Side effects: cloudy  "thinking, felt \"off\"         Current Outpatient Medications:      acetaminophen (TYLENOL) 500 MG tablet, Take 1 tablet (500 mg total) by mouth every 6 (six) hours as needed for pain or fever., Disp: , Rfl: 0     acetaminophen-codeine (TYLENOL #3) 300-30 mg per tablet, Take 1 tablet by mouth every 6 (six) hours as needed for pain., Disp: 30 tablet, Rfl: 0     albuterol (PROVENTIL) 2.5 mg /3 mL (0.083 %) nebulizer solution, TAKE 3 ML (2.5 MG TOTAL) BY NEBULIZATION EVERY 6 (SIX) HOURS AS NEEDED FOR WHEEZING., Disp: 75 mL, Rfl: 11     ascorbic acid (ASCORBIC ACID WITH KHADIJAH HIPS) 500 MG tablet, Take 500 mg by mouth daily. , Disp: , Rfl:      b complex vitamins tablet, Take 1 tablet by mouth daily. , Disp: , Rfl:      calcium-vitamin D 500 mg(1,250mg) -200 unit per tablet, Take 2 tablets by mouth daily., Disp: , Rfl:      clobetasol (TEMOVATE) 0.05 % ointment, Apply 1 application topically 2 (two) times a day as needed., Disp: , Rfl:      collagenase (SANTYL) ointment, Apply qd, Disp: 15 g, Rfl: 2     cyanocobalamin 1000 MCG tablet, Take 1 tablet (1,000 mcg total) by mouth daily., Disp: , Rfl: 0     docosahexaenoic acid-epa 120-180 mg cap, Take 2 capsules by mouth daily., Disp: , Rfl:      DULoxetine (CYMBALTA) 30 MG capsule, Take 60 mg by mouth daily.    , Disp: , Rfl:      fluocinonide (LIDEX) 0.05 % cream, Apply 1 application topically 2 (two) times a day as needed., Disp: , Rfl:      fluticasone-umeclidinium-vilanterol (TRELEGY ELLIPTA) 100-62.5-25 mcg DsDv inhaler, Inhale 1 Inhalation daily., Disp: 1 each, Rfl: 11     ibuprofen (ADVIL,MOTRIN) 200 MG tablet, Take 800 mg by mouth every 8 (eight) hours as needed for pain.    , Disp: , Rfl:      lidocaine (XYLOCAINE) 2 % jelly, Apply topically as needed., Disp: , Rfl:      lidocaine (XYLOCAINE) 5 % ointment, Apply 1 application topically 3 (three) times a day as needed., Disp: , Rfl:      magnesium 250 mg Tab, Take 1 tablet by mouth daily with lunch. , Disp: , Rfl:      " methadone (DOLOPHINE) 10 MG tablet, Take 10 mg by mouth every 6 (six) hours. , Disp: , Rfl:      mupirocin (BACTROBAN) 2 % ointment, Apply topically as needed. , Disp: , Rfl:      OMEGA-3/DHA/EPA/FISH OIL (FISH OIL-OMEGA-3 FATTY ACIDS) 300-1,000 mg capsule, Take 2 g by mouth daily., Disp: , Rfl:      predniSONE (DELTASONE) 20 MG tablet, Take 20 mg by mouth daily. (Patient taking differently: Take 20 mg by mouth daily as needed (Uses prn for symptoms of URI) .), Disp: 7 tablet, Rfl: 3     pregabalin (LYRICA) 200 MG capsule, Take 200 mg by mouth 3 (three) times a day., Disp: , Rfl:      primidone (MYSOLINE) 50 MG tablet, Take 50 mg by mouth., Disp: , Rfl:      topiramate (TOPAMAX) 25 MG tablet, Take 75 mg by mouth 2 (two) times a day.    , Disp: , Rfl:      vitamin B complex-folic acid 0.4 mg Tab, Take 1 tablet by mouth., Disp: , Rfl:     Current Facility-Administered Medications:      lidocaine 2 % jelly (XYLOCAINE), , Topical, PRN, Wai Armstrong, MELISSA, Given at 11/18/20 1431    Review of Systems - 10 point Review of Systems is negative except for right foot ulcer which is noted in HPI.      OBJECTIVE:  Vitals:    01/20/21 1510   BP: 120/68   Pulse: 62   Temp: 97.8  F (36.6  C)     General appearance: Patient is alert and fully cooperative with history & exam.  No sign of distress is noted during the visit.   Vascular: Dorsalis pedis and PT non-palpable bilateral.   Dermatologic:    VASC Wound 02/14/20 (Active)   Pre Size Length 0.6 11/11/20 1400   Pre Size Width 0.6 11/11/20 1400   Pre Size Depth 0.2 11/11/20 1400   Pre Total Sq cm 0.36 11/11/20 1400   Post Size Length 0 01/20/21 1500   Post Size Width 0 01/20/21 1500   Post Size Depth 0 01/20/21 1500   Post Total Sq cm 0 01/20/21 1500   Description scab 01/20/21 1500       Incision 02/20/20 Surgical Leg Left (Active)       Incision 07/07/20 Surgical Foot Right (Active)   No open lesions 3rd digit right foot. Nails greater than 6 elongated, thick, yellow with  subungal debris. Trophic changes noted including diminished hair growth and shiny skin.  Neurologic: Diminished to light touch Bilateral.  Musculoskeletal: Contracted digits noted Bilateral.    Imaging:     No results found.       Picture: None

## 2021-06-15 NOTE — PROGRESS NOTES
[facility] Visit    Problems Addressed Today  Problem List Items Addressed This Visit     Chronic obstructive pulmonary disease (H)      Other Visit Diagnoses     Dyspnea on exertion    -  Primary        Assessment:74M with moderate COPD and frequent exacerbations and dyspnea.    Plans and recommendations:  Continue LAMA/LABA  Continue Action Plan  Discussion of graded exercise program in detail      Chief Complaint:shortness of breath    HPI:  He has been very limited in his activity over the last 9 months because of foot surgeries.  In order to allow healing he has been essentially nonweightbearing for significant time.  He has lost a significant portion of his fitness.    His trilogy has improved his condition significantly and he is no longer having chronic bronchitis symptoms.  Current Outpatient Medications on File Prior to Visit   Medication Sig Dispense Refill     acetaminophen (TYLENOL) 500 MG tablet Take 1 tablet (500 mg total) by mouth every 6 (six) hours as needed for pain or fever.  0     albuterol (PROVENTIL) 2.5 mg /3 mL (0.083 %) nebulizer solution TAKE 3 ML (2.5 MG TOTAL) BY NEBULIZATION EVERY 6 (SIX) HOURS AS NEEDED FOR WHEEZING. 75 mL 11     ascorbic acid (ASCORBIC ACID WITH KHADIJAH HIPS) 500 MG tablet Take 500 mg by mouth daily.        b complex vitamins tablet Take 1 tablet by mouth daily.        calcium-vitamin D 500 mg(1,250mg) -200 unit per tablet Take 2 tablets by mouth daily.       clobetasol (TEMOVATE) 0.05 % ointment Apply 1 application topically 2 (two) times a day as needed.       collagenase (SANTYL) ointment Apply qd 15 g 2     cyanocobalamin 1000 MCG tablet Take 1 tablet (1,000 mcg total) by mouth daily.  0     docosahexaenoic acid-epa 120-180 mg cap Take 2 capsules by mouth daily.       DULoxetine (CYMBALTA) 30 MG capsule Take 60 mg by mouth daily.              fluocinonide (LIDEX) 0.05 % cream Apply 1 application topically 2 (two) times a day as needed.        fluticasone-umeclidinium-vilanterol (TRELEGY ELLIPTA) 100-62.5-25 mcg DsDv inhaler Inhale 1 Inhalation daily. 1 each 11     ibuprofen (ADVIL,MOTRIN) 200 MG tablet Take 800 mg by mouth every 8 (eight) hours as needed for pain.              lidocaine (XYLOCAINE) 2 % jelly Apply topically as needed.       lidocaine (XYLOCAINE) 5 % ointment Apply 1 application topically 3 (three) times a day as needed.       magnesium 250 mg Tab Take 1 tablet by mouth daily with lunch.        methadone (DOLOPHINE) 10 MG tablet Take 10 mg by mouth every 6 (six) hours.        mupirocin (BACTROBAN) 2 % ointment Apply topically as needed.        OMEGA-3/DHA/EPA/FISH OIL (FISH OIL-OMEGA-3 FATTY ACIDS) 300-1,000 mg capsule Take 2 g by mouth daily.       predniSONE (DELTASONE) 20 MG tablet Take 20 mg by mouth daily. (Patient taking differently: Take 20 mg by mouth daily as needed (Uses prn for symptoms of URI) .) 7 tablet 3     pregabalin (LYRICA) 200 MG capsule Take 200 mg by mouth 3 (three) times a day.       primidone (MYSOLINE) 50 MG tablet Take 50 mg by mouth.       topiramate (TOPAMAX) 25 MG tablet Take 75 mg by mouth 2 (two) times a day.              vitamin B complex-folic acid 0.4 mg Tab Take 1 tablet by mouth.       [DISCONTINUED] acetaminophen-codeine (TYLENOL #3) 300-30 mg per tablet Take 1 tablet by mouth every 6 (six) hours as needed for pain. 30 tablet 0     Current Facility-Administered Medications on File Prior to Visit   Medication Dose Route Frequency Provider Last Rate Last Admin     lidocaine 2 % jelly (XYLOCAINE)   Topical PRN Wai Armstrong DPM   Given at 11/18/20 0771       Review of Systems  Medical History  Active Ambulatory (Non-Hospital) Problems    Diagnosis     Absence of toe (H)     Hearing loss     History of fall     History of pneumonia     Long term (current) use of non-steroidal anti-inflammatories (nsaid)     Long term current use of inhaled steroid     Long term current use of systemic steroids      Occupational exposure to other air contaminants     Opioid dependence (H)     Vitamin B deficiency     Cellulitis and abscess of foot     Altered mental status     Chronic obstructive pulmonary disease (H)     ED (erectile dysfunction) of organic origin     Memory deficit     Hammer toe of right foot     Osteomyelitis of ankle and foot (H)     Skin ulcer of right foot, limited to breakdown of skin (H)     Sick sinus syndrome (H)     Healthcare-associated pneumonia     Other chronic pain     Traumatic rhabdomyolysis (H)     Orthostatic syncope     Syncope, unspecified syncope type     Idiopathic hypotension     Exertional dyspnea     Cellulitis     Cardiac pacemaker in situ     Cardiac arrhythmia     Symptomatic sinus bradycardia     SSS (sick sinus syndrome) (H)     Bradycardia     Status post right hip replacement     Chronic constipation     H/O asbestos exposure     Tremor, essential     Primary hypertension     Neuropathy     Controlled substance agreement signed     Osteoarthritis     Osteoarthritis Of The Hip     Polyarthritis Of The Hand     Past Medical History:   Diagnosis Date     Bradycardia      Chronic pain      COPD (chronic obstructive pulmonary disease) (H)      Erectile dysfunction      Essential tremor      H/O asbestos exposure      Hypertension      Neuropathy      Osteomyelitis (H)         Surgical History  He  has a past surgical history that includes Total hip arthroplasty (Right, 2/2/2015); Insert / replace / remove pacemaker (02/12/2019); EP Pacemaker Insertion (N/A, 2/12/2019); Toe amputation (Left, 2/20/2020); and Toe amputation (Right, 7/7/2020).       Social History  Reviewed, and he  reports that he quit smoking about 34 years ago. He has quit using smokeless tobacco. He reports previous alcohol use. He reports that he does not use drugs.    Here with his family   Allergies  Allergies   Allergen Reactions     Hydroxyzine      The Hcl formulation caused side effects; tolerated pamoate  "    Propranolol      Side effects: cloudy thinking, felt \"off\"    Family History  Reviewed, and family history includes COPD in his mother; Diabetes in his mother; Esophageal cancer in his father.          /68   Pulse 65   Wt (!) 241 lb 11.2 oz (109.6 kg)   SpO2 92% Comment: RA  BMI 33.71 kg/m    General: calm, normal body habitus  Eyes: no scleral injection or icterus, pupils equal and round  Nose: patent nares  Neck: supple, no lymph nodes  Chest: nontender to palpation, no deformity  Lungs: normal lung exam  CV: palpable radial pulses, RRR, no m/r/g, normal PMI  Ext: no clubbing, no ulnar deviation of digits  Neuro: alert and interactive, no tremor or abnormal movements  Pscyh: normal affect appropriate to clinical scenario, no hallucination      Data Review - imaging, labs, and ekgs listed below were reviewed by me.  Chest XRay and chest CT images and EKG tracings interpreted personally.     Past Labs  No visits with results within 2 Month(s) from this visit.   Latest known visit with results is:   Office Visit on 11/11/2020   Component Date Value     Culture 11/11/2020 Usual skin estrella      Gram Stain Result 11/11/2020 No polymorphonuclear leukocytes seen      Gram Stain Result 11/11/2020 No organisms seen      Vitamin D, Total (25-Hyd* 11/11/2020 28.5*     Vitamin C, Plasma 11/11/2020 17*       * Cannot find OR log *    Past Imaging  CT chest reviewed interpreted by me.  No significant changes.  Ct Chest Without Contrast    Result Date: 2/10/2021  EXAM: CT CHEST WO CONTRAST LOCATION: [facility] DATE/TIME: 2/10/2021 11:58 AM INDICATION: Follow-up pulmonary nodules. COMPARISON: 2/19/2020 and 10/17/2019 TECHNIQUE: CT chest without IV contrast. Multiplanar reformats were obtained. Dose reduction techniques were used. CONTRAST: None. FINDINGS: LUNGS AND PLEURA: Stable smooth chronic pleural thickening with mild atelectasis in the posterior lower lobes as well as the right middle " lobe and lingula all stable. Stable faint 3 mm nodule left upper lobe image 27. The nodule lower in the left upper lobe seen previously is no longer present. The should be considered benign. No significant findings to recommend follow-up. MEDIASTINUM/AXILLAE: No lymphadenopathy. Some minimal mucous in the trachea. UPPER ABDOMEN: No significant finding. MUSCULOSKELETAL: Unremarkable.     1.  Stable small chronic pleural thickening with some mild atelectasis in the lower lungs. 2.  Stable tiny nodule in the left upper lobe with the second tiny nodule not clearly seen today. The should be considered benign. 3.  No additional follow-up recommended.

## 2021-06-16 DIAGNOSIS — G62.9 PERIPHERAL POLYNEUROPATHY: Primary | ICD-10-CM

## 2021-06-16 NOTE — TELEPHONE ENCOUNTER
Pt called for a refill of Duloxitine. No longer sees Dr Corey. Current dose is 30mg 1 bid. Has been out for 2 days. If it could be done tomorrow when Dr PARADA is in the office.

## 2021-06-17 NOTE — TELEPHONE ENCOUNTER
Telephone Encounter by Barbara Russo RN at 5/26/2020  5:21 PM     Author: Barbara Russo RN Service: -- Author Type: Registered Nurse    Filed: 5/26/2020  5:27 PM Encounter Date: 5/26/2020 Status: Addendum    : Barbara Russo RN (Registered Nurse)    Related Notes: Original Note by Barbara Russo RN (Registered Nurse) filed at 5/26/2020  5:22 PM       Called to bring in for visit Friday- let me know if doesn't need to. Weeping.

## 2021-06-17 NOTE — PATIENT INSTRUCTIONS - HE
Patient Instructions by Rosa Isela Leavitt PT at 12/16/2019 11:30 AM     Author: Rosa Isela Leavitt PT Service: -- Author Type: Physical Therapist    Filed: 12/16/2019 12:21 PM Encounter Date: 12/16/2019 Status: Signed    : Rosa Isela Leavitt PT (Physical Therapist)        STRAIGHT LEG RAISE - SLR    While lying or sitting, raise up your leg with a straight knee.  Keep the opposite knee bent with the foot planted to the ground.     With the L leg, only go partial range, lift slightly off table/bed/ground.     10-15 each 1x/day        PARTIAL HEEL-TOE STANCE    Stand with your right foot partially in front of the other.  Look straight ahead.    Repeat with left foot in front.    Hold 30-60sec (once 60sec do below)    TANDEM STANCE    Stand with one foot directly in front of the other so that the toes of one foot touches the heel of the other. Maintain your balance.  If this is too difficult, take a step forward and maintain your balance in that position.            Toe Taps on Step    Pick one foot off of the ground and tap it on a step. Lower you foot back to the ground and tap the step with your other foot. Repeat the exercise switching back and forth between both legs.    Go slow for increased challenge on balance     x10-15 each leg     ,

## 2021-06-17 NOTE — PATIENT INSTRUCTIONS - HE
Patient Instructions by Rosa Isela Leavitt PT at 11/8/2019 11:30 AM     Author: Rosa Isela Leavitt PT Service: -- Author Type: Physical Therapist    Filed: 11/8/2019 12:24 PM Encounter Date: 11/8/2019 Status: Signed    : Rosa Isela Leavitt PT (Physical Therapist)        SINGLE LEG STANCE - SLS    Stand on one leg and maintain your balance.        TANDEM STANCE    Stand with one foot directly in front of the other so that the toes of one foot touches the heel of the other. Maintain your balance.  If this is too difficult, take a step forward and maintain your balance in that position.    * Do in corner    Hold 30-60 sec each side x3         Step ups:   - X10 each leg, 2 sets forward and to the side     SIT TO STAND - NO HANDS    Start by sitting in a chair. Scoot to the edge of the seat.  Bend forward with nose over toes, then raise up to standing without using your hands for support.    x10 repetitions, 2-3 sets

## 2021-06-17 NOTE — PATIENT INSTRUCTIONS - HE
Patient Instructions by Rosa Isela Leavitt PT at 12/6/2019 11:00 AM     Author: Rosa Isela Leavitt PT Service: -- Author Type: Physical Therapist    Filed: 12/6/2019 11:48 AM Encounter Date: 12/6/2019 Status: Addendum    : Rosa Isela Leavitt PT (Physical Therapist)    Related Notes: Original Note by Rosa Isela Leavitt PT (Physical Therapist) filed at 12/6/2019 11:16 AM        SIT TO STAND - NO HANDS    Start by sitting in a chair. Scoot to the edge of the seat.  Bend forward with nose over toes, then raise up to standing without using your hands for support.        STRAIGHT LEG RAISE - SLR    While lying or sitting, raise up your leg with a straight knee.  Keep the opposite knee bent with the foot planted to the ground.            HIP ABDUCTION - SIDELYING    While lying on your side, slowly raise up your top leg to the side. Keep your knee straight and maintain your toes pointed forward the entire time.     The bottom leg can be bent to stabilize your body.      FRONT STEP-UPS    Step up onto stool or step with involved leg.  Step down leading with uninvolved leg.  Repeat.      LATERAL STEP-UPS    Stand on stool or step with involved leg.  Lower the uninvolved leg until the heel touches the floor, then press back up using the muscles in the involved leg only.  Repeat.        PIRIFORMIS STRETCH     While lying on your back, hold your knee with your opposite hand and draw your knee up and over towards your opposite shoulder.    Feel free to use a towel or pillow case to pull.    Hold 30sec 2-3 times per day         HIP EXTENSION - STANDING    While standing, move your leg back as shown.    Use your arms for support if needed for balance and safety.       x10-15      Side stepping with Band    Start in slight squat position with feet pointing straight forward.  Step sideways, slowly bringing feet away from each other.     Slowly return to start position.    Take 10 steps each direction.x2

## 2021-06-18 RX ORDER — DULOXETIN HYDROCHLORIDE 30 MG/1
30 CAPSULE, DELAYED RELEASE ORAL 2 TIMES DAILY
Qty: 180 CAPSULE | Refills: 3 | Status: SHIPPED | OUTPATIENT
Start: 2021-06-18 | End: 2022-05-12

## 2021-06-18 NOTE — PATIENT INSTRUCTIONS - HE
Patient Instructions by Sumaya Cifuentes RN at 7/29/2020  9:00 AM     Author: Sumaya Cifuentes RN Service: -- Author Type: Registered Nurse    Filed: 7/29/2020  9:36 AM Encounter Date: 7/29/2020 Status: Signed    : Sumaya Cifuentes RN (Registered Nurse)       Limited walking on right foot with CAM boot for stability.    Right foot surgical site:  Steri Strips in place, do not remove.  They will fall off on their own.    Cover with dry gauze, change every other day.    Start Endoform on right 3rd and 5th toe:  Dressing Application     1. Endoform should be cut to the size of the wound.  It should touch the edges of the ulcer.  It can overlap the edges just very small amount.  Then, moisten with saline. (If it is easier for you, you can moisten it before laying it in the wound)    2. Cover the top of the wound with Adaptic Touch and then dry gauze.  Secure with roll gauze and paper tape.    3. Endoform is naturally used by the body over time so you may not find it in the wound when you change your dressing.  If you do find some, gently cleanse the wound with saline. Do not remove the remaining Endoform, which may appear as an off-white to tellez gel, just add Endoform on top.  Usually, more Endoform will need to be added every 5-7 days.     4.  Change your top dressing every other day and PRN for drainage.     -Endoform is a collagen dressing created from ovine (sheep) fore-stomach tissue. The collagen extracellular matrix transforms into a soft conforming sheet, which is naturally incorporated into the wound over time.  Collagen dressings are used to stimulate wound healing.         [facility] Virtual Check-In Number    [phone]    When you arrive for your IN OFFICE visit. Please call this number from the parking lot.      The staff will then virtually check you in and meet you at the door to escort you to a room.    If you arrive early and a room is not yet ready for you  staff may have you wait can call you back when they are ready.     Please remember to wear a mask into your appointment     No Visitors Allowed    If you are having any symptoms- cough, fever, rash, loss of taste and smell or shortness of breath we ask that you please call and reschedule your appointment.

## 2021-06-18 NOTE — PATIENT INSTRUCTIONS - HE
"Patient Instructions by Nenita Randolph LPN at 9/4/2020 10:20 AM     Author: Nenita Randolph LPN Service: -- Author Type: Licensed Nurse    Filed: 9/4/2020 11:06 AM Encounter Date: 9/4/2020 Status: Signed    : Nenita Randolph LPN (Licensed Nurse)       How to care for your wound    Cleanse wound with saline sent to you with your supplies or a wound wash found at the pharmacy.      Pat dry the wound with 4\"x4\" gauze      Cut to fit the endoform and place in or on the wound.        Cover the wound with [id number] dry gauze.      Wrap the affected area with 4 inch roll gauze.      Secure dressings in place with paper tape.      Change dressing: every other day      Do not get your ulcer wet when you shower.  You can cover it with a cast protector. Cast protectors are available for purchase at [facility] [employer] [facility] [employer]. You may also purchase a cast protector at your local pharmacy.      Good nutrition is important for wound healing.  I recommend increasing your protein.  You can do this through your diet, nutritional supplements, and/or protein powder.    It is ok to continue current wound care treatment/products for the next 2-3 days until new wound care supplies are ordered and arrive. If longer than this please contact our office.    Please call the [facility] before substituting any product    Call our clinic nurse at [phone] if there is an increase in drainage, pain, redness, or the wound size increases.  This maybe a sign of infection and require attention prior to the next appointment      - Dressing Application of  Endoform    1. Endoform should be cut to the size of the wound.  It should touch the edges of the ulcer. It is okay if it overlap the edges a small amount.  Then, moisten the Endoform with saline.(If it is easier for you, you can moisten it before laying it in the wound)    2. Cover the wound with Endoform, followed by dry gauze, and secure with " roll gauze if needed.     3. Endoform is naturally used by the body over time so you may not find it in the wound when you change your dressing.  If you do find some, gently cleanse the wound with saline. Do not remove the remaining Endoform, which may appear as an off-white to tellez gel, just add Endoform on top.  Usually, more Endoform will need to be added every 5-7 days.     4. Change your top dressing every 1-2 days or as needed depending on drainage.    -Endoform is a collagen dressing created from ovine (sheep) fore-stomach tissue. The collagen extracellular matrix transforms into a soft conforming sheet, which is naturally incorporated into the wound over time.  Collagen dressings are used to stimulate wound healing.

## 2021-06-18 NOTE — PATIENT INSTRUCTIONS - HE
Patient Instructions by Sumaya Cifuentes RN at 6/15/2020 10:30 AM     Author: Sumaya iCfuentes RN Service: -- Author Type: Registered Nurse    Filed: 6/15/2020 11:15 AM Encounter Date: 6/15/2020 Status: Addendum    : Sumaya Cifuentes RN (Registered Nurse)    Related Notes: Original Note by Sumaya Cifuentes RN (Registered Nurse) filed at 6/15/2020 11:10 AM        will call you to help schedule this MRI.    Magnetic Resonance Imaging (MRI)     You will be asked to hold very still during the scan.     Magnetic resonance imaging (MRI) is a test that lets your doctor see detailed pictures of the inside of your body. MRI combines the use of strong magnets and radio waves to form an MRI image.  How do I get ready for an MRI?    Follow any directions you are given for not eating or drinking before the test.    Ask your provider if you should stop taking any medicine before the test.    Follow your normal daily routine unless your provider tells you otherwise.    You'll be asked to remove your watch, jewelry, hearing aids, credit cards, pens, pocket knives, eyeglasses, and other metal objects.    You may be asked to remove your makeup. Makeup may contain some metal.    Most MRI tests take 30 to 60 minutes. Depending on the type of MRI you are having, the test may take longer. Give yourself extra time to check in.      MRI uses strong magnets. Metal is affected by magnets and can distort the image. The magnet used in MRI can cause metal objects in your body to move. If you have a metal implant, you may not be able to have an MRI unless the implant is certified as MRI safe. People with these implants should not have an MRI:    Ear (cochlear) implants    Certain clips used for brain aneurysms    Certain metal coils put in blood vessels    Most defibrillators    Most pacemakers  Be sure to tell the radiologist or technologist if you:    Have had any previous surgeries    Have a pacemaker,  surgical clips, metal plate or pins, an artificial joint, staples or screws, ear (cochlear) implants, or other implants    Wear a medicated adhesive patch    Have metal splinters in your body    Have implanted nerve stimulators or drug-infusion ports    Have tattoos or body piercings. Some tattoo inks contain metal.    Work with metal    Have braces. You must remove any dental work.    Have a bullet or other metal in your body  Also tell the radiologist or technologist if you:    Are pregnant or think you may be    Are afraid of small, enclosed spaces (claustrophobic)    Are allergic to X-ray dye (contrast medium), iodine, shellfish, or any medicines    Have other allergies    Are breastfeeding    Have a history of cancer    Have any serious health problems. This includes kidney disease or a liver transplant. You may not be able to have the contrast material used for MRI.   What happens during an MRI?    You may be asked to wear a hospital gown.    You may be given earplugs to wear if you need them.    You may be injected with a special dye (contrast) that improves the MRI image.     Youll lie down on a platform that slides into the magnet.  What happens after an MRI?    You can get back to normal activities right away. If you were given contrast, it will pass naturally through your body within a day. You may be told to drink more water or other fluids during this time.     Your doctor will discuss the test results with you during a follow-up appointment or over the phone.    Your next appointment is: Dr. Armstrong will call with results  Date Last Reviewed: 6/1/2017 2000-2017 [employer]. [address] [place] [address], [place] [address]. All rights reserved. This information is not intended as a substitute for professional medical care. Always follow your healthcare professional's instructions.          Hold endoform.  Cover right toe wound with dry gauze.

## 2021-06-18 NOTE — PATIENT INSTRUCTIONS - HE
Patient Instructions by Sumaya Cifuentes RN at 8/19/2020  9:30 AM     Author: Sumaya Cifuentes RN Service: -- Author Type: Registered Nurse    Filed: 8/19/2020  9:59 AM Encounter Date: 8/19/2020 Status: Signed    : Sumaya Cifuentes RN (Registered Nurse)       Limited walking on right foot with CAM boot for stability.     Right foot lateral 3rd toe: dry gauze change every 1-2 days.    Continue endoform on right 5th toe and start endoform on surgical site:    Dressing Application     1. Endoform should be cut to the size of the wound.  It should touch the edges of the ulcer.  It can overlap the edges just very small amount.  Then, moisten with saline. (If it is easier for you, you can moisten it before laying it in the wound)    2. Cover the top of the wound with dry gauze.  Secure with roll gauze and paper tape.    3. Endoform is naturally used by the body over time so you may not find it in the wound when you change your dressing.  If you do find some, gently cleanse the wound with saline. Do not remove the remaining Endoform, which may appear as an off-white to tellez gel, just add Endoform on top.  Usually, more Endoform will need to be added every 5-7 days.     4.  Change your top dressing every 1-2 days depending on drainage.     -Endoform is a collagen dressing created from ovine (sheep) fore-stomach tissue. The collagen extracellular matrix transforms into a soft conforming sheet, which is naturally incorporated into the wound over time.  Collagen dressings are used to stimulate wound healing.       [facility] Virtual Check-In Number    [phone]    When you arrive for your IN OFFICE visit. Please call this number from the parking lot.      The staff will then virtually check you in and meet you at the door to escort you to a room.    If you arrive early and a room is not yet ready for you staff may have you wait can call you back when they are ready.     Please  remember to wear a mask into your appointment     No Visitors Allowed    If you are having any symptoms- cough, fever, rash, loss of taste and smell or shortness of breath we ask that you please call and reschedule your appointment.

## 2021-06-18 NOTE — PATIENT INSTRUCTIONS - HE
Patient Instructions by Marisela Gamez NP at 8/31/2020  9:45 AM     Author: Marisela Gamez NP Service: -- Author Type: Nurse Practitioner    Filed: 8/31/2020 10:02 AM Encounter Date: 8/31/2020 Status: Signed    : Marisela Gamez NP (Nurse Practitioner)       Nails were trimmed today  Rogelio was pared    Noted to have skin breakdown between the toes  Recommend toe separators  Can  at [facility], [facility] or [facility]  Check 1-2 times per day that the device has not slipped out of place  Ok to reuse; ok to wash the device with soap and water and thoroughly dry after use                    Wound Care Instructions    Every 2-3 days     Apply endoform into/onto the wounds    Cover with gauze    Secure with non-sterile roll gauze and tape as needed; avoid adhesive directly on the skin    It is not ok to get your wound wet in the bath or shower    CAM boot; limit weight bearing on the right foot    Follow up with Dr. Armstrong as scheduled    SEEK MEDICAL CARE IF:    You have an increase in swelling, pain, or redness around the wound.    You have an increase in the amount of pus coming from the wound.    There is a bad smell coming from the wound.    The wound appears to be worsening/enlarging    You have a fever greater than 101.5 F        It is ok to continue current wound care treatment/products for the next 2-3 days until new wound care supplies are ordered and arrive. If longer than this please contact our office at [phone].      Marisela Gamez DNP, RN, CNP, CWN  [facility]  [phone]

## 2021-06-18 NOTE — TELEPHONE ENCOUNTER
Dr. Epperson did rx this last year- patients needs rx today as he is out  Medication T'd for review and signature  Zonia Carbone CMA on 6/18/2021 at 11:15 AM

## 2021-06-19 NOTE — LETTER
Letter by Farhan Dejesus MD at      Author: Farhan Dejesus MD Service: -- Author Type: --    Filed:  Encounter Date: 10/21/2019 Status: Signed         Romaine Farah  [address] [place] [employer] [place] [address]    October 21, 2019    Dear  Farah,    Welcome to [facility]! Your appointment information is below.   Please bring the following to your appointment:    Insurance Card, so we may scan it for our records    Drivers license or valid ID, so we may scan it for our records    Co-pay (as applicable per your insurance plan)    A current list of your medications including over the counter products such as vitamins and supplements    Your medical records including copies of X-Ray films if you are transferring your care from another clinic.  If you do not have your records, please fill out the release of information form and we will request those records.     Provider: Farhan Dejesus MD  Appointment Date: November 27, 2019  Arrival Time: 2:00pm for PFT test and 3:00pm for Dr. Dejesus    Location: [address]        [facility], [address]    **Please allow adequate time for your commute and parking. If you are more than 10 minutes late, you may be asked to reschedule.     If you need to cancel or reschedule your appointment, please notify us at least 24 hours prior to your appointment time so we are able to make this time available for another patient.    Thank you for choosing the [facility] for your health care needs. If you have any questions, please do not hesitate to contact us at any time at   [phone]. We look forward to caring for you.     Sincerely,     [facility] staff

## 2021-06-19 NOTE — LETTER
Letter by Paola Anderson RDCS at      Author: Paola Anderson RDCS Service: -- Author Type: --    Filed:  Encounter Date: 10/30/2019 Status: Signed         Romaine Farah  [address] [facility] [address]      October 30, 2019      Dear Mr. Farah,    RE: Remote Results    We are writing to you regarding your recent Remote Pacemaker check from home. Your transmission was received successfully. Battery status is satisfactory at this time.     Your results are showing no significant changes.    Your next device appointment will be a remote check on January 30, 2020; this will occur automatically.    To schedule or reschedule, please call [phone] and press 1.    NOTE: If you would like to do an extra transmission, please call [phone] and press 3 to speak to a nurse BEFORE transmitting. This ensures that the Device Clinic staff is aware of the reason you are sending a transmission, and can follow-up with you after it has been reviewed.    We will be checking your implanted device from home (remotely) every three months unless otherwise instructed. We will need to see you in the clinic at least once a year. You may need to be seen in the clinic sooner depending on the results of your check.    Please be aware:    The follow-up schedule is like a Physician prescription.    Your remote monitor is paired to your specific implanted device.      Sincerely,    [facility]

## 2021-06-19 NOTE — LETTER
Letter by Mariah De La Cruz at      Author: Mariah De La Cruz Service: -- Author Type: --    Filed:  Encounter Date: 7/30/2019 Status: (Other)         Romaine Farah  [address] [facility] [address]      July 30, 2019      Dear Mr. Farah,    RE: Remote Results    We are writing to you regarding your recent Remote Pacemaker check from home. Your transmission was received successfully. Battery status is satisfactory at this time.     Your results are showing no significant changes.    Your next device appointment will be a remote check on October 30, 2019; this will occur automatically.    To schedule or reschedule, please call [phone] and press 1.    NOTE: If you would like to do an extra transmission, please call [phone] and press 3 to speak to a nurse BEFORE transmitting. This ensures that the Device Clinic staff is aware of the reason you are sending a transmission, and can follow-up with you after it has been reviewed.    We will be checking your implanted device from home (remotely) every three months unless otherwise instructed. We will need to see you in the clinic at least once a year. You may need to be seen in the clinic sooner depending on the results of your check.    Please be aware:    The follow-up schedule is like a Physician prescription.    Your remote monitor is paired to your specific implanted device.      Sincerely,    [facility]

## 2021-06-19 NOTE — LETTER
Letter by Paola Anderson RDCS at      Author: Paola Anderson RDCS Service: -- Author Type: --    Filed:  Encounter Date: 3/14/2019 Status: (Other)       Romaine Farah  [address] [facility] [address]      March 14, 2019      Dear Mr. Farah,    RE: Remote Results    We are writing to you regarding your recent Remote Pacemaker check from home. Your transmission was received successfully. Battery status is satisfactory at this time.     Your results are within normal limits.    Your next device appointment will be a clinic visit on May 3, 2019 at 9:20AM at our  [place] location, [address] [facility].    To schedule or reschedule, please call [phone] and press 1.    NOTE: If you would like to do an extra transmission, please call [phone] and press 3 to speak to a nurse BEFORE transmitting. This ensures that the Device Clinic staff is aware of the reason you are sending a transmission, and can follow-up with you after it has been reviewed.    We will be checking your implanted device from home (remotely) every three months unless otherwise instructed. We will need to see you in the clinic at least once a year. You may need to be seen in the clinic sooner depending on the results of your check.    Please be aware:    The follow-up schedule is like a Physician prescription.    Your remote monitor is paired to your specific implanted device.      Sincerely,    [facility]

## 2021-06-20 NOTE — LETTER
Letter by Mariah De La Cruz at      Author: Mariah De La Cruz Service: -- Author Type: --    Filed:  Encounter Date: 1/30/2020 Status: (Other)         Romaine Farah  [address] [facility] [address]      January 30, 2020      Dear Mr. Farah,    RE: Remote Results    We are writing to you regarding your recent Remote Pacemaker check from home. Your transmission was received successfully. Battery status is satisfactory at this time.     Your results are within normal limits.    Your next device appointment will be a clinic visit.  Please call in March to schedule.      To schedule or reschedule, please call [phone] and press 1.    NOTE: If you would like to do an extra transmission, please call [phone] and press 3 to speak to a nurse BEFORE transmitting. This ensures that the Device Clinic staff is aware of the reason you are sending a transmission, and can follow-up with you after it has been reviewed.    We will be checking your implanted device from home (remotely) every three months unless otherwise instructed. We will need to see you in the clinic at least once a year. You may need to be seen in the clinic sooner depending on the results of your check.    Please be aware:    The follow-up schedule is like a Physician prescription.    Your remote monitor is paired to your specific implanted device.      Sincerely,    [facility]

## 2021-06-20 NOTE — LETTER
Letter by Wai Armstrong DPM at      Author: Wai Armstrong DPM Service: -- Author Type: --    Filed:  Encounter Date: 2020 Status: (Other)       2020    [facility]  Fax: [phone] Wound Dressing Rx and Order Form  Customer Service: [phone] Order Status: New Order   Verbal: Dr. Armstrong/Sumaya Cifuentes RN            Patient Info:  Name: Romaine Farah  : 1945  Address:   [address] [place] [address]  Phone: [phone]      Insurance Info:  Primary: Payor: HUMANA / Plan: HUMANA MEDICARE ADVANTAGE PLAN / Product Type: MEDICARE ADVANTAGE /    Secondary: N/A N/A  [id number] - (Commercial)      Physician Info:   Name:  Wai Armstrong JR., DPM   Dept Address/Phones:   [address] [place] [address], [address] [address] [phone]  Fax: [phone]      Wound info:  Encounter Diagnosis   Name Primary?   ? Skin ulcer of right foot, limited to breakdown of skin (H) Yes     VASC Wound 20 (Active)   Pre Size Length 0.3 20 0900   Pre Size Width 0.2 20 0900   Pre Size Depth 0.1 20 0900   Pre Total Sq cm 0.6 20 0900       VASC Wound 20 right second toe (Active)   Pre Size Length 0.3 06/15/20 1000   Pre Size Width 0.5 06/15/20 1000   Pre Size Depth 0.1 06/15/20 1000   Pre Total Sq cm 0.15 06/15/20 1000   Post Size Length 0.5 06/15/20 1000   Post Size Width 0.6 06/15/20 1000   Post Size Depth 0.5 06/15/20 1000   Post Total Sq cm 0.1 06/15/20 1000   Description scabbed 20 0900   Prodcut Used Gauze 20 0900       VASC Wound 20 righ foot- 5th toe (Active)   Pre Size Length 0.2 20 0900   Pre Size Width 0.2 20   Pre Size Depth 0.1 20   Pre Total Sq cm 0.4 20   Description pink 20   Prodcut Used Gauze 20 09       Incision 20 Surgical Leg Left (Active)       Incision 20 Surgical Foot Right (Active)     Right foot  "surgical site:  Steri Strips in place, do not remove.  They will fall off on their own.    Cover with dry gauze, change every other day.     Start Endoform on right 3rd and 5th toe:  Dressing Application      1. Endoform should be cut to the size of the wound.  It should touch the edges of the ulcer.  It can overlap the edges just very small amount.  Then, moisten with saline. (If it is easier for you, you can moisten it before laying it in the wound)     2. Cover the top of the wound with Adaptic Touch and then dry gauze.  Secure with roll gauze and paper tape.     3. Endoform is naturally used by the body over time so you may not find it in the wound when you change your dressing.  If you do find some, gently cleanse the wound with saline. Do not remove the remaining Endoform, which may appear as an off-white to tellez gel, just add Endoform on top.  Usually, more Endoform will need to be added every 5-7 days.      4.  Change your top dressing every other day and PRN for drainage.     Drainage: Light  Thickness:  Partial  Duration of Need: 30  Days Supply: 30  Start Date: 07/29/20  Starter Kit: Ancillary Kit (saline, gloves, gauze)  Qualifying wound/Debridement No      Dressing Type Brand Size Number of pieces Frequency of change   Primary Collagen Endoform 2x2 1 Every 5 days    Nonadhering silicone Adaptic Touch 3x4.25 1 Every 5 days                                   Secondary Gauze  4x4 2 loafs Every other day    Stretch roll gauze  4x75 30 rolls Every other day    Saline bullets  5ml 30 bullets Every other day                   Tape Paper Tape  1\" 2 rolls                      Note: If total out of pocket is more than $50.00 please contact the patient before processing order.     OK to forward to covered supplier.     Electronically Signed Physician: Wai Armstrong JR. DPPERLA Date: 7/29/2020       "

## 2021-06-20 NOTE — LETTER
Letter by Wai Armstrong DPM at      Author: Wai Armstrong DPM Service: -- Author Type: --    Filed:  Encounter Date: 2020 Status: (Other)       2020    [facility]  Fax: [phone] Wound Dressing Rx and Order Form  Customer Service: [phone] Order Status: New Order   Verbal: Dr. Armstrong/Leatha ALCAZAR            Patient Info:  Name: Romaine Farah  : 1945  Address:   [address] [place] [address]  Phone: [phone]      Insurance Info:  Primary: Payor: HUMANA / Plan: HUMANA MEDICARE ADVANTAGE PLAN / Product Type: MEDICARE ADVANTAGE /    Secondary: N/A N/A  [id number] - (Medicare)      Physician Info:   Name:  Wai Armstrong JR., DPM   Dept Address/Phones:   [address] [place] [address], [address] [place] [address] [phone]  Fax: [phone]    Lymphedema circumferential measurements (in cm):  No data recorded  No data recorded  No data recorded  No data recorded  No data recorded  No data recorded  No data recorded  No data recorded  No data recorded  No data recorded  No data recorded  No data recorded  No data recorded  No data recorded  No data recorded  No data recorded    Wound info:  Encounter Diagnoses   Name Primary?   ? Skin ulcer of right foot, limited to breakdown of skin (H) Yes   ? Hammer toe of right foot    ? Onychomycosis    ? PAD (peripheral artery disease) (H)    ? Onycholysis      VASC Wound 20 (Active)   Pre Size Length 1.5 20 1500   Pre Size Width 1.5 20 1500   Pre Size Depth 0.2 20 1500       VASC Wound 20 right second toe (Active)   Post Size Length 0.3 20 0900   Post Size Width 0.4 20 0900   Post Size Depth 0.1 20 0900       Incision 20 Surgical Leg Left (Active)     Drainage: Moderate  Thickness:  Full  Duration of Need: 30  Days Supply: 30  Start Date: 20  Starter Kit: Ancillary Kit (saline, gloves, gauze)  Qualifying wound/Debridement Yes       "Dressing Type Brand Size Number of pieces Frequency of change   Primary Endoform  2 x 2\" 5 Every 2-3 days                                           Secondary Saline bullets   15 Every 2-3 days    4 x 4\" gauze   1 loaf or 30 Every 2-3 days                           Tape Paper  1 inch 2 rolls Every 2-3 days                     Note: If total out of pocket is more than $50.00 please contact the patient before processing order.     OK to forward to covered supplier.     Electronically Signed Physician: Wai Armstrong JR., DPM Date: 5/29/2020       "

## 2021-06-21 NOTE — LETTER
Letter by Wai Armstrong DPM at      Author: Wai Armstrong DPM Service: -- Author Type: --    Filed:  Encounter Date: 2020 Status: (Other)       2020    [facility]  Fax: [phone] Wound Dressing Rx and Order Form  Customer Service: [phone] Order Status: Reorder   Verbal: Leatha Schroeder LPN            Patient Info:  Name: Romaine Farah  : 1945  Address:   [address] [place] [address]  Phone: [phone]      Insurance Info:  Primary: Payor: HUMANA / Plan: HUMANA MEDICARE ADVANTAGE PLAN / Product Type: MEDICARE ADVANTAGE /    Secondary: N/A N/A  [id number] - (Commercial)      Physician Info:   Name:  Wai Armstrong JR., DPM   Dept Address/Phones:   [address] [place] [address], [address] [address] [phone]  Fax: [phone]      Wound info:  Encounter Diagnosis   Name Primary?   ? Skin ulcer of right foot, limited to breakdown of skin (H) Yes     VASC Wound 20 (Active)   Pre Size Length 0.6 20 1400   Pre Size Width 0.6 20 1400   Pre Size Depth 0.2 20 1400   Pre Total Sq cm 0.36 20 1400   Post Size Length 0.2 20 1407   Post Size Width 0.2 20 1407   Post Size Depth 0.2 20 1407   Post Total Sq cm 0.04 20 1407   Description scabbed 20 1400       Incision 20 Surgical Leg Left (Active)       Incision 20 Surgical Foot Right (Active)     Drainage: Moderate  Thickness:  Full  Duration of Need: 30  Days Supply: 30  Start Date: 2020  Starter Kit: Ancillary Kit (saline, gloves, gauze)  Qualifying wound/Debridement Yes      Dressing Type Brand Size Number of pieces Frequency of change   Primary  roll gauze    max Every other day      Normal saline    500 ml Every other day   Tape  paper tape    3 rolls  Every other day     Note: If total out of pocket is more than $50.00 please contact the patient before processing order.     OK to forward to covered  supplier.     Electronically Signed Physician: Wai Armstrong JR., DPM Date: 12/10/2020

## 2021-06-21 NOTE — LETTER
Letter by Wai Armstrong DPM at      Author: Wai Armstrong DPM Service: -- Author Type: --    Filed:  Encounter Date: 10/16/2020 Status: (Other)       10/16/2020    [facility]  Fax: [phone] Wound Dressing Rx and Order Form  Customer Service: [phone] Order Status: New Order   Verbal: Julianne             Patient Info:  Name: Romaine Farah  : 1945  Address:   [address] [place] [address]  Phone: [phone]      Insurance Info:  Primary: Payor: HUMANA / Plan: HUMANA MEDICARE ADVANTAGE PLAN / Product Type: MEDICARE ADVANTAGE /    Secondary: N/A N/A  [id number] - (Commercial)      Physician Info:   Name:  Wia Armstrong JR., DPM   Dept Address/Phones:   [address] [place] [address], [address] [address] [phone]  Fax: [phone]    Lymphedema circumferential measurements (in cm):  No data recorded  No data recorded  No data recorded  No data recorded  No data recorded  No data recorded  No data recorded  No data recorded  No data recorded  No data recorded  No data recorded  No data recorded  No data recorded  No data recorded  No data recorded  No data recorded    Wound info:  Encounter Diagnosis   Name Primary?   ? Skin ulcer of right foot, limited to breakdown of skin (H) Yes     VASC Wound 20 (Active)   Pre Size Length 0.1 10/16/20 1000   Pre Size Width 0.1 10/16/20 1000   Pre Size Depth 0.2 10/16/20 1000   Pre Total Sq cm 0.01 10/16/20 1000   Post Size Length 0.1 20 0900   Post Size Width 0.2 20 0900   Post Size Depth 0.1 20 0900   Post Total Sq cm 0.02 20 0900   Description scabbed 20 1041       VASC Wound 20 right second toe (Active)   Pre Size Length 0.3 06/15/20 1000   Pre Size Width 0.5 06/15/20 1000   Pre Size Depth 0.1 06/15/20 1000   Pre Total Sq cm 0.15 06/15/20 1000   Post Size Length 0.5 06/15/20 1000   Post Size Width 0.6 06/15/20 1000   Post Size Depth 0.5 06/15/20  1000   Post Total Sq cm 0.1 06/15/20 1000   Description scabbed 07/29/20 0900   Prodcut Used Gauze 07/29/20 0900       VASC Wound 07/29/20 righ foot- 5th toe (Active)   Pre Size Length 0.2 09/04/20 1041   Pre Size Width 0.2 09/04/20 1041   Pre Size Depth 0 09/04/20 1041   Pre Total Sq cm 0.04 09/04/20 1041   Post Size Length 0.2 09/25/20 0900   Post Size Width 0.2 09/25/20 0900   Post Size Depth 0.2 09/25/20 0900   Post Total Sq cm 0.04 09/25/20 0900   Description scabbed 10/16/20 1000   Prodcut Used Gauze 07/29/20 0900       Incision 02/20/20 Surgical Leg Left (Active)       Incision 07/07/20 Surgical Foot Right (Active)     Drainage: Moderate  Thickness:  Full  Duration of Need: 30  Days Supply: 30  Start Date: 10/16/2020  Starter Kit: Ancillary Kit (saline, gloves, gauze)  Qualifying wound/Debridement Yes      Dressing Type Brand Size Number of pieces Frequency of change   Primary Square gauze  4x4 1 loaf  Every other day    Roll gauze  4x75 15 Every other day    Paper tape   1'' 2 rolls  Every other day     Note: If total out of pocket is more than $50.00 please contact the patient before processing order.     OK to forward to covered supplier.     Electronically Signed Physician: Wai Armsrtong JR., DPM Date: 10/16/2020

## 2021-06-24 NOTE — PROGRESS NOTES
DEVICE CLINIC RN POST-OP NOTE    Reason for visit: 1 week post op pacemaker and wound check     Device: [employer] L310 ACCOLADE MRI  Procedure date: 2/12/2019  Implant Diagnosis: Sick Sinus Syndrome, Bradycardia  Implanting Physician: Dr. Micheal Mahoney      Assessment  Subjective: Romaine denies pain or discomfort.  Vitals:   Vitals:    02/19/19 1407   BP: 108/64   Pulse: 60   Resp: 18   Temp: 97.7  F (36.5  C)     Heart Sounds: normal  Lung Sounds: clear to auscultation bilaterally  Incision/device pocket: The steri-strips were removed from the incision and it was cleansed with adhesive remover and alcohol wipes. The incision is clean, dry and intact. There are no signs of infection present. The incision is well healed.        Device Findings  Interrogation of device reveals normal sensing and capture thresholds  See the Paceart Report for a full summary of the device information.        Patient Education  Romaine Farah was accompanied today by his wife.      [facility]'s Partnership Agreement for Device Patients and Post-operative Checklist were presented and reviewed with patient at today's appointment.    Signs and symptoms of infection, poor wound healing, and device function were reviewed. Range of motion and weight restrictions for the LEFT side are for four weeks. He was issued a Tracky NXT remote monitor and instructed on its set-up and use for remote monitoring by the [employer] representative prior to hospital discharge. These instructions were reviewed with the patient at today s visit.       Plan  Remote from home in 1 month on 3/13/2019  In clinic device check in 3 months on 5/3/2019 in tandem with Dr. Kriss James RN BSN PHN  Device Nurse   [facility]

## 2021-06-24 NOTE — PATIENT INSTRUCTIONS - HE
Pacemaker Post-operative Checklist      The Device Registered Nurse (RN) reviewed the pacemaker function.      The Device RN did a wound assessment and wound care teaching.    Please call the Device Nurses with any signs of infection or questions regarding wound healing. Device Nurse Line: [phone], Option #3      The Device RN demonstrated and displayed the specific remote monitoring system for your pacemaker.      The Device RN reviewed the Partnership Agreement Form.    Patient Instructions    Do not lift your Left arm above the shoulder height, perform any vigorous arm movements such as swimming, golfing, washing windows, shoveling show, vacuuming or lifting greater than 10-15lbs with the affected arm for four weeks from the date of implant. The last day of restrictions is March 13, 2018.       To reduce the risk of infection, try to avoid any dental procedures for the first 6 weeks after your pacemaker implant. If you have an emergent or urgent dental need during this time, contact the device clinic for a prescription for an antibiotic.      You will receive a device identification (ID) card in the mail from the device  within 6 weeks to replace the temporary ID card you were given in the hospital.      You may travel by any mode of transportation; just show your pacemaker ID card. You may be subject to a hand search or use of a handheld wand, but official should not keep the wand over the implant site for greater than 5-10 seconds.      For any surgery, let your doctor know you have a pacemaker.       Your pacemaker is MRI safe       Most household appliances, including a microwave, will not interfere with your pacemaker function. If you suspect interference, simply move away from the source. Cell phones do not cause a problem. Please refer to the device booklet from the  or their website under the section on electromagnetic interference (ABDULAZIZ) for further guidelines on things that  may interfere with your pacemaker.       Device Clinic Contact Information  Device Nurses: [phone], Option #3    Device Remote Specialists: [phone], Option #2. For questions about your Remote Transmission or Transmission Schedule  Device Schedulers: [phone], Option #1

## 2021-06-27 NOTE — PROGRESS NOTES
Progress Notes by Ann Marie Hinojosa MD at 5/3/2019  9:50 AM     Author: Ann Marie Hinojosa MD Service: -- Author Type: Physician    Filed: 5/3/2019 10:14 AM Encounter Date: 5/3/2019 Status: Signed    : Ann Marie Hinojosa MD (Physician)           Click to link to [facility] HEART [facility] NOTE       Assessment/Plan:   1.  Sick sinus syndrome status post new atrial based single-chamber [employer] MRI compatible pacemaker placement on February 12, 2019: The patient has been doing better.  His device is interrogated today, normal function.    2.  Essential hypertension: Continue lisinopril 5 mg daily.    3.  Benign tremor: The patient has being evaluated at .    Thank you for the opportunity to be involved in the care of Romaine Farah. If you have any questions, please feel free to contact me.  I will see the patient again in 12 months.    Much or all of the text in this note was generated through the use of Dragon Dictate voice-to-text software. Errors in spelling or words which seem out of context are unintentional.   Sound alike errors, in particular, may have escaped editing.       History of Present Illness:   It is my pleasure to see Romaine Farah at the [facility] clinic for evaluation of Follow-up post hospital discharge.  Romaine Farah is a 73 y.o. male with a medical history of essential hypertension, benign tremor, sinus node dysfunction status post new atrial based single-chamber [employer] MRI compatible pacemaker placement on February 12, 2019.    The patient and his wife state that the patient has been doing much better after he had pacemaker placement.  He become more energetic, able to do more activities, less sleepy during daytime.  He had no fall or syncope.  He had no chest pain, shortness of breath, palpitations, dizziness, orthopnea, PND or leg edema.  He has mild dyspnea on exertion which has been improved.  His blood pressure and heart rate are in  normal range.  Patient has no regular exercise due to peripheral neuropathy.    Past Medical History:     Patient Active Problem List   Diagnosis   ? Osteoarthritis   ? Osteoarthritis Of The Hip   ? Polyarthritis Of The Hand   ? Status post right hip replacement   ? H/O asbestos exposure   ? Tremor, essential   ? Primary hypertension   ? Neuropathy (H)   ? Chronic constipation   ? Acute blood loss anemia   ? Bradycardia   ? Symptomatic sinus bradycardia   ? Sinus node dysfunction (H)   ? Cardiac arrhythmia   ? Cardiac pacemaker in situ       Past Surgical History:     Past Surgical History:   Procedure Laterality Date   ? EP PACEMAKER INSERT N/A 2/12/2019    Procedure: EP Pacemaker Insertion;  Surgeon: Micheal Mahoney MD;  Location: [facility];  Service: Cardiology   ? INSERT / REPLACE / REMOVE PACEMAKER  02/12/2019   ? TOTAL HIP ARTHROPLASTY Right 2/2/2015    Procedure: #3   HIP TOTAL ARTHROPLASTY, RIGHT;  Surgeon: Aryan Glass MD;  Location: [facility];  Service:        Family History:     Family History   Problem Relation Age of Onset   ? COPD Mother    ? Diabetes Mother    ? Esophageal cancer Father         Social History:    reports that he has quit smoking. He has quit using smokeless tobacco. He reports that he drinks alcohol. He reports that he does not use drugs.    Review of Systems:   General: WNL  Eyes: WNL  Ears/Nose/Throat: WNL  Lungs: WNL  Heart: Shortness of Breath with activity  Stomach: Constipation  Bladder: WNL  Muscle/Joints: Joint Pain, Muscle Weakness  Skin: Rash  Nervous System: Daytime Sleepiness, Loss of Balance  Mental Health: Depression, Anxiety     Blood: WNL    Meds:     Current Outpatient Medications:   ?  acetaminophen (TYLENOL) 500 MG tablet, Take 1 tablet (500 mg total) by mouth every 6 (six) hours as needed for pain or fever., Disp: , Rfl: 0  ?  calcium-vitamin D 500 mg(1,250mg) -200 unit per tablet, Take 2 tablets by mouth daily., Disp: , Rfl:   ?  clobetasol  "(TEMOVATE) 0.05 % ointment, Apply 1 application topically 2 (two) times a day as needed., Disp: , Rfl:   ?  DULoxetine (CYMBALTA) 30 MG capsule, Take 60 mg by mouth daily.    , Disp: , Rfl:   ?  fluocinonide (LIDEX) 0.05 % cream, Apply 1 application topically 2 (two) times a day as needed., Disp: , Rfl:   ?  lidocaine (XYLOCAINE) 5 % ointment, Apply 1 application topically 3 (three) times a day as needed., Disp: , Rfl:   ?  lisinopril (PRINIVIL,ZESTRIL) 10 MG tablet, Take 5 mg by mouth daily.    , Disp: , Rfl:   ?  magnesium 250 mg Tab, Take 2 tablets by mouth daily.    , Disp: , Rfl:   ?  methadone (DOLOPHINE) 10 MG tablet, Take 5-10 mg by mouth see administration instructions. 10mg qam , 5mg(1/2 tab) noon, 10mg evening and 10mg qhs   , Disp: , Rfl:   ?  OMEGA-3/DHA/EPA/FISH OIL (FISH OIL-OMEGA-3 FATTY ACIDS) 300-1,000 mg capsule, Take 2 g by mouth daily., Disp: , Rfl:   ?  pregabalin (LYRICA) 200 MG capsule, Take 200 mg by mouth 3 (three) times a day., Disp: , Rfl:   ?  primidone (MYSOLINE) 50 MG tablet, Take 100 mg by mouth 2 (two) times a day., Disp: , Rfl:   ?  topiramate (TOPAMAX) 25 MG tablet, Take 50 mg by mouth 2 (two) times a day., Disp: , Rfl:   ?  ascorbic acid (ASCORBIC ACID WITH KHADIJAH HIPS) 500 MG tablet, Take 500 mg by mouth daily., Disp: , Rfl:   ?  b complex vitamins tablet, Take 1 tablet by mouth daily., Disp: , Rfl:   ?  diphenhydrAMINE (BENADRYL) 25 mg capsule, Take 50 mg by mouth 2 (two) times a day as needed for itching., Disp: , Rfl:   ?  ibuprofen (ADVIL,MOTRIN) 200 MG tablet, Take 200 mg by mouth as needed for pain., Disp: , Rfl:     Allergies:   Hydroxyzine; Primidone; and Propranolol      Objective:      Physical Exam  (!) 228 lb (103.4 kg)  5' 10\" (1.778 m)  Body mass index is 32.71 kg/m .  /70 (Patient Site: Right Arm, Patient Position: Sitting, Cuff Size: Adult Large)   Pulse 64   Resp 16   Ht 5' 10\" (1.778 m)   Wt (!) 228 lb (103.4 kg)   BMI 32.71 kg/m      General " Appearance:   Awake, Alert, No acute distress.   HEENT:  Pupil equal and reactive to light. No scleral icterus; the mucous membranes were moist.   Neck: No cervical bruits. No JVD. No thyromegaly.     Chest: The spine was straight. The chest was symmetric.   Lungs:   Respirations unlabored; Lungs are clear to auscultation. No crackles. No wheezing.   Cardiovascular:   Regular rhythm and rate, normal first and second heart sounds with no murmurs. No rubs or gallops.    Abdomen:  Obese. Soft. No tenderness. Non-distended. Bowels sounds are present   Extremities: Equal tibial pulses. No leg edema.   Skin: No rashes or ulcers. Warm, Dry.   Musculoskeletal: No tenderness. No deformity.   Neurologic: Mood and affect are appropriate. No focal deficits.         Pacemaker interrogation today: Personally reviewed  94% atrial paced rhythm  Normal device function    Cardiac Imaging Studies  ECHO 2-:    Sinus bradycardia.    Technically challenging study. Definity contrast utilized.    Normal left ventricular size.    Left ventricle ejection fraction is normal to lower limits of normal. The calculated left ventricular ejection fraction is 58%.    Right ventricle not well visualized. Normal TAPSE suggests normal right ventricular function.    Mild left atrial enlargement    Mild to moderate tricuspid insufficiency. Mild pulmonary hypertension suggested. Estimate of RV systolic pressure 37 mmHg plus right atrial pressure.    Mild aortic root enlargement.    Lab Review   Lab Results   Component Value Date     02/12/2019    K 4.2 02/12/2019     (H) 02/12/2019    CO2 23 02/12/2019    BUN 24 02/12/2019    CREATININE 0.98 02/12/2019    CALCIUM 7.8 (L) 02/12/2019     Lab Results   Component Value Date    WBC 7.7 02/12/2019    HGB 12.6 (L) 02/12/2019    HCT 38.4 (L) 02/12/2019    MCV 90 02/12/2019     02/13/2019     Lab Results   Component Value Date    CHOL 176 07/07/2017    CHOL 194 01/12/2016     Lab Results    Component Value Date    HDL 46 07/07/2017    HDL 53 01/12/2016     Lab Results   Component Value Date    LDLCALC 116 07/07/2017    LDLCALC 130 (H) 01/12/2016     Lab Results   Component Value Date    TRIG 70 07/07/2017    TRIG 54 01/12/2016     No components found for: CHOLHDL  Lab Results   Component Value Date    TROPONINI <0.01 02/11/2019     No results found for: BNP  Lab Results   Component Value Date    TSH 4.22 07/07/2017

## 2021-06-28 NOTE — PROGRESS NOTES
Progress Notes by Mehreen Johnson PT at 10/25/2019  9:30 AM     Author: Mehreen Johnson PT Service: -- Author Type: Physical Therapist    Filed: 10/28/2019  3:06 PM Encounter Date: 10/25/2019 Status: Attested    : Mehreen Johnson PT (Physical Therapist) Cosigner: Seb Wade MD at 4/4/2020  8:12 AM    Attestation signed by Seb Wade MD at 4/4/2020  8:12 AM    cosigned                    [facility] Certification Request    October 28, 2019      Patient: Romaine Farah  MR Number: [id number]  YOB: 1945  Date of Visit: 10/25/2019      Dear Dr. Seb Wade:    Thank you for this referral.   We are seeing Romaine Farah for Physical Therapy.    Medicare and/or Medicaid requires physician review and approval of the treatment plan. Please review the plan of care and verify that you agree with the therapy plan of care by co-signing this note.      Plan of Care  Authorization / Certification Start Date: 10/25/19  Authorization / Certification End Date: 01/22/20  Authorization / Certification Number of Visits: 8-10  Communication with: Referral Source;Patient Caregiver  Patient Related Instruction: Nature of Condition;Treatment plan and rationale;Self Care instruction;Basis of treatment;Body mechanics;Posture;Precautions;Next steps;Expected outcome  Times per Week: 1  Number of Weeks: 12  Number of Visits: up to 12 sessions   Discharge Planning: when goals have been met or a plateau in progress has been noted   Therapeutic Exercise: ROM;Stretching;Strengthening;Lymphedema  Neuromuscular Reeducation: kinesio tape;posture;balance/proprioception;TNE;core  Manual Therapy: soft tissue mobilization;myofascial release;joint mobilization;muscle energy;lymphatic drainage massage      Goals:  Pt. will demonstrate/verbalize independence in self-management of condition in : 6 weeks  Pt. will be independent with home exercise program in : 6 weeks  Pt. will improve gait speed : up to  1.0m/s;for decreased risk of falls;for improved community ambulation;in 6 weeks    Pt will: increase 30 sec sit to stands to >9 steps for decreased risk for falls in 8 weeks  Pt will: increased 2 minute walk distance to >110 meters for imoroved functional activity tolerance and endurance in 8 weeks  Pt will: decrease four square step test to less than 15 seconds for decrease risk for falls in 8 weeks         If you have any questions or concerns, please don't hesitate to call.    Sincerely,      Mehreen Johnson, PT        Physician recommendation:     ___ Follow therapist's recommendation        ___ Modify therapy      *Physician co-signature indicates they certify the need for these services furnished within this plan and while under their care.      [facility]   Balance Initial Evaluation    Patient Name: Romaine Farah  Date of evaluation: 10/28/2019  Referral Diagnosis: weakness, cellulitis   Referring provider: Seb Wade MD  Visit Diagnosis:     ICD-10-CM    1. Muscle weakness (generalized) M62.81    2. Gait instability R26.81    3. Difficulty balancing R29.818        Assessment:   Romaine Farah is a 74 y.o. male who presents to therapy today with chief complaints of weakness, difficulty balancing and falls after 2 hospitalizations due to a septic cellulitic infection and pneumonia, it was noted that the weakness started before this as he has become less active recently and has had an increase in idiopathic neuropathy and essential tremors. Patient tested at an increase in falls risk category on the sit to stands, gait speed and four square step test. Patient also demonstrated decreased functional endurance and strength based on 2 minute walk test, gait speed and sit to stand test. The patient will benefit from skilled therapy to increase strength, improve function and decrease falls risk.   Patient will benefit from 1:1 skilled PT services to address the above limitations.      Goals:  Pt. will demonstrate/verbalize independence in self-management of condition in : 6 weeks  Pt. will be independent with home exercise program in : 6 weeks  Pt. will improve gait speed : up to 1.0m/s;for decreased risk of falls;for improved community ambulation;in 6 weeks    Pt will: increase 30 sec sit to stands to >9 steps for decreased risk for falls in 8 weeks  Pt will: increased 2 minute walk distance to >110 meters for imoroved functional activity tolerance and endurance in 8 weeks  Pt will: decrease four square step test to less than 15 seconds for decrease risk for falls in 8 weeks       Patient's expectations/goals are realistic.    Barriers to Learning or Achieving Goals:  Chronicity of the problem.  Co-morbidities or other medical factors.  .       Plan / Patient Instructions:        Plan of Care:   Authorization / Certification Start Date: 10/25/19  Authorization / Certification End Date: 01/22/20  Authorization / Certification Number of Visits: 8-10  Communication with: Referral Source;Patient Caregiver  Patient Related Instruction: Nature of Condition;Treatment plan and rationale;Self Care instruction;Basis of treatment;Body mechanics;Posture;Precautions;Next steps;Expected outcome  Times per Week: 1  Number of Weeks: 12  Number of Visits: up to 12 sessions   Discharge Planning: when goals have been met or a plateau in progress has been noted   Therapeutic Exercise: ROM;Stretching;Strengthening;Lymphedema  Neuromuscular Reeducation: kinesio tape;posture;balance/proprioception;TNE;core  Manual Therapy: soft tissue mobilization;myofascial release;joint mobilization;muscle energy;lymphatic drainage massage      Plan for next visit: LE strengthening, balance activities, ask about edema in the right leg (cellulitis), endurance exercises.      Subjective:       Patient reports on Oct 4th the patient sustained a fall at home. He was feeling unwell and fell and became unresponsive, he was taken to  the hospital found to have a septic cellulitis infection of the right LE. He was in the hospital x 4 days, then sent home. He was then taken back with worsening cough and spent 2-3 days in the hospital again for pneumonia. Prior to this the patient and wife report that he had a couple of falls, he was diagnoses with idiopathic neuropathy and has essential tremors that limited his activities before the illness. Patient's wife is tearful thinking about how he has been having more difficulty, she reports [place] and that seemed to be when the patient was starting to have more trouble, they also report they had to give up the boat.      Diagnosis: weakness   Date of diagnosis: Oct 2019  Mechanism of Injury: illness   Past medical history/precautions: none     Living Situation:Home and steps to basement, laundry upstairs, detached town home  Caregiver Support: wife at home   Equipment: bar at step in from the garage   Prior Functional Status: indeopendent  Current Activity Level: minimal   Chief Complaint: falls, weakness, poor balance, right leg edema     Patient's Functional Goal: to get stronger and more active     Fall history:Occasional: Date of last fall: Oct 4      Pain  Pain Ratin  Pain rating at best: 2  Pain rating at worst: 10  Pain description: aching         Objective:      Note: Items left blank indicates the item was not performed or not indicated at the time of the evaluation.    Balance Examination  1. Muscle weakness (generalized)     2. Gait instability     3. Difficulty balancing       Involved side: Right, Bilateral and right leg edema    Assistive Device: none    At rest - O2 sats 96%, HR 62 bpm  After 2 minute walk test - O2 sats 94%,   bmp     Timed Up and Go (TUG): Trial 1: 12.63 seconds Trial 2: 12.19 seconds  Trial 3: 13.38 seconds  Average: 12.73 seconds  Age gender norm: >13 sec = increase risk for falls   AD used? None      TUG  Motor:  seconds  TUG Cognitive: 19.22 seconds  Task: count down from 100 by 3's (100-82) paused with standing before initiating gait, and LOB with 2 step to correct when going to sit down     30 second sit<>stand: 5 reps  UE support? None   Age gender norm: 14 reps   O2 sats 98%, HR 70 bpm     2 Minute Walk Test: 96.7 meters   AD used: none   Age/Gender norms: 172.8 meters   At rest - O2 sats 96%, HR 62 bpm  After exercise: O2 sats 94%,  bmp       Four Square Step Test (motor planning): Trial 1: 17.43 seconds 2 wood dowel strikes Trial 2: 16.44 seconds with 2 wood dowel strikes   (>15 seconds falls risk for best of 2 trials)    Gait speed (10 meter walk test):  Comfortable gait speed: 0.82 m/sec  7.34 seconds; 11 steps   Age gender norm: 1.38 m/sec  AD used? none    Fast gait speed: 1.15 m/sec  5.22 sec; 9 steps   Age gender norm: 1.83 m/sec  AD used? none    Gait observation:   No arm swing on the left side, small swing on the right side, forward posture     Treatment Today   10/28/2019  TREATMENT MINUTES COMMENTS   Evaluation 50    Self-care/ Home management     Manual therapy     Neuromuscular Re-education     Therapeutic Activity     Therapeutic Exercises 10 Reviewed exercises and activity levels with patient, encouraged small walks at home with wife  HEP   - sit to stands    Gait training     Modality__________________                Total 60    Blank areas are intentional and mean the treatment did not include these items.   PT Evaluation Code: (Please list factors)  Patient History/Comorbidities: as above   Examination: as above   Clinical Presentation: stable   Clinical Decision Making: low     Patient History/  Comorbidities Examination  (body structures and functions, activity limitations, and/or participation restrictions) Clinical Presentation Clinical Decision Making (Complexity)   No documented Comorbidities or personal factors 1-2 Elements Stable and/or uncomplicated Low   1-2 documented  comorbidities or personal factor 3 Elements Evolving clinical presentation with changing characteristics Moderate   3-4 documented comorbidities or personal factors 4 or more Unstable and unpredictable High     Mehreen Johnson PT, DPT, CLT  10/28/2019  9:35 AM

## 2021-06-28 NOTE — PROGRESS NOTES
Progress Notes by Wai Armstrong DPM at 2/14/2020  3:00 PM     Author: Wai Armstrong DPM Service: -- Author Type: Physician    Filed: 2/14/2020  5:26 PM Encounter Date: 2/14/2020 Status: Signed    : Wai Armstrong DPM (Physician)       FOOT AND ANKLE SURGERY/PODIATRY CONSULT NOTE        ASSESSMENT:   Ulceration 3rd digit left foot  Cellulitis left foot  Hammertoe        TREATMENT:  -There is a full thickness ulceration along the lateral PIPJ 3rd digit left with exposed capsule and bone, probes to the PIPJ. Localized erythema to the 3rd digit. I reviewed these findings with the patient and present family members. This is concerning for underlying osteomyelitis, possible septic arthritis. We discussed treatment options to include surgical debridement of the non-viable tissue vs amputation of the digit if necessary.     -Non-palpable pedal pulses. Referred for ANNA's with toe pressures.     -The patient has a pacemaker which is MRI compatible. I have referred him for a x-rays and MRI of the left foot to evaluate for osteomyelitis/septic arthritis. I would like to obtain the MRI report before deciding on final treatment recommendations. Recommend he continue with Augmentin for an additional 10 days which he has in his possession. Wound culture obtained at Dr. Schmidt' office. I have asked him to closely monitor for any increase in erythema and to contact my office this is occurs.     -Trimmed callus tissue 2nd digit right foot x2 with a #15 blade. No underlying sore present. Recommend use of a CREST pad, remove at night.     -After discussion of risk factors and consent obtained 2% Lidocaine HCL jelly was applied, under clean conditions, the left and foot ulceration(s) were debrided using currette.  Devitalized and nonviable tissue, along with any fibrin and slough, was removed to improve granulation tissue formation, stimulate wound healing, decrease overall bacteria load, disrupt biofilm formation and  "decrease edge senescence.  Total excisional debridement was 3 sq cm bone with a depth of 0.4 cm.   Ulcers were improved afterwards and .  Measures were unchanged after debridement. A gauze dressing was applied. He will continue to apply a gauze dressing qday.    -I will contact the patient with the MRI report and we will be guided by the results.       Wai Armstrong PERLA  [facility]      HPI: Romaine Farah was seen today at the request of Dr. Schmidt for left foot cellulitis associated with a ulceration 3rd digit left foot. The patient's wife and daughter are present for today's visit. According to the patient, he first noticed the sore after using a toe spacer upon the advice of his podiatrist. Soon after using, he also noted redness in the 3rd digit and was placed on antibiotics. The patient's wife has been applying antibiotic cream to the ulceration. Wound cultures were obtained and the antibiotic was changed to Augmentin which he is taking as directed over the past 5 days. Denies N/V/F/C. PMH significant for idiopathic neuropathy.      Past Medical History:   Diagnosis Date   ? Bradycardia    ? Chronic pain     toes   ? Erectile dysfunction    ? Essential tremor    ? H/O asbestos exposure    ? Hypertension    ? Neuropathy        Past Surgical History:   Procedure Laterality Date   ? EP PACEMAKER INSERT N/A 2/12/2019    Procedure: EP Pacemaker Insertion;  Surgeon: Micheal Mahoney MD;  Location: [facility];  Service: Cardiology   ? INSERT / REPLACE / REMOVE PACEMAKER  02/12/2019   ? TOTAL HIP ARTHROPLASTY Right 2/2/2015    Procedure: #3   HIP TOTAL ARTHROPLASTY, RIGHT;  Surgeon: Aryan Glass MD;  Location: [facility];  Service:        Allergies   Allergen Reactions   ? Hydroxyzine      The Hcl formulation caused side effects; tolerated pamoate   ? Propranolol      Side effects: cloudy thinking, felt \"off\"         Current Outpatient Medications:   ?  " acetaminophen (TYLENOL) 500 MG tablet, Take 1 tablet (500 mg total) by mouth every 6 (six) hours as needed for pain or fever., Disp: , Rfl: 0  ?  albuterol (PROAIR HFA;PROVENTIL HFA;VENTOLIN HFA) 90 mcg/actuation inhaler, Inhale 2 puffs 4 (four) times a day as needed., Disp: , Rfl: 1  ?  albuterol (PROVENTIL) 2.5 mg /3 mL (0.083 %) nebulizer solution, Take 3 mL (2.5 mg total) by nebulization every 6 (six) hours as needed for wheezing., Disp: 60 vial, Rfl: 0  ?  amoxicillin-clavulanate (AUGMENTIN) 875-125 mg per tablet, , Disp: , Rfl:   ?  ascorbic acid (ASCORBIC ACID WITH KHADIJAH HIPS) 500 MG tablet, Take 500 mg by mouth daily as needed.    , Disp: , Rfl:   ?  b complex vitamins tablet, Take 1 tablet by mouth daily as needed.    , Disp: , Rfl:   ?  calcium-vitamin D 500 mg(1,250mg) -200 unit per tablet, Take 2 tablets by mouth daily., Disp: , Rfl:   ?  clobetasol (TEMOVATE) 0.05 % ointment, Apply 1 application topically 2 (two) times a day as needed., Disp: , Rfl:   ?  cyanocobalamin 1000 MCG tablet, Take 1 tablet (1,000 mcg total) by mouth daily., Disp: , Rfl: 0  ?  DULoxetine (CYMBALTA) 30 MG capsule, Take 60 mg by mouth daily.    , Disp: , Rfl:   ?  fluocinonide (LIDEX) 0.05 % cream, Apply 1 application topically 2 (two) times a day as needed., Disp: , Rfl:   ?  ibuprofen (ADVIL,MOTRIN) 200 MG tablet, Take 800 mg by mouth every 8 (eight) hours as needed for pain.    , Disp: , Rfl:   ?  lidocaine (XYLOCAINE) 5 % ointment, Apply 1 application topically 3 (three) times a day as needed., Disp: , Rfl:   ?  magnesium 250 mg Tab, Take 1 tablet by mouth daily with lunch. Takes a different dose in the morning and at supper   , Disp: , Rfl:   ?  methadone (DOLOPHINE) 10 MG tablet, Take 5-10 mg by mouth see administration instructions. 10mg qam , 5mg(1/2 tab) noon, 10mg evening and 10mg qhs   , Disp: , Rfl:   ?  mupirocin (BACTROBAN) 2 % ointment, , Disp: , Rfl:   ?  naloxone (NARCAN) 4 mg/actuation nasal spray, 1 spray (4 mg)  intranasally into 1 nostril. Administer into alternating nostrils. May repeat every 2 to 3 minutes., Disp: , Rfl:   ?  OMEGA-3/DHA/EPA/FISH OIL (FISH OIL-OMEGA-3 FATTY ACIDS) 300-1,000 mg capsule, Take 2 g by mouth daily., Disp: , Rfl:   ?  predniSONE (DELTASONE) 20 MG tablet, Take 20 mg by mouth daily., Disp: 7 tablet, Rfl: 3  ?  pregabalin (LYRICA) 200 MG capsule, Take 200 mg by mouth 3 (three) times a day., Disp: , Rfl:   ?  primidone (MYSOLINE) 50 MG tablet, Take 100 mg by mouth 2 (two) times a day., Disp: , Rfl:   ?  topiramate (TOPAMAX) 25 MG tablet, Take 75 mg by mouth 2 (two) times a day.    , Disp: , Rfl:   ?  umeclidinium-vilanterol (ANORO ELLIPTA) 62.5-25 mcg/actuation inhaler, Inhale 1 puff daily., Disp: 1 Inhaler, Rfl: 11  ?  vitamin B complex-folic acid 0.4 mg Tab, Take 1 tablet by mouth., Disp: , Rfl:     Past Surgical History:   Procedure Laterality Date   ? EP PACEMAKER INSERT N/A 2/12/2019    Procedure: EP Pacemaker Insertion;  Surgeon: Micheal Mahoney MD;  Location: [facility];  Service: Cardiology   ? INSERT / REPLACE / REMOVE PACEMAKER  02/12/2019   ? TOTAL HIP ARTHROPLASTY Right 2/2/2015    Procedure: #3   HIP TOTAL ARTHROPLASTY, RIGHT;  Surgeon: Aryan Glass MD;  Location: [facility];  Service:        Social History     Social History Narrative   ? Not on file       Family History   Problem Relation Age of Onset   ? COPD Mother    ? Diabetes Mother    ? Esophageal cancer Father        Review of Systems - 10 point Review of Systems is negative except for left foot infection which is noted in HPI.      OBJECTIVE:  Appearance: alert, well appearing, and in no distress.    Vitals:    02/14/20 1501   BP: 106/62   Pulse: 60   Temp: 97.7  F (36.5  C)   SpO2: 93%       BMI= Body mass index is 32.08 kg/m .    General appearance: Patient is alert and fully cooperative with history & exam.  No sign of distress is noted during the visit.     Psychiatric: Affect is pleasant &  appropriate.  Patient appears motivated to improve health.     Respiratory: Breathing is regular & unlabored while sitting.     HEENT: Hearing is intact to spoken word.  Speech is clear.  No gross evidence of visual impairment that would impact ambulation.        Vascular: Dorsalis pedis non-palpableBilateral.  Dermatologic:   VASC Wound 02/14/20 (Active)   Pre Size Length 1.5 2/14/2020  3:00 PM   Pre Size Width 1.5 2/14/2020  3:00 PM   Pre Size Depth 0.2 2/14/2020  3:00 PM   Exposed capsule at the lateral 3rd digit PIPJ ulceration which probes to bone, exposed head of the proximal phalanx. Localized erythema to the 3rd digit. Hyperkeratotic tissue distal 2nd digit right foot x2, upon removal no underlying ulceration.   Neurologic: Diminished to light touch Bilateral.  Musculoskeletal: Contracted digits noted Bilateral.    Imaging:     No results found.         Picture:

## 2021-06-29 NOTE — PROGRESS NOTES
"Progress Notes by Marisela Gamez NP at 8/31/2020  9:45 AM     Author: Marisela Gamez NP Service: -- Author Type: Nurse Practitioner    Filed: 8/31/2020 11:05 AM Encounter Date: 8/31/2020 Status: Signed    : Marisela Gamez NP (Nurse Practitioner)                   Follow up Vascular Visit       Date of Service:8/31/2020    Date Last Seen: Visit date not found; 8/19/2020    Chief Complaint: nail issues; callous build up; new wounds on right toes        Pt returns to [facility] with regards to their nail issues; callous build up and new wounds on the right toes.  They arrive today with wife. Pt is typically followed by Dr. Armstrong; underwent right 2nd toe amputation recently this is healing well. He is using endoform to the surgical site changing every 2-3 days; he is using 2x2 gauze between the toes; having increased pain to the toes \"hot spots\". Wearing CAM boot; limiting time up on the foot; arrives in w/c today. Keeping surgical site dry in the shower. ANNA done 2/2020 normal on the right; the left with mild disease; possible infrapopliteal disease. They are using nothing for compression. They are feeling well today. Denies fevers, chills. No shortness of breath.     Allergies: Hydroxyzine and Propranolol    Medications:   Current Outpatient Medications:   ?  acetaminophen (TYLENOL) 500 MG tablet, Take 1 tablet (500 mg total) by mouth every 6 (six) hours as needed for pain or fever., Disp: , Rfl: 0  ?  acetaminophen-codeine (TYLENOL #3) 300-30 mg per tablet, Take 1 tablet by mouth every 6 (six) hours as needed for pain., Disp: 30 tablet, Rfl: 0  ?  albuterol (PROVENTIL) 2.5 mg /3 mL (0.083 %) nebulizer solution, TAKE 3 ML (2.5 MG TOTAL) BY NEBULIZATION EVERY 6 (SIX) HOURS AS NEEDED FOR WHEEZING., Disp: 75 mL, Rfl: 11  ?  ascorbic acid (ASCORBIC ACID WITH KHADIJAH HIPS) 500 MG tablet, Take 500 mg by mouth daily. , Disp: , Rfl:   ?  b complex vitamins tablet, Take 1 tablet by mouth daily. , " Disp: , Rfl:   ?  calcium-vitamin D 500 mg(1,250mg) -200 unit per tablet, Take 2 tablets by mouth daily., Disp: , Rfl:   ?  clobetasol (TEMOVATE) 0.05 % ointment, Apply 1 application topically 2 (two) times a day as needed., Disp: , Rfl:   ?  cyanocobalamin 1000 MCG tablet, Take 1 tablet (1,000 mcg total) by mouth daily., Disp: , Rfl: 0  ?  docosahexaenoic acid-epa 120-180 mg cap, Take 2 capsules by mouth daily., Disp: , Rfl:   ?  DULoxetine (CYMBALTA) 30 MG capsule, Take 60 mg by mouth daily.    , Disp: , Rfl:   ?  fluocinonide (LIDEX) 0.05 % cream, Apply 1 application topically 2 (two) times a day as needed., Disp: , Rfl:   ?  fluticasone-umeclidinium-vilanterol (TRELEGY ELLIPTA) 100-62.5-25 mcg DsDv inhaler, Inhale 1 Inhalation daily., Disp: 1 each, Rfl: 11  ?  ibuprofen (ADVIL,MOTRIN) 200 MG tablet, Take 800 mg by mouth every 8 (eight) hours as needed for pain.    , Disp: , Rfl:   ?  lidocaine (XYLOCAINE) 5 % ointment, Apply 1 application topically 3 (three) times a day as needed., Disp: , Rfl:   ?  magnesium 250 mg Tab, Take 1 tablet by mouth daily with lunch. , Disp: , Rfl:   ?  methadone (DOLOPHINE) 10 MG tablet, Take 10 mg by mouth every 6 (six) hours. , Disp: , Rfl:   ?  mupirocin (BACTROBAN) 2 % ointment, Apply topically as needed. , Disp: , Rfl:   ?  OMEGA-3/DHA/EPA/FISH OIL (FISH OIL-OMEGA-3 FATTY ACIDS) 300-1,000 mg capsule, Take 2 g by mouth daily., Disp: , Rfl:   ?  predniSONE (DELTASONE) 20 MG tablet, Take 20 mg by mouth daily. (Patient taking differently: Take 20 mg by mouth daily as needed (Uses prn for symptoms of URI) .), Disp: 7 tablet, Rfl: 3  ?  pregabalin (LYRICA) 200 MG capsule, Take 200 mg by mouth 3 (three) times a day., Disp: , Rfl:   ?  primidone (MYSOLINE) 50 MG tablet, Take 100 mg by mouth 2 (two) times a day., Disp: , Rfl:   ?  topiramate (TOPAMAX) 25 MG tablet, Take 75 mg by mouth 2 (two) times a day.    , Disp: , Rfl:   ?  vitamin B complex-folic acid 0.4 mg Tab, Take 1 tablet by  mouth., Disp: , Rfl:     History:   Past Medical History:   Diagnosis Date   ? Bradycardia    ? Chronic pain     toes   ? COPD (chronic obstructive pulmonary disease) (H)    ? Erectile dysfunction    ? Essential tremor    ? H/O asbestos exposure    ? Hypertension     no meds currently   ? Neuropathy     feet   ? Osteomyelitis (H)     of ankle and foot       Physical Exam:    /78   Pulse 64   Resp 18     General:  Patient presents to clinic in no apparent distress.  Head: normocephalic atraumatic  Psychiatric:  Alert and oriented x3.   Respiratory: unlabored breathing; no cough  Integumentary:  Skin is uniformly warm, dry and pink.    Wound #1 Location: right 2nd toe amputation site  Size: 0L x 0W x 0depth.  No sinus tract present, Wound base: steristrip present; endoform; present; thin eschar formation; no gaping; no drainage; no erythema; no fluctuance; no maceration or denudement noted.     Right lateral 3rd toe and right medial 5th toe with developing ulcerations; dry; very small; thin eschar covering; toes were very tight together; tender    Left 2nd toe with hammer toe deformity; the tip with discolored callous this was pared down to reveal intact skin underneath    Nails  were thickened; elongated, and discolored bilaterally    Circumferential volume measures:    No flowsheet data found.    Ulceration(s)/Wound(s):     VAS Wound 02/14/20 (Active)   Pre Size Length 0.2 08/19/20 1005   Pre Size Width 0.2 08/19/20 1005   Pre Size Depth 0.1 08/19/20 1005   Pre Total Sq cm 0.4 08/19/20 1005       VAS Wound 05/29/20 right second toe (Active)   Pre Size Length 0.3 06/15/20 1000   Pre Size Width 0.5 06/15/20 1000   Pre Size Depth 0.1 06/15/20 1000   Pre Total Sq cm 0.15 06/15/20 1000   Post Size Length 0.5 06/15/20 1000   Post Size Width 0.6 06/15/20 1000   Post Size Depth 0.5 06/15/20 1000   Post Total Sq cm 0.1 06/15/20 1000   Description scabbed 07/29/20 0900   Prodcut Used Gauze 07/29/20 0900       [facility]  Wound 07/29/20 righ foot- 5th toe (Active)   Pre Size Length 0.3 08/19/20 1005   Pre Size Width 0.2 08/19/20 1005   Pre Size Depth 0.1 08/19/20 1005   Pre Total Sq cm 0.6 08/19/20 1005   Description pink 07/29/20 0900   Prodcut Used Gauze 07/29/20 0900       VASC Wound 08/19/20 surgical 2nd toe amp site (Active)   Post Size Length 0.5 08/19/20 1005   Post Size Width 0.3 08/19/20 1005   Post Size Depth 0.1 08/19/20 1005   Post Total Sq cm 0.15 08/19/20 1005       Incision 02/20/20 Surgical Leg Left (Active)       Incision 07/07/20 Surgical Foot Right (Active)        Lab Values    No results found for: SEDRATE  Lab Results   Component Value Date    CREATININE 1.18 07/06/2020     No results found for: HGBA1C  Lab Results   Component Value Date    BUN 19 07/06/2020     Lab Results   Component Value Date    ALBUMIN 3.2 (L) 10/17/2019     Vitamin D, Total (25-Hydroxy)   Date Value Ref Range Status   10/04/2019 33.4 30.0 - 80.0 ng/mL Final             Impression:  1. Skin ulcer of right foot, limited to breakdown of skin (H)     2. Onychomycosis     3. Hallux hammertoe, left     8/19/2020 right 5th toe     8/19/2020 right 3rd toe    8/19/2020 right 2nd toe amputation site            Are any of these wounds new today: No; Location: na    Assessment/Plan:          1. Debridement: not done     2.  Wound treatment: wound treatment will include irrigation and dressings to promote autolytic debridement which will include:continue endoform per Dr. Armstrong's instruction; this appears to be well healing Stable            3. Edema: none.            4. Nutrition: na           5. Offloading: the gauze is not adequately offloading the right 3 and 5th toes; d/w Dr. Armstrong and he is fine with silicone gel toe separators; provided hand out and instructed wife to pick these up; check for proper placement throughout the day     6. Nails: a total of 8 nails were debrided and filed smooth; tolerated well, no complications.     7. Callous: a  total of 1 callous was pared using 15 blade scalpel; tolerated well; skin intact underneath; will discuss in the future if crest pad is warranted       Patient will follow up with me in 10 weeks for reevaluation; follow up with Dr. Armstrong as scheduled for post op visist. They were instructed to call the clinic sooner with any signs or symptoms of infection or any further questions/concerns. Answered all questions.          Marisela Gamez DNP, RN, CNP, CWOCN, CFCN, CLT  [facility]   [phone]        This note was electronically signed by Marisela Gamez

## 2021-06-29 NOTE — PROGRESS NOTES
Progress Notes by Wai Armstrong DPM at 5/29/2020  9:15 AM     Author: Wai Armstrong DPM Service: -- Author Type: Physician    Filed: 5/29/2020 10:17 AM Encounter Date: 5/29/2020 Status: Signed    : Wai Armstrong DPM (Physician)       FOOT AND ANKLE SURGERY/PODIATRY Progress Note      ASSESSMENT:   Ulceration 2nd digit right foot  Hammertoe  Onychomycosis  Onycholysis       A new wound was identified today: yes,  it is located 2nd digit right foot.  TREATMENT:  -The majority of the 2nd digit toenail is free from the nail bed, with non-viable tissue distally. Patient has peripheral neuropathy and did not require local anesthesia to remove the nail plate with the use of a hemostat and nail nipper. After consenting, I removed the nail plate. The nail bed is intact. Full thickness ulceration along the distal 2nd digit. I discussed with the patient that the sore likely started due to a rigid hammertoe deformity.     -After discussion of risk factors and consent obtained 2% Lidocaine HCL jelly was applied, under clean conditions, the right and foot ulceration(s) were debrided using #15 blade scalpel.  Devitalized and nonviable tissue, along with any fibrin and slough, was removed to improve granulation tissue formation, stimulate wound healing, decrease overall bacteria load, disrupt biofilm formation and decrease edge senescence.  Total excisional debridement was 0.06 sq cm into the subcutaneous tissue with a depth of 0.2 cm.   Ulcers were improved afterwards and .  Measures were as noted on the flow sheet. Patient tolerated this well. Endoform with a gauze dresssing was applied. He will continue to apply Endoform with a gauze dresssing as directed.    -Debrided nails greater than 6 in length and thickness.     -He will follow-up with me in 2 weeks.      Wai Armstrong DPM  [facility] [place]      HPI: Romaine Farah was seen again today for a new concern related to  "discoloration on the 2nd digit right foot which he noticed 2-3 weeks ago. He denies trauma or any known mechanism of injury. He also requests a nail trim today.     Past Medical History:   Diagnosis Date   ? Bradycardia    ? Chronic pain     toes   ? COPD (chronic obstructive pulmonary disease) (H)    ? Erectile dysfunction    ? Essential tremor    ? H/O asbestos exposure    ? Hypertension     no meds currently   ? Neuropathy    ? Neuropathy     feet   ? Osteomyelitis (H)     of ankle and foot       Past Surgical History:   Procedure Laterality Date   ? EP PACEMAKER INSERT N/A 2/12/2019    Procedure: EP Pacemaker Insertion;  Surgeon: Micheal Mahoney MD;  Location: [facility] Cath Lab;  Service: Cardiology   ? INSERT / REPLACE / REMOVE PACEMAKER  02/12/2019   ? TOE AMPUTATION Left 2/20/2020    Procedure: AMPUTATION, third digit left foot;  Surgeon: Wai Armstrong DPM;  [place] [facility] [place]  Service: [place]iatry   ? TOTAL HIP ARTHROPLASTY Right 2/2/2015    Procedure: #3   HIP TOTAL ARTHROPLASTY, RIGHT;  Surgeon: Aryan Glass MD;  Location: [facility] OR;  Service:        Allergies   Allergen Reactions   ? Hydroxyzine      The Hcl formulation caused side effects; tolerated pamoate   ? Propranolol      Side effects: cloudy thinking, felt \"off\"         Current Outpatient Medications:   ?  ascorbic acid (ASCORBIC ACID WITH KHADIJAH HIPS) 500 MG tablet, Take 500 mg by mouth daily. , Disp: , Rfl:   ?  b complex vitamins tablet, Take 1 tablet by mouth daily. , Disp: , Rfl:   ?  calcium-vitamin D 500 mg(1,250mg) -200 unit per tablet, Take 2 tablets by mouth daily., Disp: , Rfl:   ?  clobetasol (TEMOVATE) 0.05 % ointment, Apply 1 application topically 2 (two) times a day as needed., Disp: , Rfl:   ?  cyanocobalamin 1000 MCG tablet, Take 1 tablet (1,000 mcg total) by mouth daily., Disp: , Rfl: 0  ?  DULoxetine (CYMBALTA) 30 MG capsule, Take 60 mg by mouth daily.    , Disp: , Rfl:   ?  fluocinonide (LIDEX) 0.05 % " cream, Apply 1 application topically 2 (two) times a day as needed., Disp: , Rfl:   ?  fluticasone-umeclidinium-vilanterol (TRELEGY ELLIPTA) 100-62.5-25 mcg DsDv inhaler, Inhale 1 Inhalation daily., Disp: 1 each, Rfl: 11  ?  ibuprofen (ADVIL,MOTRIN) 200 MG tablet, Take 800 mg by mouth every 8 (eight) hours as needed for pain.    , Disp: , Rfl:   ?  lidocaine (XYLOCAINE) 5 % ointment, Apply 1 application topically 3 (three) times a day as needed., Disp: , Rfl:   ?  magnesium 250 mg Tab, Take 1 tablet by mouth daily with lunch. , Disp: , Rfl:   ?  methadone (DOLOPHINE) 10 MG tablet, Take 10 mg by mouth every 6 (six) hours. , Disp: , Rfl:   ?  mupirocin (BACTROBAN) 2 % ointment, Apply topically as needed. , Disp: , Rfl:   ?  naloxone (NARCAN) 4 mg/actuation nasal spray, 1 spray by Intranasal route as needed. , Disp: , Rfl:   ?  OMEGA-3/DHA/EPA/FISH OIL (FISH OIL-OMEGA-3 FATTY ACIDS) 300-1,000 mg capsule, Take 2 g by mouth daily., Disp: , Rfl:   ?  predniSONE (DELTASONE) 20 MG tablet, Take 20 mg by mouth daily. (Patient taking differently: Take 20 mg by mouth daily as needed (Uses prn for symptoms of URI) .), Disp: 7 tablet, Rfl: 3  ?  pregabalin (LYRICA) 200 MG capsule, Take 200 mg by mouth 3 (three) times a day., Disp: , Rfl:   ?  primidone (MYSOLINE) 50 MG tablet, Take 100 mg by mouth 2 (two) times a day., Disp: , Rfl:   ?  topiramate (TOPAMAX) 25 MG tablet, Take 75 mg by mouth 2 (two) times a day.    , Disp: , Rfl:   ?  vitamin B complex-folic acid 0.4 mg Tab, Take 1 tablet by mouth., Disp: , Rfl:   ?  acetaminophen (TYLENOL) 500 MG tablet, Take 1 tablet (500 mg total) by mouth every 6 (six) hours as needed for pain or fever., Disp: , Rfl: 0  ?  albuterol (PROVENTIL) 2.5 mg /3 mL (0.083 %) nebulizer solution, TAKE 3 ML (2.5 MG TOTAL) BY NEBULIZATION EVERY 6 (SIX) HOURS AS NEEDED FOR WHEEZING., Disp: 75 mL, Rfl: 11    Review of Systems - 10 point Review of Systems is negative except for right foot toe discoloration  which is noted in HPI.      OBJECTIVE:  Vitals:    05/29/20 0933   BP: 124/78   Resp: 18   Temp: 97.5  F (36.4  C)     General appearance: Patient is alert and fully cooperative with history & exam.  No sign of distress is noted during the visit.   Vascular: Non-palpable PT bilateral.   Dermatologic:    VAS Wound 02/14/20 (Active)   Pre Size Length 1.5 02/14/20 1500   Pre Size Width 1.5 02/14/20 1500   Pre Size Depth 0.2 02/14/20 1500       VASC Wound 05/29/20 right second toe (Active)       Incision 02/20/20 Surgical Leg Left (Active)   Hyperkeratotic tissue distal 2nd digit right, majority of the 2nd digit nail is free from the nail bed. After removal of the nail plate, the nail bed is intact. Granular tissue at ulceration distal 2nd digit right. No erythema. Nails 1-10 elongated, thick, yellow with subungal debris. Trophic changes noted including diminished hair growth and shiny skin.  Neurologic: Diminished to light touch Bilateral.  Musculoskeletal: Contracted digits noted Bilateral.    Imaging:   No results found.       Picture:

## 2021-06-29 NOTE — PROGRESS NOTES
Progress Notes by Wai Armstrong DPM at 7/29/2020  9:00 AM     Author: Wai Armstrong DPM Service: -- Author Type: Physician    Filed: 7/29/2020 10:13 AM Encounter Date: 7/29/2020 Status: Signed    : Wai Armstrong DPM (Physician)       FOOT AND ANKLE SURGERY/PODIATRY Progress Note      ASSESSMENT:   Ulceration 3rd and 5th digits right  S/P Amputation 2nd digit right foot      A new wound was identified today: yes,  it is located right foot.  TREATMENT:  -The surgical site along the amputated 2nd digit right is progressing well. Sutures removed today, steri-strips applied.     -Two new ulcerations along the lateral 3rd digit and medial 5th digit right foot. Likely caused by increased walking in regular shoes with skin irritation from gauze. We will begin topical dressings at this time. Recommend limited walking in the CAM boot.    -After discussion of risk factors and consent obtained 2% Lidocaine HCL jelly was applied, under clean conditions, the right and foot ulceration(s) were debrided using currette.  Devitalized and nonviable tissue, along with any fibrin and slough, was removed to improve granulation tissue formation, stimulate wound healing, decrease overall bacteria load, disrupt biofilm formation and decrease edge senescence.  Total excisional debridement was 2 sq cm into the subcutaneous tissue with a depth of 0.2 cm.   Ulcers were improved afterwards and .  Measures were as noted on the flow sheet. Patient tolerated this well. Endoform with a gauze dresssing was applied. He will continue to apply Endoform with a gauze dresssing as directed.    -He will follow-up in 3 weeks.      Wai Armstrong DPM  [facility] [place]      HPI: Romaine Farah was seen again today s/p amputation 2nd digit right foot. He recently noticed new sores on the 3rd and 5th digits right foot. He has been placing gauze between the toes and walking in regular shoes.       Past Medical  "History:   Diagnosis Date   ? Bradycardia    ? Chronic pain     toes   ? COPD (chronic obstructive pulmonary disease) (H)    ? Erectile dysfunction    ? Essential tremor    ? H/O asbestos exposure    ? Hypertension     no meds currently   ? Neuropathy     feet   ? Osteomyelitis (H)     of ankle and foot       Past Surgical History:   Procedure Laterality Date   ? EP PACEMAKER INSERT N/A 2/12/2019    Procedure: EP Pacemaker Insertion;  Surgeon: Micheal Mahoney MD;  Location: [facility] Cath Lab;  Service: Cardiology   ? INSERT / REPLACE / REMOVE PACEMAKER  02/12/2019   ? TOE AMPUTATION Left 2/20/2020    Procedure: AMPUTATION, third digit left foot;  Surgeon: Wai Armstrong DPM;  Location: [facility] OR;  Service: Podiatry   ? TOE AMPUTATION Right 7/7/2020    Procedure: AMPUTATION, second digit right foot;  Surgeon: Wai Armstrong DPM;  Location: [facility] OR;  Service: Podiatry   ? TOTAL HIP ARTHROPLASTY Right 2/2/2015    Procedure: #3   HIP TOTAL ARTHROPLASTY, RIGHT;  Surgeon: Aryan Glass MD;  Location: [facility] OR;  Service:        Allergies   Allergen Reactions   ? Hydroxyzine      The Hcl formulation caused side effects; tolerated pamoate   ? Propranolol      Side effects: cloudy thinking, felt \"off\"         Current Outpatient Medications:   ?  acetaminophen (TYLENOL) 500 MG tablet, Take 1 tablet (500 mg total) by mouth every 6 (six) hours as needed for pain or fever., Disp: , Rfl: 0  ?  acetaminophen-codeine (TYLENOL #3) 300-30 mg per tablet, Take 1 tablet by mouth every 6 (six) hours as needed for pain., Disp: 30 tablet, Rfl: 0  ?  albuterol (PROVENTIL) 2.5 mg /3 mL (0.083 %) nebulizer solution, TAKE 3 ML (2.5 MG TOTAL) BY NEBULIZATION EVERY 6 (SIX) HOURS AS NEEDED FOR WHEEZING., Disp: 75 mL, Rfl: 11  ?  ascorbic acid (ASCORBIC ACID WITH KHADIJAH HIPS) 500 MG tablet, Take 500 mg by mouth daily. , Disp: , Rfl:   ?  b complex vitamins tablet, Take 1 tablet by mouth daily. , Disp: , " Rfl:   ?  calcium-vitamin D 500 mg(1,250mg) -200 unit per tablet, Take 2 tablets by mouth daily., Disp: , Rfl:   ?  clobetasol (TEMOVATE) 0.05 % ointment, Apply 1 application topically 2 (two) times a day as needed., Disp: , Rfl:   ?  cyanocobalamin 1000 MCG tablet, Take 1 tablet (1,000 mcg total) by mouth daily., Disp: , Rfl: 0  ?  docosahexaenoic acid-epa 120-180 mg cap, Take 2 capsules by mouth daily., Disp: , Rfl:   ?  DULoxetine (CYMBALTA) 30 MG capsule, Take 60 mg by mouth daily.    , Disp: , Rfl:   ?  fluocinonide (LIDEX) 0.05 % cream, Apply 1 application topically 2 (two) times a day as needed., Disp: , Rfl:   ?  fluticasone-umeclidinium-vilanterol (TRELEGY ELLIPTA) 100-62.5-25 mcg DsDv inhaler, Inhale 1 Inhalation daily., Disp: 1 each, Rfl: 11  ?  ibuprofen (ADVIL,MOTRIN) 200 MG tablet, Take 800 mg by mouth every 8 (eight) hours as needed for pain.    , Disp: , Rfl:   ?  lidocaine (XYLOCAINE) 5 % ointment, Apply 1 application topically 3 (three) times a day as needed., Disp: , Rfl:   ?  magnesium 250 mg Tab, Take 1 tablet by mouth daily with lunch. , Disp: , Rfl:   ?  methadone (DOLOPHINE) 10 MG tablet, Take 10 mg by mouth every 6 (six) hours. , Disp: , Rfl:   ?  mupirocin (BACTROBAN) 2 % ointment, Apply topically as needed. , Disp: , Rfl:   ?  OMEGA-3/DHA/EPA/FISH OIL (FISH OIL-OMEGA-3 FATTY ACIDS) 300-1,000 mg capsule, Take 2 g by mouth daily., Disp: , Rfl:   ?  predniSONE (DELTASONE) 20 MG tablet, Take 20 mg by mouth daily. (Patient taking differently: Take 20 mg by mouth daily as needed (Uses prn for symptoms of URI) .), Disp: 7 tablet, Rfl: 3  ?  pregabalin (LYRICA) 200 MG capsule, Take 200 mg by mouth 3 (three) times a day., Disp: , Rfl:   ?  primidone (MYSOLINE) 50 MG tablet, Take 100 mg by mouth 2 (two) times a day., Disp: , Rfl:   ?  topiramate (TOPAMAX) 25 MG tablet, Take 75 mg by mouth 2 (two) times a day.    , Disp: , Rfl:   ?  vitamin B complex-folic acid 0.4 mg Tab, Take 1 tablet by mouth.,  Disp: , Rfl:     Review of Systems - 10 point Review of Systems is negative except for right foot sores which is noted in HPI.      OBJECTIVE:  Vitals:    07/29/20 0911   BP: 130/64   Pulse: 60   Resp: 20   Temp: 98.7  F (37.1  C)     General appearance: Patient is alert and fully cooperative with history & exam.  No sign of distress is noted during the visit.   Vascular: Dorsalis pedis non-palpableRight.  Dermatologic:    VASC Wound 02/14/20 (Active)   Pre Size Length 0.3 07/29/20 0900   Pre Size Width 0.2 07/29/20 0900   Pre Size Depth 0.1 07/29/20 0900   Pre Total Sq cm 0.6 07/29/20 0900       VASC Wound 05/29/20 right second toe (Active)   Pre Size Length 0.3 06/15/20 1000   Pre Size Width 0.5 06/15/20 1000   Pre Size Depth 0.1 06/15/20 1000   Pre Total Sq cm 0.15 06/15/20 1000   Post Size Length 0.5 06/15/20 1000   Post Size Width 0.6 06/15/20 1000   Post Size Depth 0.5 06/15/20 1000   Post Total Sq cm 0.1 06/15/20 1000   Description scabbed 07/29/20 0900   Prodcut Used Gauze 07/29/20 0900       VASC Wound 07/29/20 righ foot- 5th toe (Active)   Pre Size Length 0.2 07/29/20 0900   Pre Size Width 0.2 07/29/20 0900   Pre Size Depth 0.1 07/29/20 0900   Pre Total Sq cm 0.4 07/29/20 0900   Description pink 07/29/20 0900   Prodcut Used Gauze 07/29/20 0900       Incision 02/20/20 Surgical Leg Left (Active)       Incision 07/07/20 Surgical Foot Right (Active)   Granular base 3rd and 5th digit ulcerations right foot. No erythema. Sutures removed today at amputated 2nd digit right with skin edges well coapted.   Neurologic: Diminished to light touch Right.  Musculoskeletal: Contracted digits noted Right.    Imaging:     No results found.       Picture:

## 2021-06-29 NOTE — PROGRESS NOTES
Progress Notes by Wai Armstrong DPM at 10/16/2020 10:00 AM     Author: Wai Armstrong DPM Service: -- Author Type: Physician    Filed: 10/16/2020 12:20 PM Encounter Date: 10/16/2020 Status: Signed    : Wai Armstrong DPM (Physician)       FOOT AND ANKLE SURGERY/PODIATRY Progress Note      ASSESSMENT:   Ulceration 3rd digit right    .  TREATMENT:  -The right foot 5th digit ulceration has resolved. The ulcer on the 3rd digit is stable, slowing progressing. Recommend continued non-weight bearing as much as possible with use of medihoney.    -After discussion of risk factors and consent obtained 2% Lidocaine HCL jelly was applied, under clean conditions, the right and foot ulceration(s) were debrided using #15 blade scalpel.  Devitalized and nonviable tissue, along with any fibrin and slough, was removed to improve granulation tissue formation, stimulate wound healing, decrease overall bacteria load, disrupt biofilm formation and decrease edge senescence.  Total excisional debridement was 0.01 sq cm into the subcutaneous tissue with a depth of 0.2 cm.   Ulcers were improved afterwards and .  Measures were as noted on the flow sheet. Patient tolerated this well. Medi-honey with a gauze dressing was applied. He will continue to apply Medi-honey with a gauze dressing as directed.    -He will follow-up in 3 weeks.    Wai Armstrong DPM  [facility]      HPI: Romaine Farah was seen again today for ulcerations 3rd and 5th digits right foot. He has used medihoney as directed and remain limited walking on the right foot.     Past Medical History:   Diagnosis Date   ? Bradycardia    ? Chronic pain     toes   ? COPD (chronic obstructive pulmonary disease) (H)    ? Erectile dysfunction    ? Essential tremor    ? H/O asbestos exposure    ? Hypertension     no meds currently   ? Neuropathy     feet   ? Osteomyelitis (H)     of ankle and foot       Past Surgical History:   Procedure  "Laterality Date   ? EP PACEMAKER INSERT N/A 2/12/2019    Procedure: EP Pacemaker Insertion;  Surgeon: Micheal Mahoney MD;  Location: [facility] Cath Lab;  Service: Cardiology   ? INSERT / REPLACE / REMOVE PACEMAKER  02/12/2019   ? TOE AMPUTATION Left 2/20/2020    Procedure: AMPUTATION, third digit left foot;  Surgeon: Wai Armstrong DPM;  Location: [facility] OR;  Service: Podiatry   ? TOE AMPUTATION Right 7/7/2020    Procedure: AMPUTATION, second digit right foot;  Surgeon: Wai Armstrong DPM;  Location: [facility] OR;  Service: Podiatry   ? TOTAL HIP ARTHROPLASTY Right 2/2/2015    Procedure: #3   HIP TOTAL ARTHROPLASTY, RIGHT;  Surgeon: Aryan Glass MD;  Location: [facility] OR;  Service:        Allergies   Allergen Reactions   ? Hydroxyzine      The Hcl formulation caused side effects; tolerated pamoate   ? Propranolol      Side effects: cloudy thinking, felt \"off\"         Current Outpatient Medications:   ?  acetaminophen (TYLENOL) 500 MG tablet, Take 1 tablet (500 mg total) by mouth every 6 (six) hours as needed for pain or fever., Disp: , Rfl: 0  ?  acetaminophen-codeine (TYLENOL #3) 300-30 mg per tablet, Take 1 tablet by mouth every 6 (six) hours as needed for pain., Disp: 30 tablet, Rfl: 0  ?  albuterol (PROVENTIL) 2.5 mg /3 mL (0.083 %) nebulizer solution, TAKE 3 ML (2.5 MG TOTAL) BY NEBULIZATION EVERY 6 (SIX) HOURS AS NEEDED FOR WHEEZING., Disp: 75 mL, Rfl: 11  ?  ascorbic acid (ASCORBIC ACID WITH KHADIJAH HIPS) 500 MG tablet, Take 500 mg by mouth daily. , Disp: , Rfl:   ?  b complex vitamins tablet, Take 1 tablet by mouth daily. , Disp: , Rfl:   ?  calcium-vitamin D 500 mg(1,250mg) -200 unit per tablet, Take 2 tablets by mouth daily., Disp: , Rfl:   ?  clobetasol (TEMOVATE) 0.05 % ointment, Apply 1 application topically 2 (two) times a day as needed., Disp: , Rfl:   ?  cyanocobalamin 1000 MCG tablet, Take 1 tablet (1,000 mcg total) by mouth daily., Disp: , Rfl: 0  ?  " docosahexaenoic acid-epa 120-180 mg cap, Take 2 capsules by mouth daily., Disp: , Rfl:   ?  DULoxetine (CYMBALTA) 30 MG capsule, Take 60 mg by mouth daily.    , Disp: , Rfl:   ?  fluocinonide (LIDEX) 0.05 % cream, Apply 1 application topically 2 (two) times a day as needed., Disp: , Rfl:   ?  fluticasone-umeclidinium-vilanterol (TRELEGY ELLIPTA) 100-62.5-25 mcg DsDv inhaler, Inhale 1 Inhalation daily., Disp: 1 each, Rfl: 11  ?  ibuprofen (ADVIL,MOTRIN) 200 MG tablet, Take 800 mg by mouth every 8 (eight) hours as needed for pain.    , Disp: , Rfl:   ?  lidocaine (XYLOCAINE) 5 % ointment, Apply 1 application topically 3 (three) times a day as needed., Disp: , Rfl:   ?  magnesium 250 mg Tab, Take 1 tablet by mouth daily with lunch. , Disp: , Rfl:   ?  methadone (DOLOPHINE) 10 MG tablet, Take 10 mg by mouth every 6 (six) hours. , Disp: , Rfl:   ?  mupirocin (BACTROBAN) 2 % ointment, Apply topically as needed. , Disp: , Rfl:   ?  OMEGA-3/DHA/EPA/FISH OIL (FISH OIL-OMEGA-3 FATTY ACIDS) 300-1,000 mg capsule, Take 2 g by mouth daily., Disp: , Rfl:   ?  predniSONE (DELTASONE) 20 MG tablet, Take 20 mg by mouth daily. (Patient taking differently: Take 20 mg by mouth daily as needed (Uses prn for symptoms of URI) .), Disp: 7 tablet, Rfl: 3  ?  pregabalin (LYRICA) 200 MG capsule, Take 200 mg by mouth 3 (three) times a day., Disp: , Rfl:   ?  primidone (MYSOLINE) 50 MG tablet, Take 50 mg by mouth., Disp: , Rfl:   ?  topiramate (TOPAMAX) 25 MG tablet, Take 75 mg by mouth 2 (two) times a day.    , Disp: , Rfl:   ?  vitamin B complex-folic acid 0.4 mg Tab, Take 1 tablet by mouth., Disp: , Rfl:     Review of Systems - 10 point Review of Systems is negative except for right foot ulcers which is noted in HPI.      OBJECTIVE:  Vitals:    10/16/20 1029   BP: 124/84   Pulse: 60   Resp: 16   Temp: 97.5  F (36.4  C)     General appearance: Patient is alert and fully cooperative with history & exam.  No sign of distress is noted during the  visit.   Vascular: Dorsalis pedis non-palpableRight.  Dermatologic:    VASC Wound 02/14/20 (Active)   Pre Size Length 0.1 10/16/20 1000   Pre Size Width 0.1 10/16/20 1000   Pre Size Depth 0.2 10/16/20 1000   Pre Total Sq cm 0.01 10/16/20 1000   Post Size Length 0.1 09/25/20 0900   Post Size Width 0.2 09/25/20 0900   Post Size Depth 0.1 09/25/20 0900   Post Total Sq cm 0.02 09/25/20 0900   Description scabbed 09/04/20 1041       VASC Wound 05/29/20 right second toe (Active)   Pre Size Length 0.3 06/15/20 1000   Pre Size Width 0.5 06/15/20 1000   Pre Size Depth 0.1 06/15/20 1000   Pre Total Sq cm 0.15 06/15/20 1000   Post Size Length 0.5 06/15/20 1000   Post Size Width 0.6 06/15/20 1000   Post Size Depth 0.5 06/15/20 1000   Post Total Sq cm 0.1 06/15/20 1000   Description scabbed 07/29/20 0900   Prodcut Used Gauze 07/29/20 0900       VASC Wound 07/29/20 righ foot- 5th toe (Active)   Pre Size Length 0.2 09/04/20 1041   Pre Size Width 0.2 09/04/20 1041   Pre Size Depth 0 09/04/20 1041   Pre Total Sq cm 0.04 09/04/20 1041   Post Size Length 0.2 09/25/20 0900   Post Size Width 0.2 09/25/20 0900   Post Size Depth 0.2 09/25/20 0900   Post Total Sq cm 0.04 09/25/20 0900   Description scabbed 10/16/20 1000   Prodcut Used Gauze 07/29/20 0900       Incision 02/20/20 Surgical Leg Left (Active)       Incision 07/07/20 Surgical Foot Right (Active)   No open lesions 3rd digit right. Granular with small amount of fibrotic tissue, no erythema.  Neurologic: Diminished to light touch Right.  Musculoskeletal: Contracted digits noted Right.    Imaging:     No results found.       Picture:

## 2021-06-29 NOTE — PROGRESS NOTES
Progress Notes by Wai Armstrong DPM at 6/15/2020 10:30 AM     Author: Wai Armstrong DPM Service: -- Author Type: Physician    Filed: 6/15/2020 11:52 AM Encounter Date: 6/15/2020 Status: Signed    : Wai Armstrong DPM (Physician)       FOOT AND ANKLE SURGERY/PODIATRY Progress Note      ASSESSMENT:   Cellulitis right foot  Ulceration 2nd digit right foot  Hammertoe      TREATMENT:  -There is localized erythema to the 2nd digit right foot with serosanguinous drainage, ulceration now probes to deep tissues possible to bone which is a new development since his last visit. Wound culture obtained today. I will start him on doxycycline and recommend and MRI to evaluate for osteomyelitis. Patient understands the treatment plan.    -After discussion of risk factors and consent obtained 2% Lidocaine HCL jelly was applied, under clean conditions, the right and foot ulceration(s) were debrided using currette and #15 blade scalpel.  Devitalized and nonviable tissue, along with any fibrin and slough, was removed to improve granulation tissue formation, stimulate wound healing, decrease overall bacteria load, disrupt biofilm formation and decrease edge senescence.  Total excisional debridement was 0.15 sq cm into the muscle/fascia with a depth of 0.3 cm.   Ulcers were improved afterwards and .  Measures were as noted on the flow sheet. Patient tolerated this well. A gauze dressing was applied. He will continue to apply a gauze dressing qday.    -I will contact the patient with the MRI report when available and we will be guided by the results.      Wai Armstrong DPM  [facility]      HPI: Romaine PERLA Farah was seen again today for a sore on the 2nd digit right foot. He has been applying endoform as directed. Denies N/V/F/C.      Past Medical History:   Diagnosis Date   ? Bradycardia    ? Chronic pain     toes   ? COPD (chronic obstructive pulmonary disease) (H)    ? Erectile  "dysfunction    ? Essential tremor    ? H/O asbestos exposure    ? Hypertension     no meds currently   ? Neuropathy    ? Neuropathy     feet   ? Osteomyelitis (H)     of ankle and foot       Past Surgical History:   Procedure Laterality Date   ? EP PACEMAKER INSERT N/A 2/12/2019    Procedure: EP Pacemaker Insertion;  Surgeon: Micheal Mahoney MD;  Location: [facility] Cath [facility];  Service: Cardiology   ? INSERT / REPLACE / REMOVE PACEMAKER  02/12/2019   ? TOE AMPUTATION Left 2/20/2020    Procedure: AMPUTATION, third digit left foot;  Surgeon: Wai Armstrong DPM;  Location: [facility] Main OR;  Service: Podiatry   ? TOTAL HIP ARTHROPLASTY Right 2/2/2015    Procedure: #3   HIP TOTAL ARTHROPLASTY, RIGHT;  Surgeon: Aryan Glass MD;  Location: [facility] OR;  Service:        Allergies   Allergen Reactions   ? Hydroxyzine      The Hcl formulation caused side effects; tolerated pamoate   ? Propranolol      Side effects: cloudy thinking, felt \"off\"         Current Outpatient Medications:   ?  acetaminophen (TYLENOL) 500 MG tablet, Take 1 tablet (500 mg total) by mouth every 6 (six) hours as needed for pain or fever., Disp: , Rfl: 0  ?  albuterol (PROVENTIL) 2.5 mg /3 mL (0.083 %) nebulizer solution, TAKE 3 ML (2.5 MG TOTAL) BY NEBULIZATION EVERY 6 (SIX) HOURS AS NEEDED FOR WHEEZING., Disp: 75 mL, Rfl: 11  ?  ascorbic acid (ASCORBIC ACID WITH KHADIJAH HIPS) 500 MG tablet, Take 500 mg by mouth daily. , Disp: , Rfl:   ?  b complex vitamins tablet, Take 1 tablet by mouth daily. , Disp: , Rfl:   ?  calcium-vitamin D 500 mg(1,250mg) -200 unit per tablet, Take 2 tablets by mouth daily., Disp: , Rfl:   ?  clobetasol (TEMOVATE) 0.05 % ointment, Apply 1 application topically 2 (two) times a day as needed., Disp: , Rfl:   ?  cyanocobalamin 1000 MCG tablet, Take 1 tablet (1,000 mcg total) by mouth daily., Disp: , Rfl: 0  ?  DULoxetine (CYMBALTA) 30 MG capsule, Take 60 mg by mouth daily.    , Disp: , Rfl:   ?  fluocinonide " (LIDEX) 0.05 % cream, Apply 1 application topically 2 (two) times a day as needed., Disp: , Rfl:   ?  fluticasone-umeclidinium-vilanterol (TRELEGY ELLIPTA) 100-62.5-25 mcg DsDv inhaler, Inhale 1 Inhalation daily., Disp: 1 each, Rfl: 11  ?  ibuprofen (ADVIL,MOTRIN) 200 MG tablet, Take 800 mg by mouth every 8 (eight) hours as needed for pain.    , Disp: , Rfl:   ?  lidocaine (XYLOCAINE) 5 % ointment, Apply 1 application topically 3 (three) times a day as needed., Disp: , Rfl:   ?  magnesium 250 mg Tab, Take 1 tablet by mouth daily with lunch. , Disp: , Rfl:   ?  methadone (DOLOPHINE) 10 MG tablet, Take 10 mg by mouth every 6 (six) hours. , Disp: , Rfl:   ?  mupirocin (BACTROBAN) 2 % ointment, Apply topically as needed. , Disp: , Rfl:   ?  naloxone (NARCAN) 4 mg/actuation nasal spray, 1 spray by Intranasal route as needed. , Disp: , Rfl:   ?  OMEGA-3/DHA/EPA/FISH OIL (FISH OIL-OMEGA-3 FATTY ACIDS) 300-1,000 mg capsule, Take 2 g by mouth daily., Disp: , Rfl:   ?  pregabalin (LYRICA) 200 MG capsule, Take 200 mg by mouth 3 (three) times a day., Disp: , Rfl:   ?  primidone (MYSOLINE) 50 MG tablet, Take 100 mg by mouth 2 (two) times a day., Disp: , Rfl:   ?  topiramate (TOPAMAX) 25 MG tablet, Take 75 mg by mouth 2 (two) times a day.    , Disp: , Rfl:   ?  vitamin B complex-folic acid 0.4 mg Tab, Take 1 tablet by mouth., Disp: , Rfl:   ?  doxycycline (MONODOX) 100 MG capsule, Take 1 capsule (100 mg total) by mouth 2 (two) times a day for 10 days., Disp: 20 capsule, Rfl: 0  ?  predniSONE (DELTASONE) 20 MG tablet, Take 20 mg by mouth daily. (Patient taking differently: Take 20 mg by mouth daily as needed (Uses prn for symptoms of URI) .), Disp: 7 tablet, Rfl: 3    Review of Systems - 10 point Review of Systems is negative except for right foot ulcer which is noted in HPI.      OBJECTIVE:  Vitals:    06/15/20 1039   BP: 120/70   Pulse: 60   Temp: 98.6  F (37  C)     General appearance: Patient is alert and fully cooperative  with history & exam.  No sign of distress is noted during the visit.   Vascular: Dorsalis pedis palpableRight.  Dermatologic:    VASC Wound 02/14/20 (Active)   Pre Size Length 1.5 02/14/20 1500   Pre Size Width 1.5 02/14/20 1500   Pre Size Depth 0.2 02/14/20 1500       VASC Wound 05/29/20 right second toe (Active)   Pre Size Length 0.3 06/15/20 1000   Pre Size Width 0.5 06/15/20 1000   Pre Size Depth 0.1 06/15/20 1000   Pre Total Sq cm 0.15 06/15/20 1000   Post Size Length 0.3 05/29/20 0900   Post Size Width 0.4 05/29/20 0900   Post Size Depth 0.1 05/29/20 0900       Incision 02/20/20 Surgical Leg Left (Active)   Localized erythema to the 2nd digit right foot. Ulceration distal 2nd digit probes to deep tissues, possible to bone with serosanguinous drainage.   Neurologic: Diminished to light touch Right.  Musculoskeletal: Contracted digits noted Right.    Imaging:     No results found.       Picture:

## 2021-06-30 NOTE — PROGRESS NOTES
Progress Notes by Wai Armstrong DPM at 11/18/2020  2:20 PM     Author: Wai Armstrong DPM Service: -- Author Type: Physician    Filed: 11/18/2020  5:38 PM Encounter Date: 11/18/2020 Status: Signed    : Wai Armstrong DPM (Physician)       FOOT AND ANKLE SURGERY/PODIATRY Progress Note      ASSESSMENT:   Ulceration 3rd digit right  Hammertoe      TREATMENT:  -Ulceration on the 3rd digit is stable, with increased fibrotic tissue. I have asked my office to prior auth him for Theraskin. He will require use of a wound vac if approved for Theraskin. Also discussed possible amputation if the wound fails to improve in the presence of a hammertoe deformity.    -After discussion of risk factors and consent obtained 2% Lidocaine HCL jelly was applied, under clean conditions, the left and foot ulceration(s) were debrided using #15 blade scalpel.  Devitalized and nonviable tissue, along with any fibrin and slough, was removed to improve granulation tissue formation, stimulate wound healing, decrease overall bacteria load, disrupt biofilm formation and decrease edge senescence.  Total excisional debridement was 0.25 sq cm into the subcutaneous tissue with a depth of 0.2 cm.   Ulcers were improved afterwards and .  Measures were as noted on the flow sheet. Patient tolerated this well. Medi-honey with a gauze dressing was applied. He will begin application of santyl daily with a gauze dressing.    -He will follow-up in 3 weeks.     Wai Armstrong DPM  [facility] [place]      HPI: Romaine Farah was seen again today for an ulceration 3rd digit left foot. His wife is present today and is providing the majority of the history. Currently using medihoney. He is taking doxycycline as directed.     Past Medical History:   Diagnosis Date   ? Bradycardia    ? Chronic pain     toes   ? COPD (chronic obstructive pulmonary disease) (H)    ? Erectile dysfunction    ? Essential tremor    ? H/O asbestos  "exposure    ? Hypertension     no meds currently   ? Neuropathy     feet   ? Osteomyelitis (H)     of ankle and foot       Past Surgical History:   Procedure Laterality Date   ? EP PACEMAKER INSERT N/A 2/12/2019    Procedure: EP Pacemaker Insertion;  Surgeon: Micheal Mahoney MD;  Location: [facility] Cath Lab;  Service: Cardiology   ? INSERT / REPLACE / REMOVE PACEMAKER  02/12/2019   ? TOE AMPUTATION Left 2/20/2020    Procedure: AMPUTATION, third digit left foot;  Surgeon: Wai Armstrong DPM;  Location: [facility] OR;  Service: Podiatry   ? TOE AMPUTATION Right 7/7/2020    Procedure: AMPUTATION, second digit right foot;  Surgeon: Wai Armstrong DPM;  Location: [facility] OR;  Service: Podiatry   ? TOTAL HIP ARTHROPLASTY Right 2/2/2015    Procedure: #3   HIP TOTAL ARTHROPLASTY, RIGHT;  Surgeon: Aryan Glass MD;  Location: [facility] OR;  Service:        Allergies   Allergen Reactions   ? Hydroxyzine      The Hcl formulation caused side effects; tolerated pamoate   ? Propranolol      Side effects: cloudy thinking, felt \"off\"         Current Outpatient Medications:   ?  acetaminophen (TYLENOL) 500 MG tablet, Take 1 tablet (500 mg total) by mouth every 6 (six) hours as needed for pain or fever., Disp: , Rfl: 0  ?  acetaminophen-codeine (TYLENOL #3) 300-30 mg per tablet, Take 1 tablet by mouth every 6 (six) hours as needed for pain., Disp: 30 tablet, Rfl: 0  ?  albuterol (PROVENTIL) 2.5 mg /3 mL (0.083 %) nebulizer solution, TAKE 3 ML (2.5 MG TOTAL) BY NEBULIZATION EVERY 6 (SIX) HOURS AS NEEDED FOR WHEEZING., Disp: 75 mL, Rfl: 11  ?  ascorbic acid (ASCORBIC ACID WITH KHADIJAH HIPS) 500 MG tablet, Take 500 mg by mouth daily. , Disp: , Rfl:   ?  b complex vitamins tablet, Take 1 tablet by mouth daily. , Disp: , Rfl:   ?  calcium-vitamin D 500 mg(1,250mg) -200 unit per tablet, Take 2 tablets by mouth daily., Disp: , Rfl:   ?  clobetasol (TEMOVATE) 0.05 % ointment, Apply 1 application topically 2 " (two) times a day as needed., Disp: , Rfl:   ?  cyanocobalamin 1000 MCG tablet, Take 1 tablet (1,000 mcg total) by mouth daily., Disp: , Rfl: 0  ?  docosahexaenoic acid-epa 120-180 mg cap, Take 2 capsules by mouth daily., Disp: , Rfl:   ?  doxycycline (MONODOX) 100 MG capsule, Take 1 capsule (100 mg total) by mouth 2 (two) times a day for 10 days., Disp: 20 capsule, Rfl: 0  ?  DULoxetine (CYMBALTA) 30 MG capsule, Take 60 mg by mouth daily.    , Disp: , Rfl:   ?  fluocinonide (LIDEX) 0.05 % cream, Apply 1 application topically 2 (two) times a day as needed., Disp: , Rfl:   ?  fluticasone-umeclidinium-vilanterol (TRELEGY ELLIPTA) 100-62.5-25 mcg DsDv inhaler, Inhale 1 Inhalation daily., Disp: 1 each, Rfl: 11  ?  ibuprofen (ADVIL,MOTRIN) 200 MG tablet, Take 800 mg by mouth every 8 (eight) hours as needed for pain.    , Disp: , Rfl:   ?  lidocaine (XYLOCAINE) 2 % jelly, Apply topically as needed., Disp: , Rfl:   ?  lidocaine (XYLOCAINE) 5 % ointment, Apply 1 application topically 3 (three) times a day as needed., Disp: , Rfl:   ?  magnesium 250 mg Tab, Take 1 tablet by mouth daily with lunch. , Disp: , Rfl:   ?  methadone (DOLOPHINE) 10 MG tablet, Take 10 mg by mouth every 6 (six) hours. , Disp: , Rfl:   ?  mupirocin (BACTROBAN) 2 % ointment, Apply topically as needed. , Disp: , Rfl:   ?  OMEGA-3/DHA/EPA/FISH OIL (FISH OIL-OMEGA-3 FATTY ACIDS) 300-1,000 mg capsule, Take 2 g by mouth daily., Disp: , Rfl:   ?  predniSONE (DELTASONE) 20 MG tablet, Take 20 mg by mouth daily. (Patient taking differently: Take 20 mg by mouth daily as needed (Uses prn for symptoms of URI) .), Disp: 7 tablet, Rfl: 3  ?  pregabalin (LYRICA) 200 MG capsule, Take 200 mg by mouth 3 (three) times a day., Disp: , Rfl:   ?  primidone (MYSOLINE) 50 MG tablet, Take 50 mg by mouth., Disp: , Rfl:   ?  topiramate (TOPAMAX) 25 MG tablet, Take 75 mg by mouth 2 (two) times a day.    , Disp: , Rfl:   ?  vitamin B complex-folic acid 0.4 mg Tab, Take 1 tablet by  mouth., Disp: , Rfl:   ?  collagenase (SANTYL) ointment, Apply qd, Disp: 15 g, Rfl: 2    Current Facility-Administered Medications:   ?  lidocaine 2 % jelly (XYLOCAINE), , Topical, PRN, Wai Armstrong DPM, Given at 11/18/20 1431    Review of Systems - 10 point Review of Systems is negative except for foot ulcer which is noted in HPI.      OBJECTIVE:  Vitals:    11/18/20 1423   BP: 126/82   Pulse: 72   Temp: 98.1  F (36.7  C)     General appearance: Patient is alert and fully cooperative with history & exam.  No sign of distress is noted during the visit.   Vascular: Dorsalis pedis non-palpableLeft.  Dermatologic:    VASC Wound 02/14/20 (Active)   Pre Size Length 0.6 11/11/20 1400   Pre Size Width 0.6 11/11/20 1400   Pre Size Depth 0.2 11/11/20 1400   Pre Total Sq cm 0.36 11/11/20 1400   Post Size Length 0.1 09/25/20 0900   Post Size Width 0.2 09/25/20 0900   Post Size Depth 0.1 09/25/20 0900   Post Total Sq cm 0.02 09/25/20 0900   Description scabbed 11/18/20 1400       Incision 02/20/20 Surgical Leg Left (Active)       Incision 07/07/20 Surgical Foot Right (Active)   Fibrotic tissue dorsal 3rd digit left foot. No erythema left foot.   Neurologic: Diminished to light touch Left.  Musculoskeletal: Contracted digits noted Left.    Imaging:     No results found.       Picture:

## 2021-07-21 ENCOUNTER — TELEPHONE (OUTPATIENT)
Dept: PULMONOLOGY | Facility: CLINIC | Age: 76
End: 2021-07-21

## 2021-07-29 ENCOUNTER — OFFICE VISIT (OUTPATIENT)
Dept: PULMONOLOGY | Facility: OTHER | Age: 76
End: 2021-07-29
Payer: COMMERCIAL

## 2021-07-29 VITALS
WEIGHT: 238.6 LBS | SYSTOLIC BLOOD PRESSURE: 112 MMHG | DIASTOLIC BLOOD PRESSURE: 70 MMHG | HEART RATE: 61 BPM | BODY MASS INDEX: 33.28 KG/M2 | OXYGEN SATURATION: 95 %

## 2021-07-29 DIAGNOSIS — J41.0 SIMPLE CHRONIC BRONCHITIS (H): Primary | ICD-10-CM

## 2021-07-29 DIAGNOSIS — R06.09 DYSPNEA ON EXERTION: ICD-10-CM

## 2021-07-29 RX ORDER — NALOXONE HYDROCHLORIDE 4 MG/.1ML
SPRAY NASAL
COMMUNITY
Start: 2021-07-28 | End: 2022-02-16

## 2021-07-29 RX ORDER — VITAMIN B COMPLEX
1 TABLET ORAL EVERY MORNING
COMMUNITY

## 2021-07-29 RX ORDER — ALBUTEROL SULFATE 0.83 MG/ML
2.5 SOLUTION RESPIRATORY (INHALATION) EVERY 4 HOURS PRN
COMMUNITY
Start: 2019-10-25 | End: 2022-07-25

## 2021-07-29 RX ORDER — RIBOFLAVIN (VITAMIN B2) 100 MG
100 TABLET ORAL EVERY MORNING
COMMUNITY

## 2021-07-29 NOTE — PROGRESS NOTES
Pulmonary progress note    Assessment and plan: 76-year-old man with history of chronic bronchitis, complicated recovery from foot surgery here for follow-up.  He is doing better on the triple inhaler.    Plan  Continue Trelegy  Continue action plan.  He has this available  Graded exercise plan with slow increase of physical activity    Chief complaint: Shortness of breath    HPI: 76-year-old man with chronic bronchitis, history of significant time off nonweightbearing because of foot pathology here for follow-up.  He continues to have no problems with exacerbations in the setting of using his Trelegy.  They are very pleased with the results.    He still is significantly short of breath with activity.  This is especially worse with exertion like going up stairs.  Assessment is somewhat complicated by his back pain and other musculoskeletal issues.    Medications and history reviewed.    /70   Pulse 61   Wt 108.2 kg (238 lb 9.6 oz)   SpO2 95%   BMI 33.28 kg/m    In good spirits, calm  Neck supple  Chest clear without crackles or wheezes  Normal cardiac exam  Normal abdominal exam  No tremor    Follow-up in 6 months.

## 2021-08-03 PROBLEM — A41.9 SEVERE SEPSIS (H): Status: RESOLVED | Noted: 2019-10-17 | Resolved: 2020-02-19

## 2021-08-03 PROBLEM — R65.20 SEVERE SEPSIS (H): Status: RESOLVED | Noted: 2019-10-17 | Resolved: 2020-02-19

## 2021-10-02 ENCOUNTER — HEALTH MAINTENANCE LETTER (OUTPATIENT)
Age: 76
End: 2021-10-02

## 2021-10-26 ENCOUNTER — TELEPHONE (OUTPATIENT)
Dept: PULMONOLOGY | Facility: OTHER | Age: 76
End: 2021-10-26

## 2021-10-26 DIAGNOSIS — J41.0 SIMPLE CHRONIC BRONCHITIS (H): Primary | ICD-10-CM

## 2021-10-26 RX ORDER — AZITHROMYCIN 250 MG/1
TABLET, FILM COATED ORAL
Qty: 6 TABLET | Refills: 0 | Status: SHIPPED | OUTPATIENT
Start: 2021-10-26 | End: 2021-10-31

## 2021-10-26 RX ORDER — PREDNISONE 20 MG/1
20 TABLET ORAL DAILY
Qty: 7 TABLET | Refills: 0 | Status: SHIPPED | OUTPATIENT
Start: 2021-10-26 | End: 2021-11-02

## 2021-10-26 NOTE — TELEPHONE ENCOUNTER
Rom had requested his action plan medications be refilled.Call placed to see if he was feeling ok. Said he had used up his action plan medications and would like to have them reordered to have on hand. Said they really help keep him out of the Ed. Medications reordered . Prednisone and Azithromycin.

## 2022-02-16 ENCOUNTER — OFFICE VISIT (OUTPATIENT)
Dept: PULMONOLOGY | Facility: OTHER | Age: 77
End: 2022-02-16
Payer: COMMERCIAL

## 2022-02-16 VITALS
RESPIRATION RATE: 16 BRPM | SYSTOLIC BLOOD PRESSURE: 114 MMHG | OXYGEN SATURATION: 94 % | WEIGHT: 230.6 LBS | BODY MASS INDEX: 32.16 KG/M2 | HEART RATE: 60 BPM | DIASTOLIC BLOOD PRESSURE: 66 MMHG

## 2022-02-16 DIAGNOSIS — J41.0 SIMPLE CHRONIC BRONCHITIS (H): Primary | ICD-10-CM

## 2022-02-16 DIAGNOSIS — R06.09 DYSPNEA ON EXERTION: ICD-10-CM

## 2022-02-16 PROCEDURE — 99214 OFFICE O/P EST MOD 30 MIN: CPT | Performed by: INTERNAL MEDICINE

## 2022-02-16 NOTE — PROGRESS NOTES
Pulmonary progress note    Assessment and plan: 76-year-old man with history of chronic bronchitis, chronic pain on multiple sedating medications here for follow-up.  He has problems with low energy, low motivation.  His shortness of breath is likely multifactorial-lung disease, deconditioning, possibly partially related to sedation from his medications.    Plan  Continue Trelegy  Continue action plan.  He has this available  Graded exercise plan with slow increase of physical activity-we discussed this specifically today with very clear recommendations and he has plans to follow-up.    Chief complaint: Shortness of breath    HPI: 76-year-old man with chronic bronchitis, chronic pain here for follow-up.  He has had no chest colds or bronchitis in the last 6 months.  He is very short of breath with activity, especially with shaving and with taking a shower.  He has very minimal physical activity and this is somewhat related to low motivation but also sedation in the setting of his pain medications.  These medications are very helpful for his pain and his sedation is at a point where he is willing to tolerate it.      Medications and history reviewed.    /66 (BP Location: Left arm, Patient Position: Chair, Cuff Size: Adult Large)   Pulse 60   Resp 16   Wt 104.6 kg (230 lb 9.6 oz)   SpO2 94%   BMI 32.16 kg/m    In good spirits, calm  Neck supple  Chest clear without crackles or wheezes  Normal cardiac exam  Normal abdominal exam  No tremor    Follow-up in 6 months.

## 2022-03-26 DIAGNOSIS — G25.9 MOVEMENT DISORDER: ICD-10-CM

## 2022-03-28 RX ORDER — PRIMIDONE 50 MG/1
100 TABLET ORAL 2 TIMES DAILY
Qty: 120 TABLET | Refills: 0 | Status: SHIPPED | OUTPATIENT
Start: 2022-03-28 | End: 2022-04-19

## 2022-03-28 NOTE — TELEPHONE ENCOUNTER
Refill request for Primidone  Last seen by Dr. Epperson 3/1/21  [facility]  Medication T'd for review and signature  Zonia Carbone CMA on 3/28/2022 at 2:52 PM

## 2022-04-19 DIAGNOSIS — G25.9 MOVEMENT DISORDER: ICD-10-CM

## 2022-04-19 DIAGNOSIS — G62.9 PERIPHERAL POLYNEUROPATHY: ICD-10-CM

## 2022-04-19 DIAGNOSIS — G25.0 ESSENTIAL TREMOR: ICD-10-CM

## 2022-04-19 RX ORDER — TOPIRAMATE 25 MG/1
TABLET, FILM COATED ORAL
Qty: 540 TABLET | Refills: 1 | Status: SHIPPED | OUTPATIENT
Start: 2022-04-19 | End: 2022-07-25

## 2022-04-19 RX ORDER — PRIMIDONE 50 MG/1
100 TABLET ORAL 2 TIMES DAILY
Qty: 360 TABLET | Refills: 1 | Status: SHIPPED | OUTPATIENT
Start: 2022-04-19 | End: 2022-07-25

## 2022-04-19 NOTE — TELEPHONE ENCOUNTER
Refill request for Topamax. Pt last seen 3/1/21 by Dr. Epperson and has follow up scheduled for 7/25/22 with Dr. Rankin. Will send in refills.     Missy Cespedes RN on 4/19/2022 at 1:50 PM

## 2022-04-19 NOTE — TELEPHONE ENCOUNTER
Refill request for Primidone. Pt last seen 3/1/21 by Dr. Epperson and has follow up scheduled for 7/25/22 with Dr. Rankin. Will send in refills.     Missy Cespedes RN on 4/19/2022 at 1:52 PM

## 2022-05-06 DIAGNOSIS — G62.9 PERIPHERAL POLYNEUROPATHY: ICD-10-CM

## 2022-05-06 DIAGNOSIS — G62.9 PERIPHERAL POLYNEUROPATHY: Primary | ICD-10-CM

## 2022-05-06 RX ORDER — PREGABALIN 200 MG/1
CAPSULE ORAL
Qty: 360 CAPSULE | Refills: 0 | Status: SHIPPED | OUTPATIENT
Start: 2022-05-06 | End: 2022-05-12

## 2022-05-06 NOTE — TELEPHONE ENCOUNTER
Refill request for Lyrica. Pt last seen 3/1/21 by Dr. Epperson and has follow up scheduled for 7/25/22 with Dr. Rankin.     Medication T'd for review and signature    Missy Cespedes RN on 5/6/2022 at 9:10 AM

## 2022-05-06 NOTE — TELEPHONE ENCOUNTER
Prior Authorization Retail Medication Request    Medication/Dose: Pregabalin 200mg capsules  ICD code (if different than what is on RX):   Previously Tried and Failed:    Rationale:      Insurance Name:    Insurance ID:        Pharmacy Information (if different than what is on RX)  Name:    Phone:        **Pt needs a PA for 4 tabs daily. Insurance only covers 3 tabs daily.**

## 2022-05-11 NOTE — TELEPHONE ENCOUNTER
Central Prior Authorization Team   Phone: [phone]      PA Initiation    Medication: Pregabalin 200mg capsules  Insurance Company: [employer] - Phone [phone] Fax [phone]  Pharmacy Filling the Rx: [id number] - [place] [[address]  Filling Pharmacy Phone: [phone]  Filling Pharmacy Fax:    Start Date: 5/11/2022

## 2022-05-12 DIAGNOSIS — G62.9 PERIPHERAL POLYNEUROPATHY: ICD-10-CM

## 2022-05-12 RX ORDER — DULOXETIN HYDROCHLORIDE 30 MG/1
30 CAPSULE, DELAYED RELEASE ORAL 2 TIMES DAILY
Qty: 180 CAPSULE | Refills: 0 | Status: SHIPPED | OUTPATIENT
Start: 2022-05-12 | End: 2022-07-25

## 2022-05-12 RX ORDER — PREGABALIN 200 MG/1
CAPSULE ORAL
Qty: 360 CAPSULE | Refills: 0 | Status: SHIPPED | OUTPATIENT
Start: 2022-05-12 | End: 2022-07-25

## 2022-05-12 NOTE — TELEPHONE ENCOUNTER
Pt has a follow up appt on 7/25.   Medication T'd for review and signature  Jignesh Pérez MA on 5/12/2022 at 1:48 PM

## 2022-05-14 ENCOUNTER — HEALTH MAINTENANCE LETTER (OUTPATIENT)
Age: 77
End: 2022-05-14

## 2022-05-16 NOTE — TELEPHONE ENCOUNTER
PRIOR AUTHORIZATION DENIED    Medication: Pregabalin 200mg capsules-DENIED    Denial Date: 5/13/2022    Denial Rational:           Appeal Information:

## 2022-05-16 NOTE — TELEPHONE ENCOUNTER
I have never seen this patient and filled this prescription as was done in the past by Dr. Aryan Epperson.  If insurance is denying 4 tablets in a day we can continue 3 tablets a day for the time being until I see him in follow-up.  I have sent an updated prescription to his pharmacy

## 2022-05-17 RX ORDER — PREGABALIN 200 MG/1
200 CAPSULE ORAL 3 TIMES DAILY
Qty: 270 CAPSULE | Refills: 0 | Status: SHIPPED | OUTPATIENT
Start: 2022-05-17 | End: 2022-07-25

## 2022-05-17 NOTE — TELEPHONE ENCOUNTER
Updated Lyrica order from QID to TID per insurance request.     Medication T'd for review and signature    Missy Cespedes RN on 5/17/2022 at 11:40 AM

## 2022-06-20 ENCOUNTER — LAB REQUISITION (OUTPATIENT)
Dept: LAB | Facility: CLINIC | Age: 77
End: 2022-06-20

## 2022-06-20 DIAGNOSIS — I49.9 CARDIAC ARRHYTHMIA, UNSPECIFIED: ICD-10-CM

## 2022-06-20 DIAGNOSIS — E53.8 DEFICIENCY OF OTHER SPECIFIED B GROUP VITAMINS: ICD-10-CM

## 2022-06-20 DIAGNOSIS — I10 ESSENTIAL (PRIMARY) HYPERTENSION: ICD-10-CM

## 2022-06-20 DIAGNOSIS — Z12.5 ENCOUNTER FOR SCREENING FOR MALIGNANT NEOPLASM OF PROSTATE: ICD-10-CM

## 2022-06-20 LAB
ALBUMIN SERPL-MCNC: 3.5 G/DL (ref 3.5–5)
ALP SERPL-CCNC: 66 U/L (ref 45–120)
ALT SERPL W P-5'-P-CCNC: 18 U/L (ref 0–45)
ANION GAP SERPL CALCULATED.3IONS-SCNC: 9 MMOL/L (ref 5–18)
AST SERPL W P-5'-P-CCNC: 20 U/L (ref 0–40)
BILIRUB SERPL-MCNC: 0.3 MG/DL (ref 0–1)
BUN SERPL-MCNC: 22 MG/DL (ref 8–28)
CALCIUM SERPL-MCNC: 8.9 MG/DL (ref 8.5–10.5)
CHLORIDE BLD-SCNC: 105 MMOL/L (ref 98–107)
CHOLEST SERPL-MCNC: 184 MG/DL
CO2 SERPL-SCNC: 25 MMOL/L (ref 22–31)
CREAT SERPL-MCNC: 1.14 MG/DL (ref 0.7–1.3)
GFR SERPL CREATININE-BSD FRML MDRD: 67 ML/MIN/1.73M2
GLUCOSE BLD-MCNC: 109 MG/DL (ref 70–125)
HDLC SERPL-MCNC: 40 MG/DL
LDLC SERPL CALC-MCNC: 121 MG/DL
POTASSIUM BLD-SCNC: 4.2 MMOL/L (ref 3.5–5)
PROT SERPL-MCNC: 6.9 G/DL (ref 6–8)
PSA SERPL-MCNC: 0.38 UG/L (ref 0–6.5)
SODIUM SERPL-SCNC: 139 MMOL/L (ref 136–145)
TRIGL SERPL-MCNC: 115 MG/DL
VIT B12 SERPL-MCNC: 364 PG/ML (ref 213–816)

## 2022-06-20 PROCEDURE — 80061 LIPID PANEL: CPT | Performed by: FAMILY MEDICINE

## 2022-06-20 PROCEDURE — 80053 COMPREHEN METABOLIC PANEL: CPT | Performed by: FAMILY MEDICINE

## 2022-06-20 PROCEDURE — 82607 VITAMIN B-12: CPT | Performed by: FAMILY MEDICINE

## 2022-06-20 PROCEDURE — 82040 ASSAY OF SERUM ALBUMIN: CPT | Performed by: FAMILY MEDICINE

## 2022-06-20 PROCEDURE — G0103 PSA SCREENING: HCPCS | Performed by: FAMILY MEDICINE

## 2022-07-15 DIAGNOSIS — J41.0 SIMPLE CHRONIC BRONCHITIS (H): Primary | ICD-10-CM

## 2022-07-25 ENCOUNTER — OFFICE VISIT (OUTPATIENT)
Dept: NEUROLOGY | Facility: CLINIC | Age: 77
End: 2022-07-25
Payer: COMMERCIAL

## 2022-07-25 VITALS
SYSTOLIC BLOOD PRESSURE: 120 MMHG | DIASTOLIC BLOOD PRESSURE: 70 MMHG | HEART RATE: 61 BPM | HEIGHT: 71 IN | WEIGHT: 229 LBS | BODY MASS INDEX: 32.06 KG/M2

## 2022-07-25 DIAGNOSIS — G62.9 PERIPHERAL POLYNEUROPATHY: ICD-10-CM

## 2022-07-25 DIAGNOSIS — G25.0 BENIGN FAMILIAL TREMOR: Primary | ICD-10-CM

## 2022-07-25 DIAGNOSIS — G60.3 IDIOPATHIC PROGRESSIVE POLYNEUROPATHY: ICD-10-CM

## 2022-07-25 DIAGNOSIS — G25.0 ESSENTIAL TREMOR: ICD-10-CM

## 2022-07-25 PROBLEM — R00.1 SYMPTOMATIC SINUS BRADYCARDIA: Status: RESOLVED | Noted: 2019-04-15 | Resolved: 2022-07-25

## 2022-07-25 PROBLEM — R55 SYNCOPE, UNSPECIFIED SYNCOPE TYPE: Status: RESOLVED | Noted: 2021-02-26 | Resolved: 2022-07-25

## 2022-07-25 PROBLEM — Z57.39 OCCUPATIONAL EXPOSURE TO OTHER AIR CONTAMINANTS: Status: RESOLVED | Noted: 2021-02-10 | Resolved: 2022-07-25

## 2022-07-25 PROBLEM — M16.10 OSTEOARTHROSIS, UNSPECIFIED WHETHER GENERALIZED OR LOCALIZED, PELVIC REGION AND THIGH: Status: RESOLVED | Noted: 2019-04-15 | Resolved: 2022-07-25

## 2022-07-25 PROBLEM — M13.0 POLYARTHROPATHY OR POLYARTHRITIS, HAND: Status: RESOLVED | Noted: 2019-04-15 | Resolved: 2022-07-25

## 2022-07-25 PROBLEM — I95.1 ORTHOSTATIC SYNCOPE: Status: RESOLVED | Noted: 2021-02-26 | Resolved: 2022-07-25

## 2022-07-25 PROBLEM — R25.9 ABNORMAL INVOLUNTARY MOVEMENT: Status: RESOLVED | Noted: 2021-02-26 | Resolved: 2022-07-25

## 2022-07-25 PROBLEM — M86.9 OSTEOMYELITIS OF ANKLE AND FOOT (H): Status: RESOLVED | Noted: 2020-02-18 | Resolved: 2022-07-25

## 2022-07-25 PROBLEM — R00.1 BRADYCARDIA: Status: RESOLVED | Noted: 2017-03-31 | Resolved: 2022-07-25

## 2022-07-25 PROBLEM — Z91.81 HISTORY OF FALL: Status: RESOLVED | Noted: 2021-02-10 | Resolved: 2022-07-25

## 2022-07-25 PROBLEM — L02.619 CELLULITIS AND ABSCESS OF FOOT: Status: RESOLVED | Noted: 2020-11-11 | Resolved: 2022-07-25

## 2022-07-25 PROBLEM — Z79.1 LONG TERM (CURRENT) USE OF NON-STEROIDAL ANTI-INFLAMMATORIES (NSAID): Status: RESOLVED | Noted: 2021-02-10 | Resolved: 2022-07-25

## 2022-07-25 PROBLEM — G60.0 HEREDITARY SENSORIMOTOR NEUROPATHY: Status: RESOLVED | Noted: 2021-02-26 | Resolved: 2022-07-25

## 2022-07-25 PROBLEM — L03.119 CELLULITIS AND ABSCESS OF FOOT: Status: RESOLVED | Noted: 2020-11-11 | Resolved: 2022-07-25

## 2022-07-25 PROBLEM — J18.9 HEALTHCARE-ASSOCIATED PNEUMONIA: Status: RESOLVED | Noted: 2021-02-26 | Resolved: 2022-07-25

## 2022-07-25 PROBLEM — L03.90 CELLULITIS: Status: RESOLVED | Noted: 2019-10-04 | Resolved: 2022-07-25

## 2022-07-25 PROBLEM — G89.29 OTHER CHRONIC PAIN: Status: RESOLVED | Noted: 2021-02-26 | Resolved: 2022-07-25

## 2022-07-25 PROBLEM — Z87.01 HISTORY OF PNEUMONIA: Status: RESOLVED | Noted: 2021-02-10 | Resolved: 2022-07-25

## 2022-07-25 PROBLEM — I73.9 PAD (PERIPHERAL ARTERY DISEASE) (H): Status: ACTIVE | Noted: 2022-01-31

## 2022-07-25 PROBLEM — T79.6XXA TRAUMATIC RHABDOMYOLYSIS (H): Status: RESOLVED | Noted: 2019-10-06 | Resolved: 2022-07-25

## 2022-07-25 PROBLEM — L97.511 SKIN ULCER OF RIGHT FOOT, LIMITED TO BREAKDOWN OF SKIN (H): Status: RESOLVED | Noted: 2020-02-14 | Resolved: 2022-07-25

## 2022-07-25 PROBLEM — R41.82 ALTERED MENTAL STATUS: Status: RESOLVED | Noted: 2020-07-29 | Resolved: 2022-07-25

## 2022-07-25 PROBLEM — I49.9 CARDIAC ARRHYTHMIA: Status: RESOLVED | Noted: 2019-02-12 | Resolved: 2022-07-25

## 2022-07-25 PROBLEM — I95.1 ORTHOSTATIC HYPOTENSION: Status: RESOLVED | Noted: 2021-02-26 | Resolved: 2022-07-25

## 2022-07-25 PROBLEM — I10 ESSENTIAL HYPERTENSION: Status: RESOLVED | Noted: 2019-04-15 | Resolved: 2022-07-25

## 2022-07-25 PROCEDURE — 99215 OFFICE O/P EST HI 40 MIN: CPT | Performed by: PSYCHIATRY & NEUROLOGY

## 2022-07-25 RX ORDER — DULOXETIN HYDROCHLORIDE 30 MG/1
30 CAPSULE, DELAYED RELEASE ORAL 2 TIMES DAILY
Qty: 180 CAPSULE | Refills: 3 | Status: SHIPPED | OUTPATIENT
Start: 2022-07-25 | End: 2023-08-16

## 2022-07-25 RX ORDER — TOPIRAMATE 25 MG/1
75 TABLET, FILM COATED ORAL 2 TIMES DAILY
Qty: 540 TABLET | Refills: 3 | Status: SHIPPED | OUTPATIENT
Start: 2022-07-25 | End: 2024-09-22

## 2022-07-25 RX ORDER — PRIMIDONE 50 MG/1
TABLET ORAL
Qty: 450 TABLET | Refills: 3 | Status: SHIPPED | OUTPATIENT
Start: 2022-07-25 | End: 2022-12-27

## 2022-07-25 RX ORDER — PREGABALIN 200 MG/1
200 CAPSULE ORAL 3 TIMES DAILY
Qty: 270 CAPSULE | Refills: 3 | Status: SHIPPED | OUTPATIENT
Start: 2022-07-25 | End: 2022-12-27

## 2022-07-25 NOTE — PATIENT INSTRUCTIONS
Non pharmacological treatment for tremors include using weighted utensils. Essential tremor website has many devices and options listed that are very helpful.  Patients get good control of their upper limb high amplitude tremor with Calatrio (https://calatrio.com) or Readi Steadi glove ([email or web address]://[email or web address].[employer].com).

## 2022-07-25 NOTE — PROGRESS NOTES
NEUROLOGY FOLLOW UP VISIT  NOTE       [facility] NEUROLOGY [place]  [address] [place] [address]  Tel: [phone]  Fax: [phone]  [email or web address]     Romaine Farah,  1945, MRN [id number]  PCP: Simon Schmidt  Date: 2022      ASSESSMENT & PLAN     Visit Diagnosis  1. Benign familial tremor  2. Idiopathic progressive polyneuropathy     Benign familial tremor  77-year-old male with history of sick sinus syndrome s/p pacemaker placement, COPD, PAD, chronic pain syndrome, SNHL who is been followed in our clinic for essential tremor and idiopathic Progressive polyneuropathy.  In the past he went to [facility] but opted against deep brain stimulation.  I have recommended:    1.  Increase primidone to 100 mg in the morning and 150 mg at bedtime  2.  Continue Topamax 75 mg twice daily  3.  Check comprehensive metabolic panel  4.  Non pharmacological treatment for tremors include using weighted utensils. Essential tremor website has many devices and options listed that are very helpful.  Patients get good control of their upper limb high amplitude tremor with Calatrio (https://calatrio.com) or Readi [employer] Systemsadi glove (https://www.[employer]Jolt).  Large amplitude tremor tends to benefit more by wearing such devices.  5.  Follow-up will be in 1 year    Idiopathic progressive polyneuropathy  77-year-old male with history of sick sinus syndrome s/p pacemaker placement, COPD, PAD, chronic pain syndrome, SNHL who is been followed in our clinic for essential tremor and idiopathic progressive polyneuropathy over the last 30 years.  He had extensive work-up in the past that was unremarkable.  He is being followed at the pain clinic and currently on methadone in addition to duloxetine and Lyrica.  He inquired about medicinal marijuana and I have informed him that I do not prescribe_marijuana.  I have recommended:    1.  Continue Lyrica 200 mg 3 times daily  2.   Continue duloxetine 30 mg 2 times daily  3.  Patient is being followed in the pain clinic and currently is on methadone  4.  Follow-up in 1 year    Thank you again for this referral, please feel free to contact me if you have any questions.    Sushant Rankin MD  [facility]  (Former[facility], [facility]     HISTORY OF PRESENT ILLNESS     Patient is 77-year-old male with history of sick sinus syndrome s/p pacemaker placement, COPD, PAD, chronic pain syndrome, SNHL who is been followed in our clinic for essential tremor and polyneuropathy.  Previously he was followed in our clinic by Dr. Aryan Epperson and is a new patient for me.    According to patient he developed tremors in his early 40s.  He started experiencing bilateral intention tremor and had problems with ADLs such as using tools, playing cards or board games.  He did report some balance difficulty and was started on primidone but did not tolerate a higher dose above 250 mg twice daily.  In addition, he is on Topamax.  He was referred to [facility] for deep brain stimulation but after meeting with provider at [facility] he decided against deep brain stimulation.  Currently he is on primidone and Topamax.  He is quite bothered by his symptoms and is wondering if there is anything more can be done.    He also has history of idiopathic peripheral neuropathy for more than 30 years.  He has a family history of neuropathy with his sister who has both neuropathy and tremors.  To manage his neuropathy is on  Lyrica, methadone, duloxetine.  This is being prescribed by pain clinic.  EMG done on 12/3/2007 suggested length dependent axonal polyneuropathy.  A thorough work-up for underlying causes of neuropathy was unrevealing.  He goes through phases during which his peripheral neuropathy symptoms are unbearable.  He is wondering if he is a candidate for medicinal marijuana.     PROBLEM LIST    Patient Active Problem List   Diagnosis Code     Cardiac pacemaker in situ Z95.0     Controlled substance agreement signed Z79.899     Benign familial tremor G25.0     History of asbestos exposure Z77.090     Osteoarthrosis M19.90     Idiopathic progressive polyneuropathy G60.3     Sick sinus syndrome (H) I49.5     Impotence of organic origin N52.9     Absence of toe (H) Z89.429     Chronic fatigue syndrome R53.82     Chronic obstructive pulmonary disease (H) J44.9     Hammer toe of right foot M20.41     Hearing loss H91.90     Long term current use of systemic steroids Z79.52     Memory deficit R41.3     Obesity E66.9     Opioid dependence (H) F11.20     Vitamin B deficiency E53.9     PAD (peripheral artery disease) (H) I73.9         PAST MEDICAL & SURGICAL HISTORY     Past Medical History:   Patient  has a past medical history of Bradycardia, Chronic pain, COPD (chronic obstructive pulmonary disease) (H), Erectile dysfunction, Essential tremor, H/O asbestos exposure, History of repair of hip joint (1/1/2014), Hypertension, Hypertension, Neuropathy, Osteomyelitis (H), and 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    Surgical History:  He  has a past surgical history that includes hip surgery (Right); Pacemaker/ICD check (2019); Eye surgery (Bilateral); Total Hip Arthroplasty (Right, 2/2/2015); Insert / Replace / Remove Pacemaker (02/12/2019); Ep Pacemaker Insert (N/A, 2/12/2019); Amputate toe(s) (Left, 2/20/2020); and Amputate toe(s) (Right, 7/7/2020).     SOCIAL HISTORY     Reviewed, and he  reports that he has quit smoking. His smoking use included cigars, cigarillos or filtered cigars. He has never used smokeless tobacco. He reports previous alcohol use. He reports that he does not use drugs.     FAMILY HISTORY     Reviewed, and family history includes Alcoholism in his father; Arthritis in his brother; Chronic Obstructive Pulmonary Disease in his mother; Diabetes in his mother; Esophageal Cancer in his  "father; LUNG DISEASE in his mother; Lupus in his daughter; Neuropathy in his sister; Other - See Comments in his brother and brother; Thyroid Disease in his daughter; Tremor in his daughter, father, sister, and sister.     ALLERGIES     Allergies   Allergen Reactions     Hydroxyzine      The Hcl formulation caused side effects; tolerated pamoate     Propranolol      Side effects: cloudy thinking, felt \"off\"         REVIEW OF SYSTEMS     A 12 point review of system was performed and was negative except as outlined in the history of present illness.     HOME MEDICATIONS     Current Outpatient Rx   Medication Sig Dispense Refill     calcium carbonate-vitamin D (OSCAL W/D) 500-200 MG-UNIT tablet Take 1 tablet by mouth daily        DULoxetine (CYMBALTA) 30 MG capsule Take 1 capsule (30 mg) by mouth 2 times daily 180 capsule 3     ibuprofen (ADVIL/MOTRIN) 200 MG tablet Take 1 tablet (200 mg) by mouth every 8 hours as needed for mild pain       levocetirizine (XYZAL) 5 MG tablet Take 5 mg by mouth as needed for allergies       magnesium 250 MG tablet Take 1 tablet by mouth 3 times daily       methadone (DOLOPHINE) 10 MG tablet 1 pill by mouth daily @ 9am, 1 pill @ 1pm, 1 pill @ 5pm and 1 pill @ 10/11pm   - 4 pills per day 1 tablet 0     Omega-3 Fatty Acids (FISH OIL BURP-LESS) 1000 MG CAPS 2 by mouth daily in morning @ 9am       Pregabalin (LYRICA) 200 MG capsule Take 1 capsule (200 mg) by mouth 3 times daily 270 capsule 3     primidone (MYSOLINE) 50 MG tablet Take 2 tablets in the morning and 3 tablets at bedtime 450 tablet 3     topiramate (TOPAMAX) 25 MG tablet Take 3 tablets (75 mg) by mouth 2 times daily 540 tablet 3     TRELEGY ELLIPTA 100-62.5-25 MCG/INH oral inhaler Inhale 1 Inhalation daily. 60 each 11     vitamin (B COMPLEX) tablet Take 1 tablet by mouth daily @ 9am       vitamin C (ASCORBIC ACID) 100 MG tablet Take 100 mg by mouth daily        Vitamin D3 (CHOLECALCIFEROL) 25 mcg (1000 units) tablet Take 1 tablet " "by mouth daily        clobetasol (TEMOVATE) 0.05 % external ointment Apply topically 2 times daily As needed 60 g 11     fluocinonide (LIDEX) 0.05 % external cream Apply topically 2 times daily As needed       lidocaine (XYLOCAINE) 2 % external gel Apply topically daily To feet           PHYSICAL EXAM     Vital signs  /70 (BP Location: Left arm, Patient Position: Sitting)   Pulse 61   Ht 1.803 m (5' 11\")   Wt 103.9 kg (229 lb)   BMI 31.94 kg/m      Weight:   229 lbs 0 oz    Elderly gentleman who is alert and oriented vital signs are reviewed and are documented in electronic medical record.  Neck supple.  Neurologically speech was normal.  Cranial nerves are intact he has bilateral intention tremor with no cogwheel rigidity strength is 5/5.  Reflexes trace positive in upper extremity absent in the lower extremity.  He has dysmetria on finger-nose testing.  He has decreased light touch pinprick vibratory and temperature sensation mid thighs down.  He has a wide-based gait     PERTINENT DIAGNOSTIC STUDIES     Following studies were reviewed:     EMG 12/3/2007  Length dependent mixed sensorimotor predominantly axonal polyneuropathy     PERTINENT LABS  Following labs were reviewed:  Lab Requisition on 06/20/2022   Component Date Value     Sodium 06/20/2022 139      Potassium 06/20/2022 4.2      Chloride 06/20/2022 105      Carbon Dioxide (CO2) 06/20/2022 25      Anion Gap 06/20/2022 9      Urea Nitrogen 06/20/2022 22      Creatinine 06/20/2022 1.14      Calcium 06/20/2022 8.9      Glucose 06/20/2022 109      Alkaline Phosphatase 06/20/2022 66      AST 06/20/2022 20      ALT 06/20/2022 18      Protein Total 06/20/2022 6.9      Albumin 06/20/2022 3.5      Bilirubin Total 06/20/2022 0.3      GFR Estimate 06/20/2022 67      Cholesterol 06/20/2022 184      Triglycerides 06/20/2022 115      Direct Measure HDL 06/20/2022 40      LDL Cholesterol Calculat* 06/20/2022 121      Prostate Specific Antige* 06/20/2022 0.38  "     Vitamin B12 06/20/2022 364          Total time spent for face to face visit, reviewing labs/imaging studies, counseling and coordination of care was: 45 Minutes spent on the date of the encounter doing chart review, review of outside records, review of test results, interpretation of tests, patient visit and documentation       This note was dictated using voice recognition software.  Any grammatical or context distortions are unintentional and inherent to the software.    Orders Placed This Encounter   Procedures     Comprehensive metabolic panel      New Prescriptions    No medications on file     Modified Medications    Modified Medication Previous Medication    DULOXETINE (CYMBALTA) 30 MG CAPSULE DULoxetine (CYMBALTA) 30 MG capsule       Take 1 capsule (30 mg) by mouth 2 times daily    Take 1 capsule (30 mg) by mouth 2 times daily.    PREGABALIN (LYRICA) 200 MG CAPSULE Pregabalin (LYRICA) 200 MG capsule       Take 1 capsule (200 mg) by mouth 3 times daily    Take 1 capsule (200 mg) by mouth 3 times daily    PRIMIDONE (MYSOLINE) 50 MG TABLET primidone (MYSOLINE) 50 MG tablet       Take 2 tablets in the morning and 3 tablets at bedtime    Take 2 tablets (100 mg) by mouth 2 times daily. PLEASE CALL TO SCHEDULE FOLLOW UP APPT    TOPIRAMATE (TOPAMAX) 25 MG TABLET topiramate (TOPAMAX) 25 MG tablet       Take 3 tablets (75 mg) by mouth 2 times daily    Take 3 tablets by mouth in the morning and 3 tablets in the evening.

## 2022-07-25 NOTE — NURSING NOTE
Chief Complaint   Patient presents with     Tremors     BUE tremors      NEUROPATHY     BLE numbness and tingling      Zonia Carbone CMA on 7/25/2022 at 12:10 PM

## 2022-07-25 NOTE — LETTER
2022         RE: Romaine Farah  [address] [place] [id number]        Dear Colleague,    Thank you for referring your patient, Romaine Farah, to the [facility] [place]. Please see a copy of my visit note below.    NEUROLOGY FOLLOW UP VISIT  NOTE       [facility]UROLOG[facility] [place]  [address] [place] [address]  Tel: [phone]  Fax: [phone]  [email or web address]     Romaine Farah,  1945, MRN [id number]  PCP: Simon Schmidt  Date: 2022      ASSESSMENT & PLAN     Visit Diagnosis  1. Benign familial tremor  2. Idiopathic progressive polyneuropathy     Benign familial tremor  77-year-old male with history of sick sinus syndrome s/p pacemaker placement, COPD, PAD, chronic pain syndrome, SNHL who is been followed in our clinic for essential tremor and idiopathic Progressive polyneuropathy.  In the past he went to [facility] but opted against deep brain stimulation.  I have recommended:    1.  Increase primidone to 100 mg in the morning and 150 mg at bedtime  2.  Continue Topamax 75 mg twice daily  3.  Check comprehensive metabolic panel  4.  Non pharmacological treatment for tremors include using weighted utensils. Essential tremor website has many devices and options listed that are very helpful.  Patients get good control of their upper limb high amplitude tremor with Calatrio (https://calatrio.com) or Readi Steadi glove (https://www.readi.[employer].com).  Large amplitude tremor tends to benefit more by wearing such devices.  5.  Follow-up will be in 1 year    Idiopathic progressive polyneuropathy  77-year-old male with history of sick sinus syndrome s/p pacemaker placement, COPD, PAD, chronic pain syndrome, SNHL who is been followed in our clinic for essential tremor and idiopathic progressive polyneuropathy over the last 30 years.  He had extensive work-up in the past that was unremarkable.  He is being  followed at the pain clinic and currently on methadone in addition to duloxetine and Lyrica.  He inquired about medicinal marijuana and I have informed him that I do not prescribe_marijuana.  I have recommended:    1.  Continue Lyrica 200 mg 3 times daily  2.  Continue duloxetine 30 mg 2 times daily  3.  Patient is being followed in the pain clinic and currently is on methadone  4.  Follow-up in 1 year    Thank you again for this referral, please feel free to contact me if you have any questions.    Sushant Rankin MD  [facility]  ([facility], [facility]     HISTORY OF PRESENT ILLNESS     Patient is 77-year-old male with history of sick sinus syndrome s/p pacemaker placement, COPD, PAD, chronic pain syndrome, SNHL who is been followed in our clinic for essential tremor and polyneuropathy.  Previously he was followed in our clinic by Dr. Aryan Epperson and is a new patient for me.    According to patient he developed tremors in his early 40s.  He started experiencing bilateral intention tremor and had problems with ADLs such as using tools, playing cards or board games.  He did report some balance difficulty and was started on primidone but did not tolerate a higher dose above 250 mg twice daily.  In addition, he is on Topamax.  He was referred to [facility] for deep brain stimulation but after meeting with provider at [facility] he decided against deep brain stimulation.  Currently he is on primidone and Topamax.  He is quite bothered by his symptoms and is wondering if there is anything more can be done.    He also has history of idiopathic peripheral neuropathy for more than 30 years.  He has a family history of neuropathy with his sister who has both neuropathy and tremors.  To manage his neuropathy is on  Lyrica, methadone, duloxetine.  This is being prescribed by pain clinic.  EMG done on 12/3/2007 suggested length dependent  axonal polyneuropathy.  A thorough work-up for underlying causes of neuropathy was unrevealing.  He goes through phases during which his peripheral neuropathy symptoms are unbearable.  He is wondering if he is a candidate for medicinal marijuana.     PROBLEM LIST   Patient Active Problem List   Diagnosis Code     Cardiac pacemaker in situ Z95.0     Controlled substance agreement signed Z79.899     Benign familial tremor G25.0     History of asbestos exposure Z77.090     Osteoarthrosis M19.90     Idiopathic progressive polyneuropathy G60.3     Sick sinus syndrome (H) I49.5     Impotence of organic origin N52.9     Absence of toe (H) Z89.429     Chronic fatigue syndrome R53.82     Chronic obstructive pulmonary disease (H) J44.9     Hammer toe of right foot M20.41     Hearing loss H91.90     Long term current use of systemic steroids Z79.52     Memory deficit R41.3     Obesity E66.9     Opioid dependence (H) F11.20     Vitamin B deficiency E53.9     PAD (peripheral artery disease) (H) I73.9         PAST MEDICAL & SURGICAL HISTORY     Past Medical History:   Patient  has a past medical history of Bradycardia, Chronic pain, COPD (chronic obstructive pulmonary disease) (H), Erectile dysfunction, Essential tremor, H/O asbestos exposure, History of repair of hip joint (1/1/2014), Hypertension, Hypertension, Neuropathy, Osteomyelitis (H), and 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    Surgical History:  He  has a past surgical history that includes hip surgery (Right); Pacemaker/ICD check (2019); Eye surgery (Bilateral); Total Hip Arthroplasty (Right, 2/2/2015); Insert / Replace / Remove Pacemaker (02/12/2019); Ep Pacemaker Insert (N/A, 2/12/2019); Amputate toe(s) (Left, 2/20/2020); and Amputate toe(s) (Right, 7/7/2020).     SOCIAL HISTORY     Reviewed, and he  reports that he has quit smoking. His smoking use included cigars, cigarillos or filtered cigars. He has never used smokeless tobacco. He reports previous  "alcohol use. He reports that he does not use drugs.     FAMILY HISTORY     Reviewed, and family history includes Alcoholism in his father; Arthritis in his brother; Chronic Obstructive Pulmonary Disease in his mother; Diabetes in his mother; Esophageal Cancer in his father; LUNG DISEASE in his mother; Lupus in his daughter; Neuropathy in his sister; Other - See Comments in his brother and brother; Thyroid Disease in his daughter; Tremor in his daughter, father, sister, and sister.     ALLERGIES     Allergies   Allergen Reactions     Hydroxyzine      The Hcl formulation caused side effects; tolerated pamoate     Propranolol      Side effects: cloudy thinking, felt \"off\"         REVIEW OF SYSTEMS     A 12 point review of system was performed and was negative except as outlined in the history of present illness.     HOME MEDICATIONS     Current Outpatient Rx   Medication Sig Dispense Refill     calcium carbonate-vitamin D (OSCAL W/D) 500-200 MG-UNIT tablet Take 1 tablet by mouth daily        DULoxetine (CYMBALTA) 30 MG capsule Take 1 capsule (30 mg) by mouth 2 times daily 180 capsule 3     ibuprofen (ADVIL/MOTRIN) 200 MG tablet Take 1 tablet (200 mg) by mouth every 8 hours as needed for mild pain       levocetirizine (XYZAL) 5 MG tablet Take 5 mg by mouth as needed for allergies       magnesium 250 MG tablet Take 1 tablet by mouth 3 times daily       methadone (DOLOPHINE) 10 MG tablet 1 pill by mouth daily @ 9am, 1 pill @ 1pm, 1 pill @ 5pm and 1 pill @ 10/11pm   - 4 pills per day 1 tablet 0     Omega-3 Fatty Acids (FISH OIL BURP-LESS) 1000 MG CAPS 2 by mouth daily in morning @ 9am       Pregabalin (LYRICA) 200 MG capsule Take 1 capsule (200 mg) by mouth 3 times daily 270 capsule 3     primidone (MYSOLINE) 50 MG tablet Take 2 tablets in the morning and 3 tablets at bedtime 450 tablet 3     topiramate (TOPAMAX) 25 MG tablet Take 3 tablets (75 mg) by mouth 2 times daily 540 tablet 3     TRELEGY ELLIPTA 100-62.5-25 MCG/INH " "oral inhaler Inhale 1 Inhalation daily. 60 each 11     vitamin (B COMPLEX) tablet Take 1 tablet by mouth daily @ 9am       vitamin C (ASCORBIC ACID) 100 MG tablet Take 100 mg by mouth daily        Vitamin D3 (CHOLECALCIFEROL) 25 mcg (1000 units) tablet Take 1 tablet by mouth daily        clobetasol (TEMOVATE) 0.05 % external ointment Apply topically 2 times daily As needed 60 g 11     fluocinonide (LIDEX) 0.05 % external cream Apply topically 2 times daily As needed       lidocaine (XYLOCAINE) 2 % external gel Apply topically daily To feet           PHYSICAL EXAM     Vital signs  /70 (BP Location: Left arm, Patient Position: Sitting)   Pulse 61   Ht 1.803 m (5' 11\")   Wt 103.9 kg (229 lb)   BMI 31.94 kg/m      Weight:   229 lbs 0 oz    Elderly gentleman who is alert and oriented vital signs are reviewed and are documented in electronic medical record.  Neck supple.  Neurologically speech was normal.  Cranial nerves are intact he has bilateral intention tremor with no cogwheel rigidity strength is 5/5.  Reflexes trace positive in upper extremity absent in the lower extremity.  He has dysmetria on finger-nose testing.  He has decreased light touch pinprick vibratory and temperature sensation mid thighs down.  He has a wide-based gait     PERTINENT DIAGNOSTIC STUDIES     Following studies were reviewed:     EMG 12/3/2007  Length dependent mixed sensorimotor predominantly axonal polyneuropathy     PERTINENT LABS  Following labs were reviewed:  Lab Requisition on 06/20/2022   Component Date Value     Sodium 06/20/2022 139      Potassium 06/20/2022 4.2      Chloride 06/20/2022 105      Carbon Dioxide (CO2) 06/20/2022 25      Anion Gap 06/20/2022 9      Urea Nitrogen 06/20/2022 22      Creatinine 06/20/2022 1.14      Calcium 06/20/2022 8.9      Glucose 06/20/2022 109      Alkaline Phosphatase 06/20/2022 66      AST 06/20/2022 20      ALT 06/20/2022 18      Protein Total 06/20/2022 6.9      Albumin 06/20/2022 3.5  "     Bilirubin Total 06/20/2022 0.3      GFR Estimate 06/20/2022 67      Cholesterol 06/20/2022 184      Triglycerides 06/20/2022 115      Direct Measure HDL 06/20/2022 40      LDL Cholesterol Calculat* 06/20/2022 121      Prostate Specific Antige* 06/20/2022 0.38      Vitamin B12 06/20/2022 364          Total time spent for face to face visit, reviewing labs/imaging studies, counseling and coordination of care was: 45 Minutes spent on the date of the encounter doing chart review, review of outside records, review of test results, interpretation of tests, patient visit and documentation       This note was dictated using voice recognition software.  Any grammatical or context distortions are unintentional and inherent to the software.    Orders Placed This Encounter   Procedures     Comprehensive metabolic panel      New Prescriptions    No medications on file     Modified Medications    Modified Medication Previous Medication    DULOXETINE (CYMBALTA) 30 MG CAPSULE DULoxetine (CYMBALTA) 30 MG capsule       Take 1 capsule (30 mg) by mouth 2 times daily    Take 1 capsule (30 mg) by mouth 2 times daily.    PREGABALIN (LYRICA) 200 MG CAPSULE Pregabalin (LYRICA) 200 MG capsule       Take 1 capsule (200 mg) by mouth 3 times daily    Take 1 capsule (200 mg) by mouth 3 times daily    PRIMIDONE (MYSOLINE) 50 MG TABLET primidone (MYSOLINE) 50 MG tablet       Take 2 tablets in the morning and 3 tablets at bedtime    Take 2 tablets (100 mg) by mouth 2 times daily. PLEASE CALL TO SCHEDULE FOLLOW UP APPT    TOPIRAMATE (TOPAMAX) 25 MG TABLET topiramate (TOPAMAX) 25 MG tablet       Take 3 tablets (75 mg) by mouth 2 times daily    Take 3 tablets by mouth in the morning and 3 tablets in the evening.                     Again, thank you for allowing me to participate in the care of your patient.        Sincerely,        Sushant Rankin MD

## 2022-08-04 ENCOUNTER — TELEPHONE (OUTPATIENT)
Dept: NEUROLOGY | Facility: CLINIC | Age: 77
End: 2022-08-04

## 2022-08-04 ENCOUNTER — TELEPHONE (OUTPATIENT)
Dept: MULTI SPECIALTY CLINIC | Facility: CLINIC | Age: 77
End: 2022-08-04

## 2022-08-04 DIAGNOSIS — J44.1 CHRONIC OBSTRUCTIVE PULMONARY DISEASE WITH ACUTE EXACERBATION (H): Primary | ICD-10-CM

## 2022-08-04 RX ORDER — AZITHROMYCIN 250 MG/1
TABLET, FILM COATED ORAL
Qty: 6 TABLET | Refills: 0 | Status: SHIPPED | OUTPATIENT
Start: 2022-08-04 | End: 2022-08-09

## 2022-08-04 RX ORDER — PREDNISONE 20 MG/1
20 TABLET ORAL DAILY
Qty: 5 TABLET | Refills: 0 | Status: SHIPPED | OUTPATIENT
Start: 2022-08-04 | End: 2022-12-26

## 2022-08-04 NOTE — TELEPHONE ENCOUNTER
[facility]    Phone Message    May a detailed message be left on voicemail: yes     Reason for Call: Other: Pt's wife Archana is calling because Dr. Rankin put a lab in for pt. Pt wants to do the lab closer to where he lives. Archana would like Dr. Rankin to send the lab order to [facility] in [facility]. [facility] Ph: [phone] Please call Archana or pt once orders have been sent.     Action Taken: Message routed to:  Other: [facility]    Travel Screening: Not Applicable

## 2022-08-04 NOTE — TELEPHONE ENCOUNTER
Brief Telephone Note         8/4/22    Patient's wife called at approximately 5 PM to explain that patient was developing symptoms of a COPD exacerbation over the past 24 hours.  She notes the patient follows with Dr. Dejesus, and has an appointment scheduled for August 26 for routine follow-up.  Patient's wife mentions that he has an action plan (confirmed in previous notes by Dr. Dejesus), but does not have prednisone and azithromycin available at this time.  Will prescribe a 5-day course of prednisone and azithromycin regimen for patient COPD exacerbation; red flag symptoms were discussed, and patient's wife was instructed to give the pulmonary service line to call back if symptoms worsen over the subsequent days.    Plan:  -Prednisone 20 mg daily x5 days (per patient's action plan)  -Azithromycin 500 mg x 1 day, followed by 250 mg daily for 4d w/ total duration of treatment of 5 days      Ray Combs MD  Pulmonary/[facility] [place]

## 2022-08-04 NOTE — TELEPHONE ENCOUNTER
Confirmed with Archana to send lab to [facility]- faxed to [phone]  Zonia Carbone CMA on 8/4/2022 at 2:52 PM

## 2022-08-15 ENCOUNTER — LAB REQUISITION (OUTPATIENT)
Dept: LAB | Facility: CLINIC | Age: 77
End: 2022-08-15

## 2022-08-15 DIAGNOSIS — G60.3 IDIOPATHIC PROGRESSIVE NEUROPATHY: ICD-10-CM

## 2022-08-15 LAB
ALBUMIN SERPL BCG-MCNC: 4 G/DL (ref 3.5–5.2)
ALP SERPL-CCNC: 67 U/L (ref 40–129)
ALT SERPL W P-5'-P-CCNC: 21 U/L (ref 10–50)
ANION GAP SERPL CALCULATED.3IONS-SCNC: 9 MMOL/L (ref 7–15)
AST SERPL W P-5'-P-CCNC: 21 U/L (ref 10–50)
BILIRUB SERPL-MCNC: 0.3 MG/DL
BUN SERPL-MCNC: 20.4 MG/DL (ref 8–23)
CALCIUM SERPL-MCNC: 9.2 MG/DL (ref 8.8–10.2)
CHLORIDE SERPL-SCNC: 102 MMOL/L (ref 98–107)
CREAT SERPL-MCNC: 1.22 MG/DL (ref 0.67–1.17)
DEPRECATED HCO3 PLAS-SCNC: 29 MMOL/L (ref 22–29)
GFR SERPL CREATININE-BSD FRML MDRD: 61 ML/MIN/1.73M2
GLUCOSE SERPL-MCNC: 83 MG/DL (ref 70–99)
POTASSIUM SERPL-SCNC: 4.8 MMOL/L (ref 3.4–5.3)
PROT SERPL-MCNC: 6.6 G/DL (ref 6.4–8.3)
SODIUM SERPL-SCNC: 140 MMOL/L (ref 136–145)

## 2022-08-15 PROCEDURE — 80053 COMPREHEN METABOLIC PANEL: CPT | Performed by: FAMILY MEDICINE

## 2022-08-26 ENCOUNTER — OFFICE VISIT (OUTPATIENT)
Dept: PULMONOLOGY | Facility: OTHER | Age: 77
End: 2022-08-26
Payer: COMMERCIAL

## 2022-08-26 VITALS
DIASTOLIC BLOOD PRESSURE: 83 MMHG | OXYGEN SATURATION: 93 % | SYSTOLIC BLOOD PRESSURE: 133 MMHG | BODY MASS INDEX: 32.4 KG/M2 | HEART RATE: 60 BPM | WEIGHT: 232.3 LBS

## 2022-08-26 DIAGNOSIS — J41.0 SIMPLE CHRONIC BRONCHITIS (H): Primary | ICD-10-CM

## 2022-08-26 DIAGNOSIS — R06.09 DYSPNEA ON EXERTION: ICD-10-CM

## 2022-08-26 PROCEDURE — 99214 OFFICE O/P EST MOD 30 MIN: CPT | Performed by: INTERNAL MEDICINE

## 2022-08-26 RX ORDER — NYSTATIN AND TRIAMCINOLONE ACETONIDE 100000; 1 [USP'U]/G; MG/G
CREAM TOPICAL
COMMUNITY
Start: 2022-08-22 | End: 2022-12-26

## 2022-08-26 RX ORDER — PREDNISONE 20 MG/1
TABLET ORAL
Qty: 13 TABLET | Refills: 3 | Status: SHIPPED | OUTPATIENT
Start: 2022-08-26 | End: 2022-12-26

## 2022-08-26 RX ORDER — MUPIROCIN 20 MG/G
OINTMENT TOPICAL
COMMUNITY
Start: 2022-07-25 | End: 2022-12-26

## 2022-08-26 RX ORDER — AZITHROMYCIN 250 MG/1
TABLET, FILM COATED ORAL
Qty: 6 TABLET | Refills: 3 | Status: SHIPPED | OUTPATIENT
Start: 2022-08-26 | End: 2022-08-31

## 2022-08-26 NOTE — PROGRESS NOTES
- Increase to 200 mcg.  He needs to swish and pulmonary progress note    Assessment and plan: 77-year-old man with history of chronic bronchitis, chronic pain on multiple sedating medications here for follow-up.  He has problems with low energy, low motivation.  His shortness of breath is likely multifactorial-lung disease, deconditioning, possibly partially related to sedation from his medications.    He is on multiple medications for his neuropathy that will affect his mental status.  I explained that possibly there is more sedative effects when he is sick.  I have asked him to follow-up with his primary care doctor about his falls as well.    Plan  Continue Trelegy- changed to 200 dose.  We discussed gargling and rinsing after use  Continue action plan.  He has this available will increase his action plan to 40 mg for 3 days then 20 mg for 7 days.  Graded exercise plan with slow increase of physical activity-we discussed this specifically today with very clear recommendations and he has plans to follow-up.    Chief complaint: Shortness of breath    HPI: 77-year-old man with chronic bronchitis, chronic pain here for follow-up.  He has been having these episodes where he gets a little bit wheezy and eventually becomes a little bit hazy mentally.  He often has falls at this point.  His wife reports that this gets much better with prednisone.      Medications and history reviewed.    /83 (BP Location: Right arm)   Pulse 60   Wt 105.4 kg (232 lb 4.8 oz)   SpO2 93%   BMI 32.40 kg/m    In good spirits, calm  Neck supple  Chest clear without crackles or wheezes  Normal cardiac exam  Normal abdominal exam  No tremor    Follow-up in 1 year

## 2022-08-29 NOTE — ANESTHESIA CARE TRANSFER NOTE
Last vitals:   Vitals:    02/20/20 1435   BP: 122/70   Pulse: 60   Resp: 16   Temp: 36.6  C (97.9  F)   SpO2: 99%     Patient's level of consciousness is awake  Spontaneous respirations: yes  Maintains airway independently: yes  Dentition unchanged: yes  Oropharynx: oropharynx clear of all foreign objects    QCDR Measures:  ASA# 20 - Surgical Safety Checklist: WHO surgical safety checklist completed prior to induction    PQRS# 430 - Adult PONV Prevention: 4558F - Pt received => 2 anti-emetic agents (different classes) preop & intraop  ASA# 8 - Peds PONV Prevention: NA - Not pediatric patient, not GA or 2 or more risk factors NOT present  PQRS# 424 - Karen-op Temp Management: 4559F - At least one body temp DOCUMENTED => 35.5C or 95.9F within required timeframe  PQRS# 426 - PACU Transfer Protocol: - Transfer of care checklist used  ASA# 14 - Acute Post-op Pain: ASA14B - Patient did NOT experience pain >= 7 out of 10   Wound Care: Petrolatum

## 2022-09-03 ENCOUNTER — HEALTH MAINTENANCE LETTER (OUTPATIENT)
Age: 77
End: 2022-09-03

## 2022-11-11 ENCOUNTER — LAB REQUISITION (OUTPATIENT)
Dept: LAB | Facility: CLINIC | Age: 77
End: 2022-11-11

## 2022-11-11 DIAGNOSIS — I10 ESSENTIAL (PRIMARY) HYPERTENSION: ICD-10-CM

## 2022-11-11 DIAGNOSIS — G25.0 ESSENTIAL TREMOR: ICD-10-CM

## 2022-11-11 LAB
ERYTHROCYTE [DISTWIDTH] IN BLOOD BY AUTOMATED COUNT: 14.3 % (ref 10–15)
HCT VFR BLD AUTO: 51.1 % (ref 40–53)
HGB BLD-MCNC: 16 G/DL (ref 13.3–17.7)
MCH RBC QN AUTO: 28.6 PG (ref 26.5–33)
MCHC RBC AUTO-ENTMCNC: 31.3 G/DL (ref 31.5–36.5)
MCV RBC AUTO: 91 FL (ref 78–100)
PLATELET # BLD AUTO: 225 10E3/UL (ref 150–450)
RBC # BLD AUTO: 5.6 10E6/UL (ref 4.4–5.9)
WBC # BLD AUTO: 8.2 10E3/UL (ref 4–11)

## 2022-11-11 PROCEDURE — 85014 HEMATOCRIT: CPT | Performed by: FAMILY MEDICINE

## 2022-12-01 ENCOUNTER — TELEPHONE (OUTPATIENT)
Dept: PULMONOLOGY | Facility: CLINIC | Age: 77
End: 2022-12-01

## 2022-12-01 DIAGNOSIS — J44.1 COPD EXACERBATION (H): Primary | ICD-10-CM

## 2022-12-01 RX ORDER — OSELTAMIVIR PHOSPHATE 75 MG/1
75 CAPSULE ORAL 2 TIMES DAILY
Qty: 10 CAPSULE | Refills: 0 | Status: SHIPPED | OUTPATIENT
Start: 2022-12-01 | End: 2022-12-06

## 2022-12-01 RX ORDER — PREDNISONE 20 MG/1
40 TABLET ORAL DAILY
Qty: 14 TABLET | Refills: 3 | Status: SHIPPED | OUTPATIENT
Start: 2022-12-01 | End: 2022-12-08

## 2022-12-01 NOTE — TELEPHONE ENCOUNTER
Reviewed with dr. Dejesus.  He sent in new prescriptions for prednisone 40mg daily through next Monday, augmentin, and tamiflu.    Was not able to reach Archana back by phone. Left detailed VM message with new instructions above from Dr. Dejesus.  Left my phone number to call back if any further questions.  Will [place] back with her tomorrow am as well to make sure she received the message.

## 2022-12-01 NOTE — TELEPHONE ENCOUNTER
Phone call from patient's wife, Archana.  States Rom has been using his action plan for 4 days now.  (prednisone 40 x 3 days, then 20mg x 7 days AND Zpack) States he sounds like his congestion is getting worse instead of better.    Asking if he needs something stronger?  Or should they just be patient and hope things kick in?

## 2022-12-05 ENCOUNTER — ANCILLARY PROCEDURE (OUTPATIENT)
Dept: CARDIOLOGY | Facility: CLINIC | Age: 77
End: 2022-12-05
Attending: INTERNAL MEDICINE
Payer: COMMERCIAL

## 2022-12-05 DIAGNOSIS — I49.5 SICK SINUS SYNDROME (H): ICD-10-CM

## 2022-12-05 DIAGNOSIS — Z95.0 PACEMAKER: ICD-10-CM

## 2022-12-05 PROCEDURE — 93296 REM INTERROG EVL PM/IDS: CPT | Performed by: INTERNAL MEDICINE

## 2022-12-05 PROCEDURE — 93294 REM INTERROG EVL PM/LDLS PM: CPT | Performed by: INTERNAL MEDICINE

## 2022-12-08 DIAGNOSIS — I49.5 SICK SINUS SYNDROME (H): ICD-10-CM

## 2022-12-08 DIAGNOSIS — Z95.0 CARDIAC PACEMAKER IN SITU: Primary | ICD-10-CM

## 2022-12-14 ENCOUNTER — OFFICE VISIT (OUTPATIENT)
Dept: PULMONOLOGY | Facility: CLINIC | Age: 77
End: 2022-12-14
Payer: COMMERCIAL

## 2022-12-14 ENCOUNTER — TELEPHONE (OUTPATIENT)
Dept: PULMONOLOGY | Facility: CLINIC | Age: 77
End: 2022-12-14

## 2022-12-14 ENCOUNTER — LAB (OUTPATIENT)
Dept: LAB | Facility: HOSPITAL | Age: 77
End: 2022-12-14
Attending: NURSE PRACTITIONER
Payer: COMMERCIAL

## 2022-12-14 ENCOUNTER — HOSPITAL ENCOUNTER (OUTPATIENT)
Dept: RADIOLOGY | Facility: HOSPITAL | Age: 77
Discharge: HOME | End: 2022-12-14
Attending: NURSE PRACTITIONER
Payer: COMMERCIAL

## 2022-12-14 ENCOUNTER — TELEPHONE (OUTPATIENT)
Dept: PULMONOLOGY | Facility: OTHER | Age: 77
End: 2022-12-14

## 2022-12-14 VITALS
DIASTOLIC BLOOD PRESSURE: 75 MMHG | SYSTOLIC BLOOD PRESSURE: 128 MMHG | OXYGEN SATURATION: 94 % | HEART RATE: 60 BPM | WEIGHT: 221 LBS | BODY MASS INDEX: 30.82 KG/M2

## 2022-12-14 DIAGNOSIS — R53.83 LETHARGY: ICD-10-CM

## 2022-12-14 DIAGNOSIS — R05.1 ACUTE COUGH: ICD-10-CM

## 2022-12-14 DIAGNOSIS — J44.1 COPD EXACERBATION (H): ICD-10-CM

## 2022-12-14 DIAGNOSIS — J44.1 COPD EXACERBATION (H): Primary | ICD-10-CM

## 2022-12-14 LAB
ALBUMIN SERPL BCG-MCNC: 3.8 G/DL (ref 3.5–5.2)
ALP SERPL-CCNC: 62 U/L (ref 40–129)
ALT SERPL W P-5'-P-CCNC: 22 U/L (ref 10–50)
ANION GAP SERPL CALCULATED.3IONS-SCNC: 10 MMOL/L (ref 7–15)
AST SERPL W P-5'-P-CCNC: 17 U/L (ref 10–50)
BASE EXCESS BLDV CALC-SCNC: 1.8 MMOL/L
BASOPHILS # BLD AUTO: 0.1 10E3/UL (ref 0–0.2)
BASOPHILS NFR BLD AUTO: 1 %
BILIRUB SERPL-MCNC: 0.4 MG/DL
BUN SERPL-MCNC: 26.6 MG/DL (ref 8–23)
CALCIUM SERPL-MCNC: 9.1 MG/DL (ref 8.8–10.2)
CHLORIDE SERPL-SCNC: 103 MMOL/L (ref 98–107)
CREAT SERPL-MCNC: 1.25 MG/DL (ref 0.67–1.17)
DEPRECATED HCO3 PLAS-SCNC: 25 MMOL/L (ref 22–29)
EOSINOPHIL # BLD AUTO: 0.3 10E3/UL (ref 0–0.7)
EOSINOPHIL NFR BLD AUTO: 2 %
ERYTHROCYTE [DISTWIDTH] IN BLOOD BY AUTOMATED COUNT: 14.3 % (ref 10–15)
GFR SERPL CREATININE-BSD FRML MDRD: 59 ML/MIN/1.73M2
GLUCOSE SERPL-MCNC: 168 MG/DL (ref 70–99)
HCO3 BLDV-SCNC: 27 MMOL/L (ref 24–30)
HCT VFR BLD AUTO: 50.7 % (ref 40–53)
HGB BLD-MCNC: 16 G/DL (ref 13.3–17.7)
IMM GRANULOCYTES # BLD: 0.1 10E3/UL
IMM GRANULOCYTES NFR BLD: 0 %
LYMPHOCYTES # BLD AUTO: 3 10E3/UL (ref 0.8–5.3)
LYMPHOCYTES NFR BLD AUTO: 22 %
MCH RBC QN AUTO: 28.7 PG (ref 26.5–33)
MCHC RBC AUTO-ENTMCNC: 31.6 G/DL (ref 31.5–36.5)
MCV RBC AUTO: 91 FL (ref 78–100)
MONOCYTES # BLD AUTO: 1 10E3/UL (ref 0–1.3)
MONOCYTES NFR BLD AUTO: 7 %
NEUTROPHILS # BLD AUTO: 9.2 10E3/UL (ref 1.6–8.3)
NEUTROPHILS NFR BLD AUTO: 68 %
NRBC # BLD AUTO: 0 10E3/UL
NRBC BLD AUTO-RTO: 0 /100
OXYHGB MFR BLDV: 85.8 % (ref 70–75)
PCO2 BLDV: 49 MM HG (ref 35–50)
PH BLDV: 7.36 [PH] (ref 7.35–7.45)
PLATELET # BLD AUTO: 177 10E3/UL (ref 150–450)
PO2 BLDV: 56 MM HG (ref 25–47)
POTASSIUM SERPL-SCNC: 3.9 MMOL/L (ref 3.4–5.3)
PROT SERPL-MCNC: 6.9 G/DL (ref 6.4–8.3)
RBC # BLD AUTO: 5.57 10E6/UL (ref 4.4–5.9)
SAO2 % BLDV: 87.3 % (ref 70–75)
SODIUM SERPL-SCNC: 138 MMOL/L (ref 136–145)
TSH SERPL DL<=0.005 MIU/L-ACNC: 2.82 UIU/ML (ref 0.3–4.2)
WBC # BLD AUTO: 13.5 10E3/UL (ref 4–11)

## 2022-12-14 PROCEDURE — 36415 COLL VENOUS BLD VENIPUNCTURE: CPT

## 2022-12-14 PROCEDURE — 99214 OFFICE O/P EST MOD 30 MIN: CPT | Performed by: NURSE PRACTITIONER

## 2022-12-14 PROCEDURE — 85025 COMPLETE CBC W/AUTO DIFF WBC: CPT

## 2022-12-14 PROCEDURE — 82805 BLOOD GASES W/O2 SATURATION: CPT

## 2022-12-14 PROCEDURE — 84443 ASSAY THYROID STIM HORMONE: CPT

## 2022-12-14 PROCEDURE — 80053 COMPREHEN METABOLIC PANEL: CPT

## 2022-12-14 PROCEDURE — 71046 X-RAY EXAM CHEST 2 VIEWS: CPT

## 2022-12-14 NOTE — PATIENT INSTRUCTIONS
It was a pleasure to see you in clinic today.   Here is what we discussed:    We will do a chest x-ray and labs today, I will call you with results and we will develop a plan from there.    Dominique Stephenson, CNP  Pulmonary Medicine  [facility]  [phone]

## 2022-12-14 NOTE — PROGRESS NOTES
Pulmonary Clinic Acute Visit        Assessment/Plan:     77-year-old man with history of chronic bronchitis, HTN, neuropathy/chronic pain on multiple sedating medications here for acute visit.      1. Cough, lethargy  Following recent URI/exacerbation.  Does not appear acutely ill in clinic today, and he denies SOB, chest pain.  He is lethargic during exam.  Discussed with wife that cough may be ongoing for some time after URI, but since he is lethargic and she states this is not his baseline - will go ahead with further work-up.    - Chest x-ray  - Labs including CBC w/diff, CMP, VBG  - I will call patient and his wife with results and we will develop next steps.      ADDENDUM 12/15/22:  CXR clear.  WBC elevated at 13.5, neutrophils 9.2.  Cr elevated at 1.25 (up from 1.14 five months ago), GFR 59, BUN 26.6.    Concern for possible UTI, as patient is having new incontinence, lethargy, elevated WBC and BERNICE.  Discussed with wife that she needs to push fluids and I was able to get them connected w/primary physician today.  Will fax this note along with labs along to primary clinic ([facility]).    Dominique Stephenson, CNP  Pulmonary Medicine  [facility]  [phone]       CC:     Ongoing cough, lethargy following URI/COPD exacerbation     HPI:     77-year-old man with history of chronic bronchitis, HTN, neuropathy/chronic pain on multiple sedating medications here for acute visit.       On 12/1/22, patients wife contacted our clinic, stating he had been using action plan (prednisone and azithromycin) x4 days, and that his congestion was worsening.  At that time the prednisone was ordered to continue until 12/11/22, and also a script was called in for augmentin x7 days and tamiflu x5 days.  Wife then called this morning concerned w/ongoing chest congestion and lethargy.  He was told to come into clinic.    Patient endorses ongoing cough w/small amt of white/yellow sputum production.  He  denies SOB, chest pain, muscle aches, fevers, sinus pain/congestion.  Wife is concerned that he is lethargic, but does endorse that this happens when he is ill (happened last 4 months ago), but he 'perks up' and goes back to baseline after prednisone + azithromycin.    Reviewed medications d/t lethargy:  He is on lyrica, methadone QID, topamax.     ROS:     12-point ROS performed and is negative aside from those listed in HPI.    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 Added automatically from request for surgery [id number]     Past Surgical History:   Procedure Laterality Date     AMPUTATE TOE(S) Left 2/20/2020    Procedure: AMPUTATION, third digit left foot;  Surgeon: Wai Armstrong DPM;  [facility];  Service: Podiatry     AMPUTATE TOE(S) Right 7/7/2020    Procedure: AMPUTATION, second digit right foot;  Surgeon: Wai Armstrong DPM;  Location: [facility] OR;  Service: Podiatry     EP PACEMAKER INSERT N/A 2/12/2019    Procedure: EP Pacemaker Insertion;  Surgeon: Micheal Mahoney MD;  Location: [facility];  Service: Cardiology     EYE SURGERY Bilateral      HIP SURGERY Right     total hip     INSERT / REPLACE / REMOVE PACEMAKER  02/12/2019     PACEMAKER/ICD CHECK  2019    low heart rate - 35     TOTAL HIP ARTHROPLASTY Right 2/2/2015    Procedure: #3   HIP TOTAL ARTHROPLASTY, RIGHT;  Surgeon: Aryan Glass MD;  Location: [facility] OR;  Service:      Social History     Tobacco Use     Smoking status: Former     Types: Cigars,  cigarillos or filtered cigars     Smokeless tobacco: Never   Substance Use Topics     Alcohol use: Not Currently     Drug use: Never     Comment: cigars      No Known Allergies     Physical Exam:     /75 (BP Location: Left arm)   Pulse 60   Wt 100.2 kg (221 lb)   SpO2 94%   BMI 30.82 kg/m    General:  Elderly male patient, appears in NAD  HEENT:  Mask present  CV:  RRR, no M/G/R  PULM:  CTA bilaterally, no wheezes or rhonchi.  Respirations even and unlabored.  On RA.  GI:  Rounded  EXT:  No cyanosis or edema  DERM:  No rash or lesions on visible skin  NEURO:  Somnolent, falls asleep during exam but awakens easily     Data:     EXAM: CT CHEST WO CONTRAST  LOCATION: [facility]  DATE/TIME: 2/10/2021 11:58 AM  INDICATION: Follow-up pulmonary nodules.  COMPARISON: 2/19/2020 and 10/17/2019  TECHNIQUE: CT chest without IV contrast. Multiplanar reformats were obtained. Dose reduction techniques were used.  CONTRAST: None.  FINDINGS:   LUNGS AND PLEURA: Stable smooth chronic pleural thickening with mild atelectasis in the posterior lower lobes as well as the right middle lobe and lingula all stable.  Stable faint 3 mm nodule left upper lobe image 27. The nodule lower in the left upper lobe seen previously is no longer present. The should be considered benign.  No significant findings to recommend follow-up.  MEDIASTINUM/AXILLAE: No lymphadenopathy. Some minimal mucous in the trachea   UPPER ABDOMEN: No significant finding.  MUSCULOSKELETAL: Unremarkable.     IMPRESSION:  1.  Stable small chronic pleural thickening with some mild atelectasis in the lower lungs.  2.  Stable tiny nodule in the left upper lobe with the second tiny nodule not clearly seen today. The should be considered benign.  3.  No additional follow-up recommended.    PFTs 11/27/2019:  FEV1/FVC is 0.66 and is normal (less than 0.7 but greater than the demographically adjusted lower limit of normal).  FEV1 is 59% predicted  and is reduced.  FVC is 67% predicted and is reduced.  There was no improvement in spirometry after a single inhaled dose of bronchodilator.  TLC is 64% predicted and is reduced.  RV is 73% predicted and is reduced.  DLCO is 57% predicted and is reduced when it   is corrected for hemoglobin.  The flow volume loop shows some expiratory concavity.     Impression:  Pulmonary function testing shows moderate-severe pathophysiology, primarily restrictive but with a likely obstructive component.  Spirometry is not consistent with reversibility.  Diffusion capacity when corrected for hemoglobin is moderately reduced.

## 2022-12-14 NOTE — TELEPHONE ENCOUNTER
Phone call from patient's wife.  States Rom completed his prednisone 40mg, but is still congested sounding, more lethargic, and with cough.        Reviewed with Dr. Dejesus in clinic. Will have him come in and be seen by NP today.

## 2022-12-14 NOTE — TELEPHONE ENCOUNTER
Called patients wife to discuss results of CXR and blood work.  WBC elevated at 13.5, neutrophils 9.2.  No PNA per CXR.  Cr elevated at 1.25 (up from 1.14 five months ago), GFR 59, BUN 26.6.      Concern for possible UTI, as patient is having new incontinence, lethargy, elevated WBC and BERNICE.  Discussed with wife that she needs to push fluids tonight and I will try to contact patients PCP tomorrow to see how we can coordinate UA/UC - he may have to be seen at PCP clinic or urgent care.  Will call her in morning.

## 2022-12-15 ENCOUNTER — LAB REQUISITION (OUTPATIENT)
Dept: LAB | Facility: CLINIC | Age: 77
End: 2022-12-15

## 2022-12-15 ENCOUNTER — TELEPHONE (OUTPATIENT)
Dept: PULMONOLOGY | Facility: OTHER | Age: 77
End: 2022-12-15

## 2022-12-15 DIAGNOSIS — R30.0 DYSURIA: ICD-10-CM

## 2022-12-15 PROCEDURE — 87086 URINE CULTURE/COLONY COUNT: CPT | Performed by: FAMILY MEDICINE

## 2022-12-15 NOTE — TELEPHONE ENCOUNTER
Spoke with wife over phone, she is unable to get out to primary clinic today d/t weather and difficulty getting patient out (they had 4:30pm appt available today).      I spoke w/[facility] and they are able to do video visit at 4:30pm, I asked them to update wife about this.    Also faxed my note and lab, CXR results to [facility].

## 2022-12-17 LAB — BACTERIA UR CULT: NO GROWTH

## 2022-12-26 ENCOUNTER — APPOINTMENT (OUTPATIENT)
Dept: CT IMAGING | Facility: HOSPITAL | Age: 77
DRG: 070 | End: 2022-12-26
Attending: EMERGENCY MEDICINE
Payer: COMMERCIAL

## 2022-12-26 ENCOUNTER — HOSPITAL ENCOUNTER (INPATIENT)
Facility: HOSPITAL | Age: 77
LOS: 1 days | Discharge: HOME | DRG: 070 | End: 2022-12-27
Attending: EMERGENCY MEDICINE | Admitting: INTERNAL MEDICINE
Payer: COMMERCIAL

## 2022-12-26 DIAGNOSIS — G60.3 IDIOPATHIC PROGRESSIVE POLYNEUROPATHY: ICD-10-CM

## 2022-12-26 DIAGNOSIS — J96.02 ACUTE RESPIRATORY FAILURE WITH HYPOXIA AND HYPERCARBIA (H): ICD-10-CM

## 2022-12-26 DIAGNOSIS — J40 BRONCHITIS: ICD-10-CM

## 2022-12-26 DIAGNOSIS — G93.40 ACUTE ENCEPHALOPATHY: ICD-10-CM

## 2022-12-26 DIAGNOSIS — J96.01 ACUTE RESPIRATORY FAILURE WITH HYPOXIA AND HYPERCARBIA (H): ICD-10-CM

## 2022-12-26 PROBLEM — N17.9 AKI (ACUTE KIDNEY INJURY) (H): Status: ACTIVE | Noted: 2022-12-26

## 2022-12-26 PROBLEM — I95.9 HYPOTENSION, UNSPECIFIED HYPOTENSION TYPE: Status: ACTIVE | Noted: 2022-12-26

## 2022-12-26 PROBLEM — J96.22 ACUTE ON CHRONIC RESPIRATORY FAILURE WITH HYPOXIA AND HYPERCAPNIA (H): Status: ACTIVE | Noted: 2022-12-26

## 2022-12-26 PROBLEM — K21.9 GASTROESOPHAGEAL REFLUX DISEASE WITHOUT ESOPHAGITIS: Status: ACTIVE | Noted: 2022-12-26

## 2022-12-26 PROBLEM — J96.21 ACUTE ON CHRONIC RESPIRATORY FAILURE WITH HYPOXIA AND HYPERCAPNIA (H): Status: ACTIVE | Noted: 2022-12-26

## 2022-12-26 LAB
ALBUMIN SERPL BCG-MCNC: 3.8 G/DL (ref 3.5–5.2)
ALP SERPL-CCNC: 65 U/L (ref 40–129)
ALT SERPL W P-5'-P-CCNC: 20 U/L (ref 10–50)
AMMONIA PLAS-SCNC: 12 UMOL/L (ref 16–60)
ANION GAP SERPL CALCULATED.3IONS-SCNC: 11 MMOL/L (ref 7–15)
AST SERPL W P-5'-P-CCNC: 24 U/L (ref 10–50)
BASE EXCESS BLDV CALC-SCNC: -1.7 MMOL/L
BASE EXCESS BLDV CALC-SCNC: 0.6 MMOL/L
BASOPHILS # BLD AUTO: 0.1 10E3/UL (ref 0–0.2)
BASOPHILS NFR BLD AUTO: 0 %
BILIRUB SERPL-MCNC: 0.4 MG/DL
BUN SERPL-MCNC: 19.4 MG/DL (ref 8–23)
CALCIUM SERPL-MCNC: 9.3 MG/DL (ref 8.8–10.2)
CHLORIDE SERPL-SCNC: 101 MMOL/L (ref 98–107)
CREAT SERPL-MCNC: 1.3 MG/DL (ref 0.67–1.17)
D DIMER PPP FEU-MCNC: 2.37 UG/ML FEU (ref 0–0.5)
DEPRECATED HCO3 PLAS-SCNC: 25 MMOL/L (ref 22–29)
EOSINOPHIL # BLD AUTO: 0 10E3/UL (ref 0–0.7)
EOSINOPHIL NFR BLD AUTO: 0 %
ERYTHROCYTE [DISTWIDTH] IN BLOOD BY AUTOMATED COUNT: 13.4 % (ref 10–15)
FLUAV RNA SPEC QL NAA+PROBE: NEGATIVE
FLUBV RNA RESP QL NAA+PROBE: NEGATIVE
GFR SERPL CREATININE-BSD FRML MDRD: 57 ML/MIN/1.73M2
GLUCOSE SERPL-MCNC: 130 MG/DL (ref 70–99)
HCO3 BLDV-SCNC: 24 MMOL/L (ref 24–30)
HCO3 BLDV-SCNC: 28 MMOL/L (ref 24–30)
HCT VFR BLD AUTO: 49.9 % (ref 40–53)
HGB BLD-MCNC: 15.8 G/DL (ref 13.3–17.7)
IMM GRANULOCYTES # BLD: 0 10E3/UL
IMM GRANULOCYTES NFR BLD: 0 %
LYMPHOCYTES # BLD AUTO: 1.1 10E3/UL (ref 0.8–5.3)
LYMPHOCYTES NFR BLD AUTO: 10 %
MCH RBC QN AUTO: 28.7 PG (ref 26.5–33)
MCHC RBC AUTO-ENTMCNC: 31.7 G/DL (ref 31.5–36.5)
MCV RBC AUTO: 91 FL (ref 78–100)
MDC_IDC_EPISODE_DTM: NORMAL
MDC_IDC_EPISODE_DURATION: 13 S
MDC_IDC_EPISODE_DURATION: 132 S
MDC_IDC_EPISODE_DURATION: 14 S
MDC_IDC_EPISODE_DURATION: 1455 S
MDC_IDC_EPISODE_DURATION: 147 S
MDC_IDC_EPISODE_DURATION: 1494 S
MDC_IDC_EPISODE_DURATION: 15 S
MDC_IDC_EPISODE_DURATION: 15 S
MDC_IDC_EPISODE_DURATION: 16 S
MDC_IDC_EPISODE_DURATION: 1657 S
MDC_IDC_EPISODE_DURATION: 168 S
MDC_IDC_EPISODE_DURATION: 175 S
MDC_IDC_EPISODE_DURATION: 2091 S
MDC_IDC_EPISODE_DURATION: 2174 S
MDC_IDC_EPISODE_DURATION: 229 S
MDC_IDC_EPISODE_DURATION: 2379 S
MDC_IDC_EPISODE_DURATION: 267 S
MDC_IDC_EPISODE_DURATION: 28 S
MDC_IDC_EPISODE_DURATION: 316 S
MDC_IDC_EPISODE_DURATION: 3284 S
MDC_IDC_EPISODE_DURATION: 34 S
MDC_IDC_EPISODE_DURATION: 36 S
MDC_IDC_EPISODE_DURATION: 381 S
MDC_IDC_EPISODE_DURATION: 41 S
MDC_IDC_EPISODE_DURATION: 473 S
MDC_IDC_EPISODE_DURATION: 48 S
MDC_IDC_EPISODE_DURATION: 488 S
MDC_IDC_EPISODE_DURATION: 499 S
MDC_IDC_EPISODE_DURATION: 505 S
MDC_IDC_EPISODE_DURATION: 51 S
MDC_IDC_EPISODE_DURATION: 521 S
MDC_IDC_EPISODE_DURATION: 585 S
MDC_IDC_EPISODE_DURATION: 5967 S
MDC_IDC_EPISODE_DURATION: 655 S
MDC_IDC_EPISODE_DURATION: 71 S
MDC_IDC_EPISODE_DURATION: 82 S
MDC_IDC_EPISODE_DURATION: 836 S
MDC_IDC_EPISODE_DURATION: 88 S
MDC_IDC_EPISODE_DURATION: 960 S
MDC_IDC_EPISODE_ID: NORMAL
MDC_IDC_EPISODE_TYPE: NORMAL
MDC_IDC_LEAD_IMPLANT_DT: NORMAL
MDC_IDC_LEAD_LOCATION: NORMAL
MDC_IDC_LEAD_LOCATION_DETAIL_1: NORMAL
MDC_IDC_LEAD_MFG: NORMAL
MDC_IDC_LEAD_MODEL: NORMAL
MDC_IDC_LEAD_POLARITY_TYPE: NORMAL
MDC_IDC_LEAD_SERIAL: NORMAL
MDC_IDC_LEAD_SPECIAL_FUNCTION: NORMAL
MDC_IDC_MSMT_BATTERY_DTM: NORMAL
MDC_IDC_MSMT_BATTERY_REMAINING_LONGEVITY: 84 MO
MDC_IDC_MSMT_BATTERY_REMAINING_PERCENTAGE: 100 %
MDC_IDC_MSMT_BATTERY_STATUS: NORMAL
MDC_IDC_MSMT_LEADCHNL_RA_IMPEDANCE_VALUE: 764 OHM
MDC_IDC_PG_IMPLANT_DTM: NORMAL
MDC_IDC_PG_MFG: NORMAL
MDC_IDC_PG_MODEL: NORMAL
MDC_IDC_PG_SERIAL: NORMAL
MDC_IDC_PG_TYPE: NORMAL
MDC_IDC_SESS_CLINIC_NAME: NORMAL
MDC_IDC_SESS_DTM: NORMAL
MDC_IDC_SESS_TYPE: NORMAL
MDC_IDC_SET_BRADY_LOWRATE: 60 {BEATS}/MIN
MDC_IDC_SET_BRADY_MAX_SENSOR_RATE: 130 {BEATS}/MIN
MDC_IDC_SET_BRADY_MODE: NORMAL
MDC_IDC_SET_LEADCHNL_RA_PACING_AMPLITUDE: 1.5 V
MDC_IDC_SET_LEADCHNL_RA_PACING_CAPTURE_MODE: NORMAL
MDC_IDC_SET_LEADCHNL_RA_PACING_POLARITY: NORMAL
MDC_IDC_SET_LEADCHNL_RA_PACING_PULSEWIDTH: 0.4 MS
MDC_IDC_SET_LEADCHNL_RA_SENSING_ADAPTATION_MODE: NORMAL
MDC_IDC_SET_LEADCHNL_RA_SENSING_POLARITY: NORMAL
MDC_IDC_SET_LEADCHNL_RA_SENSING_SENSITIVITY: 0.25 MV
MDC_IDC_SET_ZONE_DETECTION_INTERVAL: 375 MS
MDC_IDC_SET_ZONE_TYPE: NORMAL
MDC_IDC_SET_ZONE_VENDOR_TYPE: NORMAL
MDC_IDC_STAT_BRADY_DTM_END: NORMAL
MDC_IDC_STAT_BRADY_DTM_START: NORMAL
MDC_IDC_STAT_BRADY_RA_PERCENT_PACED: 99 %
MDC_IDC_STAT_EPISODE_RECENT_COUNT: 0
MDC_IDC_STAT_EPISODE_RECENT_COUNT: 38
MDC_IDC_STAT_EPISODE_RECENT_COUNT_DTM_END: NORMAL
MDC_IDC_STAT_EPISODE_RECENT_COUNT_DTM_END: NORMAL
MDC_IDC_STAT_EPISODE_RECENT_COUNT_DTM_START: NORMAL
MDC_IDC_STAT_EPISODE_RECENT_COUNT_DTM_START: NORMAL
MDC_IDC_STAT_EPISODE_TYPE: NORMAL
MDC_IDC_STAT_EPISODE_TYPE: NORMAL
MDC_IDC_STAT_EPISODE_VENDOR_TYPE: NORMAL
MONOCYTES # BLD AUTO: 0.7 10E3/UL (ref 0–1.3)
MONOCYTES NFR BLD AUTO: 6 %
NEUTROPHILS # BLD AUTO: 9.5 10E3/UL (ref 1.6–8.3)
NEUTROPHILS NFR BLD AUTO: 84 %
NRBC # BLD AUTO: 0 10E3/UL
NRBC BLD AUTO-RTO: 0 /100
NT-PROBNP SERPL-MCNC: 266 PG/ML (ref 0–1800)
OXYHGB MFR BLDV: 27.3 % (ref 70–75)
OXYHGB MFR BLDV: 95.3 % (ref 70–75)
PCO2 BLDV: 44 MM HG (ref 35–50)
PCO2 BLDV: 60 MM HG (ref 35–50)
PH BLDV: 7.27 [PH] (ref 7.35–7.45)
PH BLDV: 7.34 [PH] (ref 7.35–7.45)
PLATELET # BLD AUTO: 204 10E3/UL (ref 150–450)
PO2 BLDV: 20 MM HG (ref 25–47)
PO2 BLDV: 83 MM HG (ref 25–47)
POTASSIUM SERPL-SCNC: 4 MMOL/L (ref 3.4–5.3)
PROCALCITONIN SERPL IA-MCNC: 0.2 NG/ML
PROT SERPL-MCNC: 7.4 G/DL (ref 6.4–8.3)
RBC # BLD AUTO: 5.5 10E6/UL (ref 4.4–5.9)
RSV RNA SPEC NAA+PROBE: NEGATIVE
SAO2 % BLDV: 27.8 % (ref 70–75)
SAO2 % BLDV: 96.6 % (ref 70–75)
SARS-COV-2 RNA RESP QL NAA+PROBE: NEGATIVE
SODIUM SERPL-SCNC: 137 MMOL/L (ref 136–145)
TROPONIN T SERPL HS-MCNC: 19 NG/L
TSH SERPL DL<=0.005 MIU/L-ACNC: 1.33 UIU/ML (ref 0.3–4.2)
WBC # BLD AUTO: 11.3 10E3/UL (ref 4–11)

## 2022-12-26 PROCEDURE — 94640 AIRWAY INHALATION TREATMENT: CPT

## 2022-12-26 PROCEDURE — 94660 CPAP INITIATION&MGMT: CPT

## 2022-12-26 PROCEDURE — 93005 ELECTROCARDIOGRAM TRACING: CPT | Performed by: EMERGENCY MEDICINE

## 2022-12-26 PROCEDURE — 87637 SARSCOV2&INF A&B&RSV AMP PRB: CPT | Performed by: EMERGENCY MEDICINE

## 2022-12-26 PROCEDURE — C9803 HOPD COVID-19 SPEC COLLECT: HCPCS

## 2022-12-26 PROCEDURE — 82140 ASSAY OF AMMONIA: CPT | Performed by: EMERGENCY MEDICINE

## 2022-12-26 PROCEDURE — 84145 PROCALCITONIN (PCT): CPT | Performed by: EMERGENCY MEDICINE

## 2022-12-26 PROCEDURE — 120N000001 HC R&B MED SURG/OB

## 2022-12-26 PROCEDURE — 83880 ASSAY OF NATRIURETIC PEPTIDE: CPT | Performed by: EMERGENCY MEDICINE

## 2022-12-26 PROCEDURE — 80053 COMPREHEN METABOLIC PANEL: CPT | Performed by: EMERGENCY MEDICINE

## 2022-12-26 PROCEDURE — 84484 ASSAY OF TROPONIN QUANT: CPT | Performed by: EMERGENCY MEDICINE

## 2022-12-26 PROCEDURE — 94640 AIRWAY INHALATION TREATMENT: CPT | Mod: 76

## 2022-12-26 PROCEDURE — 85049 AUTOMATED PLATELET COUNT: CPT | Performed by: EMERGENCY MEDICINE

## 2022-12-26 PROCEDURE — 250N000009 HC RX 250: Performed by: EMERGENCY MEDICINE

## 2022-12-26 PROCEDURE — 250N000011 HC RX IP 250 OP 636: Performed by: INTERNAL MEDICINE

## 2022-12-26 PROCEDURE — C9113 INJ PANTOPRAZOLE SODIUM, VIA: HCPCS | Performed by: INTERNAL MEDICINE

## 2022-12-26 PROCEDURE — 999N000157 HC STATISTIC RCP TIME EA 10 MIN

## 2022-12-26 PROCEDURE — 70450 CT HEAD/BRAIN W/O DYE: CPT

## 2022-12-26 PROCEDURE — 93005 ELECTROCARDIOGRAM TRACING: CPT | Performed by: INTERNAL MEDICINE

## 2022-12-26 PROCEDURE — 93005 ELECTROCARDIOGRAM TRACING: CPT | Mod: 76

## 2022-12-26 PROCEDURE — 84443 ASSAY THYROID STIM HORMONE: CPT | Performed by: EMERGENCY MEDICINE

## 2022-12-26 PROCEDURE — 96374 THER/PROPH/DIAG INJ IV PUSH: CPT | Mod: 59

## 2022-12-26 PROCEDURE — 5A09357 ASSISTANCE WITH RESPIRATORY VENTILATION, LESS THAN 24 CONSECUTIVE HOURS, CONTINUOUS POSITIVE AIRWAY PRESSURE: ICD-10-PCS | Performed by: EMERGENCY MEDICINE

## 2022-12-26 PROCEDURE — 36415 COLL VENOUS BLD VENIPUNCTURE: CPT | Performed by: EMERGENCY MEDICINE

## 2022-12-26 PROCEDURE — 85014 HEMATOCRIT: CPT | Performed by: EMERGENCY MEDICINE

## 2022-12-26 PROCEDURE — 99285 EMERGENCY DEPT VISIT HI MDM: CPT | Mod: CS,25

## 2022-12-26 PROCEDURE — 258N000003 HC RX IP 258 OP 636: Performed by: INTERNAL MEDICINE

## 2022-12-26 PROCEDURE — 82805 BLOOD GASES W/O2 SATURATION: CPT | Performed by: EMERGENCY MEDICINE

## 2022-12-26 PROCEDURE — 99223 1ST HOSP IP/OBS HIGH 75: CPT | Performed by: INTERNAL MEDICINE

## 2022-12-26 PROCEDURE — 250N000011 HC RX IP 250 OP 636: Performed by: EMERGENCY MEDICINE

## 2022-12-26 PROCEDURE — 85379 FIBRIN DEGRADATION QUANT: CPT | Performed by: EMERGENCY MEDICINE

## 2022-12-26 PROCEDURE — 71275 CT ANGIOGRAPHY CHEST: CPT

## 2022-12-26 RX ORDER — IPRATROPIUM BROMIDE AND ALBUTEROL SULFATE 2.5; .5 MG/3ML; MG/3ML
3 SOLUTION RESPIRATORY (INHALATION) ONCE
Status: COMPLETED | OUTPATIENT
Start: 2022-12-26 | End: 2022-12-26

## 2022-12-26 RX ORDER — PIPERACILLIN SODIUM, TAZOBACTAM SODIUM 3; .375 G/15ML; G/15ML
3.38 INJECTION, POWDER, LYOPHILIZED, FOR SOLUTION INTRAVENOUS EVERY 8 HOURS
Status: DISCONTINUED | OUTPATIENT
Start: 2022-12-26 | End: 2022-12-26

## 2022-12-26 RX ORDER — METHYLPREDNISOLONE SODIUM SUCCINATE 40 MG/ML
40 INJECTION, POWDER, LYOPHILIZED, FOR SOLUTION INTRAMUSCULAR; INTRAVENOUS EVERY 12 HOURS
Status: DISCONTINUED | OUTPATIENT
Start: 2022-12-27 | End: 2022-12-27 | Stop reason: HOSPADM

## 2022-12-26 RX ORDER — METHYLPREDNISOLONE SODIUM SUCCINATE 125 MG/2ML
125 INJECTION, POWDER, LYOPHILIZED, FOR SOLUTION INTRAMUSCULAR; INTRAVENOUS ONCE
Status: COMPLETED | OUTPATIENT
Start: 2022-12-26 | End: 2022-12-26

## 2022-12-26 RX ORDER — ENOXAPARIN SODIUM 100 MG/ML
40 INJECTION SUBCUTANEOUS EVERY 24 HOURS
Status: DISCONTINUED | OUTPATIENT
Start: 2022-12-26 | End: 2022-12-27 | Stop reason: HOSPADM

## 2022-12-26 RX ORDER — PROPRANOLOL HYDROCHLORIDE 40 MG/1
40 TABLET ORAL 2 TIMES DAILY
COMMUNITY
End: 2023-02-06

## 2022-12-26 RX ORDER — PIPERACILLIN SODIUM, TAZOBACTAM SODIUM 3; .375 G/15ML; G/15ML
3.38 INJECTION, POWDER, LYOPHILIZED, FOR SOLUTION INTRAVENOUS EVERY 8 HOURS
Status: DISCONTINUED | OUTPATIENT
Start: 2022-12-27 | End: 2022-12-27 | Stop reason: HOSPADM

## 2022-12-26 RX ORDER — PIPERACILLIN SODIUM, TAZOBACTAM SODIUM 3; .375 G/15ML; G/15ML
3.38 INJECTION, POWDER, LYOPHILIZED, FOR SOLUTION INTRAVENOUS ONCE
Status: COMPLETED | OUTPATIENT
Start: 2022-12-26 | End: 2022-12-26

## 2022-12-26 RX ORDER — IOPAMIDOL 755 MG/ML
75 INJECTION, SOLUTION INTRAVASCULAR ONCE
Status: COMPLETED | OUTPATIENT
Start: 2022-12-26 | End: 2022-12-26

## 2022-12-26 RX ORDER — CEFTRIAXONE 2 G/1
2 INJECTION, POWDER, FOR SOLUTION INTRAMUSCULAR; INTRAVENOUS ONCE
Status: COMPLETED | OUTPATIENT
Start: 2022-12-26 | End: 2022-12-26

## 2022-12-26 RX ADMIN — METHYLPREDNISOLONE SODIUM SUCCINATE 125 MG: 125 INJECTION, POWDER, FOR SOLUTION INTRAMUSCULAR; INTRAVENOUS at 17:06

## 2022-12-26 RX ADMIN — IPRATROPIUM BROMIDE AND ALBUTEROL SULFATE 3 ML: .5; 3 SOLUTION RESPIRATORY (INHALATION) at 17:25

## 2022-12-26 RX ADMIN — PANTOPRAZOLE SODIUM 40 MG: 40 INJECTION, POWDER, FOR SOLUTION INTRAVENOUS at 20:55

## 2022-12-26 RX ADMIN — IPRATROPIUM BROMIDE AND ALBUTEROL SULFATE 3 ML: .5; 3 SOLUTION RESPIRATORY (INHALATION) at 16:23

## 2022-12-26 RX ADMIN — ENOXAPARIN SODIUM 40 MG: 40 INJECTION SUBCUTANEOUS at 20:56

## 2022-12-26 RX ADMIN — SODIUM CHLORIDE 500 ML: 9 INJECTION, SOLUTION INTRAVENOUS at 21:49

## 2022-12-26 RX ADMIN — CEFTRIAXONE SODIUM 2 G: 2 INJECTION, POWDER, FOR SOLUTION INTRAMUSCULAR; INTRAVENOUS at 18:57

## 2022-12-26 RX ADMIN — IOPAMIDOL 75 ML: 755 INJECTION, SOLUTION INTRAVENOUS at 18:07

## 2022-12-26 RX ADMIN — PIPERACILLIN AND TAZOBACTAM 3.38 G: 3; .375 INJECTION, POWDER, LYOPHILIZED, FOR SOLUTION INTRAVENOUS at 20:56

## 2022-12-26 ASSESSMENT — ACTIVITIES OF DAILY LIVING (ADL)
ADLS_ACUITY_SCORE: 35
ADLS_ACUITY_SCORE: 41

## 2022-12-26 NOTE — ED NOTES
Nursing assessment--    Pt brought in by family with concerns of increased weakness, sleepiness and confusion.  Pt has COPD--recently treated for UTI--Pt was a heavy assist of 2 to get into bed due to his weakness, size and immobility--pt is on methadone for neuropathy pain--No resp distress currently--family says that he gets these periods of increased sleepiness and weakness at home but the episodes are getting more frequent and increasing in severity. Pt without complaint at this time--placed on oxygen in triage--usually does not wear it.

## 2022-12-26 NOTE — PROGRESS NOTES
"ED RESPIRATORY CARE NOTE    1721 Patient placed ob Bipap S/T 12/7, rate of 16 and I time of 0.9. Fi02 titrated down from 60% down to 40% to maintain an oxygen saturation of 95%.     Explained how the bipap functions to family and they where understanding.     Patient has been tolerating it very well. His values are the following while resting    Rate of 20-25    Vte of 325-430    MV 11.2 and PIP of 12    Barrier applied to the bridge of his nose and mask was applied with finger allowance.     3 Duonebs given. Pre treatment diminished and post treatment no change noted. Patient tolerated well.          /60   Pulse 60   Temp 97.6  F (36.4  C) (Temporal)   Resp 24   Ht 1.803 m (5' 11\")   Wt 102.1 kg (225 lb)   SpO2 95%   BMI 31.38 kg/m          [place] Day, RT    "

## 2022-12-26 NOTE — ED NOTES
"ED Triage Provider Note  [facility] EMERGENCY DEPARTMENT  Encounter Date: Dec 26, 2022    History:  Chief Complaint   Patient presents with     Fatigue            Altered Mental Status     Romaine Farah is a 77 year old male who presents to the ED with fatigue and weakness.  The family is concerned because he has been more sleepy than normal.  He has had a dry cough and says it sounds wet and rattly.    Review of Systems:  Chest: Clear to auscultation anteriorly  Neuro: Has lethargy, no change in extremity strength or weakness    Exam:  BP (!) 146/96   Pulse 61   Temp 97.6  F (36.4  C) (Temporal)   Resp 18   Ht 1.803 m (5' 11\")   Wt 102.1 kg (225 lb)   SpO2 90%   BMI 31.38 kg/m    General: No acute distress. Appears stated age.   Cardio: normal rate, extremities well perfused  Resp: Clear breath sounds anteriorly,  normal work of breathing, grossly normal respiratory rate  Neuro: Sleepy and is dozing off during the history facial movement grossly symmetric.  He is moving all his extremities.  Grossly intact strength.       Medical Decision Making:  Patient arriving to the ED with problem as above. A medical screening exam was performed.      orders initiated from Triage. The patient is most appropriate to return to the waiting room.       Jam Puri MD  12/26/2022 at 3:48 PM     Jam Puri MD  12/26/22 1553    "

## 2022-12-26 NOTE — ED PROVIDER NOTES
EMERGENCY DEPARTMENT ENCOUNTER      NAME: Romaine Farah  AGE: 77 year old male  YOB: 1945  MRN: [id number]  EVALUATION DATE & TIME: 2022  3:54 PM    PCP: Simon Schmidt    ED PROVIDER: Simon Franco M.D.      Chief Complaint   Patient presents with     Fatigue            Altered Mental Status       FINAL IMPRESSION:  1. Acute respiratory failure with hypoxia and hypercarbia (H)    2. Bronchitis    3. Acute encephalopathy          ED COURSE & MEDICAL DECISION MAKIN year old male presents to the Emergency Department for evaluation of shortness of breath and altered mental status.  patient has a history of obstructive lung disease and chronic bronchitis, follows with pulmonology here.  Also with chronic pain and takes multiple sedating medications including Lyrica and methadone.  He arrives to the emergency department hypoxic and lethargic.  Other vital signs are stable.  He is somewhat hypoventilating when he arrives to the emergency department, does have coarse lung sounds and expiratory wheezing.  He underwent broad lab and imaging evaluation as below.  This was ultimately notable for a respiratory acidosis with some CO2 retention.  Having seen this as well as his exam with crackles and some wheezing, did administer DuoNeb's, Solu-Medrol, and started him on BiPAP for now to hopefully help with some ventilation.  Family is quite concerned about making sure that his pain is adequately controlled but at this point he is highly lethargic, does not appear to be uncomfortable, and I am worried that polypharmacy can also be playing a role in his presentation.  He underwent a CT scan of his chest which showed some severe bronchitis changes.  He was also started on ceftriaxone and azithromycin given his cough, respiratory failure, bronchitis.  Given the hypoxia, encephalopathy, will require admission.  Discussed case with hospitalist.    4:07 PM I met with the patient, obtained history,  performed an initial exam, and discussed options and plan for diagnostics and treatment here in the ED. PPE worn including N95 mask, surgical gloves.  6:34 PM I reevaluated and updated the patient and family. Discussed need for admission.  6:45 PM I spoke with the hospitalist Dr. Duque    At the conclusion of the encounter I discussed the results of all of the tests and the disposition. The questions were answered. The patient or family acknowledged understanding and was agreeable with the care plan.       Medical Decision Making    History:    Supplemental history from: Family Member/Significant Other    External Record(s) reviewed: Documented in HPI, if applicable.    Work Up:    Chart documentation includes differential considered and any EKGs or imaging independently interpreted by provider.    In additional to work up documented, I considered the following work up: See chart documentation, if applicable.    External consultation:    Discussion of management with another provider: See chart documentation, if applicable    Complicating factors:    Care impacted by chronic illness: Chronic Lung Disease and Chronic Pain    Care affected by social determinants of health: N/A    Disposition considerations: Admit.            MEDICATIONS GIVEN IN THE EMERGENCY:  Medications   piperacillin-tazobactam (ZOSYN) 3.375 g vial to attach to  mL bag (has no administration in time range)     Followed by   piperacillin-tazobactam (ZOSYN) 3.375 g vial to attach to  mL bag (has no administration in time range)   pantoprazole (PROTONIX) IV push injection 40 mg (has no administration in time range)   enoxaparin ANTICOAGULANT (LOVENOX) injection 40 mg (has no administration in time range)   methylPREDNISolone sodium succinate (solu-MEDROL) injection 40 mg (has no administration in time range)   0.9% sodium chloride BOLUS (has no administration in time range)   ipratropium - albuterol 0.5 mg/2.5 mg/3 mL (DUONEB) neb  solution 3 mL (3 mLs Nebulization Given 12/26/22 1623)   ipratropium - albuterol 0.5 mg/2.5 mg/3 mL (DUONEB) neb solution 3 mL (3 mLs Nebulization Given 12/26/22 1725)   ipratropium - albuterol 0.5 mg/2.5 mg/3 mL (DUONEB) neb solution 3 mL (3 mLs Nebulization Given 12/26/22 1725)   methylPREDNISolone sodium succinate (solu-MEDROL) injection 125 mg (125 mg Intravenous Given 12/26/22 1706)   iopamidol (ISOVUE-370) solution 75 mL (75 mLs Intravenous Given 12/26/22 1807)   cefTRIAXone (ROCEPHIN) 2 g vial to attach to  ml bag for ADULTS or NS 50 ml bag for PEDS (0 g Intravenous Stopped 12/26/22 2016)       NEW PRESCRIPTIONS STARTED AT TODAY'S ER VISIT  New Prescriptions    No medications on file          =================================================================    HPI  HPI limited secondary to altered mental status.    Patient information was obtained from: Patient's family    Use of : N/A        Romaine Farah is a 77 year old male with a pertinent history of COPD s/p pacemaker/ICD, SSS, chronic fatigue syndrome, long term current use of systemic steroids, PAD who presents to this ED by wheelchair for evaluation of shortness of breath, fatigue.    Patient was brought in by family for complaints of increasing somnolence, lethargy, breathing difficulty, and an intermittent dry cough. His symptoms have been ongoing intermittently for the past couple months, but they states his symptoms have been significantly worse today. Today they report the patient appears to be more sleepy, generally weak and unable to perform his normal ADLs, and has more labored breathing with exertion. They report the patient was recently prescribed antibiotics for a possible pneumonia and a possible UTI, but he has since finished this. No fevers or other reported symptoms at this time.      REVIEW OF SYSTEMS   ROS limited secondary to altered mental status.     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 Added automatically from request for surgery [id number]       PAST SURGICAL HISTORY:  Past Surgical History:   Procedure Laterality Date     AMPUTATE TOE(S) Left 2/20/2020    Procedure: AMPUTATION, third digit left foot;  Surgeon: Wai Armstrong DPM;  Location: [facility] OR;  Service: Podiatry     AMPUTATE TOE(S) Right 7/7/2020    Procedure: AMPUTATION, second digit right foot;  Surgeon: Wai Armstrong DPM;  Location: [facility] OR;  Service: Podiatry     EP PACEMAKER INSERT N/A 2/12/2019    Procedure: EP Pacemaker Insertion;  Surgeon: Micheal Mahoney MD;  Location: [facility] Cath Lab;  Service: Cardiology     EYE SURGERY Bilateral      HIP SURGERY Right     total hip     INSERT / REPLACE / REMOVE PACEMAKER  02/12/2019     PACEMAKER/ICD CHECK  2019    low heart rate - 35     TOTAL HIP ARTHROPLASTY Right 2/2/2015    Procedure: #3   HIP TOTAL ARTHROPLASTY, RIGHT;  Surgeon: Aryan Glass MD;  Location: [facility];  Service:            CURRENT MEDICATIONS:    Current Facility-Administered Medications   Medication     0.9% sodium chloride BOLUS     enoxaparin ANTICOAGULANT (LOVENOX) injection 40 mg     [START ON 12/27/2022] methylPREDNISolone sodium succinate (solu-MEDROL) injection 40 mg     pantoprazole (PROTONIX) IV push injection 40 mg     piperacillin-tazobactam (ZOSYN) 3.375 g vial to attach to  mL bag    Followed by     [START ON 12/27/2022] piperacillin-tazobactam (ZOSYN) 3.375 g vial to attach to  mL bag     Current  "Outpatient Medications   Medication     calcium carbonate-vitamin D (OSCAL W/D) 500-200 MG-UNIT tablet     clobetasol (TEMOVATE) 0.05 % external ointment     DULoxetine (CYMBALTA) 30 MG capsule     fluocinonide (LIDEX) 0.05 % external cream     Fluticasone-Umeclidin-Vilanterol (TRELEGY ELLIPTA) 200-62.5-25 MCG/INH oral inhaler     ibuprofen (ADVIL/MOTRIN) 200 MG tablet     levocetirizine (XYZAL) 5 MG tablet     magnesium 250 MG tablet     methadone (DOLOPHINE) 10 MG tablet     Omega-3 Fatty Acids (FISH OIL BURP-LESS) 1000 MG CAPS     Pregabalin (LYRICA) 200 MG capsule     propranolol (INDERAL) 40 MG tablet     topiramate (TOPAMAX) 25 MG tablet     vitamin (B COMPLEX) tablet     vitamin C (ASCORBIC ACID) 100 MG tablet     Vitamin D3 (CHOLECALCIFEROL) 25 mcg (1000 units) tablet     primidone (MYSOLINE) 50 MG tablet         ALLERGIES:  No Known Allergies    FAMILY HISTORY:  Family History   Problem Relation Age of Onset     LUNG DISEASE Mother      Diabetes Mother      Esophageal Cancer Father      Tremor Father      Alcoholism Father      Tremor Sister      Neuropathy Sister      Arthritis Brother      Other - See Comments Brother      Other - See Comments Brother      Tremor Sister      Lupus Daughter      Tremor Daughter      Thyroid Disease Daughter      Chronic Obstructive Pulmonary Disease Mother        SOCIAL HISTORY:   Social History     Socioeconomic History     Marital status:    Tobacco Use     Smoking status: Former     Types: Cigars, cigarillos or filtered cigars     Smokeless tobacco: Never   Substance and Sexual Activity     Alcohol use: Not Currently     Drug use: Never     Comment: cigars     Sexual activity: Not Currently   Other Topics Concern     Parent/sibling w/ CABG, MI or angioplasty before 65F 55M? No   Social History Narrative    . lives in [place]. spouse sania       VITALS:  /56   Pulse 59   Temp 97.6  F (36.4  C) (Temporal)   Resp 23   Ht 1.803 m (5' 11\")   Wt 102.1 kg " (225 lb)   SpO2 95%   BMI 31.38 kg/m      PHYSICAL EXAM    Constitutional: Chronically ill-appearing elderly male patient, lethargic but arouses to voice.  HENT: Normocephalic, Atraumatic. Neck Supple.  Eyes: EOMI, Conjunctiva normal.  Respiratory: Somewhat hypoventilatory.  There are coarse lung sounds crackles and some expiratory wheezing in the bilateral lung bases.  Cardiovascular: Normal heart rate, Regular rhythm. No peripheral edema.  Abdomen: Soft, nontender  Musculoskeletal: Good range of motion in all major joints. No major deformities noted.  Integument: Warm, Dry.  Neurologic: 6 lethargic but awakens to voice.  Face is symmetric.  Pupils are midrange and reactive, moving all extremities and following simple commands but quite slow to answer questions and follow commands in all extremities.  Psychiatric: Cooperative. Unable to further assess.     LAB:  All pertinent labs reviewed and interpreted.  Labs Ordered and Resulted from Time of ED Arrival to Time of ED Departure   BLOOD GAS VENOUS - Abnormal       Result Value    pH Venous 7.27 (*)     pCO2 Venous 60 (*)     pO2 Venous 20 (*)     Bicarbonate Venous 28      Base Excess/Deficit (+/-) 0.6      Oxyhemoglobin Venous 27.3 (*)     O2 Sat, Venous 27.8 (*)    COMPREHENSIVE METABOLIC PANEL - Abnormal    Sodium 137      Potassium 4.0      Chloride 101      Carbon Dioxide (CO2) 25      Anion Gap 11      Urea Nitrogen 19.4      Creatinine 1.30 (*)     Calcium 9.3      Glucose 130 (*)     Alkaline Phosphatase 65      AST 24      ALT 20      Protein Total 7.4      Albumin 3.8      Bilirubin Total 0.4      GFR Estimate 57 (*)    D DIMER QUANTITATIVE - Abnormal    D-Dimer Quantitative 2.37 (*)    CBC WITH PLATELETS AND DIFFERENTIAL - Abnormal    WBC Count 11.3 (*)     RBC Count 5.50      Hemoglobin 15.8      Hematocrit 49.9      MCV 91      MCH 28.7      MCHC 31.7      RDW 13.4      Platelet Count 204      % Neutrophils 84      % Lymphocytes 10      % Monocytes  6      % Eosinophils 0      % Basophils 0      % Immature Granulocytes 0      NRBCs per 100 WBC 0      Absolute Neutrophils 9.5 (*)     Absolute Lymphocytes 1.1      Absolute Monocytes 0.7      Absolute Eosinophils 0.0      Absolute Basophils 0.1      Absolute Immature Granulocytes 0.0      Absolute NRBCs 0.0     PROCALCITONIN - Abnormal    Procalcitonin 0.20 (*)    AMMONIA - Abnormal    Ammonia 12 (*)    BLOOD GAS VENOUS - Abnormal    pH Venous 7.34 (*)     pCO2 Venous 44      pO2 Venous 83 (*)     Bicarbonate Venous 24      Base Excess/Deficit (+/-) -1.7      Oxyhemoglobin Venous 95.3 (*)     O2 Sat, Venous 96.6 (*)    INFLUENZA A/B & SARS-COV2 PCR MULTIPLEX - Normal    Influenza A PCR Negative      Influenza B PCR Negative      RSV PCR Negative      SARS CoV2 PCR Negative     TSH WITH FREE T4 REFLEX - Normal    TSH 1.33     TROPONIN T, HIGH SENSITIVITY - Normal    Troponin T, High Sensitivity 19     NT PROBNP INPATIENT - Normal    N terminal Pro BNP Inpatient 266     ROUTINE UA WITH MICROSCOPIC REFLEX TO CULTURE   BLOOD GAS ARTERIAL       RADIOLOGY:  Reviewed all pertinent imaging. Please see official radiology report.  CT Chest Pulmonary Embolism w Contrast   Final Result   IMPRESSION:   1.  No pulmonary emboli. No CTA signs of acute aortic syndrome.   2.  Progression of extensive bronchial wall thickening and development of endobronchial mucosal thickening and secretions compatible with marked bronchial inflammation.   3.  Mild mid and upper lung centrilobular and paraseptal emphysema.    4.  Bilateral calcified pleural plaques, mild diffuse pleural thickening and foci of chronic cicatricial volume loss in the periphery of both lungs unchanged and compatible with prior asbestos exposure.   5.  Small esophageal hiatal hernia and mild patulous dilatation of the fluid-filled thoracic esophagus.      CT Head w/o Contrast   Final Result   IMPRESSION:   1.  Mild age-related changes without acute intracranial  abnormality.          EKG:    Performed at: 16:44    Impression: Sinus rhythm, T wave abnormality primarily anterior, electrical artifact    Rate: 60 bpm  Rhythm: Sinus rhythm  Axis: 90  VT Interval: normal  QRS Interval: 66 ms  QTc Interval: 396 ms  ST Changes: TWI inversion primarily anterior  Comparison: When compared with EKG of 05-OCT-2019, there is some T wave inversion in the anterior    I have independently reviewed and interpreted the EKG(s) documented above.        I, Ace Bedoya, am serving as a scribe to document services personally performed by Dr. Simon Franco, based on my observation and the provider's statements to me. I, Simon Franco MD attest that Ace Bedoya is acting in a scribe capacity, has observed my performance of the services and has documented them in accordance with my direction.    Simon Franco M.D.  Emergency Medicine  [facility] EMERGENCY DEPARTMENT  [address] [place] [id number]  [phone]  Dept: [phone]     Simon Franco MD  12/26/22 4595

## 2022-12-26 NOTE — ED TRIAGE NOTES
Pt ahs complex history, CPD, recent UTI, has hx of sepsis.  Increased lethargy and confusion. Per fmaily statement it has been worse today.  Pt's oxygen sats on RA only 86% pt placed on 2L via NC sats now 94%     Triage Assessment       Row Name 12/26/22 1533       Triage Assessment (Adult)    Airway WDL WDL       Respiratory WDL    Respiratory WDL X;rhythm/pattern    Rhythm/Pattern, Respiratory tachypneic;shortness of breath;other (see comments)  hypoxia       Skin Circulation/Temperature WDL    Skin Circulation/Temperature WDL X;circulation    Skin Circulation cyanosis  per famly statement, pt's fngers are old but not discolored at this time.       Cardiac WDL    Cardiac WDL WDL       Peripheral/Neurovascular WDL    Peripheral Neurovascular WDL X;capillary refill    Capillary Refill, LUE greater than 3 secs    Capillary Refill, RUE greater than 3 secs       Cognitive/Neuro/Behavioral WDL    Cognitive/Neuro/Behavioral WDL X    Level of Consciousness confused       [place] Coma Scale    Best Eye Response 4-->(E4) spontaneous    Best Motor Response 6-->(M6) obeys commands    Best Verbal Response 5-->(V5) oriented    Elvis Coma Scale Score 15

## 2022-12-27 ENCOUNTER — APPOINTMENT (OUTPATIENT)
Dept: PHYSICAL THERAPY | Facility: HOSPITAL | Age: 77
DRG: 070 | End: 2022-12-27
Attending: INTERNAL MEDICINE
Payer: COMMERCIAL

## 2022-12-27 ENCOUNTER — APPOINTMENT (OUTPATIENT)
Dept: OCCUPATIONAL THERAPY | Facility: HOSPITAL | Age: 77
DRG: 070 | End: 2022-12-27
Attending: INTERNAL MEDICINE
Payer: COMMERCIAL

## 2022-12-27 ENCOUNTER — APPOINTMENT (OUTPATIENT)
Dept: SPEECH THERAPY | Facility: HOSPITAL | Age: 77
DRG: 070 | End: 2022-12-27
Attending: INTERNAL MEDICINE
Payer: COMMERCIAL

## 2022-12-27 VITALS
HEART RATE: 60 BPM | SYSTOLIC BLOOD PRESSURE: 124 MMHG | HEIGHT: 71 IN | TEMPERATURE: 97.1 F | RESPIRATION RATE: 16 BRPM | WEIGHT: 225 LBS | OXYGEN SATURATION: 96 % | DIASTOLIC BLOOD PRESSURE: 62 MMHG | BODY MASS INDEX: 31.5 KG/M2

## 2022-12-27 LAB
ALBUMIN UR-MCNC: NEGATIVE MG/DL
ANION GAP SERPL CALCULATED.3IONS-SCNC: 11 MMOL/L (ref 7–15)
APPEARANCE UR: CLEAR
BACTERIA #/AREA URNS HPF: ABNORMAL /HPF
BILIRUB UR QL STRIP: NEGATIVE
BUN SERPL-MCNC: 17.4 MG/DL (ref 8–23)
CALCIUM SERPL-MCNC: 9.1 MG/DL (ref 8.8–10.2)
CHLORIDE SERPL-SCNC: 103 MMOL/L (ref 98–107)
COLOR UR AUTO: ABNORMAL
CREAT SERPL-MCNC: 1.07 MG/DL (ref 0.67–1.17)
DEPRECATED HCO3 PLAS-SCNC: 22 MMOL/L (ref 22–29)
GFR SERPL CREATININE-BSD FRML MDRD: 71 ML/MIN/1.73M2
GLUCOSE SERPL-MCNC: 154 MG/DL (ref 70–99)
GLUCOSE UR STRIP-MCNC: NEGATIVE MG/DL
HGB UR QL STRIP: NEGATIVE
KETONES UR STRIP-MCNC: NEGATIVE MG/DL
LEUKOCYTE ESTERASE UR QL STRIP: NEGATIVE
NITRATE UR QL: NEGATIVE
PH UR STRIP: 7 [PH] (ref 5–7)
POTASSIUM SERPL-SCNC: 4.2 MMOL/L (ref 3.4–5.3)
RBC URINE: <1 /HPF
SODIUM SERPL-SCNC: 136 MMOL/L (ref 136–145)
SP GR UR STRIP: 1.05 (ref 1–1.03)
UROBILINOGEN UR STRIP-MCNC: <2 MG/DL
WBC URINE: 1 /HPF

## 2022-12-27 PROCEDURE — 92610 EVALUATE SWALLOWING FUNCTION: CPT | Mod: GN | Performed by: SPEECH-LANGUAGE PATHOLOGIST

## 2022-12-27 PROCEDURE — 97116 GAIT TRAINING THERAPY: CPT | Mod: GP

## 2022-12-27 PROCEDURE — 99223 1ST HOSP IP/OBS HIGH 75: CPT | Performed by: NURSE PRACTITIONER

## 2022-12-27 PROCEDURE — 97530 THERAPEUTIC ACTIVITIES: CPT | Mod: GP

## 2022-12-27 PROCEDURE — 250N000011 HC RX IP 250 OP 636: Performed by: INTERNAL MEDICINE

## 2022-12-27 PROCEDURE — 92526 ORAL FUNCTION THERAPY: CPT | Mod: GN | Performed by: SPEECH-LANGUAGE PATHOLOGIST

## 2022-12-27 PROCEDURE — 99223 1ST HOSP IP/OBS HIGH 75: CPT | Performed by: INTERNAL MEDICINE

## 2022-12-27 PROCEDURE — 81001 URINALYSIS AUTO W/SCOPE: CPT | Performed by: EMERGENCY MEDICINE

## 2022-12-27 PROCEDURE — 94660 CPAP INITIATION&MGMT: CPT

## 2022-12-27 PROCEDURE — 80048 BASIC METABOLIC PNL TOTAL CA: CPT | Performed by: INTERNAL MEDICINE

## 2022-12-27 PROCEDURE — 999N000157 HC STATISTIC RCP TIME EA 10 MIN

## 2022-12-27 PROCEDURE — 97535 SELF CARE MNGMENT TRAINING: CPT | Mod: GO

## 2022-12-27 PROCEDURE — 36415 COLL VENOUS BLD VENIPUNCTURE: CPT | Performed by: INTERNAL MEDICINE

## 2022-12-27 PROCEDURE — 97166 OT EVAL MOD COMPLEX 45 MIN: CPT | Mod: GO

## 2022-12-27 PROCEDURE — 99239 HOSP IP/OBS DSCHRG MGMT >30: CPT | Mod: GC | Performed by: INTERNAL MEDICINE

## 2022-12-27 PROCEDURE — 97162 PT EVAL MOD COMPLEX 30 MIN: CPT | Mod: GP

## 2022-12-27 PROCEDURE — C9113 INJ PANTOPRAZOLE SODIUM, VIA: HCPCS | Performed by: INTERNAL MEDICINE

## 2022-12-27 PROCEDURE — 250N000013 HC RX MED GY IP 250 OP 250 PS 637: Performed by: INTERNAL MEDICINE

## 2022-12-27 RX ORDER — LEVOCETIRIZINE DIHYDROCHLORIDE 5 MG/1
5 TABLET, FILM COATED ORAL EVERY EVENING
Status: DISCONTINUED | OUTPATIENT
Start: 2022-12-27 | End: 2022-12-27 | Stop reason: HOSPADM

## 2022-12-27 RX ORDER — FLUTICASONE FUROATE AND VILANTEROL 200; 25 UG/1; UG/1
1 POWDER RESPIRATORY (INHALATION) DAILY
Status: DISCONTINUED | OUTPATIENT
Start: 2022-12-27 | End: 2022-12-27 | Stop reason: HOSPADM

## 2022-12-27 RX ORDER — TOPIRAMATE 25 MG/1
75 TABLET, FILM COATED ORAL 2 TIMES DAILY
Status: DISCONTINUED | OUTPATIENT
Start: 2022-12-27 | End: 2022-12-27 | Stop reason: HOSPADM

## 2022-12-27 RX ORDER — PREGABALIN 100 MG/1
100 CAPSULE ORAL 3 TIMES DAILY
Qty: 30 CAPSULE | Refills: 0 | Status: SHIPPED | OUTPATIENT
Start: 2022-12-27 | End: 2024-09-22

## 2022-12-27 RX ORDER — PREGABALIN 100 MG/1
100 CAPSULE ORAL 3 TIMES DAILY
Status: DISCONTINUED | OUTPATIENT
Start: 2022-12-27 | End: 2022-12-27 | Stop reason: HOSPADM

## 2022-12-27 RX ORDER — DULOXETIN HYDROCHLORIDE 30 MG/1
30 CAPSULE, DELAYED RELEASE ORAL 2 TIMES DAILY
Status: DISCONTINUED | OUTPATIENT
Start: 2022-12-27 | End: 2022-12-27 | Stop reason: HOSPADM

## 2022-12-27 RX ADMIN — METHADONE HYDROCHLORIDE 7.5 MG: 5 TABLET ORAL at 13:43

## 2022-12-27 RX ADMIN — PREGABALIN 100 MG: 100 CAPSULE ORAL at 13:42

## 2022-12-27 RX ADMIN — TOPIRAMATE 75 MG: 25 TABLET, FILM COATED ORAL at 09:36

## 2022-12-27 RX ADMIN — METHYLPREDNISOLONE SODIUM SUCCINATE 40 MG: 40 INJECTION, POWDER, FOR SOLUTION INTRAMUSCULAR; INTRAVENOUS at 05:07

## 2022-12-27 RX ADMIN — PIPERACILLIN AND TAZOBACTAM 3.38 G: 3; .375 INJECTION, POWDER, LYOPHILIZED, FOR SOLUTION INTRAVENOUS at 09:12

## 2022-12-27 RX ADMIN — PREGABALIN 100 MG: 100 CAPSULE ORAL at 09:38

## 2022-12-27 RX ADMIN — PANTOPRAZOLE SODIUM 40 MG: 40 INJECTION, POWDER, FOR SOLUTION INTRAVENOUS at 09:11

## 2022-12-27 RX ADMIN — UMECLIDINIUM 1 PUFF: 62.5 AEROSOL, POWDER ORAL at 09:37

## 2022-12-27 RX ADMIN — PIPERACILLIN AND TAZOBACTAM 3.38 G: 3; .375 INJECTION, POWDER, LYOPHILIZED, FOR SOLUTION INTRAVENOUS at 02:17

## 2022-12-27 RX ADMIN — FLUTICASONE FUROATE AND VILANTEROL TRIFENATATE 1 PUFF: 200; 25 POWDER RESPIRATORY (INHALATION) at 09:37

## 2022-12-27 RX ADMIN — DULOXETINE HYDROCHLORIDE 30 MG: 30 CAPSULE, DELAYED RELEASE ORAL at 09:36

## 2022-12-27 ASSESSMENT — ACTIVITIES OF DAILY LIVING (ADL)
ADLS_ACUITY_SCORE: 37
ADLS_ACUITY_SCORE: 37
ADLS_ACUITY_SCORE: 41
ADLS_ACUITY_SCORE: 37
DEPENDENT_IADLS:: INDEPENDENT;TRANSPORTATION
ADLS_ACUITY_SCORE: 37

## 2022-12-27 NOTE — H&P
Admission History and Physical   Romaine Ochoa, 1945, [id number]    No admission diagnoses are documented for this encounter.  PCP:Simon Schmidt, [phone]   Admitting provider: Celestina Duque MD.    Code status:  Full Code          Extended Emergency Contact Information  Primary Emergency Contact: JOSE M LOW  Address: [address] [place] [address]  Home Phone: [phone]  Mobile Phone: [phone]  Relation: Other  Secondary Emergency Contact: BRYN OCHOA   [facility]  Home Phone: [phone]  Relation: Spouse       Assessment and Plan  Active Problems:    Bradycardia    History of asbestos exposure    Idiopathic progressive polyneuropathy    Chronic obstructive pulmonary disease (H)    Memory deficit    Opioid dependence (H)    PAD (peripheral artery disease) (H)    Bronchitis    Acute encephalopathy    Acute respiratory failure with hypoxia and hypercarbia (H)    Gastroesophageal reflux disease without esophagitis    Hypotension, unspecified hypotension type    BERNICE (acute kidney injury) (H)    Romaine Ochoa is a 77 year old male presenting with lethargy and respiratory failure     Acute hypoxic resp failure/COPD   - CTA no PE Progression of extensive bronchial wall thickening and development of endobronchial mucosal thickening and secretions compatible with marked bronchial inflammation. Mild mid and upper lung centrilobular and paraseptal emphysema.  Bilateral calcified pleural plaques, mild diffuse pleural thickening and foci of chronic cicatricial volume loss in the periphery of both lungs unchanged and compatible with prior asbestos exposure. Small esophageal hiatal hernia and mild patulous dilatation of the fluid-filled thoracic esophagus.  - On Bipap, but still lethargic but gases improving and oxygenation  - NPO for now and hold meds (specifically methadone and monitor for discomfort) until more awake, less lethargic    -  HOB at 30 degrees since question of aspiration with CT findings, GERD and small hiatal hernia with fluid filled thoracic esophagus  - IV protonix BID for now  - SLP to see  - Pulm consulted follows with Oleg  - changed abx to zosyn to cover aspiration   - place on solumedrol 40mg Q12 hrs for now   - continue home inhalers as able RT to assess may need nebs  - RCAT     Encephalopathy likely metabolic with resp failure and methadone use.   - treating as above  - holding methadone for now and monitor for withdrawals once able to swallow more will resume 10mg QID once more awake will start  - pain team consulted may need adjustment  - holding lyrica high dose 200mg TID as well possibly needs adjustment and is on Cymbalta 30mg BID with lyrica.      Chronic pain with idiopathic progressive neuropathy   - treating as above once less lethargic and do not want to withdrawal as well  - pain team  - PT/OT    Bradycardic and hypotensive  - telemetry   - treating resp failure  - holding meds as well   - EKG poor quality no QTc prolongation   - fluid bolus     BERNICE vs CKD  - giving ns 500ml Bolus run over 3 hors  - recheck labs in am     GERD  - start on protonix IV 40mg BID for now  - reassess before discharge     COVID-19 PCR Results    COVID-19 PCR Results 12/26/22   SARS CoV2 PCR Negative      Comments are available for some flowsheets but are not being displayed.         COVID-19 Antibody Results, Testing for Immunity    COVID-19 Antibody Results, Testing for Immunity   No data to display.           VTE prophylaxis:  Enoxaparin (Lovenox) SQ  DIET: Orders Placed This Encounter      NPO for Medical/Clinical Reasons Except for: No Exceptions    Drains/Lines: none  Weight bearing status: WBAt once more awake  Disposition/Barriers to discharge: pending respiratory status, and specialist recs   Code Status:Full Code    HPI: Romaine Farah is a 77 year old old male with h/o COPD s/p pacemaker/ICD, SSS, chronic fatigue syndrome,  long term current use of systemic steroids, PAD who presents to this ED by wheelchair for evaluation of shortness of breath, fatigue.     Patient was brought in by family for complaints of increasing somnolence, lethargy, breathing difficulty, and an intermittent dry cough. His symptoms have been ongoing intermittently for the past couple months, but they states his symptoms have been significantly worse today. Today they report the patient appears to be more sleepy, generally weak and unable to perform his normal ADLs, and has more labored breathing with exertion. They report the patient was recently prescribed antibiotics for a possible pneumonia and a possible UTI, but he has since finished this. No fevers or other reported symptoms at this time.   Per family patient does occasionally cough when eating or drinking, he does complain of reflux and has been more severe lately. Reviewed with wife and daughter findings on CT could be to aspiration since per notes hes been more lethargic for sometime and possibly needs methadone adjusted or other meds, plus CT shows mild hiatal hernia with fluid filled esophagus and possibly aspirating gastric contents while sleep and aspirating due to too sedated. Informed them that he is still lethargic and on Bipap and holding medications for now until hes more awake and no signs of aspirating they agree. Also will have SLp and Pulm see tomorrow.      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 Added automatically from request for surgery  [id number]     Past Surgical History:   Procedure Laterality Date     AMPUTATE TOE(S) Left 2/20/2020    Procedure: AMPUTATION, third digit left foot;  Surgeon: Wai Armstrong DPM;  Location: [facility] Main OR;  Service: Podiatry     AMPUTATE TOE(S) Right 7/7/2020    Procedure: AMPUTATION, second digit right foot;  Surgeon: Wai Armstrong DPM;  Location: [facility] Main OR;  Service: Podiatry     EP PACEMAKER INSERT N/A 2/12/2019    Procedure: EP Pacemaker Insertion;  Surgeon: Micheal Mahoney MD;  Location: [facility] Cath Lab;  Service: Cardiology     EYE SURGERY Bilateral      HIP SURGERY Right     total hip     INSERT / REPLACE / REMOVE PACEMAKER  02/12/2019     PACEMAKER/ICD CHECK  2019    low heart rate - 35     TOTAL HIP ARTHROPLASTY Right 2/2/2015    Procedure: #3   HIP TOTAL ARTHROPLASTY, RIGHT;  Surgeon: Aryan Glass MD;  Location: [facility] OR;  Service:      Family History   Problem Relation Age of Onset     LUNG DISEASE Mother      Diabetes Mother      Esophageal Cancer Father      Tremor Father      Alcoholism Father      Tremor Sister      Neuropathy Sister      Arthritis Brother      Other - See Comments Brother      Other - See Comments Brother      Tremor Sister      Lupus Daughter      Tremor Daughter      Thyroid Disease Daughter      Chronic Obstructive Pulmonary Disease Mother      Social History     Socioeconomic History     Marital status:      Spouse name: Not on file     Number of children: Not on file     Years of education: Not on file     Highest education level: Not on file   Occupational History     Not on file   Tobacco Use     Smoking status: Former     Types: Cigars, cigarillos or filtered cigars     Smokeless tobacco: Never   Substance and Sexual Activity     Alcohol use: Not Currently     Drug use: Never     Comment: cigars     Sexual activity: Not Currently   Other Topics Concern     Parent/sibling w/ CABG, MI or angioplasty before 65F 55M? No   Social  History Narrative    . lives in [place]. spouse sania     Social Determinants of Health     Financial Resource Strain: Not on file   Food Insecurity: Not on file   Transportation Needs: Not on file   Physical Activity: Not on file   Stress: Not on file   Social Connections: Not on file   Intimate Partner Violence: Not on file   Housing Stability: Not on file     No Known Allergies    PRIOR TO ADMISSION MEDICATIONS   Prior to Admission medications    Medication Sig Last Dose Taking? Auth Provider Long Term End Date   calcium carbonate-vitamin D (OSCAL W/D) 500-200 MG-UNIT tablet Take 1 tablet by mouth daily    Israel Corey MD     clobetasol (TEMOVATE) 0.05 % external ointment Apply topically 2 times daily As needed   Israel Corey MD     DULoxetine (CYMBALTA) 30 MG capsule Take 1 capsule (30 mg) by mouth 2 times daily   Sushant Rankin MD Yes    fluocinonide (LIDEX) 0.05 % external cream Apply topically 2 times daily As needed   Israel Corey MD     Fluticasone-Umeclidin-Vilanterol (TRELEGY ELLIPTA) 200-62.5-25 MCG/INH oral inhaler Inhale 1 puff into the lungs daily   Farhan Dejesus MD     ibuprofen (ADVIL/MOTRIN) 200 MG tablet Take 1 tablet (200 mg) by mouth every 8 hours as needed for mild pain   Israel Corey MD     levocetirizine (XYZAL) 5 MG tablet Take 5 mg by mouth as needed for allergies   Reported, Patient     magnesium 250 MG tablet Take 1 tablet by mouth 3 times daily   Reported, Patient     methadone (DOLOPHINE) 10 MG tablet 1 pill by mouth daily @ 9am, 1 pill @ 1pm, 1 pill @ 5pm and 1 pill @ 10/11pm   - 4 pills per day   Israel Corey MD     mupirocin (BACTROBAN) 2 % external ointment Apply 1 application Externally Twice a day   Reported, Patient     nystatin-triamcinolone (MYCOLOG II) 504059-3.1 UNIT/GM-% external cream APPLY ONE APPLICATION TWICE A DAY   Reported, Patient     Omega-3 Fatty Acids (FISH OIL BURP-LESS) 1000 MG CAPS 2 by mouth daily in morning @ 9am    Israel Corey MD     predniSONE (DELTASONE) 20 MG tablet Take 2 tabs daily for 3 days and then 1 tablet daily for 7 days.   Farhan Dejesus MD     predniSONE (DELTASONE) 20 MG tablet Take 1 tablet (20 mg) by mouth daily   Ray Combs MD     Pregabalin (LYRICA) 200 MG capsule Take 1 capsule (200 mg) by mouth 3 times daily   Sushant Rankin MD Yes    primidone (MYSOLINE) 50 MG tablet Take 2 tablets in the morning and 3 tablets at bedtime   Sushant Rankin MD Yes    topiramate (TOPAMAX) 25 MG tablet Take 3 tablets (75 mg) by mouth 2 times daily   Sushant Rankin MD Yes    vitamin (B COMPLEX) tablet Take 1 tablet by mouth daily @ 9am   Israel Corey MD     vitamin C (ASCORBIC ACID) 100 MG tablet Take 100 mg by mouth daily    Reported, Patient     Vitamin D3 (CHOLECALCIFEROL) 25 mcg (1000 units) tablet Take 1 tablet by mouth daily    Reported, Patient          REVIEW OF SYSTEMS:  12 point reviewed pertinent negatives and positives in HPI all others negative     PHYSICAL EXAM  Temp:  [97.6  F (36.4  C)] 97.6  F (36.4  C)  Pulse:  [59-62] 59  Resp:  [11-26] 23  BP: (103-146)/(53-96) 103/56  FiO2 (%):  [40 %-60 %] 40 %  SpO2:  [90 %-96 %] 95 %  Wt Readings from Last 1 Encounters:   12/26/22 102.1 kg (225 lb)     Body mass index is 31.38 kg/m .  Physical Exam  Constitutional:       Comments: Lethargic, opens eyes occasionally and falls back to sleep.    HENT:      Head: Normocephalic and atraumatic.   Eyes:      Conjunctiva/sclera: Conjunctivae normal.      Comments: Falls back to sleep but Pupils reactive to light, does not track falls back to sleep.   Cardiovascular:      Rate and Rhythm: Regular rhythm. Bradycardia present.      Pulses: Normal pulses.   Pulmonary:      Comments: On bipap, comfortable fine bibasilar crackles but no wheezes or rhonchi   Abdominal:      General: Bowel sounds are normal.      Palpations: Abdomen is soft.   Musculoskeletal:      Cervical back: Neck supple.       Comments: No E/C/C   Skin:     General: Skin is warm and dry.      Capillary Refill: Capillary refill takes less than 2 seconds.   Neurological:      Comments: Opens eyes intermittently, lethargic but does follow some commands and takes deep breaths  withdraws to pain in BUE, did not check feet due to severe neuropathy per family. Sensation intact in lower legs          PERTINENT LABS and RADIOLOGY   Results for orders placed or performed during the hospital encounter of 12/26/22   CT Head w/o Contrast    Impression    IMPRESSION:  1.  Mild age-related changes without acute intracranial abnormality.   CT Chest Pulmonary Embolism w Contrast    Impression    IMPRESSION:  1.  No pulmonary emboli. No CTA signs of acute aortic syndrome.  2.  Progression of extensive bronchial wall thickening and development of endobronchial mucosal thickening and secretions compatible with marked bronchial inflammation.  3.  Mild mid and upper lung centrilobular and paraseptal emphysema.   4.  Bilateral calcified pleural plaques, mild diffuse pleural thickening and foci of chronic cicatricial volume loss in the periphery of both lungs unchanged and compatible with prior asbestos exposure.  5.  Small esophageal hiatal hernia and mild patulous dilatation of the fluid-filled thoracic esophagus.       Recent Labs   Lab 12/26/22  1613   WBC 11.3*   HGB 15.8   MCV 91         POTASSIUM 4.0   CHLORIDE 101   CO2 25   BUN 19.4   CR 1.30*   ANIONGAP 11   JOSSELYN 9.3   *   ALBUMIN 3.8   PROTTOTAL 7.4   BILITOTAL 0.4   ALKPHOS 65   ALT 20   AST 24     EKG:poor quality possible junctional rhythm  Recheck EKG       Celestina Duque MD  [facility] Medicine Service  [phone]

## 2022-12-27 NOTE — PROGRESS NOTES
RCAT Treatment Plan    Patient Score: 6  Patient Acuity: 4    Clinical Indication for Therapy: history of mucous producing disease    Therapy Ordered: Bipap continuous    Assessment Summary: PT has improved since admission to the ED. Bipap no longer needed.    Myke Mcdonald, RT  12/27/2022

## 2022-12-27 NOTE — CONSULTS
Care Management Initial Consult    General Information  Assessment completed with: Patient, Spouse or significant other, Children, pt  Type of CM/SW Visit: Initial Assessment    Primary Care Provider verified and updated as needed: Yes   Readmission within the last 30 days: no previous admission in last 30 days   Return Category: Progression of disease  Reason for Consult: discharge planning  Advance Care Planning: Advance Care Planning Reviewed: verified with patient     General Information Comments: lives w/wife and no svcs, uses walker, family to transport    Communication Assessment  Patient's communication style: spoken language (English or Bilingual)             Cognitive  Cognitive/Neuro/Behavioral: WDL  Level of Consciousness: alert  Arousal Level: opens eyes spontaneously     Mood/Behavior: calm, cooperative  Best Language: 0 - No aphasia  Speech: clear    Living Environment:   People in home: spouse     Current living Arrangements: house      Able to return to prior arrangements: yes       Family/Social Support:  Care provided by: self, spouse/significant other  Provides care for: no one  Marital Status:   Wife, Children          Description of Support System: Supportive, Involved    Support Assessment: Adequate family and caregiver support, Adequate social supports    Current Resources:   Patient receiving home care services: No     Community Resources: None  Equipment currently used at home: walker, rolling  Supplies currently used at home: None    Employment/Financial:  Employment Status:          Financial Concerns: No concerns identified   Referral to Financial Worker: No       Lifestyle & Psychosocial Needs:  Social Determinants of Health     Tobacco Use: Medium Risk     Smoking Tobacco Use: Former     Smokeless Tobacco Use: Never     Passive Exposure: Not on file   Alcohol Use: Not on file   Financial Resource Strain: Not on file   Food Insecurity: Not on file   Transportation Needs: Not on  file   Physical Activity: Not on file   Stress: Not on file   Social Connections: Not on file   Intimate Partner Violence: Not on file   Depression: Not at risk     PHQ-2 Score: 0   Housing Stability: Not on file       Functional Status:  Prior to admission patient needed assistance:   Dependent ADLs:: Ambulation-walker  Dependent IADLs:: Independent, Transportation  Assesssment of Functional Status: Not at baseline with ADL Functioning    Mental Health Status:  Mental Health Status: No Current Concerns       Chemical Dependency Status:                Values/Beliefs:  Spiritual, Cultural Beliefs, Mosque Practices, Values that affect care:                 Additional Information:  Assessed, lives w/wife and no svcs, uses walker, family to transport. No CM needs.      Wilman Perry RN

## 2022-12-27 NOTE — PHARMACY-ADMISSION MEDICATION HISTORY
Pharmacy Note - Admission Medication History    Pertinent Provider Information: Sources unknown, interview completed by another pharmacist. Reportedly, patient is no longer taking primidone.   ______________________________________________________________________    Prior To Admission (PTA) med list completed and updated in EMR.       PTA Med List   Medication Sig Last Dose     calcium carbonate-vitamin D (OSCAL W/D) 500-200 MG-UNIT tablet Take 1 tablet by mouth daily  12/26/2022     clobetasol (TEMOVATE) 0.05 % external ointment Apply topically 2 times daily As needed      DULoxetine (CYMBALTA) 30 MG capsule Take 1 capsule (30 mg) by mouth 2 times daily 12/26/2022 at x1 am     fluocinonide (LIDEX) 0.05 % external cream Apply topically 2 times daily As needed      Fluticasone-Umeclidin-Vilanterol (TRELEGY ELLIPTA) 200-62.5-25 MCG/INH oral inhaler Inhale 1 puff into the lungs daily 12/25/2022     ibuprofen (ADVIL/MOTRIN) 200 MG tablet Take 1 tablet (200 mg) by mouth every 8 hours as needed for mild pain      levocetirizine (XYZAL) 5 MG tablet Take 5 mg by mouth every evening 12/25/2022     magnesium 250 MG tablet Take 1 tablet by mouth 3 times daily 12/26/2022 at x2     methadone (DOLOPHINE) 10 MG tablet 1 pill by mouth daily @ 9am, 1 pill @ 1pm, 1 pill @ 5pm and 1 pill @ 10/11pm   - 4 pills per day 12/26/2022 at x2     Omega-3 Fatty Acids (FISH OIL BURP-LESS) 1000 MG CAPS 2 by mouth daily in morning @ 9am 12/26/2022     Pregabalin (LYRICA) 200 MG capsule Take 1 capsule (200 mg) by mouth 3 times daily 12/26/2022 at x2     propranolol (INDERAL) 40 MG tablet Take 40 mg by mouth 2 times daily 12/26/2022 at x1 am     topiramate (TOPAMAX) 25 MG tablet Take 3 tablets (75 mg) by mouth 2 times daily 12/26/2022 at x1 am     vitamin (B COMPLEX) tablet Take 1 tablet by mouth daily @ 9am 12/26/2022     vitamin C (ASCORBIC ACID) 100 MG tablet Take 100 mg by mouth daily  12/26/2022     Vitamin D3 (CHOLECALCIFEROL) 25 mcg (1000  units) tablet Take 1 tablet by mouth daily  12/26/2022     The information provided in this note is only as accurate as the sources available at the time of the update(s).    Thank you for the opportunity to participate in the care of this patient.    Liberty Mac, [facility]  12/26/2022 7:38 PM

## 2022-12-27 NOTE — CONSULTS
LUNG NODULE & INTERVENTIONAL PULMONARY CLINIC  CLINICS [facility] SURGERY CENTER, [facility]     Romaine Farah MRN# [id number]   Age: 77 year old YOB: 1945       Requesting Physician: No referring provider defined for this encounter.       Assessment and Plan:    1.  Acute hypercarbic respiratory failure.  In the setting of chronic pain medication.  I explained that his encephalopathy is likely due to poor clearance of his methadone and other medications.  They are very hesitant to decrease his dose.  I explained that if they are not willing to decrease his dose then they should start holding methadone doses when he gets too sedated.  I explained the risk of respiratory failure especially at night.  He will follow-up with his primary pain specialist.    2.  Chronic bronchitis: He can continue his current home regimen.  He looks fine and likely does not need further prednisone or antibiotics at this time.    Will sign off.  Please contact me with any questions.             History:     Romaine Farah is a 77 year old male with sig h/o for COPD, chronic pain who is here for evaluation/followup of respiratory failure.  He has been more and more hazy and sedated recently.  He also recently had a UTI.  No clear other provocative or palliative factors.  He continues to take his chronic pain medication which includes topiramate and methadone.  He has not missed any doses.  He has had a little bit of phlegm but is overall feeling pretty clear.  No other provocative or palliative factors.  No pain.  His UTIs been treated.      - My interpretation of the images relevant for this visit includes: Changes consistent with chronic bronchitis.             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 Added automatically from request for surgery [id number]           Past Surgical History:      Past Surgical History:   Procedure Laterality Date     AMPUTATE TOE(S) Left 2/20/2020    Procedure: AMPUTATION, third digit left foot;  Surgeon: Wai Armstrong DPM;  Location: [facility] OR;  Service: Podiatry     AMPUTATE TOE(S) Right 7/7/2020    Procedure: AMPUTATION, second digit right foot;  Surgeon: Wai Armstrong DPM;  Location: [facility] OR;  Service: Podiatry     EP PACEMAKER INSERT N/A 2/12/2019    Procedure: EP Pacemaker Insertion;  Surgeon: Micheal Mahoney MD;  Location: [facility];  Service: Cardiology     EYE SURGERY Bilateral      HIP SURGERY Right     total hip     INSERT / REPLACE / REMOVE PACEMAKER  02/12/2019     PACEMAKER/ICD CHECK  2019    low heart rate - 35     TOTAL HIP ARTHROPLASTY Right 2/2/2015    Procedure: #3   HIP TOTAL ARTHROPLASTY, RIGHT;  Surgeon: Aryan Glass MD;  Location: [facility] OR;  Service:           Social History:     Social History     Tobacco Use     Smoking status: Former     Types: Cigars, cigarillos or filtered cigars     Smokeless tobacco: Never   Substance Use Topics     Alcohol use: Not Currently          Family History:     Family History   Problem Relation Age of Onset     LUNG DISEASE Mother      Diabetes Mother      Esophageal Cancer Father      Tremor Father      Alcoholism Father      Tremor Sister      Neuropathy Sister      Arthritis Brother      Other - See Comments Brother      Other - See Comments Brother      Tremor Sister      Lupus Daughter      Tremor Daughter      Thyroid Disease Daughter      Chronic Obstructive Pulmonary Disease  Mother            Allergies:    No Known Allergies       Medications:     Current Facility-Administered Medications   Medication     DULoxetine (CYMBALTA) DR capsule 30 mg     enoxaparin ANTICOAGULANT (LOVENOX) injection 40 mg     fluticasone-vilanterol (BREO ELLIPTA) 200-25 MCG/ACT inhaler 1 puff    And     umeclidinium (INCRUSE ELLIPTA) 62.5 MCG/ACT inhaler 1 puff     levocetirizine (XYZAL) tablet 5 mg     methadone (DOLOPHINE) half-tab 7.5 mg     methylPREDNISolone sodium succinate (solu-MEDROL) injection 40 mg     pantoprazole (PROTONIX) IV push injection 40 mg     piperacillin-tazobactam (ZOSYN) 3.375 g vial to attach to  mL bag     pregabalin (LYRICA) capsule 100 mg     topiramate (TOPAMAX) tablet 75 mg     Current Outpatient Medications   Medication Sig     calcium carbonate-vitamin D (OSCAL W/D) 500-200 MG-UNIT tablet Take 1 tablet by mouth daily      clobetasol (TEMOVATE) 0.05 % external ointment Apply topically 2 times daily As needed     DULoxetine (CYMBALTA) 30 MG capsule Take 1 capsule (30 mg) by mouth 2 times daily     fluocinonide (LIDEX) 0.05 % external cream Apply topically 2 times daily As needed     Fluticasone-Umeclidin-Vilanterol (TRELEGY ELLIPTA) 200-62.5-25 MCG/INH oral inhaler Inhale 1 puff into the lungs daily     ibuprofen (ADVIL/MOTRIN) 200 MG tablet Take 1 tablet (200 mg) by mouth every 8 hours as needed for mild pain     levocetirizine (XYZAL) 5 MG tablet Take 5 mg by mouth every evening     magnesium 250 MG tablet Take 1 tablet by mouth 3 times daily     methadone (DOLOPHINE) 10 MG tablet 1 pill by mouth daily @ 9am, 1 pill @ 1pm, 1 pill @ 5pm and 1 pill @ 10/11pm   - 4 pills per day     Omega-3 Fatty Acids (FISH OIL BURP-LESS) 1000 MG CAPS 2 by mouth daily in morning @ 9am     Pregabalin (LYRICA) 200 MG capsule Take 1 capsule (200 mg) by mouth 3 times daily     propranolol (INDERAL) 40 MG tablet Take 40 mg by mouth 2 times daily     topiramate (TOPAMAX) 25 MG tablet Take 3  "tablets (75 mg) by mouth 2 times daily     vitamin (B COMPLEX) tablet Take 1 tablet by mouth daily @ 9am     vitamin C (ASCORBIC ACID) 100 MG tablet Take 100 mg by mouth daily      Vitamin D3 (CHOLECALCIFEROL) 25 mcg (1000 units) tablet Take 1 tablet by mouth daily      primidone (MYSOLINE) 50 MG tablet Take 2 tablets in the morning and 3 tablets at bedtime (Patient not taking: Reported on 12/26/2022)          Review of Systems:   Comprehensive review of systems negative except as above         Physical Exam:   /62   Pulse 60   Temp 97.1  F (36.2  C) (Axillary)   Resp 16   Ht 1.803 m (5' 11\")   Wt 102.1 kg (225 lb)   SpO2 96%   BMI 31.38 kg/m      Constitutional - looks well, in no apparent distress  Neck supple, no palpable mass  Eyes - no redness or discharge  Respiratory -breathing appears comfortable. No wheeze or rhonchi.   Cardiac -- Normal rate, rhythm.   Abdomen soft without masses  Skin - No appreciable discoloration or lesions (very limited exam)  Neurological - No apparent tremors. Speech fluent and articlate  Psychiatric - no signs of delirium or anxiety          Current Laboratory Data:   All laboratory and imaging data reviewed.    Results for orders placed or performed during the hospital encounter of 12/26/22 (from the past 24 hour(s))   Blood gas venous   Result Value Ref Range    pH Venous 7.27 (L) 7.35 - 7.45    pCO2 Venous 60 (H) 35 - 50 mm Hg    pO2 Venous 20 (L) 25 - 47 mm Hg    Bicarbonate Venous 28 24 - 30 mmol/L    Base Excess/Deficit (+/-) 0.6   mmol/L    Oxyhemoglobin Venous 27.3 (L) 70.0 - 75.0 %    O2 Sat, Venous 27.8 (L) 70.0 - 75.0 %   CBC with platelets differential    Narrative    The following orders were created for panel order CBC with platelets differential.  Procedure                               Abnormality         Status                     ---------                               -----------         ------                     CBC with platelets and " dAmanda..[[id number]]  Abnormal            Final result                 Please view results for these tests on the individual orders.   Comprehensive metabolic panel   Result Value Ref Range    Sodium 137 136 - 145 mmol/L    Potassium 4.0 3.4 - 5.3 mmol/L    Chloride 101 98 - 107 mmol/L    Carbon Dioxide (CO2) 25 22 - 29 mmol/L    Anion Gap 11 7 - 15 mmol/L    Urea Nitrogen 19.4 8.0 - 23.0 mg/dL    Creatinine 1.30 (H) 0.67 - 1.17 mg/dL    Calcium 9.3 8.8 - 10.2 mg/dL    Glucose 130 (H) 70 - 99 mg/dL    Alkaline Phosphatase 65 40 - 129 U/L    AST 24 10 - 50 U/L    ALT 20 10 - 50 U/L    Protein Total 7.4 6.4 - 8.3 g/dL    Albumin 3.8 3.5 - 5.2 g/dL    Bilirubin Total 0.4 <=1.2 mg/dL    GFR Estimate 57 (L) >60 mL/min/1.73m2   D dimer quantitative   Result Value Ref Range    D-Dimer Quantitative 2.37 (H) 0.00 - 0.50 ug/mL FEU    Narrative    This D-dimer assay is intended for use in conjunction with a clinical pretest probability assessment model to exclude pulmonary embolism (PE) and deep venous thrombosis (DVT) in outpatients suspected of PE or DVT. The cut-off value is 0.50 ug/mL FEU.   CBC with platelets and differential   Result Value Ref Range    WBC Count 11.3 (H) 4.0 - 11.0 10e3/uL    RBC Count 5.50 4.40 - 5.90 10e6/uL    Hemoglobin 15.8 13.3 - 17.7 g/dL    Hematocrit 49.9 40.0 - 53.0 %    MCV 91 78 - 100 fL    MCH 28.7 26.5 - 33.0 pg    MCHC 31.7 31.5 - 36.5 g/dL    RDW 13.4 10.0 - 15.0 %    Platelet Count 204 150 - 450 10e3/uL    % Neutrophils 84 %    % Lymphocytes 10 %    % Monocytes 6 %    % Eosinophils 0 %    % Basophils 0 %    % Immature Granulocytes 0 %    NRBCs per 100 WBC 0 <1 /100    Absolute Neutrophils 9.5 (H) 1.6 - 8.3 10e3/uL    Absolute Lymphocytes 1.1 0.8 - 5.3 10e3/uL    Absolute Monocytes 0.7 0.0 - 1.3 10e3/uL    Absolute Eosinophils 0.0 0.0 - 0.7 10e3/uL    Absolute Basophils 0.1 0.0 - 0.2 10e3/uL    Absolute Immature Granulocytes 0.0 <=0.4 10e3/uL    Absolute NRBCs 0.0 10e3/uL   Procalcitonin    Result Value Ref Range    Procalcitonin 0.20 (H) <0.05 ng/mL   TSH with free T4 reflex   Result Value Ref Range    TSH 1.33 0.30 - 4.20 uIU/mL   Troponin T, High Sensitivity (now)   Result Value Ref Range    Troponin T, High Sensitivity 19 <=22 ng/L   Nt probnp inpatient   Result Value Ref Range    N terminal Pro BNP Inpatient 266 0 - 1,800 pg/mL   Symptomatic Influenza A/B & SARS-CoV2 (COVID-19) Virus PCR Multiplex Nasopharyngeal    Specimen: Nasopharyngeal; Swab   Result Value Ref Range    Influenza A PCR Negative Negative    Influenza B PCR Negative Negative    RSV PCR Negative Negative    SARS CoV2 PCR Negative Negative    Narrative    Testing was performed using the Xpert Xpress CoV2/Flu/RSV Assay on the [employer] Instrument. This test should be ordered for the detection of SARS-CoV-2 and influenza viruses in individuals who meet clinical and/or epidemiological criteria. Test performance is unknown in asymptomatic patients. This test is for in vitro diagnostic use under the FDA EUA for laboratories certified under CLIA to perform high or moderate complexity testing. This test has not been FDA cleared or approved. A negative result does not rule out the presence of PCR inhibitors in the specimen or target RNA in concentration below the limit of detection for the assay. If only one viral target is positive but coinfection with multiple targets is suspected, the sample should be re-tested with another FDA cleared, approved, or authorized test, if coinfection would change clinical management. This test was validated by the [facility]. These laboratories are certified under the Clinical Laboratory Improvement Amendments of 1988 (CLIA-88) as qualified to perform high complexity laboratory testing.   Ammonia (on ice)   Result Value Ref Range    Ammonia 12 (L) 16 - 60 umol/L   CT Head w/o Contrast    Narrative    EXAM: CT HEAD W/O CONTRAST  LOCATION: [facility]  DATE/TIME: 12/26/2022 6:07 PM    INDICATION: Weakness and fatigue, altered mental status  COMPARISON:  CT head 10/04/2019.  TECHNIQUE: Routine CT Head without IV contrast. Multiplanar reformats. Dose reduction techniques were used.    FINDINGS:  INTRACRANIAL CONTENTS: No intracranial hemorrhage, extraaxial collection, or mass effect.  No CT evidence of acute infarct. Mild presumed chronic small vessel ischemic changes. Mild generalized volume loss. No hydrocephalus.     VISUALIZED ORBITS/SINUSES/MASTOIDS: Prior bilateral cataract surgery. Visualized portions of the orbits are otherwise unremarkable. No paranasal sinus mucosal disease. No middle ear or mastoid effusion.    BONES/SOFT TISSUES: No acute abnormality.      Impression    IMPRESSION:  1.  Mild age-related changes without acute intracranial abnormality.   CT Chest Pulmonary Embolism w Contrast    Narrative    EXAM: CT CHEST PULMONARY EMBOLISM W CONTRAST  LOCATION: [facility]  DATE/TIME: 12/26/2022 6:07 PM    INDICATION: Hypoxia, altered mental status, COPD exacerbation  COMPARISON: Noncontrast CT chest 02/10/2021  TECHNIQUE: CT chest pulmonary angiogram during arterial phase injection of IV contrast. Multiplanar reformats and MIP reconstructions were performed. Dose reduction techniques were used.   CONTRAST: isovue 370  75ml    FINDINGS:  ANGIOGRAM CHEST: Pulmonary arteries are normal caliber and negative for pulmonary emboli. Thoracic aorta is negative for dissection. No CT evidence of right heart strain.    LUNGS AND PLEURA: Mild mid and upper lung centrilobular and paraseptal emphysema. Progression of extensive bronchial wall thickening and development of endobronchial mucosal thickening and secretions. Bilateral calcified pleural plaques, mild diffuse   pleural thickening and foci of chronic cicatricial volume loss in the periphery of both lungs unchanged and compatible with prior asbestos  exposure.    MEDIASTINUM/AXILLAE: Heart size within normal limits with no pericardial effusion. Cardiac pacemaker with lead tip in the right atrium. No lymphadenopathy. Small esophageal hiatal hernia and mild patulous dilatation of the fluid-filled thoracic   esophagus.    CORONARY ARTERY CALCIFICATION: Mild.    UPPER ABDOMEN: Unremarkable.    MUSCULOSKELETAL: Unremarkable.      Impression    IMPRESSION:  1.  No pulmonary emboli. No CTA signs of acute aortic syndrome.  2.  Progression of extensive bronchial wall thickening and development of endobronchial mucosal thickening and secretions compatible with marked bronchial inflammation.  3.  Mild mid and upper lung centrilobular and paraseptal emphysema.   4.  Bilateral calcified pleural plaques, mild diffuse pleural thickening and foci of chronic cicatricial volume loss in the periphery of both lungs unchanged and compatible with prior asbestos exposure.  5.  Small esophageal hiatal hernia and mild patulous dilatation of the fluid-filled thoracic esophagus.   Blood gas venous   Result Value Ref Range    pH Venous 7.34 (L) 7.35 - 7.45    pCO2 Venous 44 35 - 50 mm Hg    pO2 Venous 83 (H) 25 - 47 mm Hg    Bicarbonate Venous 24 24 - 30 mmol/L    Base Excess/Deficit (+/-) -1.7   mmol/L    Oxyhemoglobin Venous 95.3 (H) 70.0 - 75.0 %    O2 Sat, Venous 96.6 (H) 70.0 - 75.0 %   UA with Microscopic reflex to Culture    Specimen: Urine, Catheter   Result Value Ref Range    Color Urine Light Yellow Colorless, Straw, Light Yellow, Yellow    Appearance Urine Clear Clear    Glucose Urine Negative Negative mg/dL    Bilirubin Urine Negative Negative    Ketones Urine Negative Negative mg/dL    Specific Gravity Urine 1.046 (H) 1.001 - 1.030    Blood Urine Negative Negative    pH Urine 7.0 5.0 - 7.0    Protein Albumin Urine Negative Negative mg/dL    Urobilinogen Urine <2.0 <2.0 mg/dL    Nitrite Urine Negative Negative    Leukocyte Esterase Urine Negative Negative    Bacteria Urine Few  (A) None Seen /HPF    RBC Urine <1 <=2 /HPF    WBC Urine 1 <=5 /HPF    Narrative    Urine Culture not indicated   Basic metabolic panel   Result Value Ref Range    Sodium 136 136 - 145 mmol/L    Potassium 4.2 3.4 - 5.3 mmol/L    Chloride 103 98 - 107 mmol/L    Carbon Dioxide (CO2) 22 22 - 29 mmol/L    Anion Gap 11 7 - 15 mmol/L    Urea Nitrogen 17.4 8.0 - 23.0 mg/dL    Creatinine 1.07 0.67 - 1.17 mg/dL    Calcium 9.1 8.8 - 10.2 mg/dL    Glucose 154 (H) 70 - 99 mg/dL    GFR Estimate 71 >60 mL/min/1.73m2

## 2022-12-27 NOTE — ED NOTES
"[facility] ED Handoff Report    ED Chief Complaint: SOB and AMS    ED Diagnosis:  (J96.01,  J96.02) Acute respiratory failure with hypoxia and hypercarbia (H)    (J40) Bronchitis    (G93.40) Acute encephalopathy      PMH:    Past Medical History:   Diagnosis Date     Bradycardia      Chronic pain     toes     COPD (chronic obstructive pulmonary disease) (H)      Erectile dysfunction      Essential tremor      H/O asbestos exposure      History of repair of hip joint 1/1/2014     Hypertension      Hypertension     no meds currently     Neuropathy     feet     Osteomyelitis (H)     of ankle and foot     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 Added automatically from request for surgery [id number]        Code Status:  Full Code     Falls Risk: Yes Band: Applied    Current Living Situation/Residence: lives with a significant other and lives in a house     Elimination Status: Continent: Yes, using primo fit.     Activity Level: SBA w/ walker    Patients Preferred Language:  English     Needed: No    Vital Signs:  /62   Pulse 60   Temp 97.1  F (36.2  C) (Axillary)   Resp 16   Ht 1.803 m (5' 11\")   Wt 102.1 kg (225 lb)   SpO2 96%   BMI 31.38 kg/m       Cardiac Rhythm: NSR    Pain Score: chronic pain on methadone    Is the Patient Confused:  No    Last Food or Drink: 12/27/22 at dinner.  Full liquid diet.  Is to have video swallow.    Tests Performed: Done: Labs and Imaging    Treatments Provided:  Scans, bloodwork,     Family Dynamics/Concerns: No    Family Updated On Visitor Policy: Yes    Plan of Care Communicated to Family: Yes    Who Was Updated about Plan of Care: wife and dtr    Belongings Checklist Done and Signed by Patient: No    Medications sent with patient: None to be sent    Covid: symptomatic, negative        "

## 2022-12-27 NOTE — PROGRESS NOTES
Attempted ABG X 2.ER MD notified   Condition:: Cosmetic Treatment Please Describe Your Condition:: Patient presents today for first treatment of Subnovii Plasma Pen.

## 2022-12-27 NOTE — PROGRESS NOTES
Patient remains on BiPAP ST 12/6 16 40%. SpO2 mid 90's.pt tolerating the BiPAP well. RT will continue to monitor

## 2022-12-27 NOTE — CONSULTS
"[facility] ACUTE PAIN SERVICE CONSULTATION     Date of Admission:  12/26/2022  Date of Consult (When I saw the patient): 12/27/22  Physician requesting consult: Celestina Duque MD  Reason for consult: Chronic pain, lethargic and sedated on methadone  Primary Care Physician: Simon Schmidt MD     Assessment/Plan:     Romaine Farah is a 77 year old male who was admitted on 12/26/2022.  Pain team was asked to see the patient for chronic pain management on methadone, presenting lethargic and sedated. Admitted for evaluation of acute hypoxic respiratory failure, AMS, and COPD exacerbation. History of COPD, s/p pacemaker/ICD, SSS, chronic fatigue syndrome, long term current use of systemic steroids, tremors, PAD. Patient was brought in by family for complaints of increasing somnolence, lethargy, breathing difficulty, and an intermittent dry cough. His symptoms have been ongoing intermittently for the past couple months, but his family state his symptoms have been significantly worse yesterday prompting ED presentation. Patient was recently prescribed antibiotics for a possible pneumonia and a possible UTI, but he has since finished this. The patient is a former smoker, quit 35 years ago, and denies chemical dependency history. Patient is on chronic Methadone for pain. Follows with [place] [facility].    Patient is followed by Dr. Geronimo at [facility] for neuropathy. He reports chronic neuropathic pain of both feet. He reports taking Methadone 10 mg QID and Lyrica TID. Topical medications and other opioids have been ineffective for pain in feet. Describes pain as 6-8/10 and \"sticking your feet in a beehive full of bees\".      Opioid Induced Respiratory Depression Risk Assessment:?  High due to the following risk factors: COPD, Acute hypoxic resp failure, BMI 31.38, Former Smoker, Age>60, chronic Methadone use, concomitant CNS depressants.     PLAN:   1) Pain is consistent with chronic " pain.  Labs and imaging indicated: I have personally reviewed pertinent labs, tests, and radiologic imaging in patient's chart. Treatment plan includes multimodal pain approach, Hospital Medicine Service for medical management. Patient educated regarding multimodal pain approach, medications as listed below. Patient is understanding of the plan. All questions and concerns addressed to patient's satisfaction.   2)Multimodal Medication Therapy  Topical: discussed lidocaine and Capsaicin and patient declines   NSAID'S: None. CrCl 70.3 mL/min   Steroids: IV Methylprednisolone 40 mg BID  Muscle Relaxants: None.  Adjuvants: Lyrica 100 mg TID (home med is 200 mg TID)  Antidepressants/anxiolytics: Cymbalta 30 mg BID  Anticonvulsants:Topiramate 75 mg BID  Opioids: Methadone 7.5 mg QID to start if patient passes dysphagia screen, (home med is 10 mg QID)  IV Pain medication: None  3)Constipation Prophylaxis: patient reports takes Magnesium at home - will need resumed - defer to Hospital Medicine Service   4) Care Teams: HMS, Addiction Medicine, Pulmonology, PT/OT, SLP.    -Opioid prescriber has been Grazyna Barrett NP and Sushant Lewis MD  -[place]  pulled from system on 12/27/22. This indicates chronic opioid use, opioid tolerant.   11/28/22: Methadone 5 mg #20 for 10 ds  11/28/22: Methadone 10 mg #120 for 30 ds  11/09/22: Pregabalin 200 mg #270 for 90 ds  10/29/22: Methadone 10 mg #120 for 90 ds  10/29/22: Methadone 5 mg #20 for 30 ds  Discharge Recommendations - We recommend prescribing the following at the time of discharge: TBD       History of Present Illness (HPI):       Romaine Farah is a 77 year old male who presented the ED by wheelchair for evaluation of shortness of breath, fatigue.  Past medical history as above. He has been more and more hazy and sedated recently.  He also recently had a UTI and respiratory infection in the past month per family. He is also followed by Neurology for tremors and Propanol and  "Primidone being adjusted. The pain is reported to be chronic, located in the feet.      Per MN  review, the patient does have an opioid tolerance. Opioid induced side effects noted and include: sedation, constipation.      Reviewed medical record, labs, imaging, ED note, and care everywhere.   Patient is followed by Dr. Geronimo at [facility]. Last follow-up visit was on 11/08/22 for neuropathy. Per progress note: Methadone helps him with his pain, reported he has to use \"P.R.N.\" 8 x a month. Has severe pain in his feet at night. The daytime pain is largely managed. He is loathe to take additional pain medicine. He has problems with low energy, low motivation, SOB, likely multifactorial-lung disease, deconditioning, possibly partially related to sedation from his medications.     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 Added automatically from request for surgery [id number]       PAST SURGICAL HISTORY:   Past Surgical History:   Procedure Laterality Date     AMPUTATE TOE(S) Left 2/20/2020    Procedure: AMPUTATION, third digit left foot;  Surgeon: Wai Armstrong DPM;  [place] [facility]  Service: Podiatry     AMPUTATE TOE(S) Right 7/7/2020    Procedure: AMPUTATION, second digit right foot;  Surgeon: Wai Armstrong DPM;  [place] [facility]  Service: Podiatry     EP PACEMAKER INSERT N/A 2/12/2019    Procedure: EP Pacemaker Insertion;  Surgeon: Micheal Mahoney MD;  Location: [facility]" Cath Lab;  Service: Cardiology     EYE SURGERY Bilateral      HIP SURGERY Right     total hip     INSERT / REPLACE / REMOVE PACEMAKER  02/12/2019     PACEMAKER/ICD CHECK  2019    low heart rate - 35     TOTAL HIP ARTHROPLASTY Right 2/2/2015    Procedure: #3   HIP TOTAL ARTHROPLASTY, RIGHT;  Surgeon: Aryan Glass MD;  Location: [facility] OR;  Service:        FAMILY HISTORY:   Family History   Problem Relation Age of Onset     LUNG DISEASE Mother      Diabetes Mother      Esophageal Cancer Father      Tremor Father      Alcoholism Father      Tremor Sister      Neuropathy Sister      Arthritis Brother      Other - See Comments Brother      Other - See Comments Brother      Tremor Sister      Lupus Daughter      Tremor Daughter      Thyroid Disease Daughter      Chronic Obstructive Pulmonary Disease Mother        SOCIAL HISTORY:   Social History     Tobacco Use     Smoking status: Former     Types: Cigars, cigarillos or filtered cigars     Smokeless tobacco: Never   Substance Use Topics     Alcohol use: Not Currently        HEALTH & LIFESTYLE PRACTICES  Tobacco:  reports that he has quit smoking. His smoking use included cigars, cigarillos or filtered cigars. He has never used smokeless tobacco.  Alcohol:  reports that he does not currently use alcohol.  Illicit drugs:  reports no history of drug use.    Allergies  No Known Allergies    Problem List  Patient Active Problem List    Diagnosis Date Noted     Bronchitis 12/26/2022     Priority: Medium     Acute encephalopathy 12/26/2022     Priority: Medium     Acute respiratory failure with hypoxia and hypercarbia (H) 12/26/2022     Priority: Medium     Gastroesophageal reflux disease without esophagitis 12/26/2022     Priority: Medium     Hypotension, unspecified hypotension type 12/26/2022     Priority: Medium     BERNICE (acute kidney injury) (H) 12/26/2022     Priority: Medium     PAD (peripheral artery disease) (H) 01/31/2022     Priority: Medium     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     Priority: Medium     Chronic fatigue syndrome 02/26/2021     Priority: Medium     Obesity 02/26/2021     Priority: Medium     Absence of toe (H) 02/10/2021     Priority: Medium     Hearing loss 02/10/2021     Priority: Medium     Long term current use of systemic steroids 02/10/2021     Priority: Medium     Opioid dependence (H) 02/10/2021     Priority: Medium     Vitamin B deficiency 02/10/2021     Priority: Medium     Chronic obstructive pulmonary disease (H) 07/29/2020     Priority: Medium     Memory deficit 07/29/2020     Priority: Medium     Hammer toe of right foot 05/29/2020     Priority: Medium     History of asbestos exposure 04/15/2019     Priority: Medium     Osteoarthrosis 04/15/2019     Priority: Medium     Overview:   Created by Conversion    Replacement Utility updated for latest IMO load    Formatting of this note might be different from the original.  Created by Conversion    Replacement Utility updated for latest IMO load       Sick sinus syndrome (H) 04/15/2019     Priority: Medium     Cardiac pacemaker in situ 02/12/2019     Priority: Medium     Overview:   [employer]omind L310 ACCOLADE MRI  DOI: 2/12/2019    Formatting of this note might be different from the original.  Genomind L310 ACCOLADE MRI  DOI: 2/12/2019       Bradycardia 03/31/2017     Priority: Medium     Controlled substance agreement signed 09/05/2014     Priority: Medium     Overview:   [facility]-9/2/14  Overview:   90 day reminder has been signed by pt    Formatting of this note might be different from the original.  Overview:   [facility]-9/2/14  Overview:   90 day reminder has been signed by pt  [facility]-9/2/14  Overview:   90 day reminder has been signed by pt       Idiopathic progressive polyneuropathy 01/01/1999     Priority: Medium     Benign familial tremor 01/02/1994     Priority: Medium       Prior to Admission Medications   (Not in a hospital  "admission)      Review of Systems  Complete ROS reviewed, unless noted in HPI, all other systems reviewed (with patient) and all others found to be negative.      Objective:     Physical Exam:  /62   Pulse 60   Temp 97.1  F (36.2  C) (Axillary)   Resp 16   Ht 1.803 m (5' 11\")   Wt 102.1 kg (225 lb)   SpO2 96%   BMI 31.38 kg/m    Weight:   Vitals:    12/26/22 1532   Weight: 102.1 kg (225 lb)      Body mass index is 31.38 kg/m .    General Appearance:  Alert, cooperative, no distress, sitting up in bed, daughter at bedside    Head:  Normocephalic, without obvious abnormality, atraumatic   Eyes:  PERRL, conjunctiva/corneas clear, EOM's intact   ENT/Throat: Lips, mucosa, and tongue normal; teeth and gums normal   Lymph/Neck: Supple, symmetrical, trachea midline   Lungs:   Clear to auscultation bilaterally, respirations unlabored, off BiPAP now on NC O2   Chest Wall:  No tenderness or deformity   Cardiovascular/Heart:  Regular rate and rhythm, S1, S2 normal,no murmur, rub or gallop.    Abdomen:   Soft, non-tender, bowel sounds active all four quadrants,  no masses, no organomegaly   Musculoskeletal: Extremities normal, atraumatic   Skin: Skin warm, dry    Neurologic: Alert and oriented X 3, Moves all 4 extremities     Psych: Affect is appropriate      Imaging: Reviewed I have personally reviewed pertinent labs, tests, and radiologic imaging in patient's chart.  XR Chest 2 Views    Result Date: 12/14/2022  EXAM: XR CHEST 2 VIEWS LOCATION: [facility] DATE/TIME: 12/14/2022 2:36 PM INDICATION: ongoing cough, lethargy following URI COMPARISON: CT chest 02/10/2021     IMPRESSION: No acute cardiopulmonary abnormality. Unchanged left basilar scarring and chronic blunting of the costophrenic angles. Normal cardiomediastinal silhouette. Single lead left chest pacemaker.    Cardiac Device Check - Remote (Standing ORD 5 count)    Result Date: 12/26/2022  Type: overdue remote pacemaker " transmission. Device: [facility] Accolade Pacing%/Programmed: AP 99% in AAIR  ppm mode. Lead(s): stable Battery longevity: estimated 7 yrs Presenting: AP with apparent intrinsic A-V conduction, rate 89 bpm. (no RV lead) Atrial arrhythmias: since 38 tachy episodes; appear to be AF, longest 1.5 hrs. No RV lead so unable to assess V-rates during AF. Two nonsustained events, EGMs suggest PAT. Anticoagulant: none Ventricular arrhythmias: n/a Device/Lead alerts: device on advisory, risk for hydrogen induced battery depletion per [facility]. Comments: normal magnet and pacemaker function. Plan: patient is overdue for annual device clinic visit, letter sent asking them to schedule.  AMBROCIO Anderson, Device Specialist I have reviewed and interpreted the device interrogation, settings, programming, and encounter summary. The device is functioning within normal device parameters. I agree with the current findings, assessment and plan.    CT Chest Pulmonary Embolism w Contrast    Result Date: 12/26/2022  EXAM: CT CHEST PULMONARY EMBOLISM W CONTRAST LOCATION: [facility] DATE/TIME: 12/26/2022 6:07 PM INDICATION: Hypoxia, altered mental status, COPD exacerbation COMPARISON: Noncontrast CT chest 02/10/2021 TECHNIQUE: CT chest pulmonary angiogram during arterial phase injection of IV contrast. Multiplanar reformats and MIP reconstructions were performed. Dose reduction techniques were used. CONTRAST: isovue 370  75ml FINDINGS: ANGIOGRAM CHEST: Pulmonary arteries are normal caliber and negative for pulmonary emboli. Thoracic aorta is negative for dissection. No CT evidence of right heart strain. LUNGS AND PLEURA: Mild mid and upper lung centrilobular and paraseptal emphysema. Progression of extensive bronchial wall thickening and development of endobronchial mucosal thickening and secretions. Bilateral calcified pleural plaques, mild diffuse pleural thickening and foci of chronic cicatricial volume loss in the periphery  of both lungs unchanged and compatible with prior asbestos exposure. MEDIASTINUM/AXILLAE: Heart size within normal limits with no pericardial effusion. Cardiac pacemaker with lead tip in the right atrium. No lymphadenopathy. Small esophageal hiatal hernia and mild patulous dilatation of the fluid-filled thoracic esophagus. CORONARY ARTERY CALCIFICATION: Mild. UPPER ABDOMEN: Unremarkable. MUSCULOSKELETAL: Unremarkable.     IMPRESSION: 1.  No pulmonary emboli. No CTA signs of acute aortic syndrome. 2.  Progression of extensive bronchial wall thickening and development of endobronchial mucosal thickening and secretions compatible with marked bronchial inflammation. 3.  Mild mid and upper lung centrilobular and paraseptal emphysema. 4.  Bilateral calcified pleural plaques, mild diffuse pleural thickening and foci of chronic cicatricial volume loss in the periphery of both lungs unchanged and compatible with prior asbestos exposure. 5.  Small esophageal hiatal hernia and mild patulous dilatation of the fluid-filled thoracic esophagus.    CT Head w/o Contrast    Result Date: 12/26/2022  EXAM: CT HEAD W/O CONTRAST LOCATION: [facility] DATE/TIME: 12/26/2022 6:07 PM INDICATION: Weakness and fatigue, altered mental status COMPARISON:  CT head 10/04/2019. TECHNIQUE: Routine CT Head without IV contrast. Multiplanar reformats. Dose reduction techniques were used. FINDINGS: INTRACRANIAL CONTENTS: No intracranial hemorrhage, extraaxial collection, or mass effect.  No CT evidence of acute infarct. Mild presumed chronic small vessel ischemic changes. Mild generalized volume loss. No hydrocephalus. VISUALIZED ORBITS/SINUSES/MASTOIDS: Prior bilateral cataract surgery. Visualized portions of the orbits are otherwise unremarkable. No paranasal sinus mucosal disease. No middle ear or mastoid effusion. BONES/SOFT TISSUES: No acute abnormality.     IMPRESSION: 1.  Mild age-related changes without acute intracranial  abnormality.      Labs: Reviewed I have personally reviewed pertinent labs, tests, and radiologic imaging in patient's chart.  Recent Results (from the past 24 hour(s))   Blood gas venous    Collection Time: 12/26/22  4:13 PM   Result Value Ref Range    pH Venous 7.27 (L) 7.35 - 7.45    pCO2 Venous 60 (H) 35 - 50 mm Hg    pO2 Venous 20 (L) 25 - 47 mm Hg    Bicarbonate Venous 28 24 - 30 mmol/L    Base Excess/Deficit (+/-) 0.6   mmol/L    Oxyhemoglobin Venous 27.3 (L) 70.0 - 75.0 %    O2 Sat, Venous 27.8 (L) 70.0 - 75.0 %   Comprehensive metabolic panel    Collection Time: 12/26/22  4:13 PM   Result Value Ref Range    Sodium 137 136 - 145 mmol/L    Potassium 4.0 3.4 - 5.3 mmol/L    Chloride 101 98 - 107 mmol/L    Carbon Dioxide (CO2) 25 22 - 29 mmol/L    Anion Gap 11 7 - 15 mmol/L    Urea Nitrogen 19.4 8.0 - 23.0 mg/dL    Creatinine 1.30 (H) 0.67 - 1.17 mg/dL    Calcium 9.3 8.8 - 10.2 mg/dL    Glucose 130 (H) 70 - 99 mg/dL    Alkaline Phosphatase 65 40 - 129 U/L    AST 24 10 - 50 U/L    ALT 20 10 - 50 U/L    Protein Total 7.4 6.4 - 8.3 g/dL    Albumin 3.8 3.5 - 5.2 g/dL    Bilirubin Total 0.4 <=1.2 mg/dL    GFR Estimate 57 (L) >60 mL/min/1.73m2   D dimer quantitative    Collection Time: 12/26/22  4:13 PM   Result Value Ref Range    D-Dimer Quantitative 2.37 (H) 0.00 - 0.50 ug/mL FEU   CBC with platelets and differential    Collection Time: 12/26/22  4:13 PM   Result Value Ref Range    WBC Count 11.3 (H) 4.0 - 11.0 10e3/uL    RBC Count 5.50 4.40 - 5.90 10e6/uL    Hemoglobin 15.8 13.3 - 17.7 g/dL    Hematocrit 49.9 40.0 - 53.0 %    MCV 91 78 - 100 fL    MCH 28.7 26.5 - 33.0 pg    MCHC 31.7 31.5 - 36.5 g/dL    RDW 13.4 10.0 - 15.0 %    Platelet Count 204 150 - 450 10e3/uL    % Neutrophils 84 %    % Lymphocytes 10 %    % Monocytes 6 %    % Eosinophils 0 %    % Basophils 0 %    % Immature Granulocytes 0 %    NRBCs per 100 WBC 0 <1 /100    Absolute Neutrophils 9.5 (H) 1.6 - 8.3 10e3/uL    Absolute Lymphocytes 1.1 0.8 - 5.3  10e3/uL    Absolute Monocytes 0.7 0.0 - 1.3 10e3/uL    Absolute Eosinophils 0.0 0.0 - 0.7 10e3/uL    Absolute Basophils 0.1 0.0 - 0.2 10e3/uL    Absolute Immature Granulocytes 0.0 <=0.4 10e3/uL    Absolute NRBCs 0.0 10e3/uL   Procalcitonin    Collection Time: 12/26/22  4:13 PM   Result Value Ref Range    Procalcitonin 0.20 (H) <0.05 ng/mL   TSH with free T4 reflex    Collection Time: 12/26/22  4:13 PM   Result Value Ref Range    TSH 1.33 0.30 - 4.20 uIU/mL   Troponin T, High Sensitivity (now)    Collection Time: 12/26/22  4:13 PM   Result Value Ref Range    Troponin T, High Sensitivity 19 <=22 ng/L   Nt probnp inpatient    Collection Time: 12/26/22  4:13 PM   Result Value Ref Range    N terminal Pro BNP Inpatient 266 0 - 1,800 pg/mL   Symptomatic Influenza A/B & SARS-CoV2 (COVID-19) Virus PCR Multiplex Nasopharyngeal    Collection Time: 12/26/22  4:15 PM    Specimen: Nasopharyngeal; Swab   Result Value Ref Range    Influenza A PCR Negative Negative    Influenza B PCR Negative Negative    RSV PCR Negative Negative    SARS CoV2 PCR Negative Negative   Ammonia (on ice)    Collection Time: 12/26/22  4:22 PM   Result Value Ref Range    Ammonia 12 (L) 16 - 60 umol/L   Blood gas venous    Collection Time: 12/26/22  6:47 PM   Result Value Ref Range    pH Venous 7.34 (L) 7.35 - 7.45    pCO2 Venous 44 35 - 50 mm Hg    pO2 Venous 83 (H) 25 - 47 mm Hg    Bicarbonate Venous 24 24 - 30 mmol/L    Base Excess/Deficit (+/-) -1.7   mmol/L    Oxyhemoglobin Venous 95.3 (H) 70.0 - 75.0 %    O2 Sat, Venous 96.6 (H) 70.0 - 75.0 %   UA with Microscopic reflex to Culture    Collection Time: 12/27/22  2:26 AM    Specimen: Urine, Catheter   Result Value Ref Range    Color Urine Light Yellow Colorless, Straw, Light Yellow, Yellow    Appearance Urine Clear Clear    Glucose Urine Negative Negative mg/dL    Bilirubin Urine Negative Negative    Ketones Urine Negative Negative mg/dL    Specific Gravity Urine 1.046 (H) 1.001 - 1.030    Blood Urine  Negative Negative    pH Urine 7.0 5.0 - 7.0    Protein Albumin Urine Negative Negative mg/dL    Urobilinogen Urine <2.0 <2.0 mg/dL    Nitrite Urine Negative Negative    Leukocyte Esterase Urine Negative Negative    Bacteria Urine Few (A) None Seen /HPF    RBC Urine <1 <=2 /HPF    WBC Urine 1 <=5 /HPF   Basic metabolic panel    Collection Time: 12/27/22  7:33 AM   Result Value Ref Range    Sodium 136 136 - 145 mmol/L    Potassium 4.2 3.4 - 5.3 mmol/L    Chloride 103 98 - 107 mmol/L    Carbon Dioxide (CO2) 22 22 - 29 mmol/L    Anion Gap 11 7 - 15 mmol/L    Urea Nitrogen 17.4 8.0 - 23.0 mg/dL    Creatinine 1.07 0.67 - 1.17 mg/dL    Calcium 9.1 8.8 - 10.2 mg/dL    Glucose 154 (H) 70 - 99 mg/dL    GFR Estimate 71 >60 mL/min/1.73m2       Total time spent 80 minutes with greater than 50% in consultation, education and coordination of care.     Also discussed with RN, pharmacist.     Thank you for this consultation.    JESUSITA MckeonP-C  Acute Care Pain Management Program  [facility] ([facility], [place], [place])  Monday-Friday 8a-4p   Page via online paging system or call [phone]

## 2022-12-27 NOTE — PROGRESS NOTES
12/27/22 1142   Appointment Info   Signing Clinician's Name / Credentials (OT) jay Rondon   Living Environment   People in Home spouse   Current Living Arrangements house   Home Accessibility stairs to enter home   Number of Stairs, Main Entrance 1   Self-Care   Usual Activity Tolerance moderate   Current Activity Tolerance poor   General Information   Onset of Illness/Injury or Date of Surgery 12/26/22   Referring Physician Simon Schmidt   Patient/Family Therapy Goal Statement (OT) return home   General Observations and Info assist of 1 for mobiity/adl's   Cognitive Status Examination   Orientation Status orientation to person, place and time   Range of Motion Comprehensive   General Range of Motion no range of motion deficits identified   Bed Mobility   Bed Mobility supine-sit;sit-supine   Supine-Sit Reeves (Bed Mobility) minimum assist (75% patient effort)   Sit-Supine Reeves (Bed Mobility) minimum assist (75% patient effort)   Assistive Device (Bed Mobility) bed rails   Transfers   Transfers sit-stand transfer;shower transfer;other (see comments)   Transfer Comments therapist demo car, armless chair, room mobility   Sit-Stand Transfer   Sit-Stand Reeves (Transfers) minimum assist (75% patient effort)   Assistive Device (Sit-Stand Transfers) walker, standard   Shower Transfer   Shower Transfer Comments demo by therapist/technique   Activities of Daily Living   BADL Assessment/Intervention lower body dressing;grooming   Lower Body Dressing Assessment/Training   Position (Lower Body Dressing) edge of bed sitting   Comment, (Lower Body Dressing) has assist as needed at home for dressing   Reeves Level (Lower Body Dressing) maximum assist (25% patient effort)   Grooming Assessment/Training   Position (Grooming) edge of bed sitting   Reeves Level (Grooming) set up;supervision   Comment, (Grooming) wash face, hands   Clinical Impression   Criteria for Skilled Therapeutic  Interventions Met (OT) Yes, treatment indicated   OT Diagnosis decreased adl's   OT Problem List-Impairments impacting ADL activity tolerance impaired;mobility;strength   Assessment of Occupational Performance 3-5 Performance Deficits   Planned Therapy Interventions (OT) ADL retraining;strengthening;transfer training   Clinical Decision Making Complexity (OT) low complexity   Risk & Benefits of therapy have been explained evaluation/treatment results reviewed   Clinical Impression Comments asssit of 1 for mobilirty/adl's   OT Total Evaluation Time   OT Eval, Moderate Complexity Minutes (08921) 10   OT Goals   Therapy Frequency (OT) Daily   OT Predicted Duration/Target Date for Goal Attainment 01/02/23   OT Goals Hygiene/Grooming;Lower Body Dressing;Transfers   OT: Hygiene/Grooming supervision/stand-by assist;while standing   OT: Lower Body Dressing Minimal assist  (pants, underwear)   OT: Transfer Supervision/stand-by assist;with assistive device   Interventions   Interventions Quick Adds Self-Care/Home Management   Self-Care/Home Management   Self-Care/Home Mgmt/ADL, Compensatory, Meal Prep Minutes (43713) 10   Treatment Detail/Skilled Intervention instructed pt to swing legs over bed and use arms to push to eob with min a/slide hips to edge of bed and use arms to push to stand/min a with walker to side step eob with cues for walker use and hand placement, therapist demmo car, armless chair, room mobiility for good body mechanics/sit first and swing legs into car-armless chair/bring  feet up to pt for le drsg/side strp into shower with hands on wall to side step in to shower   OT Discharge Planning   OT Plan transfers, stand to groom, le drsg   OT Discharge Recommendation (DC Rec) home with home care occupational therapy   OT Rationale for DC Rec pt care sfor personal self at baseline   OT Brief overview of current status assist of 1 for mobility/adl's   Total Session Time   Timed Code Treatment Minutes 10   Total  Session Time (sum of timed and untimed services) 20

## 2022-12-27 NOTE — ED NOTES
MD updated about family's concerns about patient not getting his 1600 methadone dose this evening.

## 2022-12-27 NOTE — PLAN OF CARE
Problem: Gas Exchange Impaired  Goal: Optimal Gas Exchange  Intervention: Optimize Oxygenation and Ventilation  Recent Flowsheet Documentation  Taken 12/27/2022 0345 by Dominique Toure, RN  Head of Bed (HOB) Positioning: HOB at 30 degrees  Taken 12/27/2022 0000 by Dominique Toure RN  Head of Bed (HOB) Positioning: HOB at 30 degrees   Goal Outcome Evaluation:       He has worn his bipap thru the night satting 95%.  He is able to answer questions appropriately when awake but falls back to sleep when you're talking to him.

## 2022-12-27 NOTE — PROGRESS NOTES
12/27/22 1410   Appointment Info   Signing Clinician's Name / Credentials (PT) Mariah Rivera DPT   Living Environment   People in Home spouse   Current Living Arrangements house   Home Accessibility stairs to enter home   Stair Railings, Main Entrance none   Transportation Anticipated family or friend will provide   Self-Care   Usual Activity Tolerance good   Current Activity Tolerance fair   Equipment Currently Used at Home walker, rolling  (4WW)   General Information   Onset of Illness/Injury or Date of Surgery 12/26/22   Referring Physician Celestina Duque MD   Patient/Family Therapy Goals Statement (PT) return home   Pertinent History of Current Problem (include personal factors and/or comorbidities that impact the POC) 77 year old male presenting with lethargy and respiratory failure   Existing Precautions/Restrictions oxygen therapy device and L/min  (1L)   Strength (Manual Muscle Testing)   Strength (Manual Muscle Testing) Deficits observed during functional mobility   Bed Mobility   Bed Mobility supine-sit;sit-supine   Supine-Sit Callahan (Bed Mobility) minimum assist (75% patient effort)   Sit-Supine Callahan (Bed Mobility) supervision   Transfers   Transfers sit-stand transfer   Sit-Stand Transfer   Sit-Stand Callahan (Transfers) contact guard   Assistive Device (Sit-Stand Transfers) walker, front-wheeled   Gait/Stairs (Locomotion)   Callahan Level (Gait) contact guard   Assistive Device (Gait) walker, front-wheeled   Distance in Feet 25'   Pattern (Gait) step-through   Deviations/Abnormal Patterns (Gait) gait speed decreased   Clinical Impression   Criteria for Skilled Therapeutic Intervention Yes, treatment indicated   PT Diagnosis (PT) Impaired functional mobility   Influenced by the following impairments Weakness, fatigue   Functional limitations due to impairments Impaired strength, transfers, gait   Clinical Presentation (PT Evaluation Complexity) Stable/Uncomplicated    Clinical Presentation Rationale Presents as diagnosed   Clinical Decision Making (Complexity) moderate complexity   Planned Therapy Interventions (PT) balance training;bed mobility training;gait training;strengthening;transfer training;stair training   Anticipated Equipment Needs at Discharge (PT) walker, rolling   Risk & Benefits of therapy have been explained patient;spouse/significant other   PT Total Evaluation Time   PT Eval, Moderate Complexity Minutes (80021) 10   Physical Therapy Goals   PT Frequency Daily   PT Predicted Duration/Target Date for Goal Attainment 01/03/23   PT Goals Bed Mobility;Transfers;Gait;Stairs   PT: Bed Mobility Supervision/stand-by assist;Supine to/from sit   PT: Transfers Supervision/stand-by assist;Sit to/from stand;Bed to/from chair;Assistive device   PT: Gait Supervision/stand-by assist;Rolling walker;150 feet   PT: Stairs Minimal assist;1 stair   PT Discharge Planning   PT Plan progress transfers, amb as everardo, 1 EARLENE   PT Discharge Recommendation (DC Rec) home with assist;home with home care physical therapy   PT Rationale for DC Rec Pt may benefit from home PT to improve overall strength and mobility.   PT Brief overview of current status Amb 125' with CGA and FWW.   Total Session Time   Total Session Time (sum of timed and untimed services) 10         Mariah Rivera, PT, DPT  12/27/2022

## 2022-12-27 NOTE — PROGRESS NOTES
"CLINICAL SWALLOW EVALUATION     12/27/22 3763   Appointment Info   Signing Clinician's Name / Credentials (SLP) Leatha Nolan [facility]   General Information   Onset of Illness/Injury or Date of Surgery 12/26/22   Referring Physician Celestina Duque MD   Patient/Family Therapy Goal Statement (SLP) Patient is hungry and thirsty.   Pertinent History of Current Problem Per MD note: \"Romaine Farah is a 77 year old old male with h/o COPD s/p pacemaker/ICD, SSS, chronic fatigue syndrome, long term current use of systemic steroids, PAD who presents to this ED by wheelchair for evaluation of shortness of breath, fatigue. Per family patient does occasionally cough when eating or drinking, he does complain of reflux and has been more severe lately.\" Chest CT: Mild mid and upper lung centrilobular and paraseptal emphysema. Progression of extensive bronchial wall thickening and development of endobronchial mucosal thickening and secretions. Bilateral calcified pleural plaques, mild diffuse   pleural thickening and foci of chronic cicatricial volume loss in the periphery of both lungs unchanged and compatible with prior asbestos exposure.   General Observations Patient alert, interactive. Patient's daughter present. Patient and daughter reported hx of reflux symptoms (heartburn). They report occasional gurgly sound upper chest area. Patient reported he sleeps in recliner with head slightly elevated. He reported eating his last meal a few hours before bedtime. They report patient is not on anti-reflux medications but takes pepcid a needed.   Type of Evaluation   Type of Evaluation Swallow Evaluation   Oral Motor   Oral Musculature generally intact   Structural Abnormalities none present   Mucosal Quality dry   Dentition (Oral Motor)   Dentition (Oral Motor) edentulous;dental appliance/dentures   Dental Appliance/Denture (Oral Motor) upper and lower;dentures, full;adequate fit   Facial Symmetry (Oral Motor)   Facial " Symmetry (Oral Motor) WNL   Lip Function (Oral Motor)   Lip Range of Motion (Oral Motor) WNL   Lip Strength (Oral Motor) WFL   Tongue Function (Oral Motor)   Tongue Strength (Oral Motor) WFL   Tongue Coordination/Speed (Oral Motor) WNL   Tongue ROM (Oral Motor) WNL   Jaw Function (Oral Motor)   Jaw Function (Oral Motor) WNL   Cough/Swallow/Gag Reflex (Oral Motor)   Soft Palate/Velum (Oral Motor) WNL   Volitional Throat Clear/Cough (Oral Motor) WNL   Volitional Swallow (Oral Motor) WNL   Vocal Quality/Secretion Management (Oral Motor)   Vocal Quality (Oral Motor) WFL   Secretion Management (Oral Motor) WNL   General Swallowing Observations   Past History of Dysphagia See above description. No formal swallow evaluation in the past.   Respiratory Support (General Swallowing Observations) nasal cannula   Current Diet/Method of Nutritional Intake (General Swallowing Observations, NIS) NPO   Swallowing Evaluation Clinical swallow evaluation   Clinical Swallow Evaluation   Feeding Assistance minimal assistance required  (due to tremulousness)   Clinical Swallow Evaluation Textures Trialed thin liquids;pureed;solid foods   Clinical Swallow Eval: Thin Liquid Texture Trial   Mode of Presentation, Thin Liquids cup;straw;fed by clinician;self-fed  ([place] assist)   Volume of Liquid or Food Presented 5-6 oz thin water   Oral Phase of Swallow WFL   Pharyngeal Phase of Swallow   (no overt aspiration signs)   Clinical Swallow Evaluation: Puree Solid Texture Trial   Mode of Presentation, Puree spoon;fed by clinician   Volume of Puree Presented 4 bites pudding   Oral Phase, Puree WFL   Pharyngeal Phase, Puree   (no overt aspiration signs)   Clinical Swallow Evaluation: Solid Food Texture Trial   Volume Presented 1/2 myron cracker square   Oral Phase WFL   Pharyngeal Phase   (no overt aspiration signs)   Esophageal Phase of Swallow   Patient reports or presents with symptoms of esophageal dysphagia Yes   Esophageal comments Hx of reflux  symptoms. None occurred during exam today.   Swallowing Recommendations   Diet Consistency Recommendations regular diet;thin liquids (level 0)   Supervision Level for Intake patient independent   Mode of Delivery Recommendations bolus size, small;slow rate of intake   Monitoring/Assistance Required (Eating/Swallowing) stop eating activities when fatigue is present   Recommended Feeding/Eating Techniques (Swallow Eval) maintain upright posture during/after eating for 30 minutes   Medication Administration Recommendations, Swallowing (SLP) Oone at a time.   Instrumental Assessment Recommendations VFSS (videofluoroscopic swallowing study)   Comment, Swallowing Recommendations Consider VFSS given hx of COPD and GERD and repeat respiratory infections.   General Therapy Interventions   Planned Therapy Interventions Dysphagia Treatment   Dysphagia treatment Instruction of safe swallow strategies   Clinical Impression   Criteria for Skilled Therapeutic Interventions Met (SLP Eval) Yes, treatment indicated   SLP Diagnosis Functional oropharyngeal swallow mechanism at bedside   Risks & Benefits of therapy have been explained evaluation/treatment results reviewed;care plan/treatment goals reviewed;risks/benefits reviewed;current/potential barriers reviewed;participants voiced agreement with care plan;participants included;patient   Clinical Impression Comments Functional oropharyngeal swallow mechanism based on clinical swallow exam today. No overt aspiration signs occurred across consistencies. Oral phase unremarkable with full dentures in place. Not able to rule out silent aspiration at bedside. Patient required min assist with feeding due to tremulousness. Recomend initiate regular diet with thin liquids given swallow strategies and reflux precautions (upright position, small bites/sips, slow pace, remain upright after meals, do not lay down 2-3 hours after meals, elevate head of bed 4-6 inches). Recommend consider  instrumental swallow exam given hx of COPD and GERD and risk for silent aspiration.   SLP Total Evaluation Time   Eval: oral/pharyngeal swallow function, clinical swallow Minutes (21497) 15   SLP Goals   Therapy Frequency (SLP Eval) 5 times/wk   SLP Predicted Duration/Target Date for Goal Attainment 01/09/23   SLP Goals Swallow   SLP: Safely tolerate diet without signs/symptoms of aspiration Regular diet;Thin liquids;With use of swallow precautions   Interventions   Interventions Quick Adds Swallowing Dysfunction   Swallowing Dysfunction &/or Oral Function for Feeding   Treatment of Swallowing Dysfunction &/or Oral Function for Feeding Minutes (37457) 8   Symptoms Noted During/After Treatment None   Treatment Detail/Skilled Intervention Provided education about swallow strategies and reflux precautions. Patient would like to think about VFSS but not ready to proceed with this.   SLP Discharge Planning   SLP Plan VFSS wtih esophageal sweep(pending patient decision); otherwise x1-2 f/u for diet tolerance and strategy training (swallow and reflux precautions)   SLP Discharge Recommendation home with assist   SLP Rationale for DC Rec Patient will likely meet swallow related goals prior to discharge.   SLP Brief overview of current status  Recomend initiate regular diet with thin liquids given swallow strategies and reflux precautions (upright position, small bites/sips, slow pace, remain upright after meals, do not lay down 2-3 hours after meals, elevate head of bed 4-6 inches). Recommend consider instrumental swallow exam given hx of COPD and GERD and risk for silent aspiration.   Total Session Time   Total Session Time (sum of timed and untimed services) 23

## 2022-12-27 NOTE — PLAN OF CARE
Problem: Plan of Care - These are the overarching goals to be used throughout the patient stay.    Goal: Optimal Comfort and Wellbeing  Outcome: Progressing     Problem: Plan of Care - These are the overarching goals to be used throughout the patient stay.    Goal: Readiness for Transition of Care  Outcome: Progressing   Goal Outcome Evaluation:       Pt passed bedside swallow study per Speech therapist. At this time speech is recommending a regular diet and thin liquids. Pt is tolerating IV zosyn well. Congestive cough and coarse lung sounds noted. Pt was resistive to xray swallow study. Speech stated with his past medical history of COPD and multiple respiratory illnesses he may be at risk for silent aspiration. Pt is afebrile. Daughter present and supportive at bedside. No other concerns noted.  Romaine Pickard RN  12/27/2022  11:14 AM

## 2022-12-27 NOTE — PLAN OF CARE
Problem: Risk for Delirium  Goal: Optimal Coping  12/27/2022 1453 by Romaine Pickard RN  Outcome: Progressing  12/27/2022 1044 by Romaine Pickard RN  Outcome: Progressing     Problem: Gas Exchange Impaired  Goal: Optimal Gas Exchange  12/27/2022 1453 by Romaine Pickard RN  Outcome: Progressing  12/27/2022 1044 by Romaine Pickard RN  Outcome: Progressing  Intervention: Optimize Oxygenation and Ventilation  Recent Flowsheet Documentation  Taken 12/27/2022 1410 by Romaine Pickard RN  Head of Bed (HOB) Positioning: HOB at 30 degrees  Taken 12/27/2022 1123 by Romaine Pickard RN  Head of Bed (HOB) Positioning: HOB at 30 degrees  Taken 12/27/2022 0941 by Romaine Pickard RN  Head of Bed (HOB) Positioning: HOB at 30 degrees   Goal Outcome Evaluation:       Pt participated in therapy. Pt was incontinent of urine as the Primo fit external catheter malfunctioned. Pt's pain managed with scheduled methadone and scheduled lyrica. Pt is tolerating O2 via nasal canula 3 Lpm at 96%. Lung sounds continue to have coarse crackles. Lunch ordered. Pt to transfer to P4 room 404. Report to be called to P4 RN in a couple of minutes.   Romaine Pickard RN  12/27/2022  2:58 PM

## 2022-12-28 NOTE — ED NOTES
ER DISCHARGE NOTE:   Romaine Farah MRN: [id number]      Romaine Farah is discharged from the emergency room.    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    Verbalized understanding of discharge instructions.   Prescriptions: sent with pt.    AVS in hand.

## 2022-12-28 NOTE — PLAN OF CARE
Physical Therapy Discharge Summary    Reason for therapy discharge:    Discharged to home.    Progress towards therapy goal(s). See goals on Care Plan in [facility] electronic health record for goal details.  Goals partially met.  Barriers to achieving goals:   discharge from facility.    Therapy recommendation(s):    No further therapy is recommended.

## 2022-12-28 NOTE — PLAN OF CARE
Occupational Therapy Discharge Summary    Reason for therapy discharge:    Discharged to home.    Progress towards therapy goal(s). See goals on Care Plan in [facility] electronic health record for goal details.  Goals partially met.  Barriers to achieving goals:   discharge from facility.    Therapy recommendation(s):    No further therapy is recommended.    Goal Outcome Evaluation:

## 2022-12-28 NOTE — DISCHARGE INSTRUCTIONS
Romaine Farah   [id number]  1945     Doctor recommendations at discharge:    -discharge meds: no new meds ordered for discharge. There are changes to your everyday meds.      -reason for your hospital stay: confusion    -activity level: Your activity upon discharge: activity as tolerated    -diet: Follow this diet upon discharge: Orders Placed This Encounter: Full Liquid Diet    -follow up and recommended labs/tests: Follow up with primary care provider, Simon Schmidt, within 7 days for hospital follow- up.  No follow up labs or test are needed.

## 2023-01-04 NOTE — DISCHARGE SUMMARY
[facility]  Hospitalist Discharge Summary      Date of Admission:  12/26/2022  Date of Discharge:  12/27/2022  7:03 PM  Discharging Provider: Alexey Cramer MD  Discharge Service: Hospitalist Service    Discharge Diagnoses         Active Problems:    Bradycardia    History of asbestos exposure    Idiopathic progressive polyneuropathy    Chronic obstructive pulmonary disease (H)    Memory deficit    Opioid dependence (H)    PAD (peripheral artery disease) (H)    Bronchitis    Acute encephalopathy    Acute respiratory failure with hypoxia and hypercarbia (H)    Gastroesophageal reflux disease without esophagitis    Hypotension, unspecified hypotension type    BERNICE (acute kidney injury) (H)     Romaine Farah is a 77 year old male presenting with lethargy and respiratory failure     12/27 :     Medically stable  Confusion resolved and not on oxygen  Reduced lyrica dose at discharge, outpatient pain management f/u, pcp f/u  Pulmonary team cleared patient for discharge    Family strongly demand discharging home today      A/p :        Acute hypoxic resp failure/COPD   - CTA no PE Progression of extensive bronchial wall thickening and development of endobronchial mucosal thickening and secretions compatible with marked bronchial inflammation. Mild mid and upper lung centrilobular and paraseptal emphysema.  Bilateral calcified pleural plaques, mild diffuse pleural thickening and foci of chronic cicatricial volume loss in the periphery of both lungs unchanged and compatible with prior asbestos exposure. Small esophageal hiatal hernia and mild patulous dilatation of the fluid-filled thoracic esophagus.  - On Bipap, but still lethargic but gases improving and oxygenation  - NPO for now and hold meds (specifically methadone and monitor for discomfort) until more awake, less lethargic    - HOB at 30 degrees since question of aspiration with CT findings, GERD and small hiatal hernia with fluid filled  thoracic esophagus  - IV protonix BID for now  - SLP to see  - Pulm consulted follows with Oleg  - changed abx to zosyn to cover aspiration   - place on solumedrol 40mg Q12 hrs for now   - continue home inhalers as able RT to assess may need nebs  - RCAT     Resolved, pulmonary team cleared patient for discharge      Encephalopathy likely metabolic with resp failure and methadone use.   - treating as above  - holding methadone for now and monitor for withdrawals once able to swallow more will resume 10mg QID once more awake will start  - pain team consulted may need adjustment  - holding lyrica high dose 200mg TID as well possibly needs adjustment and is on Cymbalta 30mg BID with lyrica.         Chronic pain with idiopathic progressive neuropathy   - treating as above once less lethargic and do not want to withdrawal as well  - pain team  - PT/OT       Bradycardic and hypotensive  - telemetry   - treating resp failure  - holding meds as well   - EKG poor quality no QTc prolongation   - fluid bolus     resolved       BERNICE vs CKD  - giving ns 500ml Bolus run over 3 hors  - recheck labs in am     resolved       GERD  - start on protonix IV 40mg BID for now  - reassess before discharge     Follow-ups Needed After Discharge   Follow-up Appointments     Follow-up and recommended labs and tests       Follow up with primary care provider, Simon Schmidt, within 7 days   for hospital follow- up.  No follow up labs or test are needed.         {Additional follow-up instructions/to-do's for PCP    : none    Unresulted Labs Ordered in the Past 30 Days of this Admission     No orders found from 11/26/2022 to 12/27/2022.      These results will be followed up by pcp    Discharge Disposition   Discharged to home  Condition at discharge: Stable      Consultations This Hospital Stay   PAIN MANAGEMENT ADULT IP CONSULT  OCCUPATIONAL THERAPY ADULT IP CONSULT  PHYSICAL THERAPY ADULT IP CONSULT  SPEECH LANGUAGE PATH ADULT IP  CONSULT  PULMONARY IP CONSULT  ADDICTION MEDICINE INPATIENT CONSULT  CARE MANAGEMENT / SOCIAL WORK IP CONSULT    Code Status   Prior    Time Spent on this Encounter   I, Alexey Cramer MD, personally saw the patient today and spent greater than 30 minutes discharging this patient.       Alexey Cramer MD  [facility] EMERGENCY DEPARTMENT  [address] [address]  Phone: [phone]  ______________________________________________________________________    Physical Exam   Vital Signs:                    Weight: 225 lbs 0 oz     GENERAL: The patient is not in any acute distressed. Awake and alert.  HEENT: Nonicteric sclerae, PERRLA, EOMI. Oropharynx clear. Moist mucous membranes. Conjunctivae appear well perfused.  HEART: Regular rate and rhythm without murmurs.  LUNGS: Clear to auscultation bilaterally. No wheezing or crackles.  ABDOMEN: Soft, positive bowel sounds, nontender.  SKIN: No rash, no excessive bruising, petechiae, or purpura.  EXTREMITIES : no rashes, no swelling in legs.  NEUROLOGIC: conscious and oriented, follows commands, no obvious focal deficits.  ROS: All other systems negative          Primary Care Physician   Simon Schmidt    Discharge Orders      Reason for your hospital stay    confusion     Follow-up and recommended labs and tests     Follow up with primary care provider, Simon Schmidt, within 7 days for hospital follow- up.  No follow up labs or test are needed.     Activity    Your activity upon discharge: activity as tolerated     Diet    Follow this diet upon discharge: Orders Placed This Encounter      Full Liquid Diet       Significant Results and Procedures   Most Recent 3 CBC's:Recent Labs   Lab Test 12/26/22  1613 12/14/22  0247 11/11/22  1131   WBC 11.3* 13.5* 8.2   HGB 15.8 16.0 16.0   MCV 91 91 91    177 225     Most Recent 3 BMP's:Recent Labs   Lab Test 12/27/22  0733 12/26/22  1613 12/14/22  0247    137 138    POTASSIUM 4.2 4.0 3.9   CHLORIDE 103 101 103   CO2 22 25 25   BUN 17.4 19.4 26.6*   CR 1.07 1.30* 1.25*   ANIONGAP 11 11 10   JOSSELYN 9.1 9.3 9.1   * 130* 168*     Most Recent 2 LFT's:Recent Labs   Lab Test 12/26/22  1613 12/14/22  0247   AST 24 17   ALT 20 22   ALKPHOS 65 62   BILITOTAL 0.4 0.4       Discharge Medications   Discharge Medication List as of 12/27/2022  6:30 PM      CONTINUE these medications which have CHANGED    Details   Pregabalin (LYRICA) 100 MG capsule Take 1 capsule (100 mg) by mouth 3 times daily, Disp-30 capsule, R-0, Local Print         CONTINUE these medications which have NOT CHANGED    Details   calcium carbonate-vitamin D (OSCAL W/D) 500-200 MG-UNIT tablet Take 1 tablet by mouth daily , Historical      clobetasol (TEMOVATE) 0.05 % external ointment Apply topically 2 times daily As neededDisp-60 g, R-11Historical      DULoxetine (CYMBALTA) 30 MG capsule Take 1 capsule (30 mg) by mouth 2 times daily, Disp-180 capsule, R-3, E-Prescribe      fluocinonide (LIDEX) 0.05 % external cream Apply topically 2 times daily As neededHistorical      Fluticasone-Umeclidin-Vilanterol (TRELEGY ELLIPTA) 200-62.5-25 MCG/INH oral inhaler Inhale 1 puff into the lungs daily, Disp-1 each, R-11, E-Prescribe      ibuprofen (ADVIL/MOTRIN) 200 MG tablet Take 1 tablet (200 mg) by mouth every 8 hours as needed for mild pain, Historical      levocetirizine (XYZAL) 5 MG tablet Take 5 mg by mouth every evening, Historical      magnesium 250 MG tablet Take 1 tablet by mouth 3 times daily, Historical      methadone (DOLOPHINE) 10 MG tablet 1 pill by mouth daily @ 9am, 1 pill @ 1pm, 1 pill @ 5pm and 1 pill @ 10/11pm   - 4 pills per day, Disp-1 tablet, R-0, Historical      Omega-3 Fatty Acids (FISH OIL BURP-LESS) 1000 MG CAPS 2 by mouth daily in morning @ 9am, Historical      propranolol (INDERAL) 40 MG tablet Take 40 mg by mouth 2 times daily, Historical      topiramate (TOPAMAX) 25 MG tablet Take 3 tablets (75 mg) by  mouth 2 times daily, Disp-540 tablet, R-3, E-Prescribe      vitamin (B COMPLEX) tablet Take 1 tablet by mouth daily @ 9am, Historical      vitamin C (ASCORBIC ACID) 100 MG tablet Take 100 mg by mouth daily , Historical      Vitamin D3 (CHOLECALCIFEROL) 25 mcg (1000 units) tablet Take 1 tablet by mouth daily , Historical         STOP taking these medications       primidone (MYSOLINE) 50 MG tablet Comments:   Reason for Stopping:             Allergies   No Known Allergies

## 2023-02-06 ENCOUNTER — OFFICE VISIT (OUTPATIENT)
Dept: PULMONOLOGY | Facility: CLINIC | Age: 78
End: 2023-02-06
Payer: COMMERCIAL

## 2023-02-06 VITALS
HEART RATE: 68 BPM | BODY MASS INDEX: 30.4 KG/M2 | WEIGHT: 218 LBS | SYSTOLIC BLOOD PRESSURE: 116 MMHG | DIASTOLIC BLOOD PRESSURE: 66 MMHG | OXYGEN SATURATION: 95 %

## 2023-02-06 DIAGNOSIS — J41.0 SIMPLE CHRONIC BRONCHITIS (H): Primary | ICD-10-CM

## 2023-02-06 DIAGNOSIS — R53.83 LETHARGY: ICD-10-CM

## 2023-02-06 PROCEDURE — 99214 OFFICE O/P EST MOD 30 MIN: CPT | Performed by: INTERNAL MEDICINE

## 2023-02-06 RX ORDER — PREDNISONE 20 MG/1
20 TABLET ORAL DAILY
Qty: 7 TABLET | Refills: 3 | Status: SHIPPED | OUTPATIENT
Start: 2023-02-06 | End: 2023-12-21

## 2023-02-06 RX ORDER — AZITHROMYCIN 250 MG/1
TABLET, FILM COATED ORAL
Qty: 6 TABLET | Refills: 3 | Status: SHIPPED | OUTPATIENT
Start: 2023-02-06 | End: 2023-02-11

## 2023-02-06 NOTE — PROGRESS NOTES
Assessment and plan: 77-year-old man with history of chronic bronchitis, chronic pain on multiple sedating medications here for follow-up.  He is doing better since some small changes in his medications.    He is on multiple medications for his neuropathy that will affect his mental status.  I explained that possibly there is more sedative effects when he is sick.  He will continue to need to keep close eye on things with a low threshold to reduce doses if he develops haziness or confusion.    Plan  Continue Trelegy   Continue action plan.      Graded exercise plan with slow increase of physical activity-we discussed this specifically today with very clear recommendations and he has plans to follow-up.    Chief complaint: Shortness of breath    HPI: 77-year-old man with chronic bronchitis, chronic pain here for follow-up.  He was hospitalized in late December after hypercarbic respiratory failure in the setting of sedation and confusion.  Fortunately he has done better since reducing his medications.    Medications and history reviewed.    /66   Pulse 68   Wt 98.9 kg (218 lb)   SpO2 95%   BMI 30.40 kg/m    In good spirits, calm  Neck supple  Chest clear without crackles or wheezes  Normal cardiac exam  Normal abdominal exam  No tremor

## 2023-02-17 ENCOUNTER — ANCILLARY PROCEDURE (OUTPATIENT)
Dept: CARDIOLOGY | Facility: CLINIC | Age: 78
End: 2023-02-17
Attending: INTERNAL MEDICINE
Payer: COMMERCIAL

## 2023-02-17 VITALS
HEART RATE: 60 BPM | DIASTOLIC BLOOD PRESSURE: 72 MMHG | WEIGHT: 220 LBS | SYSTOLIC BLOOD PRESSURE: 111 MMHG | HEIGHT: 72 IN | BODY MASS INDEX: 29.8 KG/M2 | RESPIRATION RATE: 12 BRPM | OXYGEN SATURATION: 87 %

## 2023-02-17 DIAGNOSIS — I49.5 SICK SINUS SYNDROME (H): Primary | ICD-10-CM

## 2023-02-17 DIAGNOSIS — F11.20 UNCOMPLICATED OPIOID DEPENDENCE (H): ICD-10-CM

## 2023-02-17 DIAGNOSIS — I10 PRIMARY HYPERTENSION: ICD-10-CM

## 2023-02-17 DIAGNOSIS — Z95.0 CARDIAC PACEMAKER IN SITU: Primary | ICD-10-CM

## 2023-02-17 DIAGNOSIS — G25.0 BENIGN FAMILIAL TREMOR: ICD-10-CM

## 2023-02-17 DIAGNOSIS — Z95.0 CARDIAC PACEMAKER IN SITU: ICD-10-CM

## 2023-02-17 PROCEDURE — 99214 OFFICE O/P EST MOD 30 MIN: CPT | Performed by: INTERNAL MEDICINE

## 2023-02-17 PROCEDURE — 93000 ELECTROCARDIOGRAM COMPLETE: CPT | Performed by: INTERNAL MEDICINE

## 2023-02-17 PROCEDURE — 93279 PRGRMG DEV EVAL PM/LDLS PM: CPT | Performed by: INTERNAL MEDICINE

## 2023-02-17 NOTE — PROGRESS NOTES
Assessment/Plan:   1.  Sick sinus syndrome status post new atrial based single-chamber [employer] MRI compatible pacemaker placement on February 12, 2019: The patient has been doing better.  His device is interrogated today, normal function.    He had exercise stress echo on January 31, 2023 which was reported a small size of inducible myocardial in anteroseptal wall.  The patient has no chest pain.  He achieved good exercise of workload 7 minutes on treadmill.  His left ventricular ejection fraction is 60 to 65%.  The patient's exercise stress echo imaging is low quality due to technical difficulty.  It is a low risk.  After discussion, no further cardiac evaluation at this time.  If he developed some symptoms, the patient will call me back and then we will consider further cardiac evaluation.  The patient and his wife agreed with the plan.     2.  Benign essential hypertension: He has off lisinopril 5 mg daily.  His blood pressure is in normal range.     3.  Benign tremor: Stable.    4.  Opoid dependence on Methadone: ECG showed atrial paced rhythm up with prolonged AV conduction, but QTc is 435 ms in normal range.  The patient is cleared for the use of methadone from a cardiology standpoint.    Thank you for the opportunity to be involved in the care of Romaine Farah. If you have any questions, please feel free to contact me.  I will see the patient again in 12 months and as needed.    Much or all of the text in this note was generated through the use of Dragon Dictate voice-to-text software. Errors in spelling or words which seem out of context are unintentional. Sound alike errors, in particular, may have escaped editing.       History of Present Illness:   It is my pleasure to see Romaine Farah at the [facility] for routine cardiology follow up.  Romaine Farah is a 77 year old male with a medical history of sinus node dysfunction status post new atrial based  single-chamber [employer] MRI compatible pacemaker placement on February 12, 2019, benign essential hypertension, benign tremor, opioid dependence on methadone.    The patient states that he has no chest pain, shortness of breath, palpitations.  He has mild dyspnea on exertion which has been stable.  He has no dizziness, syncope.  He has no orthopnea, PND or leg edema.  His blood pressure and heart rate in normal range.    His pacemaker interrogation today demonstrated normal device function, no cardiac arrhythmia.  ECG showed    Past Medical History:     Patient Active Problem List   Diagnosis     Bradycardia     Cardiac pacemaker in situ     Controlled substance agreement signed     Benign familial tremor     History of asbestos exposure     Osteoarthrosis     Idiopathic progressive polyneuropathy     Sick sinus syndrome (H)     Impotence of organic origin     Absence of toe (H)     Chronic fatigue syndrome     Chronic obstructive pulmonary disease (H)     Hammer toe of right foot     Hearing loss     Long term current use of systemic steroids     Memory deficit     Obesity     Opioid dependence (H)     Vitamin B deficiency     PAD (peripheral artery disease) (H)     Bronchitis     Acute encephalopathy     Acute respiratory failure with hypoxia and hypercarbia (H)     Gastroesophageal reflux disease without esophagitis     Hypotension, unspecified hypotension type     BERNICE (acute kidney injury) (H)       Past Surgical History:     Past Surgical History:   Procedure Laterality Date     AMPUTATE TOE(S) Left 2/20/2020    Procedure: AMPUTATION, third digit left foot;  Surgeon: Wai Armstrong DPM;  Location: [facility] OR;  Service: Podiatry     AMPUTATE TOE(S) Right 7/7/2020    Procedure: AMPUTATION, second digit right foot;  Surgeon: Wai Armstrong DPM;  Location: [facility] OR;  Service: Podiatry     EP PACEMAKER INSERT N/A 2/12/2019    Procedure: EP Pacemaker Insertion;  Surgeon: Maru  Micheal ASHLEY MD;  Location: [facility] Cath Lab;  Service: Cardiology     EYE SURGERY Bilateral      HIP SURGERY Right     total hip     INSERT / REPLACE / REMOVE PACEMAKER  02/12/2019     PACEMAKER/ICD CHECK  2019    low heart rate - 35     TOTAL HIP ARTHROPLASTY Right 2/2/2015    Procedure: #3   HIP TOTAL ARTHROPLASTY, RIGHT;  Surgeon: Aryan Glass MD;  Location: [facility];  Service:        Family History:     Family History   Problem Relation Age of Onset     LUNG DISEASE Mother      Diabetes Mother      Esophageal Cancer Father      Tremor Father      Alcoholism Father      Tremor Sister      Neuropathy Sister      Arthritis Brother      Other - See Comments Brother      Other - See Comments Brother      Tremor Sister      Lupus Daughter      Tremor Daughter      Thyroid Disease Daughter      Chronic Obstructive Pulmonary Disease Mother         Social History:    reports that he has quit smoking. His smoking use included cigars, cigarillos or filtered cigars. He has never used smokeless tobacco. He reports that he does not currently use alcohol. He reports that he does not use drugs.    Review of Systems:   12 systems are reviewed negative except for in HPI.    Meds:     Current Outpatient Medications:      calcium carbonate-vitamin D (OSCAL W/D) 500-200 MG-UNIT tablet, Take 1 tablet by mouth daily , Disp: , Rfl:      clobetasol (TEMOVATE) 0.05 % external ointment, Apply topically 2 times daily As needed, Disp: 60 g, Rfl: 11     DULoxetine (CYMBALTA) 30 MG capsule, Take 1 capsule (30 mg) by mouth 2 times daily, Disp: 180 capsule, Rfl: 3     fluocinonide (LIDEX) 0.05 % external cream, Apply topically 2 times daily As needed, Disp: , Rfl:      Fluticasone-Umeclidin-Vilanterol (TRELEGY ELLIPTA) 200-62.5-25 MCG/INH oral inhaler, Inhale 1 puff into the lungs daily, Disp: 1 each, Rfl: 11     levocetirizine (XYZAL) 5 MG tablet, Take 5 mg by mouth every evening, Disp: , Rfl:      magnesium 250 MG tablet, Take 1  tablet by mouth 3 times daily, Disp: , Rfl:      methadone (DOLOPHINE) 10 MG tablet, 1 pill by mouth daily @ 9am, 1 pill @ 1pm, 1 pill @ 5pm and 1 pill @ 10/11pm   - 4 pills per day, Disp: 1 tablet, Rfl: 0     Omega-3 Fatty Acids (FISH OIL BURP-LESS) 1000 MG CAPS, 2 by mouth daily in morning @ 9am, Disp: , Rfl:      predniSONE (DELTASONE) 20 MG tablet, Take 1 tablet (20 mg) by mouth daily (Patient taking differently: Take 20 mg by mouth daily Takes as needed), Disp: 7 tablet, Rfl: 3     Pregabalin (LYRICA) 100 MG capsule, Take 1 capsule (100 mg) by mouth 3 times daily, Disp: 30 capsule, Rfl: 0     topiramate (TOPAMAX) 25 MG tablet, Take 3 tablets (75 mg) by mouth 2 times daily, Disp: 540 tablet, Rfl: 3     vitamin (B COMPLEX) tablet, Take 1 tablet by mouth daily @ 9am, Disp: , Rfl:      vitamin C (ASCORBIC ACID) 100 MG tablet, Take 100 mg by mouth daily , Disp: , Rfl:      Vitamin D3 (CHOLECALCIFEROL) 25 mcg (1000 units) tablet, Take 1 tablet by mouth daily , Disp: , Rfl:      ibuprofen (ADVIL/MOTRIN) 200 MG tablet, Take 1 tablet (200 mg) by mouth every 8 hours as needed for mild pain (Patient not taking: Reported on 2/17/2023), Disp: , Rfl:     Allergies:   Patient has no known allergies.      Objective:      Physical Exam  99.8 kg (220 lb)  1.829 m (6')  Body mass index is 29.84 kg/m .  /72 (BP Location: Left arm, Patient Position: Sitting, Cuff Size: Adult Large)   Pulse 60   Resp 12   Ht 1.829 m (6')   Wt 99.8 kg (220 lb)   SpO2 (!) 87%   BMI 29.84 kg/m      General Appearance:   Awake, Alert, No acute distress.   HEENT:  Pupil equal and reactive to light. No scleral icterus; the mucous membranes were moist.   Neck: No cervical bruits. No JVD. No thyromegaly.     Chest: The spine was straight. The chest was symmetric.   Lungs:   Respirations unlabored; Lungs are clear to auscultation. No crackles. No wheezing.   Cardiovascular:   Regular rhythm and rate, normal first and second heart sounds with no  murmurs. No rubs or gallops.    Abdomen:  Soft. No tenderness. Non-distended. Bowels sounds are present   Extremities: Equal tibial pulses. No leg edema.   Skin: No rashes or ulcers. Warm, Dry.   Musculoskeletal: No tenderness. No deformity.   Neurologic: Mood and affect are appropriate. No focal deficits.         EKG: Personally reviewed  Atrial-paced rhythm with prolonged AV conduction   Right axis deviation   Right ventricular hypertrophy   Abnormal QRS-T angle, consider primary T wave abnormality   Abnormal ECG   When compared with ECG of 26-DEC-2022 21:49,   Electronic atrial pacemaker has replaced Sinus rhythm   QRS axis Shifted right   ST no longer depressed in Anterior leads   T wave inversion no longer evident in Anterior leads    Cardiac Imaging Studies  Exercise stress echo at [facility] on January 31, 2023:  Technically difficult study due to patient imaging characteristics.  Normal left ventricular size, LVEF was 60 to 65%.  No clear rest regional wall motion abnormality.  Mild LVH, normal left ventricular diastolic function, no obvious valvular disease.  Stress echo images showed a small area of ischemia in anteroseptal wall.    Lab Review   Lab Results   Component Value Date     12/27/2022    CO2 22 12/27/2022    CO2 25 06/20/2022    BUN 17.4 12/27/2022    BUN 22 06/20/2022     Lab Results   Component Value Date    WBC 11.3 12/26/2022    HGB 15.8 12/26/2022    HCT 49.9 12/26/2022    MCV 91 12/26/2022     12/26/2022     Lab Results   Component Value Date    CHOL 184 06/20/2022    CHOL 176 07/07/2017    CHOL 194 01/12/2016     Lab Results   Component Value Date    HDL 40 06/20/2022    HDL 46 07/07/2017    HDL 53 01/12/2016     No components found for: LDLCALC  Lab Results   Component Value Date    TRIG 115 06/20/2022    TRIG 70 07/07/2017    TRIG 54 01/12/2016     No results found for: CHOLHDL  Lab Results   Component Value Date    TROPONINI <0.01 10/04/2019     No results found for:  BNP  Lab Results   Component Value Date    TSH 1.33 12/26/2022    TSH 3.64 07/29/2019

## 2023-02-18 LAB
ATRIAL RATE - MUSE: 67 BPM
DIASTOLIC BLOOD PRESSURE - MUSE: NORMAL MMHG
INTERPRETATION ECG - MUSE: NORMAL
P AXIS - MUSE: NORMAL DEGREES
PR INTERVAL - MUSE: 230 MS
QRS DURATION - MUSE: 96 MS
QT - MUSE: 412 MS
QTC - MUSE: 435 MS
R AXIS - MUSE: 145 DEGREES
SYSTOLIC BLOOD PRESSURE - MUSE: NORMAL MMHG
T AXIS - MUSE: -1 DEGREES
VENTRICULAR RATE- MUSE: 67 BPM

## 2023-02-20 LAB
MDC_IDC_EPISODE_DTM: NORMAL
MDC_IDC_EPISODE_ID: NORMAL
MDC_IDC_EPISODE_TYPE: NORMAL
MDC_IDC_LEAD_IMPLANT_DT: NORMAL
MDC_IDC_LEAD_LOCATION: NORMAL
MDC_IDC_LEAD_LOCATION_DETAIL_1: NORMAL
MDC_IDC_LEAD_MFG: NORMAL
MDC_IDC_LEAD_MODEL: NORMAL
MDC_IDC_LEAD_POLARITY_TYPE: NORMAL
MDC_IDC_LEAD_SERIAL: NORMAL
MDC_IDC_LEAD_SPECIAL_FUNCTION: NORMAL
MDC_IDC_MSMT_BATTERY_DTM: NORMAL
MDC_IDC_MSMT_BATTERY_REMAINING_LONGEVITY: 84 MO
MDC_IDC_MSMT_BATTERY_REMAINING_PERCENTAGE: 100 %
MDC_IDC_MSMT_BATTERY_STATUS: NORMAL
MDC_IDC_MSMT_LEADCHNL_RA_IMPEDANCE_VALUE: 777 OHM
MDC_IDC_MSMT_LEADCHNL_RA_PACING_THRESHOLD_AMPLITUDE: 0.9 V
MDC_IDC_MSMT_LEADCHNL_RA_PACING_THRESHOLD_AMPLITUDE: 0.9 V
MDC_IDC_MSMT_LEADCHNL_RA_PACING_THRESHOLD_PULSEWIDTH: 0.4 MS
MDC_IDC_MSMT_LEADCHNL_RA_PACING_THRESHOLD_PULSEWIDTH: 0.4 MS
MDC_IDC_MSMT_LEADCHNL_RA_SENSING_INTR_AMPL: 3.6 MV
MDC_IDC_MSMT_LEADCHNL_RV_IMPEDANCE_VALUE: 777 OHM
MDC_IDC_PG_IMPLANT_DTM: NORMAL
MDC_IDC_PG_MFG: NORMAL
MDC_IDC_PG_MODEL: NORMAL
MDC_IDC_PG_SERIAL: NORMAL
MDC_IDC_PG_TYPE: NORMAL
MDC_IDC_SESS_CLINIC_NAME: NORMAL
MDC_IDC_SESS_DTM: NORMAL
MDC_IDC_SESS_TYPE: NORMAL
MDC_IDC_SET_BRADY_LOWRATE: 60 {BEATS}/MIN
MDC_IDC_SET_BRADY_MAX_SENSOR_RATE: 130 {BEATS}/MIN
MDC_IDC_SET_BRADY_MODE: NORMAL
MDC_IDC_SET_LEADCHNL_RA_PACING_AMPLITUDE: 1.8 V
MDC_IDC_SET_LEADCHNL_RA_PACING_CAPTURE_MODE: NORMAL
MDC_IDC_SET_LEADCHNL_RA_PACING_POLARITY: NORMAL
MDC_IDC_SET_LEADCHNL_RA_PACING_PULSEWIDTH: 0.4 MS
MDC_IDC_SET_LEADCHNL_RA_SENSING_ADAPTATION_MODE: NORMAL
MDC_IDC_SET_LEADCHNL_RA_SENSING_POLARITY: NORMAL
MDC_IDC_SET_LEADCHNL_RA_SENSING_SENSITIVITY: 0.25 MV
MDC_IDC_SET_ZONE_DETECTION_INTERVAL: 375 MS
MDC_IDC_SET_ZONE_TYPE: NORMAL
MDC_IDC_SET_ZONE_VENDOR_TYPE: NORMAL
MDC_IDC_STAT_BRADY_DTM_END: NORMAL
MDC_IDC_STAT_BRADY_DTM_START: NORMAL
MDC_IDC_STAT_BRADY_RA_PERCENT_PACED: 99 %
MDC_IDC_STAT_EPISODE_RECENT_COUNT: 0
MDC_IDC_STAT_EPISODE_RECENT_COUNT: 40
MDC_IDC_STAT_EPISODE_RECENT_COUNT_DTM_END: NORMAL
MDC_IDC_STAT_EPISODE_RECENT_COUNT_DTM_END: NORMAL
MDC_IDC_STAT_EPISODE_RECENT_COUNT_DTM_START: NORMAL
MDC_IDC_STAT_EPISODE_RECENT_COUNT_DTM_START: NORMAL
MDC_IDC_STAT_EPISODE_TYPE: NORMAL
MDC_IDC_STAT_EPISODE_TYPE: NORMAL
MDC_IDC_STAT_EPISODE_VENDOR_TYPE: NORMAL

## 2023-05-22 ENCOUNTER — ANCILLARY PROCEDURE (OUTPATIENT)
Dept: CARDIOLOGY | Facility: CLINIC | Age: 78
End: 2023-05-22
Attending: INTERNAL MEDICINE
Payer: COMMERCIAL

## 2023-05-22 DIAGNOSIS — Z95.0 PACEMAKER: ICD-10-CM

## 2023-05-22 DIAGNOSIS — I49.5 SICK SINUS SYNDROME (H): ICD-10-CM

## 2023-05-23 LAB
MDC_IDC_EPISODE_DTM: NORMAL
MDC_IDC_EPISODE_ID: NORMAL
MDC_IDC_EPISODE_TYPE: NORMAL
MDC_IDC_LEAD_IMPLANT_DT: NORMAL
MDC_IDC_LEAD_LOCATION: NORMAL
MDC_IDC_LEAD_LOCATION_DETAIL_1: NORMAL
MDC_IDC_LEAD_MFG: NORMAL
MDC_IDC_LEAD_MODEL: NORMAL
MDC_IDC_LEAD_POLARITY_TYPE: NORMAL
MDC_IDC_LEAD_SERIAL: NORMAL
MDC_IDC_LEAD_SPECIAL_FUNCTION: NORMAL
MDC_IDC_MSMT_BATTERY_DTM: NORMAL
MDC_IDC_MSMT_BATTERY_REMAINING_LONGEVITY: 78 MO
MDC_IDC_MSMT_BATTERY_REMAINING_PERCENTAGE: 96 %
MDC_IDC_MSMT_BATTERY_STATUS: NORMAL
MDC_IDC_MSMT_LEADCHNL_RA_IMPEDANCE_VALUE: 732 OHM
MDC_IDC_PG_IMPLANT_DTM: NORMAL
MDC_IDC_PG_MFG: NORMAL
MDC_IDC_PG_MODEL: NORMAL
MDC_IDC_PG_SERIAL: NORMAL
MDC_IDC_PG_TYPE: NORMAL
MDC_IDC_SESS_CLINIC_NAME: NORMAL
MDC_IDC_SESS_DTM: NORMAL
MDC_IDC_SESS_TYPE: NORMAL
MDC_IDC_SET_BRADY_LOWRATE: 60 {BEATS}/MIN
MDC_IDC_SET_BRADY_MAX_SENSOR_RATE: 130 {BEATS}/MIN
MDC_IDC_SET_BRADY_MODE: NORMAL
MDC_IDC_SET_LEADCHNL_RA_PACING_AMPLITUDE: 1.8 V
MDC_IDC_SET_LEADCHNL_RA_PACING_CAPTURE_MODE: NORMAL
MDC_IDC_SET_LEADCHNL_RA_PACING_POLARITY: NORMAL
MDC_IDC_SET_LEADCHNL_RA_PACING_PULSEWIDTH: 0.4 MS
MDC_IDC_SET_LEADCHNL_RA_SENSING_ADAPTATION_MODE: NORMAL
MDC_IDC_SET_LEADCHNL_RA_SENSING_POLARITY: NORMAL
MDC_IDC_SET_LEADCHNL_RA_SENSING_SENSITIVITY: 0.25 MV
MDC_IDC_SET_ZONE_DETECTION_INTERVAL: 375 MS
MDC_IDC_SET_ZONE_TYPE: NORMAL
MDC_IDC_SET_ZONE_VENDOR_TYPE: NORMAL
MDC_IDC_STAT_BRADY_DTM_END: NORMAL
MDC_IDC_STAT_BRADY_DTM_START: NORMAL
MDC_IDC_STAT_BRADY_RA_PERCENT_PACED: 98 %
MDC_IDC_STAT_EPISODE_RECENT_COUNT: 0
MDC_IDC_STAT_EPISODE_RECENT_COUNT: 0
MDC_IDC_STAT_EPISODE_RECENT_COUNT_DTM_END: NORMAL
MDC_IDC_STAT_EPISODE_RECENT_COUNT_DTM_END: NORMAL
MDC_IDC_STAT_EPISODE_RECENT_COUNT_DTM_START: NORMAL
MDC_IDC_STAT_EPISODE_RECENT_COUNT_DTM_START: NORMAL
MDC_IDC_STAT_EPISODE_TYPE: NORMAL
MDC_IDC_STAT_EPISODE_TYPE: NORMAL
MDC_IDC_STAT_EPISODE_VENDOR_TYPE: NORMAL

## 2023-05-23 PROCEDURE — 93294 REM INTERROG EVL PM/LDLS PM: CPT | Performed by: INTERNAL MEDICINE

## 2023-05-23 PROCEDURE — 93296 REM INTERROG EVL PM/IDS: CPT | Performed by: INTERNAL MEDICINE

## 2023-06-01 ENCOUNTER — LAB REQUISITION (OUTPATIENT)
Dept: LAB | Facility: CLINIC | Age: 78
End: 2023-06-01

## 2023-06-01 DIAGNOSIS — Z12.5 ENCOUNTER FOR SCREENING FOR MALIGNANT NEOPLASM OF PROSTATE: ICD-10-CM

## 2023-06-01 LAB — PSA SERPL DL<=0.01 NG/ML-MCNC: 0.3 NG/ML (ref 0–6.5)

## 2023-06-01 PROCEDURE — G0103 PSA SCREENING: HCPCS | Performed by: FAMILY MEDICINE

## 2023-06-03 ENCOUNTER — HEALTH MAINTENANCE LETTER (OUTPATIENT)
Age: 78
End: 2023-06-03

## 2023-06-09 ENCOUNTER — TELEPHONE (OUTPATIENT)
Dept: PULMONOLOGY | Facility: CLINIC | Age: 78
End: 2023-06-09
Payer: COMMERCIAL

## 2023-06-09 NOTE — TELEPHONE ENCOUNTER
Lexii, daughter, and Archana, wife, called today with concerns about Bill being very confused and sleeping a lot. Oxygen sats 89-92%, lungs sound gargled, congestion with some yellow colored phlegm, no shortness of breath, no slurring of speech, no facial droop, generalized weakness, but very hard to get him to wake up. Consulted with Dr. Dejesus. Dr. Dejesus advised to hold his all of his meds until he can wake up. He is on a number of sedating medication like methadone, lyrica, and gabapentin. He has had spells like this before and when the medications are held, he clears from his confusion and wakes up. Archana will hold all of his medication until later his afternoon. Gave her the FAST signs to look for stoke symptoms. He has none of these symptoms at this time. She agrees to call 911 this afternoon if he does not clear up from his confusion state.

## 2023-08-10 NOTE — PROGRESS NOTES
SUBJECTIVE/OBJECTIVE:                Romaine Farah is a 74 year old male called for a transitions of care visit.  He was discharged from [facility] on 10/7 for cellulitis. Archana his wife was also on the call      Neurology: Dr. Epperson and Dr. Corey  PCP: Dr. Franko Schmidt [facility]    Chief Complaint: Medication review.    Allergies/ADRs: propranolol  Tobacco: No tobacco use   Alcohol: not currently using  PMH: Reviewed in Epic and ECW    Medication Adherence/Access:  no issues reported  Patient uses pill box(es).  Patient takes medications 4 time(s) per day.     Cellulitis:  Currently taking doxycycline 100mg BID for 10 days, denies negative effects.  Discussed taking doxycyline with food, but avoiding taking it with calcium products such as dairy products or his supplements (calcium, magnesium).  Reports that the leg is still red and inflamed, but hasn't increased in surface area.  Still a little tender.      Tremor:   Currently taking topiramate 75mg twice daily, Primidone 100mg twice daily.    Reports flat affect with higher doses of primidone, so they decreased dose and added topiramate.      Neuropathy:   Currently taking duloxetine 60 mg daily in the morning, pregabalin 200 mg 3 times daily, topiramate 75 mg twice daily, and methadone 10 mg in the AM, 5 mg at 1 pm, 10 mg at 5 pm, and 10 pm at 10 pm.  Also using lidocaine 4% gel which he finds.  Without the methadone was terrible, this has been very effective.  Pt feels the topiramate has also helped significantly with his neuropathy.    Anxiety/Depression:   Currently taking duloxetine 60 mg daily.  Finds this effective, and denies adverse effects.  Pt said his anxiety and depression are well controlled.      Insomnia:   Not currently taking anything. States that he has difficulty falling asleep but usually can stay asleep. He goes to bed at about 10:30 pm and wakes up at 10:30 am. Typically takes him until 1-1:30 am to fall asleep. Not using  Subjective   Patient ID: Emilia Manuel is a 82 y.o. male who presents for Diabetes, Hypertension, Hyperlipidemia, GERD (Recheck. Review labs.), and Medicare Annual Wellness Visit Subsequent.    HPI   Patient presents for hypertension, hyperlipidemia and diabetes.    Diabetes: Trying to follow recommended diet.    Sugars higher.   Hemoglobin A1C (%)   Date Value   08/03/2023 6.9 (A)   02/09/2023 6.4 (A)     Glucose (mg/dL)   Date Value   08/03/2023 127 (H)   02/09/2023 129 (H)       HTN: Taking and tolerating amlodipine/benazepril. Condition well controlled.    Hyperlipidemia:  Taking fish oil. Taking and tolerating Simvastatin.   LDL (mg/dL)   Date Value   08/03/2023 64   02/09/2023 58     Triglycerides (mg/dL)   Date Value   08/03/2023 296 (H)   02/09/2023 139     HDL (mg/dL)   Date Value   08/03/2023 37.1 (A)   02/09/2023 38.2 (A)       Allergic rhinitis: not been using his flonase or azelastine spray recently. Getting some intermittent rhinitis and gets pressure in ears.    Urinary frequency/BPH: Was taking tamsulosin and tolerates it well but isn't certain he has been taking it recently.     Aortic atherosclosis (mild): no symptoms. On simvastatin.    DDD C-spine:  Chronic intermittent pain but it is stable and manageable.      GERD: well controlled with pantoprazole.  No abdominal pain or breakthrough reflux.     Fatty liver: Ultrasound 4/2022 showed fatty liver.  Watching diet and staying active. No abdominal pain.  LFTs normal on labs.   ALT (SGPT) (U/L)   Date Value   08/03/2023 25   02/09/2023 34     AST (U/L)   Date Value   08/03/2023 23   02/09/2023 29        Saw ENT Dr Cortez.  Ears ringing at times still. Had MRI that showed stable meningioma. Saw neurosurgeon Dr Rick and it is felt to be stable so just going to monitor it. Is going to be seeing neuro Dr Arzola -- has appt in a couple months.  Also having some memory issues that he plans to discuss with the neurologist also.    Hasn't see eye  in  "several years.     Review of Systems   Respiratory:  Negative for shortness of breath.    Cardiovascular:  Negative for chest pain and palpitations.   Gastrointestinal:  Negative for abdominal pain.       Objective   /74   Pulse (!) 113   Temp 36.3 °C (97.3 °F)   Ht 1.715 m (5' 7.5\")   Wt 83.5 kg (184 lb)   SpO2 97%   BMI 28.39 kg/m²     Physical Exam  HENT:      Head: Normocephalic.   Eyes:      General: No scleral icterus.  Cardiovascular:      Rate and Rhythm: Normal rate and regular rhythm.   Pulmonary:      Effort: Pulmonary effort is normal.      Breath sounds: Normal breath sounds.   Abdominal:      Palpations: Abdomen is soft. There is no mass.      Tenderness: There is no abdominal tenderness.   Skin:     General: Skin is warm and dry.   Neurological:      Mental Status: He is alert.   Psychiatric:         Mood and Affect: Affect normal.         Assessment/Plan   Diagnoses and all orders for this visit:  Type 2 diabetes mellitus without complication, without long-term current use of insulin (CMS/[facility])  -     Comprehensive Metabolic Panel; Future  -     Hemoglobin A1C; Future  -     POCT microalbumin manually resulted  Benign essential HTN  -     Comprehensive Metabolic Panel; Future  -     amLODIPine-benazepriL (Lotrel) 5-40 mg capsule; Take 1 capsule by mouth once daily.  Hyperlipidemia, unspecified hyperlipidemia type  -     Comprehensive Metabolic Panel; Future  -     Lipid Panel; Future  -     simvastatin (Zocor) 10 mg tablet; Take 1 tablet (10 mg) by mouth once daily.  Gastroesophageal reflux disease without esophagitis  -     pantoprazole (ProtoNix) 40 mg EC tablet; Take 1 tablet (40 mg) by mouth once daily.  Fatty liver  Benign prostatic hyperplasia with lower urinary tract symptoms, symptom details unspecified  -     tamsulosin (Flomax) 0.4 mg 24 hr capsule; Take 1 capsule (0.4 mg) by mouth once daily at bedtime.  Degeneration of cervical intervertebral disc  Atherosclerosis of aorta " Benadryl for sleep after learning about the anticholinergic side effects with it.  Said his sleep is not bothersome, still feels rested.     Arthritis pain:   Currently taking ibuprofen 4 tablets (800 mg) as needed, takes about one time per week. Patient states pain is in his lower back, hips, shoulder, elbow. Has had one hip replacement and may have the other one replaced. He also uses lidocaine gel on his feet, finds this helpful.     Hypertension:   Current medications include lisinopril 5 mg, but on hold right now since hospitalization.  Patient does not self-monitor BP.  Patient reports no current medication side effects. Patient states he had a pacemaker placed this year and his HR was 35 but now is 62 with pacemaker.     BP Readings from Last 3 Encounters:   04/18/19 117/78   08/07/11 120/66     Allergic rhinitis:   Current medications include Levocetirizine 5mg once daily. Primary symptoms are nasal congestion, post-nasal drip and runny nose. Pt feels that current therapy is effective.     Dry, itchy skin:   Topicals:   Using fluocinonide 0.05% cream topically as needed, and uses a moisturizing lotion daily. Pt also has clobetasol 0.05% ointment.  Pt has been avoiding benadryl due to instability.       Vitamins/supplements:   Taking magnesium (250 mg each) 3 times/day (for constipation), 2 fish oil 1000 mg in the morning daily (for dry skin), calcium/vitamin D 600 mg/20 mcg 2 tablets daily in the morning, 2 super B complex/Vit C tablets in the morning (for neuropathy).       Today's Vitals: There were no vitals taken for this visit. - telephone encounter    ASSESSMENT:                 Current medications were reviewed today.      Medication Adherence: good, no issues identified    Cellulitis:  Plan in place, just started antibiotic.  Pt monitoring cellulitis closely.    Tremor:   Stable.  Follows with neurology.    Neuropathy:   Stable.  Could try the topical lidocaine patch to get longer lasting pain relief  (CMS/[facility])  Allergic rhinitis, unspecified seasonality, unspecified trigger  -     fluticasone (Flonase) 50 mcg/actuation nasal spray; Administer 2 sprays into each nostril once daily.  -     azelastine (Astelin) 137 mcg (0.1 %) nasal spray; Administer 2 sprays into each nostril 2 times a day.  Encounter for immunization  -     Tdap vaccine, age 10 years and older (BOOSTRIX)  Routine general medical examination at health care facility       Reviewed labs.    Discussed diet and exercise, and encouraged weight loss. Try to bring sugars back down.   Refilled meds.  Ensure taking Tamsulosin.   Tdap given.   Advised to see eye Dr for diabetic eye exam.   Urine microalbumin done today  Restart his azelastine nasal spray prn and fluticasone nasal spray.   Follow up in 6 months for recheck DM, HTN, hyperlipidemia, GERD, BPH with fasting labs the week before.      compared to the cream.    Anxiety/Depression:   Stable.      Insomnia:   Stable.  Pt could attempt trial on melatonin if needed.  Not bothersome right now, so didn't discuss alternative therapies.     Arthritis pain:   Stable.     Hypertension:   Stable.  BP below goal of 140/90 mmHg.    Allergic rhinitis:   Stable.    Dry, itchy skin:   Stable.     Vitamins/supplements:   Stable.  Discussed  doses of doxycycline with his calcium and magnesium supplement.      PLAN:                  Post Discharge Medication Reconciliation Status: discharge medications reconciled, continue medications without change.    1) Pt could try OTC lidocaine patch for the pain on his feet.  Can be cut into smaller pieces to fit.    I spent 60 minutes with this patient today. I offer these suggestions for consideration by Dr. Schmidt. A copy of the visit note was provided to the patient's primary care provider.    Will follow up as needed.    The patient was mailed a summary of these recommendations as an after visit summary.    Garret MaguireD  Medication Therapy Management Pharmacist  Pager: [phone]

## 2023-08-15 DIAGNOSIS — G60.3 IDIOPATHIC PROGRESSIVE POLYNEUROPATHY: ICD-10-CM

## 2023-08-16 RX ORDER — DULOXETIN HYDROCHLORIDE 30 MG/1
30 CAPSULE, DELAYED RELEASE ORAL 2 TIMES DAILY
Qty: 180 CAPSULE | Refills: 1 | Status: SHIPPED | OUTPATIENT
Start: 2023-08-16 | End: 2024-09-22

## 2023-08-16 NOTE — TELEPHONE ENCOUNTER
Refill request for: DULoxetine (CYMBALTA) 30 MG capsule    Directions: Take 1 capsule (30 mg) by mouth 2 times daily     LOV: 7/25/22  NOV: 1/23/24    90 day supply with 1 refills Medication T'd for review and signature  Zonia Carbone CMA on 8/16/2023 at 12:10 PM

## 2023-08-28 ENCOUNTER — OFFICE VISIT (OUTPATIENT)
Dept: PULMONOLOGY | Facility: CLINIC | Age: 78
End: 2023-08-28
Payer: COMMERCIAL

## 2023-08-28 VITALS
OXYGEN SATURATION: 94 % | WEIGHT: 212 LBS | HEART RATE: 68 BPM | BODY MASS INDEX: 28.75 KG/M2 | DIASTOLIC BLOOD PRESSURE: 66 MMHG | SYSTOLIC BLOOD PRESSURE: 118 MMHG

## 2023-08-28 DIAGNOSIS — R06.09 DYSPNEA ON EXERTION: ICD-10-CM

## 2023-08-28 DIAGNOSIS — J41.0 SIMPLE CHRONIC BRONCHITIS (H): ICD-10-CM

## 2023-08-28 DIAGNOSIS — R53.83 LETHARGY: Primary | ICD-10-CM

## 2023-08-28 PROCEDURE — 99214 OFFICE O/P EST MOD 30 MIN: CPT | Performed by: INTERNAL MEDICINE

## 2023-08-28 NOTE — PROGRESS NOTES
Assessment and plan: 78-year-old man with history of chronic bronchitis, chronic pain on multiple sedating medications here for follow-up.  He is doing better since some small changes in his medications.    He has legitimate chronic bronchitis unfortunately it sounds like when he gets sick it decreases medication tolerance.  He and his wife are understandably concerned about uncontrolled pain but voiced understanding of the risks of respiratory depression-including death-of oversedation.    I wonder if when he develops chest cold he should consider decreasing or backing off his dosage.  He will have to discuss this with his pain clinic.    Plan  Continue Trelegy   Continue action plan.      Graded exercise plan     Consider decreasing doses of sedating medications with illness.    Chief complaint: COPD    HPI: 78-year-old man with chronic bronchitis, chronic pain here for follow-up.  He was hospitalized in late December after hypercarbic respiratory failure in the setting of sedation and confusion.  Fortunately he has done better since reducing his medications.  He has had 1 other episode in June.  I told him to take no further medications and stay at home and he actually did okay.  No issues since then although his pain is a little bit worse today.    Medications and history reviewed.    /66 (BP Location: Right arm, Patient Position: Sitting, Cuff Size: Adult Regular)   Pulse 68   Wt 96.2 kg (212 lb)   SpO2 94%   BMI 28.75 kg/m    In good spirits, calm  Neck supple  Chest clear without crackles or wheezes  Normal cardiac exam  Normal abdominal exam  No tremor

## 2023-08-28 NOTE — LETTER
8/28/2023         RE: Romaine Farah  [facility] [place] [id number]        Dear Colleague,    Thank you for referring your patient, Romaine Farah, to the [facility]. Please see a copy of my visit note below.        Assessment and plan: 78-year-old man with history of chronic bronchitis, chronic pain on multiple sedating medications here for follow-up.  He is doing better since some small changes in his medications.    He has legitimate chronic bronchitis unfortunately it sounds like when he gets sick it decreases medication tolerance.  He and his wife are understandably concerned about uncontrolled pain but voiced understanding of the risks of respiratory depression-including death-of oversedation.    I wonder if when he develops chest cold he should consider decreasing or backing off his dosage.  He will have to discuss this with his pain clinic.    Plan  Continue Trelegy   Continue action plan.      Graded exercise plan     Consider decreasing doses of sedating medications with illness.    Chief complaint: COPD    HPI: 78-year-old man with chronic bronchitis, chronic pain here for follow-up.  He was hospitalized in late December after hypercarbic respiratory failure in the setting of sedation and confusion.  Fortunately he has done better since reducing his medications.  He has had 1 other episode in June.  I told him to take no further medications and stay at home and he actually did okay.  No issues since then although his pain is a little bit worse today.    Medications and history reviewed.    /66 (BP Location: Right arm, Patient Position: Sitting, Cuff Size: Adult Regular)   Pulse 68   Wt 96.2 kg (212 lb)   SpO2 94%   BMI 28.75 kg/m    In good spirits, calm  Neck supple  Chest clear without crackles or wheezes  Normal cardiac exam  Normal abdominal exam  No tremor      Again, thank you for allowing me to participate in the care of your patient.         Sincerely,        Farhan Dejesus MD

## 2023-08-30 ENCOUNTER — ANCILLARY PROCEDURE (OUTPATIENT)
Dept: CARDIOLOGY | Facility: CLINIC | Age: 78
End: 2023-08-30
Attending: INTERNAL MEDICINE
Payer: COMMERCIAL

## 2023-08-30 DIAGNOSIS — Z95.0 CARDIAC PACEMAKER IN SITU: ICD-10-CM

## 2023-08-30 DIAGNOSIS — I49.5 SICK SINUS SYNDROME (H): ICD-10-CM

## 2023-08-30 LAB
MDC_IDC_EPISODE_DTM: NORMAL
MDC_IDC_EPISODE_DURATION: 14 S
MDC_IDC_EPISODE_DURATION: 16 S
MDC_IDC_EPISODE_ID: NORMAL
MDC_IDC_EPISODE_TYPE: NORMAL
MDC_IDC_LEAD_IMPLANT_DT: NORMAL
MDC_IDC_LEAD_LOCATION: NORMAL
MDC_IDC_LEAD_LOCATION_DETAIL_1: NORMAL
MDC_IDC_LEAD_MFG: NORMAL
MDC_IDC_LEAD_MODEL: NORMAL
MDC_IDC_LEAD_POLARITY_TYPE: NORMAL
MDC_IDC_LEAD_SERIAL: NORMAL
MDC_IDC_LEAD_SPECIAL_FUNCTION: NORMAL
MDC_IDC_MSMT_BATTERY_DTM: NORMAL
MDC_IDC_MSMT_BATTERY_REMAINING_LONGEVITY: 78 MO
MDC_IDC_MSMT_BATTERY_REMAINING_PERCENTAGE: 92 %
MDC_IDC_MSMT_BATTERY_STATUS: NORMAL
MDC_IDC_MSMT_LEADCHNL_RA_IMPEDANCE_VALUE: 703 OHM
MDC_IDC_PG_IMPLANT_DTM: NORMAL
MDC_IDC_PG_MFG: NORMAL
MDC_IDC_PG_MODEL: NORMAL
MDC_IDC_PG_SERIAL: NORMAL
MDC_IDC_PG_TYPE: NORMAL
MDC_IDC_SESS_CLINIC_NAME: NORMAL
MDC_IDC_SESS_DTM: NORMAL
MDC_IDC_SESS_TYPE: NORMAL
MDC_IDC_SET_BRADY_LOWRATE: 60 {BEATS}/MIN
MDC_IDC_SET_BRADY_MAX_SENSOR_RATE: 130 {BEATS}/MIN
MDC_IDC_SET_BRADY_MODE: NORMAL
MDC_IDC_SET_LEADCHNL_RA_PACING_AMPLITUDE: 1.8 V
MDC_IDC_SET_LEADCHNL_RA_PACING_CAPTURE_MODE: NORMAL
MDC_IDC_SET_LEADCHNL_RA_PACING_POLARITY: NORMAL
MDC_IDC_SET_LEADCHNL_RA_PACING_PULSEWIDTH: 0.4 MS
MDC_IDC_SET_LEADCHNL_RA_SENSING_ADAPTATION_MODE: NORMAL
MDC_IDC_SET_LEADCHNL_RA_SENSING_POLARITY: NORMAL
MDC_IDC_SET_LEADCHNL_RA_SENSING_SENSITIVITY: 0.25 MV
MDC_IDC_SET_ZONE_DETECTION_INTERVAL: 375 MS
MDC_IDC_SET_ZONE_TYPE: NORMAL
MDC_IDC_SET_ZONE_VENDOR_TYPE: NORMAL
MDC_IDC_STAT_BRADY_DTM_END: NORMAL
MDC_IDC_STAT_BRADY_DTM_START: NORMAL
MDC_IDC_STAT_BRADY_RA_PERCENT_PACED: 98 %
MDC_IDC_STAT_EPISODE_RECENT_COUNT: 0
MDC_IDC_STAT_EPISODE_RECENT_COUNT: 2
MDC_IDC_STAT_EPISODE_RECENT_COUNT_DTM_END: NORMAL
MDC_IDC_STAT_EPISODE_RECENT_COUNT_DTM_END: NORMAL
MDC_IDC_STAT_EPISODE_RECENT_COUNT_DTM_START: NORMAL
MDC_IDC_STAT_EPISODE_RECENT_COUNT_DTM_START: NORMAL
MDC_IDC_STAT_EPISODE_TYPE: NORMAL
MDC_IDC_STAT_EPISODE_TYPE: NORMAL
MDC_IDC_STAT_EPISODE_VENDOR_TYPE: NORMAL

## 2023-08-30 PROCEDURE — 93296 REM INTERROG EVL PM/IDS: CPT | Performed by: INTERNAL MEDICINE

## 2023-08-30 PROCEDURE — 93294 REM INTERROG EVL PM/LDLS PM: CPT | Performed by: INTERNAL MEDICINE

## 2023-11-16 ENCOUNTER — LAB REQUISITION (OUTPATIENT)
Dept: LAB | Facility: CLINIC | Age: 78
End: 2023-11-16

## 2023-11-16 ENCOUNTER — TRANSFERRED RECORDS (OUTPATIENT)
Dept: HEALTH INFORMATION MANAGEMENT | Facility: CLINIC | Age: 78
End: 2023-11-16

## 2023-11-16 DIAGNOSIS — I10 ESSENTIAL (PRIMARY) HYPERTENSION: ICD-10-CM

## 2023-11-16 PROCEDURE — 80061 LIPID PANEL: CPT | Performed by: FAMILY MEDICINE

## 2023-11-16 PROCEDURE — 80053 COMPREHEN METABOLIC PANEL: CPT | Performed by: FAMILY MEDICINE

## 2023-11-17 LAB
ALBUMIN SERPL BCG-MCNC: 4 G/DL (ref 3.5–5.2)
ALP SERPL-CCNC: 66 U/L (ref 40–150)
ALT SERPL W P-5'-P-CCNC: 15 U/L (ref 0–70)
ANION GAP SERPL CALCULATED.3IONS-SCNC: 12 MMOL/L (ref 7–15)
AST SERPL W P-5'-P-CCNC: 21 U/L (ref 0–45)
BILIRUB SERPL-MCNC: 0.3 MG/DL
BUN SERPL-MCNC: 16.4 MG/DL (ref 8–23)
CALCIUM SERPL-MCNC: 9.8 MG/DL (ref 8.8–10.2)
CHLORIDE SERPL-SCNC: 103 MMOL/L (ref 98–107)
CHOLEST SERPL-MCNC: 201 MG/DL
CREAT SERPL-MCNC: 1.18 MG/DL (ref 0.67–1.17)
DEPRECATED HCO3 PLAS-SCNC: 27 MMOL/L (ref 22–29)
EGFRCR SERPLBLD CKD-EPI 2021: 63 ML/MIN/1.73M2
GLUCOSE SERPL-MCNC: 90 MG/DL (ref 70–99)
HDLC SERPL-MCNC: 48 MG/DL
LDLC SERPL CALC-MCNC: 130 MG/DL
NONHDLC SERPL-MCNC: 153 MG/DL
POTASSIUM SERPL-SCNC: 4.7 MMOL/L (ref 3.4–5.3)
PROT SERPL-MCNC: 7.4 G/DL (ref 6.4–8.3)
SODIUM SERPL-SCNC: 142 MMOL/L (ref 135–145)
TRIGL SERPL-MCNC: 115 MG/DL

## 2023-12-05 ENCOUNTER — ANCILLARY PROCEDURE (OUTPATIENT)
Dept: CARDIOLOGY | Facility: CLINIC | Age: 78
End: 2023-12-05
Attending: INTERNAL MEDICINE
Payer: COMMERCIAL

## 2023-12-05 DIAGNOSIS — I49.5 SICK SINUS SYNDROME (H): ICD-10-CM

## 2023-12-05 DIAGNOSIS — Z95.0 CARDIAC PACEMAKER IN SITU: ICD-10-CM

## 2023-12-05 LAB
MDC_IDC_EPISODE_DTM: NORMAL
MDC_IDC_EPISODE_ID: NORMAL
MDC_IDC_EPISODE_TYPE: NORMAL
MDC_IDC_LEAD_CONNECTION_STATUS: NORMAL
MDC_IDC_LEAD_IMPLANT_DT: NORMAL
MDC_IDC_LEAD_LOCATION: NORMAL
MDC_IDC_LEAD_LOCATION_DETAIL_1: NORMAL
MDC_IDC_LEAD_MFG: NORMAL
MDC_IDC_LEAD_MODEL: NORMAL
MDC_IDC_LEAD_POLARITY_TYPE: NORMAL
MDC_IDC_LEAD_SERIAL: NORMAL
MDC_IDC_LEAD_SPECIAL_FUNCTION: NORMAL
MDC_IDC_MSMT_BATTERY_DTM: NORMAL
MDC_IDC_MSMT_BATTERY_REMAINING_LONGEVITY: 78 MO
MDC_IDC_MSMT_BATTERY_REMAINING_PERCENTAGE: 92 %
MDC_IDC_MSMT_BATTERY_STATUS: NORMAL
MDC_IDC_MSMT_LEADCHNL_RA_IMPEDANCE_VALUE: 728 OHM
MDC_IDC_PG_IMPLANT_DTM: NORMAL
MDC_IDC_PG_MFG: NORMAL
MDC_IDC_PG_MODEL: NORMAL
MDC_IDC_PG_SERIAL: NORMAL
MDC_IDC_PG_TYPE: NORMAL
MDC_IDC_SESS_CLINIC_NAME: NORMAL
MDC_IDC_SESS_DTM: NORMAL
MDC_IDC_SESS_TYPE: NORMAL
MDC_IDC_SET_BRADY_LOWRATE: 60 {BEATS}/MIN
MDC_IDC_SET_BRADY_MAX_SENSOR_RATE: 130 {BEATS}/MIN
MDC_IDC_SET_BRADY_MODE: NORMAL
MDC_IDC_SET_LEADCHNL_RA_PACING_AMPLITUDE: 1.8 V
MDC_IDC_SET_LEADCHNL_RA_PACING_CAPTURE_MODE: NORMAL
MDC_IDC_SET_LEADCHNL_RA_PACING_POLARITY: NORMAL
MDC_IDC_SET_LEADCHNL_RA_PACING_PULSEWIDTH: 0.4 MS
MDC_IDC_SET_LEADCHNL_RA_SENSING_ADAPTATION_MODE: NORMAL
MDC_IDC_SET_LEADCHNL_RA_SENSING_POLARITY: NORMAL
MDC_IDC_SET_LEADCHNL_RA_SENSING_SENSITIVITY: 0.25 MV
MDC_IDC_SET_ZONE_DETECTION_INTERVAL: 375 MS
MDC_IDC_SET_ZONE_STATUS: NORMAL
MDC_IDC_SET_ZONE_TYPE: NORMAL
MDC_IDC_SET_ZONE_VENDOR_TYPE: NORMAL
MDC_IDC_STAT_BRADY_DTM_END: NORMAL
MDC_IDC_STAT_BRADY_DTM_START: NORMAL
MDC_IDC_STAT_BRADY_RA_PERCENT_PACED: 98 %
MDC_IDC_STAT_EPISODE_RECENT_COUNT: 0
MDC_IDC_STAT_EPISODE_RECENT_COUNT: 0
MDC_IDC_STAT_EPISODE_RECENT_COUNT_DTM_END: NORMAL
MDC_IDC_STAT_EPISODE_RECENT_COUNT_DTM_END: NORMAL
MDC_IDC_STAT_EPISODE_RECENT_COUNT_DTM_START: NORMAL
MDC_IDC_STAT_EPISODE_RECENT_COUNT_DTM_START: NORMAL
MDC_IDC_STAT_EPISODE_TYPE: NORMAL
MDC_IDC_STAT_EPISODE_TYPE: NORMAL
MDC_IDC_STAT_EPISODE_VENDOR_TYPE: NORMAL

## 2023-12-05 PROCEDURE — 93294 REM INTERROG EVL PM/LDLS PM: CPT | Performed by: INTERNAL MEDICINE

## 2023-12-05 PROCEDURE — 93296 REM INTERROG EVL PM/IDS: CPT | Performed by: INTERNAL MEDICINE

## 2023-12-18 ENCOUNTER — TRANSFERRED RECORDS (OUTPATIENT)
Dept: HEALTH INFORMATION MANAGEMENT | Facility: CLINIC | Age: 78
End: 2023-12-18

## 2023-12-20 ENCOUNTER — TELEPHONE (OUTPATIENT)
Dept: PULMONOLOGY | Facility: CLINIC | Age: 78
End: 2023-12-20
Payer: COMMERCIAL

## 2023-12-20 DIAGNOSIS — J41.0 SIMPLE CHRONIC BRONCHITIS (H): ICD-10-CM

## 2023-12-20 NOTE — TELEPHONE ENCOUNTER
"Spoke with patient's wife.  He is COVID NEG.  Had fever yesterday of 101 and was congested, lethargic and gurgling in his throat, so she gave him some \"theraflu\".    Temp today is down to 98 and he is more awake, but still feels \"tired\".  Chest is congested.        Ok to give action plan??  Wife was hesitant as last time this happened, you had them hold all meds and he actually got better.     She states that they are weaning down his methadone and is at \"about 25%\".    He IS more awake today, but still has the congestion.  Fever is also down to 98 today.        If cannot have action plan, is there anything over the counter he could take for the congestion?     "

## 2023-12-20 NOTE — TELEPHONE ENCOUNTER
[facility] Call Center    Phone Message    May a detailed message be left on voicemail: yes     Reason for Call: Other: Follow-up Requested   Spouse requesting a call back from the team to discuss the possibility of medication for the pt due to a recent cold. Please review and advise. Thank you.     Action Taken: Other: Pulm    Travel Screening: Not Applicable

## 2023-12-21 RX ORDER — AZITHROMYCIN 250 MG/1
TABLET, FILM COATED ORAL
Qty: 6 TABLET | Refills: 0 | Status: SHIPPED | OUTPATIENT
Start: 2023-12-21 | End: 2023-12-26

## 2023-12-21 RX ORDER — PREDNISONE 20 MG/1
20 TABLET ORAL DAILY
Qty: 7 TABLET | Refills: 0 | Status: SHIPPED | OUTPATIENT
Start: 2023-12-21 | End: 2023-12-29

## 2023-12-21 NOTE — TELEPHONE ENCOUNTER
Spoke with patient's wife, Archana.  Will send prescription for his action plan:  prednisone 20mg dialy x 7 days and Zapck.  Can take Zyrtec or Allegra (over the counter) if needs a decongestant.

## 2023-12-29 ENCOUNTER — TELEPHONE (OUTPATIENT)
Dept: PULMONOLOGY | Facility: CLINIC | Age: 78
End: 2023-12-29
Payer: COMMERCIAL

## 2023-12-29 DIAGNOSIS — J41.0 SIMPLE CHRONIC BRONCHITIS (H): ICD-10-CM

## 2023-12-29 RX ORDER — AZITHROMYCIN 250 MG/1
TABLET, FILM COATED ORAL
Qty: 6 TABLET | Refills: 0 | Status: SHIPPED | OUTPATIENT
Start: 2023-12-29 | End: 2024-01-03

## 2023-12-29 RX ORDER — PREDNISONE 20 MG/1
20 TABLET ORAL DAILY
Qty: 7 TABLET | Refills: 0 | Status: SHIPPED | OUTPATIENT
Start: 2023-12-29 | End: 2024-09-22

## 2023-12-29 NOTE — TELEPHONE ENCOUNTER
"Phone call from patient's wife, Archana.  Rom is doing some better, not quite as congested as he was last week, but still struggling with cough and chest congestion and \"crackling\" in his throat.      Will repeat his action plan.  Instructed to call next week if no better and he will need to be seen in clinic by NP.  If worsens over the weekend, he will need to go to Urgent Care or ED for evaluation and treatment.  "

## 2024-02-27 ENCOUNTER — OFFICE VISIT (OUTPATIENT)
Dept: PULMONOLOGY | Facility: CLINIC | Age: 79
End: 2024-02-27
Attending: INTERNAL MEDICINE
Payer: COMMERCIAL

## 2024-02-27 VITALS
SYSTOLIC BLOOD PRESSURE: 126 MMHG | DIASTOLIC BLOOD PRESSURE: 64 MMHG | BODY MASS INDEX: 28.75 KG/M2 | WEIGHT: 212 LBS | OXYGEN SATURATION: 96 % | HEART RATE: 63 BPM

## 2024-02-27 DIAGNOSIS — R06.09 DYSPNEA ON EXERTION: ICD-10-CM

## 2024-02-27 DIAGNOSIS — J41.0 SIMPLE CHRONIC BRONCHITIS (H): Primary | ICD-10-CM

## 2024-02-27 PROCEDURE — 99214 OFFICE O/P EST MOD 30 MIN: CPT | Performed by: INTERNAL MEDICINE

## 2024-02-27 RX ORDER — HYDROCODONE BITARTRATE AND ACETAMINOPHEN 5; 325 MG/1; MG/1
1-2 TABLET ORAL
COMMUNITY
Start: 2024-02-20

## 2024-02-27 RX ORDER — CARBIDOPA/LEVODOPA 10MG-100MG
1 TABLET ORAL
COMMUNITY
Start: 2024-02-12

## 2024-02-27 RX ORDER — NYSTATIN AND TRIAMCINOLONE ACETONIDE 100000; 1 [USP'U]/G; MG/G
CREAM TOPICAL 2 TIMES DAILY PRN
COMMUNITY
Start: 2024-02-20

## 2024-02-27 NOTE — PROGRESS NOTES
Assessment and plan: 78-year-old man with history of chronic bronchitis, chronic pain on multiple sedating medications here for follow-up.  He is doing well.    Chronic bronchitis well-controlled on Trelegy.    Previously his pain medication was at a point where when he became sick he was much more susceptible to the side effects and he would become sedated and hypercarbic.  He has been very proactive in turning down his dose and is using some augmenting nonpharmacologic therapies to help.    Plan  Continue Trelegy   Continue action plan.  Prednisone 20 mg for 7 days and a Z-Silvestre.    Graded exercise plan       Chief complaint: COPD    HPI: 78-year-old man with chronic bronchitis, chronic pain here for follow-up.  He is doing very well since his last visit with me.  He had 1 exacerbation that was treated with action plan.  He has been decreasing his medication for pain and is brighter and more awake.    Medications and history reviewed.    /64 (BP Location: Left arm, Patient Position: Sitting, Cuff Size: Adult Regular)   Pulse 63   Wt 96.2 kg (212 lb)   SpO2 96%   BMI 28.75 kg/m    In good spirits, calm  Neck supple  Chest clear without crackles or wheezes  Normal cardiac exam  Normal abdominal exam  No tremor

## 2024-02-29 DIAGNOSIS — Z95.0 CARDIAC PACEMAKER IN SITU: Primary | ICD-10-CM

## 2024-02-29 DIAGNOSIS — I49.5 SICK SINUS SYNDROME (H): ICD-10-CM

## 2024-03-15 ENCOUNTER — TRANSFERRED RECORDS (OUTPATIENT)
Dept: HEALTH INFORMATION MANAGEMENT | Facility: CLINIC | Age: 79
End: 2024-03-15
Payer: COMMERCIAL

## 2024-03-20 ENCOUNTER — OFFICE VISIT (OUTPATIENT)
Dept: CARDIOLOGY | Facility: CLINIC | Age: 79
End: 2024-03-20
Attending: INTERNAL MEDICINE
Payer: COMMERCIAL

## 2024-03-20 VITALS
RESPIRATION RATE: 14 BRPM | BODY MASS INDEX: 27.67 KG/M2 | SYSTOLIC BLOOD PRESSURE: 118 MMHG | WEIGHT: 204 LBS | HEART RATE: 68 BPM | DIASTOLIC BLOOD PRESSURE: 68 MMHG

## 2024-03-20 DIAGNOSIS — R94.39 ABNORMAL CARDIOVASCULAR STRESS TEST: ICD-10-CM

## 2024-03-20 DIAGNOSIS — Z95.0 CARDIAC PACEMAKER IN SITU: Primary | ICD-10-CM

## 2024-03-20 DIAGNOSIS — F11.20 CONTINUOUS OPIOID DEPENDENCE (H): ICD-10-CM

## 2024-03-20 DIAGNOSIS — Z95.0 CARDIAC PACEMAKER IN SITU: ICD-10-CM

## 2024-03-20 DIAGNOSIS — I49.5 SICK SINUS SYNDROME (H): ICD-10-CM

## 2024-03-20 PROCEDURE — G2211 COMPLEX E/M VISIT ADD ON: HCPCS

## 2024-03-20 PROCEDURE — 93000 ELECTROCARDIOGRAM COMPLETE: CPT | Performed by: STUDENT IN AN ORGANIZED HEALTH CARE EDUCATION/TRAINING PROGRAM

## 2024-03-20 PROCEDURE — 99214 OFFICE O/P EST MOD 30 MIN: CPT

## 2024-03-20 NOTE — PROGRESS NOTES
HEART CARE ENCOUNTER CONSULTATON VAUGHN      [facility]  [phone]      Assessment/Recommendations   Assessment:   Sick sinus syndrome: device check shows device function 3 episodes less than 4 seconds AT, asymptomatic.  No VHR  Hypertension: Controlled, not currently on antihypertensive medication,  Opioid dependence on methadone: EKG today personally reviewed, Qtc 432 on methadone therapy.  Otherwise consistent ECG 1 year ago with known T wave inversion in lead III.  Abnormal exercise echo stress test 1/2023: a small size of inducible myocardial in anteroseptal wall, LVEF 60-65%, good exercise capacity. With primary cardiologist Dr. Hinojosa determined no further workup at that time as he was asymptomatic.  Continues to deny chest pain and exertion, some stable dyspnea with exertion over the last year with hx COPD.  Compensated on exam.  Patient would like to continue monitoring with symptoms.    Plan:   Continue regular device checks  Discussed monitoring for development of chest discomfort/pain, particularly with exertion, progression of shortness of breath.  Return to exercise on stationary bike.      Follow up in 1 year or sooner as needed     History of Present Illness/Subjective    HPI: Romaine Farah is a 78 year old male with PMHx of sick sinus syndrome with pacemaker, history of HTN, opioid dependence, neuropathy presents for follow up.    Patient reports no large changes over the last year.  Continues to have some shortness of breath with exertion, needs to rest after walking up 1 flight of stairs and resolves with rest.  Patient and wife who accompanies him agree that this is stable from 1 year ago after finding of small abnormality on echo stress test.  Denies exertional or resting chest pain.  Some indigestion that is obviously relieved with Pepcid.  He denies orthopnea or lower extremity edema.  Breathing improved with daily Trelegy, and no changes made at recent pulmonology visit.   Methadone was reduced over the last year.  Plans to return to using stationary bike which he has followed evidence of.  Patient's main struggle is foot pain, neuropathy.    He denies lightheadedness, PND, palpitations, and lower extremity edema.        Encounter Type: Patient seen in clinic for annual device evaluation and iterative programming, followed by Joann Stevens NP (patient accompanied by Archana)  Device: [employer] Accolade (S) pacemaker  Pacing %/Programmed: AP 97%, AAIR 60bpm  Lead(s): Stable  Battery longevity: Estimating 5.5 years remaining  Presenting rhythm: AP 68bpm  Underlying rhythm: SB 40's w/frequent PAC's  Heart rates: Stable, HR's per histogram range 60-130bpm and primarily 60bpm, patient denies activity intolerance  Atrial High rates: 3 episodes logged, available EGM's suggest brief AT lasting up to ~ 4 seconds.  Anticoagulant: None  Ventricular High rates: NA, no ventricular lead  Comments: Normal device function. MV adjusted to current patient age.  Plan: Remote device check scheduled for 6/20/24. Danay Cook RN     Echocardiogram stress 5/2023 Results:       Physical Examination  Review of Systems   Vitals: There were no vitals taken for this visit.  BMI= There is no height or weight on file to calculate BMI.  Wt Readings from Last 3 Encounters:   02/27/24 96.2 kg (212 lb)   08/28/23 96.2 kg (212 lb)   02/17/23 99.8 kg (220 lb)           ENT/Mouth: membranes moist, no oral lesions or bleeding gums.      EYES:  no scleral icterus, normal conjunctivae       Chest/Lungs:   lungs are clear to auscultation, no rales or wheezing, equal chest wall expansion    Cardiovascular:   Regular. Normal first and second heart sounds with no murmurs, rubs, or gallops; the radial and posterior tibial pulses are intact, absent edema bilaterally        Extremities: no cyanosis or clubbing   Skin: no xanthelasma, warm.    Neurologic:  no tremors     Psychiatric: alert and oriented x3, calm        Please refer  above for cardiac ROS details.        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 Added automatically from request for surgery [id number]     Past Surgical History:   Procedure Laterality Date    AMPUTATE TOE(S) Left 2/20/2020    Procedure: AMPUTATION, third digit left foot;  Surgeon: Wai Armstrong DPM;  Location: [facility] OR;  Service: Podiatry    AMPUTATE TOE(S) Right 7/7/2020    Procedure: AMPUTATION, second digit right foot;  Surgeon: Wai Armstrong DPM;  Location: [facility] OR;  Service: Podiatry    EP PACEMAKER INSERT N/A 2/12/2019    Procedure: EP Pacemaker Insertion;  Surgeon: Mihceal Mahoney MD;  Location: [facility];  Service: Cardiology    EYE SURGERY Bilateral     HIP SURGERY Right     total hip    INSERT / REPLACE / REMOVE PACEMAKER  02/12/2019    PACEMAKER/ICD CHECK  2019    low heart rate - 35    TOTAL HIP ARTHROPLASTY Right 2/2/2015    Procedure: #3   HIP TOTAL ARTHROPLASTY, RIGHT;  Surgeon: Aryan Glass MD;  Location: [facility] OR;  Service:      Family History   Problem Relation Age of Onset    LUNG DISEASE Mother     Diabetes Mother     Esophageal Cancer Father     Tremor Father     Alcoholism Father     Tremor Sister     Neuropathy Sister     Arthritis Brother     Other - See Comments Brother     Other - See Comments Brother     Tremor Sister     Lupus Daughter     Tremor Daughter     Thyroid Disease Daughter     Chronic Obstructive Pulmonary Disease Mother          Social History     Socioeconomic History    Marital status:      Spouse name: Not on file    Number of children: Not on file    Years of education: Not on file    Highest education level: Not on file   Occupational History    Not on file   Tobacco Use    Smoking status: Former     Types: Cigars, cigarillos or filtered cigars    Smokeless tobacco: Never   Vaping Use    Vaping Use: Never used   Substance and Sexual Activity    Alcohol use: Not Currently    Drug use: Never     Comment: cigars    Sexual activity: Not Currently   Other Topics Concern    Parent/sibling w/ CABG, MI or angioplasty before 65F 55M? No   Social History Narrative    . lives in [place]. spouse sania     Social Determinants of Health     Financial Resource Strain: Not on file   Food Insecurity: Not on file   Transportation Needs: Not on file   Physical Activity: Not on file   Stress: Not on file   Social Connections: Not on file   Interpersonal Safety: Not on file   Housing Stability: Not on file           Medications  Allergies   Current Outpatient Medications   Medication Sig Dispense Refill    calcium carbonate-vitamin D (OSCAL W/D) 500-200 MG-UNIT tablet Take 1 tablet by mouth daily       carbidopa-levodopa (SINEMET)  MG tablet Take 1 tablet by mouth 3 times daily      clobetasol (TEMOVATE) 0.05 % external ointment Apply topically 2 times daily As needed 60 g 11    DULoxetine (CYMBALTA) 30 MG capsule Take 1 capsule (30 mg) by mouth 2 times daily 180 capsule 1    fluocinonide (LIDEX) 0.05 % external cream Apply topically 2 times daily As needed      Fluticasone-Umeclidin-Vilanterol (TRELEGY ELLIPTA) 200-62.5-25 MCG/ACT oral inhaler Inhale 1 puff into the lungs daily 1 each 11    HYDROcodone-acetaminophen (NORCO) 5-325 MG tablet Si-2 tabs. P.O. H.S. (use dates: 2024-3/22/2024)      ibuprofen (ADVIL/MOTRIN) 200 MG tablet Take 200 mg by mouth every 8 hours as needed for mild pain      levocetirizine (XYZAL) 5 MG  "tablet Take 5 mg by mouth every evening      magnesium 250 MG tablet Take 1 tablet by mouth 3 times daily      methadone (DOLOPHINE) 10 MG tablet 1 pill by mouth daily @ 9am, 1 pill @ 1pm, 1 pill @ 5pm and 1 pill @ 10/11pm   - 4 pills per day 1 tablet 0    naloxone (NARCAN) 4 MG/0.1ML nasal spray Instill 1 spray (4 mg) intranasally into 1 nostril. Administer into alternating nostrils. May repeat every 2 to 3 minutes.* (Patient not taking: Reported on 2/27/2024)      nystatin-triamcinolone (MYCOLOG II) 787245-2.1 UNIT/GM-% external cream APPLY ONE APPLICATION TWICE A DAY 30*      Omega-3 Fatty Acids (FISH OIL BURP-LESS) 1000 MG CAPS 2 by mouth daily in morning @ 9am      predniSONE (DELTASONE) 20 MG tablet Take 1 tablet (20 mg) by mouth daily 7 tablet 0    Pregabalin (LYRICA) 100 MG capsule Take 1 capsule (100 mg) by mouth 3 times daily 30 capsule 0    topiramate (TOPAMAX) 25 MG tablet Take 3 tablets (75 mg) by mouth 2 times daily 540 tablet 3    vitamin (B COMPLEX) tablet Take 1 tablet by mouth daily @ 9am      vitamin C (ASCORBIC ACID) 100 MG tablet Take 100 mg by mouth daily       Vitamin D3 (CHOLECALCIFEROL) 25 mcg (1000 units) tablet Take 1 tablet by mouth daily        No Known Allergies       Lab Results    Chemistry/lipid CBC Cardiac Enzymes/BNP/TSH/INR   Recent Labs   Lab Test 11/16/23  1634   CHOL 201*   HDL 48   *   TRIG 115     Recent Labs   Lab Test 11/16/23  1634 06/20/22  1424 07/07/17  0858   * 121 116     Recent Labs   Lab Test 11/16/23  1634      POTASSIUM 4.7   CHLORIDE 103   CO2 27   GLC 90   BUN 16.4   CR 1.18*   GFRESTIMATED 63   JOSSELYN 9.8     Recent Labs   Lab Test 11/16/23  1634 12/27/22  0733 12/26/22  1613   CR 1.18* 1.07 1.30*     No results for input(s): \"A1C\" in the last 15993 hours.       Recent Labs   Lab Test 12/26/22  1613   WBC 11.3*   HGB 15.8   HCT 49.9   MCV 91        Recent Labs   Lab Test 12/26/22  1613 12/14/22  0247 11/11/22  1131   HGB 15.8 16.0 16.0 "    Recent Labs   Lab Test 10/04/19  1936 02/11/19  1124   TROPONINI <0.01 <0.01     Recent Labs   Lab Test 12/26/22  1613   NTBNPI 266     Recent Labs   Lab Test 12/26/22  1613   TSH 1.33     Recent Labs   Lab Test 02/11/19  1124   INR 1.15*          This note has been dictated using voice recognition software. Any grammatical, typographical, or context distortions are unintentional and inherent to the software    Joann Stevens PA-C

## 2024-03-20 NOTE — LETTER
3/20/2024    Simon Schmidt MD  [id number]  [facility] [address]    RE: Romaine Farah       Dear Colleague,     I had the pleasure of seeing Romaine Farah in the [facility].    HEART CARE ENCOUNTER CONSULTATON [facility]N [facility]  [phone]      Assessment/Recommendations   Assessment:   Sick sinus syndrome: device check shows device function 3 episodes less than 4 seconds AT, asymptomatic.  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 With primary cardiologist Dr. Hinojosa determined no further workup at that time as he was asymptomatic.  Continues to deny chest pain and exertion, some stable dyspnea with exertion over the last year with hx COPD.  Compensated on exam.  Patient would like to continue monitoring with symptoms.    Plan:   Continue regular device checks  Discussed monitoring for development of chest discomfort/pain, particularly with exertion, progression of shortness of breath.  Return to exercise on stationary bike.      Follow up in 1 year or sooner as needed     History of Present Illness/Subjective    HPI: Romaine Farah is a 78 year old male with PMHx of sick sinus syndrome with pacemaker, history of HTN, opioid dependence, neuropathy presents for follow up.    Patient reports no large changes over the last year.  Continues to have some shortness of breath with exertion, needs to rest after walking up 1 flight of stairs and resolves with rest.  Patient and wife who accompanies him agree that this is stable from 1 year ago after finding of small abnormality on echo stress test.  Denies exertional or resting chest pain.  Some indigestion that is obviously relieved with Pepcid.  He denies orthopnea or lower extremity  edema.  Breathing improved with daily Trelegy, and no changes made at recent pulmonology visit.  Methadone was reduced over the last year.  Plans to return to using stationary bike which he has followed evidence of.  Patient's main struggle is foot pain, neuropathy.    He denies lightheadedness, PND, palpitations, and lower extremity edema.        Encounter Type: Patient seen in clinic for annual device evaluation and iterative programming, followed by Joann Stevens NP (patient accompanied by Archana)  Device: [place] Accolade (S) pacemaker  Pacing %/Programmed: AP 97%, AAIR 60bpm  Lead(s): Stable  Battery longevity: Estimating 5.5 years remaining  Presenting rhythm: AP 68bpm  Underlying rhythm: SB 40's w/frequent PAC's  Heart rates: Stable, HR's per histogram range 60-130bpm and primarily 60bpm, patient denies activity intolerance  Atrial High rates: 3 episodes logged, available EGM's suggest brief AT lasting up to ~ 4 seconds.  Anticoagulant: None  Ventricular High rates: NA, no ventricular lead  Comments: Normal device function. MV adjusted to current patient age.  Plan: Remote device check scheduled for 6/20/24. Danay Cook RN     Echocardiogram stress 5/2023 Results:       Physical Examination  Review of Systems   Vitals: There were no vitals taken for this visit.  BMI= There is no height or weight on file to calculate BMI.  Wt Readings from Last 3 Encounters:   02/27/24 96.2 kg (212 lb)   08/28/23 96.2 kg (212 lb)   02/17/23 99.8 kg (220 lb)           ENT/Mouth: membranes moist, no oral lesions or bleeding gums.      EYES:  no scleral icterus, normal conjunctivae       Chest/Lungs:   lungs are clear to auscultation, no rales or wheezing, equal chest wall expansion    Cardiovascular:   Regular. Normal first and second heart sounds with no murmurs, rubs, or gallops; the radial and posterior tibial pulses are intact, absent edema bilaterally        Extremities: no cyanosis or clubbing   Skin: no xanthelasma,  warm.    Neurologic:  no tremors     Psychiatric: alert and oriented x3, calm        Please refer above for cardiac ROS details.        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 Added automatically from request for surgery [id number]     Past Surgical History:   Procedure Laterality Date    AMPUTATE TOE(S) Left 2/20/2020    Procedure: AMPUTATION, third digit left foot;  Surgeon: Wai Armstrong DPM;  [place] [facility]  Service: [place]iat[place]    AMPUTATE TOE(S) Right 7/7/2020    Procedure: AMPUTATION, second digit right foot;  Surgeon: Wai Armstrong DPM;  [place] [facility]  Service: Podiatry    EP PACEMAKER INSERT N/A 2/12/2019    Procedure: EP Pacemaker Insertion;  Surgeon: Micheal Mahoney MD;  Location: [facility];  Service: Cardiology    EYE SURGERY Bilateral     HIP SURGERY Right     total hip    INSERT / REPLACE / REMOVE PACEMAKER  02/12/2019    PACEMAKER/ICD CHECK  2019    low heart rate - 35    TOTAL HIP ARTHROPLASTY Right 2/2/2015    Procedure: #3   HIP TOTAL ARTHROPLASTY, RIGHT;  Surgeon: Aryan Glass MD;  Location: [facility];  Service:      Family History   Problem Relation Age of Onset    LUNG DISEASE Mother     Diabetes Mother     Esophageal Cancer Father     Tremor Father     Alcoholism Father     Tremor Sister     Neuropathy Sister     Arthritis Brother     Other - See Comments Brother     Other - See Comments Brother     Tremor Sister     Lupus Daughter      Tremor Daughter     Thyroid Disease Daughter     Chronic Obstructive Pulmonary Disease Mother         Social History     Socioeconomic History    Marital status:      Spouse name: Not on file    Number of children: Not on file    Years of education: Not on file    Highest education level: Not on file   Occupational History    Not on file   Tobacco Use    Smoking status: Former     Types: Cigars, cigarillos or filtered cigars    Smokeless tobacco: Never   Vaping Use    Vaping Use: Never used   Substance and Sexual Activity    Alcohol use: Not Currently    Drug use: Never     Comment: cigars    Sexual activity: Not Currently   Other Topics Concern    Parent/sibling w/ CABG, MI or angioplasty before 65F 55M? No   Social History Narrative    . lives in [place]. spouse sania     Social Determinants of Health     Financial Resource Strain: Not on file   Food Insecurity: Not on file   Transportation Needs: Not on file   Physical Activity: Not on file   Stress: Not on file   Social Connections: Not on file   Interpersonal Safety: Not on file   Housing Stability: Not on file           Medications  Allergies   Current Outpatient Medications   Medication Sig Dispense Refill    calcium carbonate-vitamin D (OSCAL W/D) 500-200 MG-UNIT tablet Take 1 tablet by mouth daily       carbidopa-levodopa (SINEMET)  MG tablet Take 1 tablet by mouth 3 times daily      clobetasol (TEMOVATE) 0.05 % external ointment Apply topically 2 times daily As needed 60 g 11    DULoxetine (CYMBALTA) 30 MG capsule Take 1 capsule (30 mg) by mouth 2 times daily 180 capsule 1    fluocinonide (LIDEX) 0.05 % external cream Apply topically 2 times daily As needed      Fluticasone-Umeclidin-Vilanterol (TRELEGY ELLIPTA) 200-62.5-25 MCG/ACT oral inhaler Inhale 1 puff into the lungs daily 1 each 11    HYDROcodone-acetaminophen (NORCO) 5-325 MG tablet Si-2 tabs. P.O. H.S. (use dates: 2024-3/22/2024)      ibuprofen (ADVIL/MOTRIN) 200 MG tablet  "Take 200 mg by mouth every 8 hours as needed for mild pain      levocetirizine (XYZAL) 5 MG tablet Take 5 mg by mouth every evening      magnesium 250 MG tablet Take 1 tablet by mouth 3 times daily      methadone (DOLOPHINE) 10 MG tablet 1 pill by mouth daily @ 9am, 1 pill @ 1pm, 1 pill @ 5pm and 1 pill @ 10/11pm   - 4 pills per day 1 tablet 0    naloxone (NARCAN) 4 MG/0.1ML nasal spray Instill 1 spray (4 mg) intranasally into 1 nostril. Administer into alternating nostrils. May repeat every 2 to 3 minutes.* (Patient not taking: Reported on 2/27/2024)      nystatin-triamcinolone (MYCOLOG II) 722450-4.1 UNIT/GM-% external cream APPLY ONE APPLICATION TWICE A DAY 30*      Omega-3 Fatty Acids (FISH OIL BURP-LESS) 1000 MG CAPS 2 by mouth daily in morning @ 9am      predniSONE (DELTASONE) 20 MG tablet Take 1 tablet (20 mg) by mouth daily 7 tablet 0    Pregabalin (LYRICA) 100 MG capsule Take 1 capsule (100 mg) by mouth 3 times daily 30 capsule 0    topiramate (TOPAMAX) 25 MG tablet Take 3 tablets (75 mg) by mouth 2 times daily 540 tablet 3    vitamin (B COMPLEX) tablet Take 1 tablet by mouth daily @ 9am      vitamin C (ASCORBIC ACID) 100 MG tablet Take 100 mg by mouth daily       Vitamin D3 (CHOLECALCIFEROL) 25 mcg (1000 units) tablet Take 1 tablet by mouth daily        No Known Allergies       Lab Results    Chemistry/lipid CBC Cardiac Enzymes/BNP/TSH/INR   Recent Labs   Lab Test 11/16/23  1634   CHOL 201*   HDL 48   *   TRIG 115     Recent Labs   Lab Test 11/16/23  1634 06/20/22  1424 07/07/17  0858   * 121 116     Recent Labs   Lab Test 11/16/23  1634      POTASSIUM 4.7   CHLORIDE 103   CO2 27   GLC 90   BUN 16.4   CR 1.18*   GFRESTIMATED 63   JOSSELYN 9.8     Recent Labs   Lab Test 11/16/23  1634 12/27/22  0733 12/26/22  1613   CR 1.18* 1.07 1.30*     No results for input(s): \"A1C\" in the last 70453 hours.       Recent Labs   Lab Test 12/26/22  1613   WBC 11.3*   HGB 15.8   HCT 49.9   MCV 91    "     Recent Labs   Lab Test 12/26/22  1613 12/14/22  0247 11/11/22  1131   HGB 15.8 16.0 16.0    Recent Labs   Lab Test 10/04/19  1936 02/11/19  1124   TROPONINI <0.01 <0.01     Recent Labs   Lab Test 12/26/22  1613   NTBNPI 266     Recent Labs   Lab Test 12/26/22  1613   TSH 1.33     Recent Labs   Lab Test 02/11/19  1124   INR 1.15*          This note has been dictated using voice recognition software. Any grammatical, typographical, or context distortions are unintentional and inherent to the software    Joann Stevens PA-C    Thank you for allowing me to participate in the care of your patient.      Sincerely,     Joann Cates PA-C     [facility]  cc:   Debo Gavin MD  [address]  [place] [address]

## 2024-03-20 NOTE — PATIENT INSTRUCTIONS
It was a pleasure taking part in your care today:    - Follow up in 1 year   - Notify our clinic of development of chest pain or worsening shortness of breath    Please call the [facility] clinic with any questions or concerns at [phone].     Joann Stevens PA-C

## 2024-03-22 LAB
ATRIAL RATE - MUSE: 71 BPM
DIASTOLIC BLOOD PRESSURE - MUSE: NORMAL MMHG
INTERPRETATION ECG - MUSE: NORMAL
MDC_IDC_LEAD_CONNECTION_STATUS: NORMAL
MDC_IDC_LEAD_IMPLANT_DT: NORMAL
MDC_IDC_LEAD_LOCATION: NORMAL
MDC_IDC_LEAD_LOCATION_DETAIL_1: NORMAL
MDC_IDC_LEAD_MFG: NORMAL
MDC_IDC_LEAD_MODEL: NORMAL
MDC_IDC_LEAD_POLARITY_TYPE: NORMAL
MDC_IDC_LEAD_SERIAL: NORMAL
MDC_IDC_LEAD_SPECIAL_FUNCTION: NORMAL
MDC_IDC_MSMT_BATTERY_DTM: NORMAL
MDC_IDC_MSMT_BATTERY_REMAINING_LONGEVITY: 66 MO
MDC_IDC_MSMT_BATTERY_REMAINING_PERCENTAGE: 85 %
MDC_IDC_MSMT_BATTERY_STATUS: NORMAL
MDC_IDC_MSMT_LEADCHNL_RA_IMPEDANCE_VALUE: 754 OHM
MDC_IDC_MSMT_LEADCHNL_RA_PACING_THRESHOLD_AMPLITUDE: 0.9 V
MDC_IDC_MSMT_LEADCHNL_RA_PACING_THRESHOLD_PULSEWIDTH: 0.4 MS
MDC_IDC_MSMT_LEADCHNL_RA_SENSING_INTR_AMPL: 3.9 MV
MDC_IDC_PG_IMPLANT_DTM: NORMAL
MDC_IDC_PG_MFG: NORMAL
MDC_IDC_PG_MODEL: NORMAL
MDC_IDC_PG_SERIAL: NORMAL
MDC_IDC_PG_TYPE: NORMAL
MDC_IDC_SESS_CLINIC_NAME: NORMAL
MDC_IDC_SESS_DTM: NORMAL
MDC_IDC_SESS_TYPE: NORMAL
MDC_IDC_SET_BRADY_LOWRATE: 60 {BEATS}/MIN
MDC_IDC_SET_BRADY_MAX_SENSOR_RATE: 130 {BEATS}/MIN
MDC_IDC_SET_BRADY_MODE: NORMAL
MDC_IDC_SET_LEADCHNL_RA_PACING_AMPLITUDE: 1.8 V
MDC_IDC_SET_LEADCHNL_RA_PACING_CAPTURE_MODE: NORMAL
MDC_IDC_SET_LEADCHNL_RA_PACING_POLARITY: NORMAL
MDC_IDC_SET_LEADCHNL_RA_PACING_PULSEWIDTH: 0.4 MS
MDC_IDC_SET_LEADCHNL_RA_SENSING_ADAPTATION_MODE: NORMAL
MDC_IDC_SET_LEADCHNL_RA_SENSING_POLARITY: NORMAL
MDC_IDC_SET_LEADCHNL_RA_SENSING_SENSITIVITY: 0.25 MV
MDC_IDC_SET_ZONE_DETECTION_INTERVAL: 375 MS
MDC_IDC_SET_ZONE_STATUS: NORMAL
MDC_IDC_SET_ZONE_TYPE: NORMAL
MDC_IDC_SET_ZONE_VENDOR_TYPE: NORMAL
MDC_IDC_STAT_AT_MODE_SW_COUNT: 3
MDC_IDC_STAT_BRADY_DTM_END: NORMAL
MDC_IDC_STAT_BRADY_DTM_START: NORMAL
MDC_IDC_STAT_BRADY_RA_PERCENT_PACED: 97 %
MDC_IDC_STAT_EPISODE_RECENT_COUNT: 0
MDC_IDC_STAT_EPISODE_RECENT_COUNT: 3
MDC_IDC_STAT_EPISODE_RECENT_COUNT_DTM_END: NORMAL
MDC_IDC_STAT_EPISODE_RECENT_COUNT_DTM_END: NORMAL
MDC_IDC_STAT_EPISODE_RECENT_COUNT_DTM_START: NORMAL
MDC_IDC_STAT_EPISODE_RECENT_COUNT_DTM_START: NORMAL
MDC_IDC_STAT_EPISODE_TYPE: NORMAL
MDC_IDC_STAT_EPISODE_TYPE: NORMAL
MDC_IDC_STAT_EPISODE_VENDOR_TYPE: NORMAL
P AXIS - MUSE: 51 DEGREES
PR INTERVAL - MUSE: 192 MS
QRS DURATION - MUSE: 102 MS
QT - MUSE: 398 MS
QTC - MUSE: 432 MS
R AXIS - MUSE: 126 DEGREES
SYSTOLIC BLOOD PRESSURE - MUSE: NORMAL MMHG
T AXIS - MUSE: 3 DEGREES
VENTRICULAR RATE- MUSE: 71 BPM

## 2024-03-22 PROCEDURE — 93279 PRGRMG DEV EVAL PM/LDLS PM: CPT | Performed by: INTERNAL MEDICINE

## 2024-06-03 ENCOUNTER — TRANSFERRED RECORDS (OUTPATIENT)
Dept: HEALTH INFORMATION MANAGEMENT | Facility: CLINIC | Age: 79
End: 2024-06-03

## 2024-06-03 ENCOUNTER — LAB REQUISITION (OUTPATIENT)
Dept: LAB | Facility: CLINIC | Age: 79
End: 2024-06-03

## 2024-06-03 DIAGNOSIS — Z12.5 ENCOUNTER FOR SCREENING FOR MALIGNANT NEOPLASM OF PROSTATE: ICD-10-CM

## 2024-06-03 DIAGNOSIS — E53.8 DEFICIENCY OF OTHER SPECIFIED B GROUP VITAMINS: ICD-10-CM

## 2024-06-03 LAB
ERYTHROCYTE [DISTWIDTH] IN BLOOD BY AUTOMATED COUNT: 14.9 % (ref 10–15)
HCT VFR BLD AUTO: 48.2 % (ref 40–53)
HGB BLD-MCNC: 15.1 G/DL (ref 13.3–17.7)
MCH RBC QN AUTO: 28.4 PG (ref 26.5–33)
MCHC RBC AUTO-ENTMCNC: 31.3 G/DL (ref 31.5–36.5)
MCV RBC AUTO: 91 FL (ref 78–100)
PLATELET # BLD AUTO: 190 10E3/UL (ref 150–450)
RBC # BLD AUTO: 5.31 10E6/UL (ref 4.4–5.9)
WBC # BLD AUTO: 8.8 10E3/UL (ref 4–11)

## 2024-06-03 PROCEDURE — 82607 VITAMIN B-12: CPT | Performed by: FAMILY MEDICINE

## 2024-06-03 PROCEDURE — 85027 COMPLETE CBC AUTOMATED: CPT | Performed by: FAMILY MEDICINE

## 2024-06-03 PROCEDURE — G0103 PSA SCREENING: HCPCS | Performed by: FAMILY MEDICINE

## 2024-06-04 LAB
PSA SERPL DL<=0.01 NG/ML-MCNC: 0.38 NG/ML (ref 0–6.5)
VIT B12 SERPL-MCNC: 283 PG/ML (ref 232–1245)

## 2024-06-20 ENCOUNTER — ANCILLARY PROCEDURE (OUTPATIENT)
Dept: CARDIOLOGY | Facility: CLINIC | Age: 79
End: 2024-06-20
Attending: INTERNAL MEDICINE
Payer: COMMERCIAL

## 2024-06-20 DIAGNOSIS — Z95.0 CARDIAC PACEMAKER IN SITU: ICD-10-CM

## 2024-06-20 DIAGNOSIS — I49.5 SICK SINUS SYNDROME (H): ICD-10-CM

## 2024-06-26 LAB
MDC_IDC_EPISODE_DTM: NORMAL
MDC_IDC_EPISODE_DTM: NORMAL
MDC_IDC_EPISODE_DURATION: 15 S
MDC_IDC_EPISODE_ID: NORMAL
MDC_IDC_EPISODE_ID: NORMAL
MDC_IDC_EPISODE_TYPE: NORMAL
MDC_IDC_EPISODE_TYPE: NORMAL
MDC_IDC_EPISODE_TYPE_INDUCED: NO
MDC_IDC_LEAD_CONNECTION_STATUS: NORMAL
MDC_IDC_LEAD_IMPLANT_DT: NORMAL
MDC_IDC_LEAD_LOCATION: NORMAL
MDC_IDC_LEAD_LOCATION_DETAIL_1: NORMAL
MDC_IDC_LEAD_MFG: NORMAL
MDC_IDC_LEAD_MODEL: NORMAL
MDC_IDC_LEAD_POLARITY_TYPE: NORMAL
MDC_IDC_LEAD_SERIAL: NORMAL
MDC_IDC_LEAD_SPECIAL_FUNCTION: NORMAL
MDC_IDC_MSMT_BATTERY_DTM: NORMAL
MDC_IDC_MSMT_BATTERY_REMAINING_LONGEVITY: 66 MO
MDC_IDC_MSMT_BATTERY_REMAINING_PERCENTAGE: 81 %
MDC_IDC_MSMT_BATTERY_STATUS: NORMAL
MDC_IDC_MSMT_LEADCHNL_RA_IMPEDANCE_VALUE: 756 OHM
MDC_IDC_PG_IMPLANT_DTM: NORMAL
MDC_IDC_PG_MFG: NORMAL
MDC_IDC_PG_MODEL: NORMAL
MDC_IDC_PG_SERIAL: NORMAL
MDC_IDC_PG_TYPE: NORMAL
MDC_IDC_SESS_CLINIC_NAME: NORMAL
MDC_IDC_SESS_DTM: NORMAL
MDC_IDC_SESS_TYPE: NORMAL
MDC_IDC_SET_BRADY_LOWRATE: 60 {BEATS}/MIN
MDC_IDC_SET_BRADY_MAX_SENSOR_RATE: 130 {BEATS}/MIN
MDC_IDC_SET_BRADY_MODE: NORMAL
MDC_IDC_SET_LEADCHNL_RA_PACING_AMPLITUDE: 1.8 V
MDC_IDC_SET_LEADCHNL_RA_PACING_CAPTURE_MODE: NORMAL
MDC_IDC_SET_LEADCHNL_RA_PACING_POLARITY: NORMAL
MDC_IDC_SET_LEADCHNL_RA_PACING_PULSEWIDTH: 0.4 MS
MDC_IDC_SET_LEADCHNL_RA_SENSING_ADAPTATION_MODE: NORMAL
MDC_IDC_SET_LEADCHNL_RA_SENSING_POLARITY: NORMAL
MDC_IDC_SET_LEADCHNL_RA_SENSING_SENSITIVITY: 0.25 MV
MDC_IDC_SET_ZONE_DETECTION_INTERVAL: 375 MS
MDC_IDC_SET_ZONE_STATUS: NORMAL
MDC_IDC_SET_ZONE_TYPE: NORMAL
MDC_IDC_SET_ZONE_VENDOR_TYPE: NORMAL
MDC_IDC_STAT_BRADY_DTM_END: NORMAL
MDC_IDC_STAT_BRADY_DTM_START: NORMAL
MDC_IDC_STAT_BRADY_RA_PERCENT_PACED: 99 %
MDC_IDC_STAT_EPISODE_RECENT_COUNT: 0
MDC_IDC_STAT_EPISODE_RECENT_COUNT: 1
MDC_IDC_STAT_EPISODE_RECENT_COUNT_DTM_END: NORMAL
MDC_IDC_STAT_EPISODE_RECENT_COUNT_DTM_END: NORMAL
MDC_IDC_STAT_EPISODE_RECENT_COUNT_DTM_START: NORMAL
MDC_IDC_STAT_EPISODE_RECENT_COUNT_DTM_START: NORMAL
MDC_IDC_STAT_EPISODE_TYPE: NORMAL
MDC_IDC_STAT_EPISODE_TYPE: NORMAL
MDC_IDC_STAT_EPISODE_VENDOR_TYPE: NORMAL

## 2024-06-26 PROCEDURE — 93294 REM INTERROG EVL PM/LDLS PM: CPT | Performed by: INTERNAL MEDICINE

## 2024-06-26 PROCEDURE — 93296 REM INTERROG EVL PM/IDS: CPT | Performed by: INTERNAL MEDICINE

## 2024-07-06 ENCOUNTER — HEALTH MAINTENANCE LETTER (OUTPATIENT)
Age: 79
End: 2024-07-06

## 2024-08-15 DIAGNOSIS — J41.0 SIMPLE CHRONIC BRONCHITIS (H): ICD-10-CM

## 2024-09-11 ENCOUNTER — OFFICE VISIT (OUTPATIENT)
Dept: PULMONOLOGY | Facility: CLINIC | Age: 79
End: 2024-09-11
Attending: INTERNAL MEDICINE
Payer: COMMERCIAL

## 2024-09-11 VITALS
BODY MASS INDEX: 28.43 KG/M2 | OXYGEN SATURATION: 95 % | WEIGHT: 209.6 LBS | SYSTOLIC BLOOD PRESSURE: 135 MMHG | DIASTOLIC BLOOD PRESSURE: 82 MMHG

## 2024-09-11 DIAGNOSIS — J41.0 SIMPLE CHRONIC BRONCHITIS (H): Primary | ICD-10-CM

## 2024-09-11 DIAGNOSIS — R06.09 DYSPNEA ON EXERTION: ICD-10-CM

## 2024-09-11 PROCEDURE — 99214 OFFICE O/P EST MOD 30 MIN: CPT | Performed by: INTERNAL MEDICINE

## 2024-09-11 NOTE — PROGRESS NOTES
Assessment and plan: 79-year-old man with history of chronic bronchitis, chronic pain on multiple sedating medications here for follow-up.  He is quite affected by his pain but his bronchitis has been stable since decreasing his dose and he stayed out of the hospital.    Plan  Continue Trelegy   Continue action plan.  Prednisone 20 mg daily for 7 days.  Can stop early.    Graded exercise plan     Consider decreasing doses of sedating medications with illness.    Chief complaint: COPD    HPI: 79-year-old man with chronic bronchitis, chronic pain here for follow-up.  He has not been hospitalized in over a year since increasing his methadone dose.  He is stable on his current medications.    Medications and history reviewed.    /82 (BP Location: Right arm, Patient Position: Chair)   Wt 95.1 kg (209 lb 9.6 oz)   SpO2 95%   BMI 28.43 kg/m    In good spirits, calm  Neck supple  Chest clear without crackles or wheezes  Normal cardiac exam  Normal abdominal exam  No tremor

## 2024-09-22 ENCOUNTER — APPOINTMENT (OUTPATIENT)
Dept: CT IMAGING | Facility: HOSPITAL | Age: 79
DRG: 871 | End: 2024-09-22
Attending: EMERGENCY MEDICINE
Payer: COMMERCIAL

## 2024-09-22 ENCOUNTER — APPOINTMENT (OUTPATIENT)
Dept: RADIOLOGY | Facility: HOSPITAL | Age: 79
DRG: 871 | End: 2024-09-22
Attending: STUDENT IN AN ORGANIZED HEALTH CARE EDUCATION/TRAINING PROGRAM
Payer: COMMERCIAL

## 2024-09-22 ENCOUNTER — HOSPITAL ENCOUNTER (INPATIENT)
Facility: HOSPITAL | Age: 79
LOS: 10 days | Discharge: HOME HEALTH | DRG: 871 | End: 2024-10-02
Attending: EMERGENCY MEDICINE | Admitting: INTERNAL MEDICINE
Payer: COMMERCIAL

## 2024-09-22 ENCOUNTER — APPOINTMENT (OUTPATIENT)
Dept: CT IMAGING | Facility: HOSPITAL | Age: 79
DRG: 871 | End: 2024-09-22
Attending: INTERNAL MEDICINE
Payer: COMMERCIAL

## 2024-09-22 DIAGNOSIS — J96.02 ACUTE RESPIRATORY FAILURE WITH HYPOXIA AND HYPERCAPNIA (H): ICD-10-CM

## 2024-09-22 DIAGNOSIS — A41.9 SEPSIS, DUE TO UNSPECIFIED ORGANISM, UNSPECIFIED WHETHER ACUTE ORGAN DYSFUNCTION PRESENT (H): ICD-10-CM

## 2024-09-22 DIAGNOSIS — Z95.0 CARDIAC PACEMAKER IN SITU: ICD-10-CM

## 2024-09-22 DIAGNOSIS — J96.01 ACUTE RESPIRATORY FAILURE WITH HYPOXIA AND HYPERCAPNIA (H): ICD-10-CM

## 2024-09-22 DIAGNOSIS — J18.9 PNEUMONIA OF BOTH LOWER LOBES DUE TO INFECTIOUS ORGANISM: ICD-10-CM

## 2024-09-22 DIAGNOSIS — Z91.89 AT RISK FOR IMPAIRED SKIN INTEGRITY: Primary | ICD-10-CM

## 2024-09-22 PROBLEM — G47.33 OBSTRUCTIVE SLEEP APNEA SYNDROME: Status: ACTIVE | Noted: 2024-09-22

## 2024-09-22 LAB
ALBUMIN SERPL BCG-MCNC: 3.6 G/DL (ref 3.5–5.2)
ALP SERPL-CCNC: 65 U/L (ref 40–150)
ALT SERPL W P-5'-P-CCNC: 13 U/L (ref 0–70)
ANION GAP SERPL CALCULATED.3IONS-SCNC: 12 MMOL/L (ref 7–15)
AST SERPL W P-5'-P-CCNC: 62 U/L (ref 0–45)
BASE EXCESS BLDA CALC-SCNC: -4.2 MMOL/L (ref -3–3)
BASE EXCESS BLDV CALC-SCNC: -2.1 MMOL/L (ref -3–3)
BASE EXCESS BLDV CALC-SCNC: -3.3 MMOL/L (ref -3–3)
BASE EXCESS BLDV CALC-SCNC: -4.1 MMOL/L (ref -3–3)
BASOPHILS # BLD AUTO: 0 10E3/UL (ref 0–0.2)
BASOPHILS NFR BLD AUTO: 1 %
BILIRUB DIRECT SERPL-MCNC: 0.26 MG/DL (ref 0–0.3)
BILIRUB SERPL-MCNC: 0.8 MG/DL
BUN SERPL-MCNC: 19.4 MG/DL (ref 8–23)
C PNEUM DNA SPEC QL NAA+PROBE: NOT DETECTED
CA-I BLD-MCNC: 4.5 MG/DL (ref 4.4–5.2)
CALCIUM SERPL-MCNC: 8.9 MG/DL (ref 8.8–10.4)
CHLORIDE SERPL-SCNC: 102 MMOL/L (ref 98–107)
COHGB MFR BLD: 95.6 % (ref 95–96)
CREAT SERPL-MCNC: 1.28 MG/DL (ref 0.67–1.17)
EGFRCR SERPLBLD CKD-EPI 2021: 57 ML/MIN/1.73M2
EOSINOPHIL # BLD AUTO: 0 10E3/UL (ref 0–0.7)
EOSINOPHIL NFR BLD AUTO: 1 %
ERYTHROCYTE [DISTWIDTH] IN BLOOD BY AUTOMATED COUNT: 14.6 % (ref 10–15)
FLUAV H1 2009 PAND RNA SPEC QL NAA+PROBE: NOT DETECTED
FLUAV H1 RNA SPEC QL NAA+PROBE: NOT DETECTED
FLUAV H3 RNA SPEC QL NAA+PROBE: NOT DETECTED
FLUAV RNA SPEC QL NAA+PROBE: NOT DETECTED
FLUBV RNA SPEC QL NAA+PROBE: NOT DETECTED
GLUCOSE SERPL-MCNC: 108 MG/DL (ref 70–99)
HADV DNA SPEC QL NAA+PROBE: NOT DETECTED
HCO3 BLD-SCNC: 23 MMOL/L (ref 21–28)
HCO3 BLDV-SCNC: 24 MMOL/L (ref 21–28)
HCO3 BLDV-SCNC: 25 MMOL/L (ref 21–28)
HCO3 BLDV-SCNC: 25 MMOL/L (ref 21–28)
HCO3 SERPL-SCNC: 23 MMOL/L (ref 22–29)
HCOV PNL SPEC NAA+PROBE: NOT DETECTED
HCT VFR BLD AUTO: 51.4 % (ref 40–53)
HGB BLD-MCNC: 16.5 G/DL (ref 13.3–17.7)
HMPV RNA SPEC QL NAA+PROBE: NOT DETECTED
HOLD SPECIMEN: NORMAL
HPIV1 RNA SPEC QL NAA+PROBE: NOT DETECTED
HPIV2 RNA SPEC QL NAA+PROBE: NOT DETECTED
HPIV3 RNA SPEC QL NAA+PROBE: NOT DETECTED
HPIV4 RNA SPEC QL NAA+PROBE: NOT DETECTED
IMM GRANULOCYTES # BLD: 0 10E3/UL
IMM GRANULOCYTES NFR BLD: 1 %
LACTATE SERPL-SCNC: 1.6 MMOL/L (ref 0.7–2)
LACTATE SERPL-SCNC: 2.1 MMOL/L (ref 0.7–2)
LYMPHOCYTES # BLD AUTO: 0.4 10E3/UL (ref 0.8–5.3)
LYMPHOCYTES NFR BLD AUTO: 21 %
M PNEUMO DNA SPEC QL NAA+PROBE: NOT DETECTED
MCH RBC QN AUTO: 28.2 PG (ref 26.5–33)
MCHC RBC AUTO-ENTMCNC: 32.1 G/DL (ref 31.5–36.5)
MCV RBC AUTO: 88 FL (ref 78–100)
MONOCYTES # BLD AUTO: 0.1 10E3/UL (ref 0–1.3)
MONOCYTES NFR BLD AUTO: 3 %
NEUTROPHILS # BLD AUTO: 1.4 10E3/UL (ref 1.6–8.3)
NEUTROPHILS NFR BLD AUTO: 74 %
NRBC # BLD AUTO: 0 10E3/UL
NRBC BLD AUTO-RTO: 0 /100
NT-PROBNP SERPL-MCNC: 1499 PG/ML (ref 0–1800)
O2/TOTAL GAS SETTING VFR VENT: 100 %
O2/TOTAL GAS SETTING VFR VENT: 80 %
OXYHGB MFR BLDV: 69 % (ref 70–75)
OXYHGB MFR BLDV: 71 % (ref 70–75)
OXYHGB MFR BLDV: 81 % (ref 70–75)
PCO2 BLD: 51 MM HG (ref 35–45)
PCO2 BLDV: 51 MM HG (ref 40–50)
PCO2 BLDV: 53 MM HG (ref 40–50)
PCO2 BLDV: 55 MM HG (ref 40–50)
PH BLD: 7.27 [PH] (ref 7.35–7.45)
PH BLDV: 7.25 [PH] (ref 7.32–7.43)
PH BLDV: 7.27 [PH] (ref 7.32–7.43)
PH BLDV: 7.3 [PH] (ref 7.32–7.43)
PHOSPHATE SERPL-MCNC: 2.8 MG/DL (ref 2.5–4.5)
PLATELET # BLD AUTO: 143 10E3/UL (ref 150–450)
PO2 BLD: 82 MM HG (ref 80–105)
PO2 BLDV: 39 MM HG (ref 25–47)
PO2 BLDV: 42 MM HG (ref 25–47)
PO2 BLDV: 51 MM HG (ref 25–47)
POTASSIUM SERPL-SCNC: 3.5 MMOL/L (ref 3.4–5.3)
PROCALCITONIN SERPL IA-MCNC: 1.04 NG/ML
PROT SERPL-MCNC: 6.9 G/DL (ref 6.4–8.3)
RBC # BLD AUTO: 5.85 10E6/UL (ref 4.4–5.9)
RSV RNA SPEC QL NAA+PROBE: NOT DETECTED
RSV RNA SPEC QL NAA+PROBE: NOT DETECTED
RV+EV RNA SPEC QL NAA+PROBE: NOT DETECTED
SAO2 % BLDA: 95 % (ref 92–100)
SAO2 % BLDV: 70.3 % (ref 70–75)
SAO2 % BLDV: 72 % (ref 70–75)
SAO2 % BLDV: 82.7 % (ref 70–75)
SARS-COV-2 RNA RESP QL NAA+PROBE: NEGATIVE
SODIUM SERPL-SCNC: 137 MMOL/L (ref 135–145)
TROPONIN T SERPL HS-MCNC: 37 NG/L
TROPONIN T SERPL HS-MCNC: 38 NG/L
WBC # BLD AUTO: 1.9 10E3/UL (ref 4–11)

## 2024-09-22 PROCEDURE — 84300 ASSAY OF URINE SODIUM: CPT | Performed by: INTERNAL MEDICINE

## 2024-09-22 PROCEDURE — 258N000003 HC RX IP 258 OP 636: Performed by: EMERGENCY MEDICINE

## 2024-09-22 PROCEDURE — 87635 SARS-COV-2 COVID-19 AMP PRB: CPT | Performed by: STUDENT IN AN ORGANIZED HEALTH CARE EDUCATION/TRAINING PROGRAM

## 2024-09-22 PROCEDURE — 84100 ASSAY OF PHOSPHORUS: CPT | Performed by: INTERNAL MEDICINE

## 2024-09-22 PROCEDURE — 83880 ASSAY OF NATRIURETIC PEPTIDE: CPT | Performed by: EMERGENCY MEDICINE

## 2024-09-22 PROCEDURE — 200N000001 HC R&B ICU

## 2024-09-22 PROCEDURE — 36600 WITHDRAWAL OF ARTERIAL BLOOD: CPT

## 2024-09-22 PROCEDURE — 250N000009 HC RX 250: Performed by: EMERGENCY MEDICINE

## 2024-09-22 PROCEDURE — 999N000157 HC STATISTIC RCP TIME EA 10 MIN

## 2024-09-22 PROCEDURE — 250N000011 HC RX IP 250 OP 636: Performed by: EMERGENCY MEDICINE

## 2024-09-22 PROCEDURE — 999N000254 HC STATISTIC VENTILATOR TRANSFER

## 2024-09-22 PROCEDURE — 250N000011 HC RX IP 250 OP 636: Performed by: INTERNAL MEDICINE

## 2024-09-22 PROCEDURE — 5A09357 ASSISTANCE WITH RESPIRATORY VENTILATION, LESS THAN 24 CONSECUTIVE HOURS, CONTINUOUS POSITIVE AIRWAY PRESSURE: ICD-10-PCS | Performed by: INTERNAL MEDICINE

## 2024-09-22 PROCEDURE — 96375 TX/PRO/DX INJ NEW DRUG ADDON: CPT

## 2024-09-22 PROCEDURE — 87899 AGENT NOS ASSAY W/OPTIC: CPT | Performed by: INTERNAL MEDICINE

## 2024-09-22 PROCEDURE — 85025 COMPLETE CBC W/AUTO DIFF WBC: CPT | Performed by: EMERGENCY MEDICINE

## 2024-09-22 PROCEDURE — 94640 AIRWAY INHALATION TREATMENT: CPT

## 2024-09-22 PROCEDURE — 3E043XZ INTRODUCTION OF VASOPRESSOR INTO CENTRAL VEIN, PERCUTANEOUS APPROACH: ICD-10-PCS | Performed by: INTERNAL MEDICINE

## 2024-09-22 PROCEDURE — 84145 PROCALCITONIN (PCT): CPT | Performed by: EMERGENCY MEDICINE

## 2024-09-22 PROCEDURE — 80053 COMPREHEN METABOLIC PANEL: CPT | Performed by: EMERGENCY MEDICINE

## 2024-09-22 PROCEDURE — 94640 AIRWAY INHALATION TREATMENT: CPT | Mod: 76

## 2024-09-22 PROCEDURE — 82805 BLOOD GASES W/O2 SATURATION: CPT | Performed by: INTERNAL MEDICINE

## 2024-09-22 PROCEDURE — 94660 CPAP INITIATION&MGMT: CPT

## 2024-09-22 PROCEDURE — 36415 COLL VENOUS BLD VENIPUNCTURE: CPT | Performed by: EMERGENCY MEDICINE

## 2024-09-22 PROCEDURE — 82805 BLOOD GASES W/O2 SATURATION: CPT | Performed by: EMERGENCY MEDICINE

## 2024-09-22 PROCEDURE — 82330 ASSAY OF CALCIUM: CPT | Performed by: INTERNAL MEDICINE

## 2024-09-22 PROCEDURE — 87486 CHLMYD PNEUM DNA AMP PROBE: CPT | Performed by: EMERGENCY MEDICINE

## 2024-09-22 PROCEDURE — 83605 ASSAY OF LACTIC ACID: CPT | Performed by: EMERGENCY MEDICINE

## 2024-09-22 PROCEDURE — 71275 CT ANGIOGRAPHY CHEST: CPT

## 2024-09-22 PROCEDURE — 99285 EMERGENCY DEPT VISIT HI MDM: CPT | Mod: 25

## 2024-09-22 PROCEDURE — 36569 INSJ PICC 5 YR+ W/O IMAGING: CPT

## 2024-09-22 PROCEDURE — 71045 X-RAY EXAM CHEST 1 VIEW: CPT

## 2024-09-22 PROCEDURE — 99291 CRITICAL CARE FIRST HOUR: CPT | Performed by: INTERNAL MEDICINE

## 2024-09-22 PROCEDURE — 84484 ASSAY OF TROPONIN QUANT: CPT | Performed by: EMERGENCY MEDICINE

## 2024-09-22 PROCEDURE — 250N000009 HC RX 250: Performed by: INTERNAL MEDICINE

## 2024-09-22 PROCEDURE — 82248 BILIRUBIN DIRECT: CPT | Performed by: EMERGENCY MEDICINE

## 2024-09-22 PROCEDURE — 96361 HYDRATE IV INFUSION ADD-ON: CPT

## 2024-09-22 PROCEDURE — 258N000003 HC RX IP 258 OP 636: Performed by: INTERNAL MEDICINE

## 2024-09-22 PROCEDURE — 87040 BLOOD CULTURE FOR BACTERIA: CPT | Performed by: EMERGENCY MEDICINE

## 2024-09-22 PROCEDURE — 70450 CT HEAD/BRAIN W/O DYE: CPT

## 2024-09-22 PROCEDURE — 82805 BLOOD GASES W/O2 SATURATION: CPT | Performed by: STUDENT IN AN ORGANIZED HEALTH CARE EDUCATION/TRAINING PROGRAM

## 2024-09-22 PROCEDURE — 96365 THER/PROPH/DIAG IV INF INIT: CPT | Mod: 59

## 2024-09-22 PROCEDURE — 272N000452 HC KIT SHRLOCK 5FR POWER PICC TRIPLE LUMEN

## 2024-09-22 RX ORDER — IPRATROPIUM BROMIDE AND ALBUTEROL SULFATE 2.5; .5 MG/3ML; MG/3ML
SOLUTION RESPIRATORY (INHALATION)
Status: DISCONTINUED
Start: 2024-09-22 | End: 2024-09-22 | Stop reason: HOSPADM

## 2024-09-22 RX ORDER — PIPERACILLIN SODIUM, TAZOBACTAM SODIUM 3; .375 G/15ML; G/15ML
3.38 INJECTION, POWDER, LYOPHILIZED, FOR SOLUTION INTRAVENOUS ONCE
Status: COMPLETED | OUTPATIENT
Start: 2024-09-22 | End: 2024-09-22

## 2024-09-22 RX ORDER — FORMOTEROL FUMARATE DIHYDRATE 20 UG/2ML
20 SOLUTION RESPIRATORY (INHALATION)
Status: DISCONTINUED | OUTPATIENT
Start: 2024-09-22 | End: 2024-10-02 | Stop reason: HOSPADM

## 2024-09-22 RX ORDER — IOPAMIDOL 755 MG/ML
75 INJECTION, SOLUTION INTRAVASCULAR ONCE
Status: COMPLETED | OUTPATIENT
Start: 2024-09-22 | End: 2024-09-22

## 2024-09-22 RX ORDER — DEXAMETHASONE SODIUM PHOSPHATE 4 MG/ML
4 INJECTION, SOLUTION INTRA-ARTICULAR; INTRALESIONAL; INTRAMUSCULAR; INTRAVENOUS; SOFT TISSUE ONCE
Status: COMPLETED | OUTPATIENT
Start: 2024-09-22 | End: 2024-09-22

## 2024-09-22 RX ORDER — LIDOCAINE 40 MG/G
CREAM TOPICAL
Status: ACTIVE | OUTPATIENT
Start: 2024-09-22 | End: 2024-09-25

## 2024-09-22 RX ORDER — NOREPINEPHRINE BITARTRATE 0.02 MG/ML
.01-.6 INJECTION, SOLUTION INTRAVENOUS CONTINUOUS
Status: DISCONTINUED | OUTPATIENT
Start: 2024-09-22 | End: 2024-09-24

## 2024-09-22 RX ORDER — AMOXICILLIN 250 MG
2 CAPSULE ORAL 2 TIMES DAILY PRN
Status: DISCONTINUED | OUTPATIENT
Start: 2024-09-22 | End: 2024-10-02 | Stop reason: HOSPADM

## 2024-09-22 RX ORDER — BISACODYL 10 MG
10 SUPPOSITORY, RECTAL RECTAL DAILY PRN
Status: DISCONTINUED | OUTPATIENT
Start: 2024-09-22 | End: 2024-10-02 | Stop reason: HOSPADM

## 2024-09-22 RX ORDER — NICOTINE POLACRILEX 4 MG
15-30 LOZENGE BUCCAL
Status: DISCONTINUED | OUTPATIENT
Start: 2024-09-22 | End: 2024-10-02 | Stop reason: HOSPADM

## 2024-09-22 RX ORDER — CEFAZOLIN SODIUM 1 G/50ML
2000 SOLUTION INTRAVENOUS ONCE
Status: COMPLETED | OUTPATIENT
Start: 2024-09-22 | End: 2024-09-22

## 2024-09-22 RX ORDER — DEXTROSE MONOHYDRATE 25 G/50ML
25-50 INJECTION, SOLUTION INTRAVENOUS
Status: DISCONTINUED | OUTPATIENT
Start: 2024-09-22 | End: 2024-09-22

## 2024-09-22 RX ORDER — PREGABALIN 150 MG/1
150 CAPSULE ORAL 3 TIMES DAILY
Status: ON HOLD | COMMUNITY
Start: 2024-08-30 | End: 2024-10-02

## 2024-09-22 RX ORDER — ONDANSETRON 2 MG/ML
4 INJECTION INTRAMUSCULAR; INTRAVENOUS EVERY 6 HOURS PRN
Status: DISCONTINUED | OUTPATIENT
Start: 2024-09-22 | End: 2024-10-02 | Stop reason: HOSPADM

## 2024-09-22 RX ORDER — PIPERACILLIN SODIUM, TAZOBACTAM SODIUM 3; .375 G/15ML; G/15ML
3.38 INJECTION, POWDER, LYOPHILIZED, FOR SOLUTION INTRAVENOUS EVERY 8 HOURS
Status: DISCONTINUED | OUTPATIENT
Start: 2024-09-23 | End: 2024-09-30

## 2024-09-22 RX ORDER — POLYETHYLENE GLYCOL 3350 17 G/17G
1 POWDER, FOR SOLUTION ORAL DAILY PRN
COMMUNITY

## 2024-09-22 RX ORDER — ALBUTEROL SULFATE 0.83 MG/ML
2.5 SOLUTION RESPIRATORY (INHALATION) EVERY 4 HOURS PRN
Status: DISCONTINUED | OUTPATIENT
Start: 2024-09-22 | End: 2024-10-02 | Stop reason: HOSPADM

## 2024-09-22 RX ORDER — NICOTINE POLACRILEX 4 MG
15-30 LOZENGE BUCCAL
Status: DISCONTINUED | OUTPATIENT
Start: 2024-09-22 | End: 2024-09-22

## 2024-09-22 RX ORDER — AMOXICILLIN 250 MG
1 CAPSULE ORAL 2 TIMES DAILY PRN
Status: DISCONTINUED | OUTPATIENT
Start: 2024-09-22 | End: 2024-10-02 | Stop reason: HOSPADM

## 2024-09-22 RX ORDER — POLYETHYLENE GLYCOL 3350 17 G/17G
17 POWDER, FOR SOLUTION ORAL DAILY PRN
Status: DISCONTINUED | OUTPATIENT
Start: 2024-09-22 | End: 2024-09-25

## 2024-09-22 RX ORDER — ONDANSETRON 4 MG/1
4 TABLET, ORALLY DISINTEGRATING ORAL EVERY 6 HOURS PRN
Status: DISCONTINUED | OUTPATIENT
Start: 2024-09-22 | End: 2024-10-02 | Stop reason: HOSPADM

## 2024-09-22 RX ORDER — TOPIRAMATE 100 MG/1
1 TABLET, FILM COATED ORAL
COMMUNITY
Start: 2024-06-29

## 2024-09-22 RX ORDER — NOREPINEPHRINE BITARTRATE 0.02 MG/ML
.01-.6 INJECTION, SOLUTION INTRAVENOUS CONTINUOUS
Status: DISCONTINUED | OUTPATIENT
Start: 2024-09-22 | End: 2024-09-22

## 2024-09-22 RX ORDER — PROCHLORPERAZINE 25 MG
12.5 SUPPOSITORY, RECTAL RECTAL EVERY 12 HOURS PRN
Status: DISCONTINUED | OUTPATIENT
Start: 2024-09-22 | End: 2024-10-02 | Stop reason: HOSPADM

## 2024-09-22 RX ORDER — DULOXETIN HYDROCHLORIDE 30 MG/1
60 CAPSULE, DELAYED RELEASE ORAL EVERY MORNING
COMMUNITY

## 2024-09-22 RX ORDER — IPRATROPIUM BROMIDE AND ALBUTEROL SULFATE 2.5; .5 MG/3ML; MG/3ML
3 SOLUTION RESPIRATORY (INHALATION)
Status: DISCONTINUED | OUTPATIENT
Start: 2024-09-22 | End: 2024-10-02 | Stop reason: HOSPADM

## 2024-09-22 RX ORDER — PROCHLORPERAZINE MALEATE 5 MG
5 TABLET ORAL EVERY 6 HOURS PRN
Status: DISCONTINUED | OUTPATIENT
Start: 2024-09-22 | End: 2024-10-02 | Stop reason: HOSPADM

## 2024-09-22 RX ORDER — DEXTROSE MONOHYDRATE 25 G/50ML
25-50 INJECTION, SOLUTION INTRAVENOUS
Status: DISCONTINUED | OUTPATIENT
Start: 2024-09-22 | End: 2024-10-02 | Stop reason: HOSPADM

## 2024-09-22 RX ORDER — METHYLPREDNISOLONE SODIUM SUCCINATE 125 MG/2ML
60 INJECTION, POWDER, LYOPHILIZED, FOR SOLUTION INTRAMUSCULAR; INTRAVENOUS EVERY 24 HOURS
Status: DISCONTINUED | OUTPATIENT
Start: 2024-09-22 | End: 2024-09-25

## 2024-09-22 RX ADMIN — SODIUM CHLORIDE 500 ML: 9 INJECTION, SOLUTION INTRAVENOUS at 22:37

## 2024-09-22 RX ADMIN — FORMOTEROL FUMARATE 20 MCG: 20 SOLUTION RESPIRATORY (INHALATION) at 22:38

## 2024-09-22 RX ADMIN — SODIUM CHLORIDE 250 ML: 9 INJECTION, SOLUTION INTRAVENOUS at 17:19

## 2024-09-22 RX ADMIN — LIDOCAINE HYDROCHLORIDE 2 ML: 10 INJECTION, SOLUTION EPIDURAL; INFILTRATION; INTRACAUDAL; PERINEURAL at 20:55

## 2024-09-22 RX ADMIN — SODIUM CHLORIDE 2000 MG: 9 INJECTION, SOLUTION INTRAVENOUS at 19:38

## 2024-09-22 RX ADMIN — METHYLPREDNISOLONE SODIUM SUCCINATE 62.5 MG: 125 INJECTION, POWDER, FOR SOLUTION INTRAMUSCULAR; INTRAVENOUS at 22:14

## 2024-09-22 RX ADMIN — SODIUM CHLORIDE 1000 ML: 9 INJECTION, SOLUTION INTRAVENOUS at 18:12

## 2024-09-22 RX ADMIN — IOPAMIDOL 75 ML: 755 INJECTION, SOLUTION INTRAVENOUS at 21:39

## 2024-09-22 RX ADMIN — IPRATROPIUM BROMIDE AND ALBUTEROL SULFATE 3 ML: .5; 3 SOLUTION RESPIRATORY (INHALATION) at 21:58

## 2024-09-22 RX ADMIN — AZITHROMYCIN MONOHYDRATE 500 MG: 500 INJECTION, POWDER, LYOPHILIZED, FOR SOLUTION INTRAVENOUS at 22:53

## 2024-09-22 RX ADMIN — DEXAMETHASONE SODIUM PHOSPHATE 4 MG: 4 INJECTION, SOLUTION INTRA-ARTICULAR; INTRALESIONAL; INTRAMUSCULAR; INTRAVENOUS; SOFT TISSUE at 18:12

## 2024-09-22 RX ADMIN — SODIUM CHLORIDE 1000 ML: 9 INJECTION, SOLUTION INTRAVENOUS at 19:54

## 2024-09-22 RX ADMIN — PIPERACILLIN AND TAZOBACTAM 3.38 G: 3; .375 INJECTION, POWDER, FOR SOLUTION INTRAVENOUS at 18:20

## 2024-09-22 RX ADMIN — NOREPINEPHRINE BITARTRATE 0.03 MCG/KG/MIN: 0.02 INJECTION, SOLUTION INTRAVENOUS at 19:59

## 2024-09-22 ASSESSMENT — ACTIVITIES OF DAILY LIVING (ADL)
ADLS_ACUITY_SCORE: 36
ADLS_ACUITY_SCORE: 25
ADLS_ACUITY_SCORE: 36

## 2024-09-22 NOTE — ED TRIAGE NOTES
Pt presents to ED with shortness of breath. Pt has not been feeling well x3 days. Pt was at wedding all day yesterday. Pt reports syncopal episode and daughters brought him home. Wife called EMS O2 at home 71-74% on RA, cyanotic. On 15L non re-breather pt came up to 88%. Pt became more alert with non-re breather. EMS tried CPAP pt had drop in BP and became less responsive. Wet lung sounds.       Takes methadone, hx of chronic bronchitis.     RT called during triage. Pt diaphoretic. AOx4 upon arrival, but lethargic.

## 2024-09-22 NOTE — ED PROVIDER NOTES
EMERGENCY DEPARTMENT NOTE     Name: Romaine Farah    Age/Sex: 79 year old male   MRN: [id number]   Evaluation Date & Time:  9/22/2024  5:00 PM    PCP:    Simon Schmidt   ED Provider: Israel Shipman D.O.       CHIEF COMPLAINT    Respiratory Distress     HISTORY OF PRESENT ILLNESS   Romaine Farah is a 79 year old year old male with a relevant past history of COPD, chronic bronchiectasis, sick sinus syndrome status post pacemaker implantation, PAD, idiopathic progressive neuropathy, who presents to the ED  for evaluation of altered mental status with progressive shortness of breath.      DIAGNOSIS & DISPOSITION/MEDICAL DECISION MAKING     1. Sepsis, due to unspecified organism, unspecified whether acute organ dysfunction present (H)    2. Pneumonia of both lower lobes due to infectious organism    3. Acute respiratory failure with hypoxia and hypercapnia (H)        EMERGENCY DEPARTMENT COURSE   5:10 PM I met with the patient to gather history and to perform my initial exam.  We discussed treatment options and the plan for care while in the Emergency Department.  Triage vital signs: /59 pulse 73 respiratory 22 temp 98.9 SpO2 15 L NRB 88%  Differential diagnosis considered included but not limited to: Pneumonia, CHF, pulmonary embolism, ACS    MDM:  ED Course as of 09/22/24 2306   Sun Sep 22, 2024   1712 Portable at bedside reviewed: Bibasilar infiltrates with pulmonary vascular congestion   1712 Patient is somnolent but responds appropriately.  On 30 L satting 94% will switch to BiPAP   1713 Patient seems to be tolerating BiPAP currently.  Blood pressure has decreased to 97 systolic.  Will give 250 cc bolus normal saline.  Currently O2 sats 94% on 100%.  Will try to wean oxygen to maintain O2 sats 90 to 92%   1810 The patient's wife and 2 daughters arrived to the hospital.  They provide more history.  Patient is reported to have awoken yesterday with chills and not feeling well.  He did go outside and  work in the hot ambient environment and then was fatigued and felt warm.  They checked a temperature and it was 100.  Patient rested at his home but then 1 with his family in a 90-minute drive to his grandsons wedding which was outside.  Patient can continue did not feel well at the wedding and was somnolent.  They attempted to get the patient up and had a brief syncopal episode with positional change.  Patient remains somnolent on the 90-minute drive back to the house and was somnolent.  Family placed him in his recliner in his bedroom.  During the night the wife heard the patient try to get up and he did have a fall.  Was not necessarily witnessed but she did not think he struck his head.  He has had continued shortness of breath and somnolence throughout today and this afternoon the daughters came back to the house and checked his oxygen saturations and found him to be 70 to 74% at that point EMS was summoned for the patient to be transported the emergency department.  EMS trialed CPAP but reported they felt his level of consciousness got worse and is blood pressure decreased and he discontinued.  On arrival the patient was somnolent but easily arousable and was on 15 L nonrebreather satting at 88%  Patient at this time remains mildly somnolent but appears to be improved and awakens easily.  Current laboratory is reviewed.  Initial VBG shows pCO2   1816 pH is 7.3 with pCO2 51.  WBC 1.9, lactic acid 2.1, creatinine 1.28 with GFR 57, glucose 108, hepatic profile within normal limit except for minimal elevation AST 62  KG was reviewed by myself and showed sinus rhythm ossific ST-T wave changes.  Initial troponin 37, BNP 1499  Patient initially was receiving 250 cc normal saline bolus and will now add 1 L.  Patient was started on vancomycin and Zosyn, PICC line is ordered patient also received 4 mg of dexamethasone   1842 Repeat VBG with pH 7.27 pCO2 53 no significant change will discussed with RT increase BiPAP  "settings including PEEP and continue to monitor   1950 Patient's MAP remains 65 with SBP 90 despite initial 1250 cc bolus.  Will give additional IV fluid bolus and initiate Levophed   1950 Patient remains somnolent but easily arousable reports no respiratory distress.  Will obtain ABG to reassess respiratory status   2050 ABG resulted and does not show worsening of CO2 retention or acidosis.  Patient able to main obtain sats while in awake at 94%.  Currently does not appear to need intubation for ventilatory support   2052 Lactic acid decreased to 1.6.  Patient remains hemodynamically stable with MAP on low-dose Levophed. PICC line placed by PICC line nurse     2207 CT of the chest shows no evidence of pulmonary embolism, pericardial effusion.  Case was discussed with  intensivist and will proceed with admission to the ICU for further care.       Discharge Vital Signs:/54   Pulse 63   Temp 98.1  F (36.7  C) (Axillary)   Resp 28   Ht 1.778 m (5' 10\")   Wt 97.8 kg (215 lb 11.2 oz)   SpO2 96%   BMI 30.95 kg/m     PROCEDURES:   None  Diagnostic studies:  CT Chest Pulmonary Embolism w Contrast   Final Result   IMPRESSION:   1.  No pulmonary emboli.   2.  Multifocal, bilateral infiltrates presumably infectious or inflammatory. Follow-up to confirm resolution.   3.  Mediastinal and right hilar adenopathy can be reassessed at time of follow-up.   4.  Trace pleural effusions.   5.  Calcified left pleural plaques suggesting previous asbestos exposure.      XR Chest Port 1 View   Final Result   IMPRESSION:       Extensive patchy mid to lower lung airspace opacities, suspicious for multifocal pneumonia or aspiration.      Small bilateral pleural effusions. No pneumothorax.      The cardiomediastinal silhouette is partially obscured, limiting evaluation. Aortic calcifications.      Left subclavian approach pacemaker.      CT Head w/o Contrast    (Results Pending)     Labs Ordered and Resulted from Time of ED " Arrival to Time of ED Departure   BLOOD GAS VENOUS - Abnormal       Result Value    pH Venous 7.30 (*)     pCO2 Venous 51 (*)     pO2 Venous 39      Bicarbonate Venous 25      Base Excess/Deficit Venous -2.1      FIO2 100      Oxyhemoglobin Venous 69 (*)     O2 Sat, Venous 70.3     BASIC METABOLIC PANEL - Abnormal    Sodium 137      Potassium 3.5      Chloride 102      Carbon Dioxide (CO2) 23      Anion Gap 12      Urea Nitrogen 19.4      Creatinine 1.28 (*)     GFR Estimate 57 (*)     Calcium 8.9      Glucose 108 (*)    HEPATIC FUNCTION PANEL - Abnormal    Protein Total 6.9      Albumin 3.6      Bilirubin Total 0.8      Alkaline Phosphatase 65      AST 62 (*)     ALT 13      Bilirubin Direct 0.26     LACTIC ACID WHOLE BLOOD WITH 1X REPEAT IN 2 HR WHEN >2 - Abnormal    Lactic Acid, Initial 2.1 (*)    PROCALCITONIN - Abnormal    Procalcitonin 1.04 (*)    TROPONIN T, HIGH SENSITIVITY - Abnormal    Troponin T, High Sensitivity 37 (*)    CBC WITH PLATELETS AND DIFFERENTIAL - Abnormal    WBC Count 1.9 (*)     RBC Count 5.85      Hemoglobin 16.5      Hematocrit 51.4      MCV 88      MCH 28.2      MCHC 32.1      RDW 14.6      Platelet Count 143 (*)     % Neutrophils 74      % Lymphocytes 21      % Monocytes 3      % Eosinophils 1      % Basophils 1      % Immature Granulocytes 1      NRBCs per 100 WBC 0      Absolute Neutrophils 1.4 (*)     Absolute Lymphocytes 0.4 (*)     Absolute Monocytes 0.1      Absolute Eosinophils 0.0      Absolute Basophils 0.0      Absolute Immature Granulocytes 0.0      Absolute NRBCs 0.0     TROPONIN T, HIGH SENSITIVITY - Abnormal    Troponin T, High Sensitivity 38 (*)    BLOOD GAS VENOUS - Abnormal    pH Venous 7.27 (*)     pCO2 Venous 53 (*)     pO2 Venous 51 (*)     Bicarbonate Venous 25      Base Excess/Deficit Venous -3.3 (*)     FIO2 100      Oxyhemoglobin Venous 81 (*)     O2 Sat, Venous 82.7 (*)    BLOOD GAS ARTERIAL - Abnormal    pH Arterial 7.27 (*)     pCO2 Arterial 51 (*)     pO2  Arterial 82      FIO2 100      Bicarbonate Arterial 23      Base Excess/Deficit Arterial -4.2 (*)     Oxyhemoglobin Arterial 95      O2 Sat, Arterial 95.6     COVID-19 VIRUS (CORONAVIRUS) BY PCR - Normal    SARS CoV2 PCR Negative     NT PROBNP INPATIENT - Normal    N terminal Pro BNP Inpatient 1,499     LACTIC ACID WHOLE BLOOD - Normal    Lactic Acid 1.6     BLOOD CULTURE   LEGIONELLA URINARY ANTIGEN AND STREPTOCOCCUS PNEUMONIAE ANTIGEN   RESPIRATORY PANEL PCR   FRACTIONAL EXCRETION OF SODIUM     ED INTERVENTIONS     Medications   lidocaine 1 % 0.1-5 mL (2 mLs Other $Given 9/22/24 2055)   lidocaine (LMX4) cream (has no administration in time range)   sodium chloride (PF) 0.9% PF flush 10-40 mL (has no administration in time range)   sodium chloride (PF) 0.9% PF flush 10-20 mL (has no administration in time range)   sodium chloride (PF) 0.9% PF flush 10-40 mL (20 mLs Intracatheter $Given 9/22/24 2210)   sodium chloride (PF) 0.9% PF flush 10-40 mL (has no administration in time range)   glucose gel 15-30 g (has no administration in time range)     Or   dextrose 50 % injection 25-50 mL (has no administration in time range)     Or   glucagon injection 1 mg (has no administration in time range)   norepinephrine (LEVOPHED) 4 mg in  mL infusion PREMIX (0 mcg/kg/min × 96.9 kg Intravenous Stopped 9/22/24 2232)   ondansetron (ZOFRAN ODT) ODT tab 4 mg (has no administration in time range)     Or   ondansetron (ZOFRAN) injection 4 mg (has no administration in time range)   prochlorperazine (COMPAZINE) injection 5 mg (has no administration in time range)     Or   prochlorperazine (COMPAZINE) tablet 5 mg (has no administration in time range)     Or   prochlorperazine (COMPAZINE) suppository 12.5 mg (has no administration in time range)   senna-docusate (SENOKOT-S/PERICOLACE) 8.6-50 MG per tablet 1 tablet (has no administration in time range)     Or   senna-docusate (SENOKOT-S/PERICOLACE) 8.6-50 MG per tablet 2 tablet (has  no administration in time range)   polyethylene glycol (MIRALAX) Packet 17 g (has no administration in time range)   bisacodyl (DULCOLAX) suppository 10 mg (has no administration in time range)   piperacillin-tazobactam (ZOSYN) 3.375 g vial to attach to  mL bag (has no administration in time range)   azithromycin (ZITHROMAX) 500 mg in sodium chloride 0.9 % 250 mL intermittent infusion (500 mg Intravenous $New Bag 9/22/24 2253)   ipratropium - albuterol 0.5 mg/2.5 mg/3 mL (DUONEB) neb solution 3 mL (3 mLs Nebulization $Given 9/22/24 2158)   albuterol (PROVENTIL) neb solution 2.5 mg (has no administration in time range)   formoterol (PERFOROMIST) neb solution 20 mcg (20 mcg Nebulization $Given 9/22/24 2238)   methylPREDNISolone Na Suc (solu-MEDROL) injection 62.5 mg (62.5 mg Intravenous $Given 9/22/24 2214)   vancomycin (VANCOCIN) 1,500 mg in sodium chloride 0.9 % 250 mL intermittent infusion (has no administration in time range)   sodium chloride 0.9% BOLUS 250 mL (0 mLs Intravenous Stopped 9/22/24 1816)   piperacillin-tazobactam (ZOSYN) 3.375 g vial to attach to  mL bag (0 g Intravenous Stopped 9/22/24 1943)   dexAMETHasone (DECADRON) injection 4 mg (4 mg Intravenous $Given 9/22/24 1812)   sodium chloride 0.9% BOLUS 1,000 mL (0 mLs Intravenous Stopped 9/22/24 1935)   vancomycin (VANCOCIN) 2,000 mg in sodium chloride 0.9 % 500 mL intermittent infusion (2,000 mg Intravenous $New Bag 9/22/24 1938)   sodium chloride 0.9% BOLUS 1,000 mL (1,000 mLs Intravenous $New Bag 9/22/24 1954)   iopamidol (ISOVUE-370) solution 75 mL (75 mLs Intravenous $Given 9/22/24 2139)   sodium chloride 0.9% BOLUS 500 mL (500 mLs Intravenous $New Bag 9/22/24 2237)     TOTAL CRITICAL CARE TIME (EXCLUDING PROCEDURES): 30 minutes for management of respiratory failure and sepsis      DISCHARGE MEDICATIONS        Review of your medicines        UNREVIEWED medicines. Ask your doctor about these medicines        Dose / Directions   calcium  carbonate-vitamin D 500-200 MG-UNIT tablet  Commonly known as: OSCAL w/D      Dose: 1 tablet  Take 1 tablet by mouth daily  Refills: 0     carbidopa-levodopa  MG tablet  Commonly known as: SINEMET      Dose: 1 tablet  Take 1 tablet by mouth 3 times daily  Refills: 0     clobetasol 0.05 % external ointment  Commonly known as: TEMOVATE      Apply topically 2 times daily as needed.  Quantity: 60 g  Refills: 11     DULoxetine 30 MG capsule  Commonly known as: CYMBALTA  Ask about: Which instructions should I use?      Dose: 60 mg  Take 60 mg by mouth every morning.  Refills: 0     fluocinonide 0.05 % external cream  Commonly known as: LIDEX      Apply topically 2 times daily as needed.  Refills: 0     Fluticasone-Umeclidin-Vilanterol 200-62.5-25 MCG/ACT oral inhaler  Commonly known as: Trelegy Ellipta  Used for: Simple chronic bronchitis (H)      Dose: 1 puff  Inhale 1 puff into the lungs daily  Quantity: 60 each  Refills: 5     HYDROcodone-acetaminophen 5-325 MG tablet  Commonly known as: NORCO      Dose: 1-2 tablet  Take 1-2 tablets by mouth nightly as needed.  Refills: 0     ibuprofen 200 MG tablet  Commonly known as: ADVIL/MOTRIN      Dose: 200 mg  Take 200 mg by mouth every 8 hours as needed for mild pain  Refills: 0     levocetirizine 5 MG tablet  Commonly known as: XYZAL      Dose: 5 mg  Take 5 mg by mouth every evening  Refills: 0     methadone 10 MG tablet  Commonly known as: DOLOPHINE      Dose: 10 mg  Take 10 mg by mouth 2 times daily  Quantity: 1 tablet  Refills: 0     naloxone 4 MG/0.1ML nasal spray  Commonly known as: NARCAN      Dose: 4 mg  Spray 4 mg into one nostril alternating nostrils once as needed.  Refills: 0     nystatin-triamcinolone 518610-3.1 UNIT/GM-% external cream  Commonly known as: MYCOLOG II      Apply topically 2 times daily as needed.  Refills: 0     polyethylene glycol 17 GM/Dose powder  Commonly known as: MIRALAX      Dose: 1 Capful  Take 1 Capful by mouth daily as needed for  constipation.  Refills: 0     pregabalin 150 MG capsule  Commonly known as: LYRICA  Ask about: Which instructions should I use?      Dose: 150 mg  Take 150 mg by mouth 3 times daily.  Refills: 0     topiramate 100 MG tablet  Commonly known as: TOPAMAX  Ask about: Which instructions should I use?      Dose: 1 tablet  Take 1 tablet by mouth 2 times daily.  Refills: 0     vitamin C 100 MG tablet  Commonly known as: ASCORBIC ACID      Dose: 100 mg  Take 100 mg by mouth every morning.  Refills: 0     Vitamin D3 25 mcg (1000 units) tablet  Commonly known as: CHOLECALCIFEROL      Dose: 1 tablet  Take 1 tablet by mouth every morning.  Refills: 0     vitamin tablet  Commonly known as: B COMPLEX      Dose: 1 tablet  Take 1 tablet by mouth every morning.  Refills: 0            CONTINUE these medicines which have NOT CHANGED        Dose / Directions   Fish Oil Burp-Less 1000 MG Caps      Dose: 2 capsule  Take 2 capsules by mouth every morning.  Refills: 0            DISPOSITION: Admit to the ICU/intensivist    Medical Decision Making    History:  Supplemental history from: NA  External Record(s) reviewed:   Pulmonology office visit in early 2024 reviewed:  Assessment and plan: 79-year-old man with history of chronic bronchitis, chronic pain on multiple sedating medications here for follow-up.  He is quite affected by his pain but his bronchitis has been stable since decreasing his dose and he stayed out of the hospital.     Plan  Continue Trelegy   Continue action plan.  Prednisone 20 mg daily for 7 days.  Can stop early.     Graded exercise plan      Consider decreasing doses of sedating medications with illness.     Chief complaint: COPD     HPI: 79-year-old man with chronic bronchitis, chronic pain here for follow-up.  He has not been hospitalized in over a year since increasing his methadone dose.  He is stable on his current medications.     Medications and history reviewed.     /82 (BP Location: Right arm, Patient  Position: Chair)   Wt 95.1 kg (209 lb 9.6 oz)   SpO2 95%   BMI 28.43 kg/m    In good spirits, calm  Neck supple  Chest clear without crackles or wheezes  Normal cardiac exam  Normal abdominal exam  No tremor  Outpatient cardiology office visit March 20, 2024 reviewed:  Sick sinus syndrome: device check shows device function 3 episodes less than 4 seconds AT, asymptomatic.  No VHR  Hypertension: Controlled, not currently on antihypertensive medication,  Opioid dependence on methadone: EKG today personally reviewed, Qtc 432 on methadone therapy.  Otherwise consistent ECG 1 year ago with known T wave inversion in lead III.  Abnormal exercise echo stress test 1/2023: a small size of inducible myocardial in anteroseptal wall, LVEF 60-65%, good exercise capacity. With primary cardiologist Dr. Hinojosa determined no further workup at that time as he was asymptomatic.  Continues to deny chest pain and exertion, some stable dyspnea with exertion over the last year with hx COPD.  Compensated on exam.  Patient would like to continue monitoring with symptoms.    Work Up:  Emergent/Severe conditions considered and evaluated for: See differential diagnosis  I independently reviewed and interpreted EKG  In additional to work up documented, I considered the following work up: NA  Medications given that require intensive monitoring for toxicity: NA    External consultation:  Discussion of management with another provider: NA    Complicating factors:  Care impacted by chronic illness:   Care affected by social determinants of health: Access to medical care    Disposition considerations: Admit   Prescriptions considered/prescribed: NA    CT Pulmonary Angiogram:Patient is moderate to high risk for PE.  At the conclusion of the encounter I discussed the results of all of the tests and the disposition. The questions were answered. The patient or family acknowledged understanding and was agreeable with the care plan.            INFORMATION  SOURCE AND LIMITATIONS    History/Exam limitations: Altered mental status  Patient information was obtained from: EMS, patient, patient's wife and 2 daughters  Use of : N/A        REVIEW OF SYSTEMS:   All other systems reviewed and are negative except as noted above in HPI.    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     Patient Active Problem List   Diagnosis    Bradycardia    Cardiac pacemaker in situ    Controlled substance agreement signed    Benign familial tremor    History of asbestos exposure    Osteoarthrosis    Idiopathic progressive polyneuropathy    Sick sinus syndrome (H)    Impotence of organic origin    Absence of toe (H24)    Chronic fatigue syndrome    Chronic obstructive pulmonary disease (H)    Hammer toe of right foot    Hearing loss    Long term current use of systemic steroids    Memory deficit    Obesity    Opioid dependence (H)    Vitamin B deficiency    PAD (peripheral artery disease) (H24)    Bronchitis    Acute encephalopathy    Acute respiratory failure with hypoxia and hypercarbia (H)    Gastroesophageal reflux disease without esophagitis    Hypotension, unspecified hypotension type    BERNICE (acute kidney injury) (H24)    Obstructive sleep apnea syndrome    Acute respiratory failure with hypoxia and hypercapnia (H)    Pneumonia of both lower lobes due to infectious organism    Sepsis, due to unspecified organism, unspecified whether acute organ dysfunction present (H)     Past Surgical History:   Procedure Laterality Date    AMPUTATE TOE(S) Left 2/20/2020    Procedure: AMPUTATION, third digit left foot;  Surgeon: Wai Armstrong DPM;   "Location: [facility] [place];  Service: Podiatry    AMPUTATE TOE(S) Right 7/7/2020    Procedure: AMPUTATION, second digit right foot;  Surgeon: Wai Armstrong DPM;  Location: [facility] OR;  Service: Podiatry    EP PACEMAKER INSERT N/A 2/12/2019    Procedure: EP Pacemaker Insertion;  Surgeon: Micheal Mahoney MD;  Location: [facility];  Service: Cardiology    EYE SURGERY Bilateral     HIP SURGERY Right     total hip    INSERT / REPLACE / REMOVE PACEMAKER  02/12/2019    PACEMAKER/ICD CHECK  2019    low heart rate - 35    PICC TRIPLE LUMEN PLACEMENT  9/22/2024    TOTAL HIP ARTHROPLASTY Right 2/2/2015    Procedure: #3   HIP TOTAL ARTHROPLASTY, RIGHT;  Surgeon: Aryan Glass MD;  Location: [facility];  Service:        No Known Allergies    OUTPATIENT MEDICATIONS     Current Discharge Medication List         Vitals:    09/22/24 2238 09/22/24 2242 09/22/24 2245 09/22/24 2300   BP:   97/53 100/54   Pulse: 64  60 63   Resp: 25  25 28   Temp:  98.1  F (36.7  C)     TempSrc:  Axillary     SpO2: 95%  94% 96%   Weight:  97.8 kg (215 lb 11.2 oz)     Height:  1.778 m (5' 10\")         Physical Exam   Constitutional: Somnolent but easily arousable, oriented to person place and time  HEENT:    Head: Atraumatic.   Neck: Normal range of motion. Neck supple.   Cardiovascular: Normal rate, regular rhythm and normal heart sounds.    Pulmonary/Chest: Moderate tachypnea, coarse breath sounds bilaterally, no wheezing  Abdominal: Soft. Bowel sounds are normal.   Musculoskeletal: Trace edema  Neurological: Alert and oriented to person, place, and time. Normal strength.       DIAGNOSTICS    LABORATORY FINDINGS (REVIEWED AND INTERPRETED):  Labs Ordered and Resulted from Time of ED Arrival to Time of ED Departure   BLOOD GAS VENOUS - Abnormal       Result Value    pH Venous 7.30 (*)     pCO2 Venous 51 (*)     pO2 Venous 39      Bicarbonate Venous 25      Base Excess/Deficit Venous -2.1      FIO2 100      " Oxyhemoglobin Venous 69 (*)     O2 Sat, Venous 70.3     BASIC METABOLIC PANEL - Abnormal    Sodium 137      Potassium 3.5      Chloride 102      Carbon Dioxide (CO2) 23      Anion Gap 12      Urea Nitrogen 19.4      Creatinine 1.28 (*)     GFR Estimate 57 (*)     Calcium 8.9      Glucose 108 (*)    HEPATIC FUNCTION PANEL - Abnormal    Protein Total 6.9      Albumin 3.6      Bilirubin Total 0.8      Alkaline Phosphatase 65      AST 62 (*)     ALT 13      Bilirubin Direct 0.26     LACTIC ACID WHOLE BLOOD WITH 1X REPEAT IN 2 HR WHEN >2 - Abnormal    Lactic Acid, Initial 2.1 (*)    PROCALCITONIN - Abnormal    Procalcitonin 1.04 (*)    TROPONIN T, HIGH SENSITIVITY - Abnormal    Troponin T, High Sensitivity 37 (*)    CBC WITH PLATELETS AND DIFFERENTIAL - Abnormal    WBC Count 1.9 (*)     RBC Count 5.85      Hemoglobin 16.5      Hematocrit 51.4      MCV 88      MCH 28.2      MCHC 32.1      RDW 14.6      Platelet Count 143 (*)     % Neutrophils 74      % Lymphocytes 21      % Monocytes 3      % Eosinophils 1      % Basophils 1      % Immature Granulocytes 1      NRBCs per 100 WBC 0      Absolute Neutrophils 1.4 (*)     Absolute Lymphocytes 0.4 (*)     Absolute Monocytes 0.1      Absolute Eosinophils 0.0      Absolute Basophils 0.0      Absolute Immature Granulocytes 0.0      Absolute NRBCs 0.0     TROPONIN T, HIGH SENSITIVITY - Abnormal    Troponin T, High Sensitivity 38 (*)    BLOOD GAS VENOUS - Abnormal    pH Venous 7.27 (*)     pCO2 Venous 53 (*)     pO2 Venous 51 (*)     Bicarbonate Venous 25      Base Excess/Deficit Venous -3.3 (*)     FIO2 100      Oxyhemoglobin Venous 81 (*)     O2 Sat, Venous 82.7 (*)    BLOOD GAS ARTERIAL - Abnormal    pH Arterial 7.27 (*)     pCO2 Arterial 51 (*)     pO2 Arterial 82      FIO2 100      Bicarbonate Arterial 23      Base Excess/Deficit Arterial -4.2 (*)     Oxyhemoglobin Arterial 95      O2 Sat, Arterial 95.6     COVID-19 VIRUS (CORONAVIRUS) BY PCR - Normal    SARS CoV2 PCR Negative      NT PROBNP INPATIENT - Normal    N terminal Pro BNP Inpatient 1,499     LACTIC ACID WHOLE BLOOD - Normal    Lactic Acid 1.6     BLOOD CULTURE   LEGIONELLA URINARY ANTIGEN AND STREPTOCOCCUS PNEUMONIAE ANTIGEN   RESPIRATORY PANEL PCR   FRACTIONAL EXCRETION OF SODIUM         IMAGING (REVIEWED AND INTERPRETED):  CT Chest Pulmonary Embolism w Contrast   Final Result   IMPRESSION:   1.  No pulmonary emboli.   2.  Multifocal, bilateral infiltrates presumably infectious or inflammatory. Follow-up to confirm resolution.   3.  Mediastinal and right hilar adenopathy can be reassessed at time of follow-up.   4.  Trace pleural effusions.   5.  Calcified left pleural plaques suggesting previous asbestos exposure.      XR Chest Port 1 View   Final Result   IMPRESSION:       Extensive patchy mid to lower lung airspace opacities, suspicious for multifocal pneumonia or aspiration.      Small bilateral pleural effusions. No pneumothorax.      The cardiomediastinal silhouette is partially obscured, limiting evaluation. Aortic calcifications.      Left subclavian approach pacemaker.      CT Head w/o Contrast    (Results Pending)         ECG (REVIEWED AND INTERPRETED):   ECG:   Performed at: 17:01  HR:  74 bpm  Rhythm: Sinus  Axis: 163  QRS duration: 100 ms  QTC: 448 ms  ST changes: No ST segment elevation or depression, no T wave inversion,No Q wave  Interpretation: Sinus rhythm, right axis deviation  Compared to most recent ECG from: March 20, 2024 sinus rhythm replaces atrial paced rhythm    I have reviewed the patient's ECG, with comments made as listed above. Please see scanned image for full interpretation.             Israel Shipman D.O.  EMERGENCY MEDICINE   09/22/24  [facility] EMERGENCY DEPARTMENT  [address] [place] [id number]  [phone]  Dept: [phone]     Israel Shipman DO  09/22/24 0575

## 2024-09-22 NOTE — PROGRESS NOTES
"Patient was started on Bipap per MD order. Pt is on ST 16/8 RR 20 and 75% FiO2. Pt is tolerating well.    /55   Pulse 74   Temp 98.9  F (37.2  C)   Resp 12   Ht 1.778 m (5' 10\")   Wt 96.9 kg (213 lb 9.6 oz)   SpO2 94%   BMI 30.65 kg/m       Nupur Lerma RT   "

## 2024-09-22 NOTE — ED NOTES
Pt at 87% on current BiPAP settings 75% O2. ED RT called, bumped to 100% O2. RT will come assess patient. MD at bedside.

## 2024-09-22 NOTE — ED NOTES
Bed: JNED-19  Expected date: 9/22/24  Expected time: 4:44 PM  Means of arrival: Ambulance  Comments:  [facility]  70 yo M SOB

## 2024-09-22 NOTE — MEDICATION SCRIBE - ADMISSION MEDICATION HISTORY
Medication Scribe Admission Medication History    Admission medication history is complete. The information provided in this note is only as accurate as the sources available at the time of the update.    Information Source(s): Family member via in-person    Pertinent Information: Patient's medications are being managed by spouse, Archana. Archana was present in room and was interviewed.     Patient reported to be no longer taking: Prednisone, Magnesium     Changes made to PTA medication list:  Added: Miralax  Deleted: Prednisone, Magnesium   Changed: Cymbalta to 60 mg every day, Lyrica to 150 mg TID, Topamax to 100 mg BID (all per patient)    Allergies reviewed with patient and updates made in EHR: yes    Medication History Completed By: Soto Marie 9/22/2024 5:49 PM    PTA Med List   Medication Sig Last Dose    calcium carbonate-vitamin D (OSCAL W/D) 500-200 MG-UNIT tablet Take 1 tablet by mouth daily  9/22/2024 at am    carbidopa-levodopa (SINEMET)  MG tablet Take 1 tablet by mouth 3 times daily 9/22/2024 at am (1st dose)    clobetasol (TEMOVATE) 0.05 % external ointment Apply topically 2 times daily as needed. Past Month at prn    DULoxetine (CYMBALTA) 30 MG capsule Take 60 mg by mouth every morning. 9/22/2024 at am    fluocinonide (LIDEX) 0.05 % external cream Apply topically 2 times daily as needed. Past Month at prn    Fluticasone-Umeclidin-Vilanterol (TRELEGY ELLIPTA) 200-62.5-25 MCG/ACT oral inhaler Inhale 1 puff into the lungs daily 9/22/2024 at am    HYDROcodone-acetaminophen (NORCO) 5-325 MG tablet Take 1-2 tablets by mouth nightly as needed. Past Week at prn    ibuprofen (ADVIL/MOTRIN) 200 MG tablet Take 200 mg by mouth every 8 hours as needed for mild pain More than a month at prn    levocetirizine (XYZAL) 5 MG tablet Take 5 mg by mouth every evening 9/21/2024 at pm    methadone (DOLOPHINE) 10 MG tablet Take 10 mg by mouth 2 times daily 9/22/2024 at am    nystatin-triamcinolone (MYCOLOG II) 051292-8.1  UNIT/GM-% external cream Apply topically 2 times daily as needed. Past Month at prn    Omega-3 Fatty Acids (FISH OIL BURP-LESS) 1000 MG CAPS Take 2 capsules by mouth every morning. 9/22/2024 at am    polyethylene glycol (MIRALAX) 17 GM/Dose powder Take 1 Capful by mouth daily as needed for constipation. Past Month at prn    pregabalin (LYRICA) 150 MG capsule Take 150 mg by mouth 3 times daily. 9/22/2024 at am (1st dose)    topiramate (TOPAMAX) 100 MG tablet Take 1 tablet by mouth 2 times daily. 9/22/2024 at am    vitamin (B COMPLEX) tablet Take 1 tablet by mouth every morning. 9/22/2024 at am    vitamin C (ASCORBIC ACID) 100 MG tablet Take 100 mg by mouth every morning. 9/22/2024 at am    Vitamin D3 (CHOLECALCIFEROL) 25 mcg (1000 units) tablet Take 1 tablet by mouth every morning. 9/22/2024 at am

## 2024-09-22 NOTE — PHARMACY-VANCOMYCIN DOSING SERVICE
Pharmacy Vancomycin Initial Note  Date of Service 2024  Patient's  1945  79 year old, male    Indication: Sepsis    Current estimated CrCl = Estimated Creatinine Clearance: 54.7 mL/min (A) (based on SCr of 1.28 mg/dL (H)).    Creatinine for last 3 days  2024:  5:05 PM Creatinine 1.28 mg/dL    Recent Vancomycin Level(s) for last 3 days  No results found for requested labs within last 3 days.      Vancomycin IV Administrations (past 72 hours)        No vancomycin orders with administrations in past 72 hours.                    Nephrotoxins and other renal medications (From now, onward)      Start     Dose/Rate Route Frequency Ordered Stop    24 1830  piperacillin-tazobactam (ZOSYN) 3.375 g vial to attach to  mL bag         3.375 g  over 30 Minutes Intravenous ONCE 24 1805              Contrast Orders - past 72 hours (72h ago, onward)      None                Plan:  Start vancomycin 2000 mg IV once.  Please re-consult pharmacy if therapy to continue upon admission.    Liberty Mac, [facility]

## 2024-09-23 ENCOUNTER — APPOINTMENT (OUTPATIENT)
Dept: CARDIOLOGY | Facility: HOSPITAL | Age: 79
DRG: 871 | End: 2024-09-23
Attending: INTERNAL MEDICINE
Payer: COMMERCIAL

## 2024-09-23 LAB
ANION GAP SERPL CALCULATED.3IONS-SCNC: 9 MMOL/L (ref 7–15)
BASE EXCESS BLDV CALC-SCNC: -1.8 MMOL/L (ref -3–3)
BASE EXCESS BLDV CALC-SCNC: -2.9 MMOL/L (ref -3–3)
BASE EXCESS BLDV CALC-SCNC: -3.8 MMOL/L (ref -3–3)
BASE EXCESS BLDV CALC-SCNC: -5.2 MMOL/L (ref -3–3)
BASE EXCESS BLDV CALC-SCNC: -5.9 MMOL/L (ref -3–3)
BUN SERPL-MCNC: 19.2 MG/DL (ref 8–23)
CA-I BLD-MCNC: 4.8 MG/DL (ref 4.4–5.2)
CALCIUM SERPL-MCNC: 8 MG/DL (ref 8.8–10.4)
CHLORIDE SERPL-SCNC: 105 MMOL/L (ref 98–107)
CREAT SERPL-MCNC: 1.13 MG/DL (ref 0.51–1.17)
CREAT SERPL-MCNC: 1.23 MG/DL (ref 0.51–1.17)
CREAT UR-MCNC: 93.2 MG/DL
EGFRCR SERPLBLD CKD-EPI 2021: 60 ML/MIN/1.73M2
EGFRCR SERPLBLD CKD-EPI 2021: 66 ML/MIN/1.73M2
ERYTHROCYTE [DISTWIDTH] IN BLOOD BY AUTOMATED COUNT: 14.8 % (ref 10–15)
FRACT EXCRET NA UR+SERPL-RTO: NORMAL %
GLUCOSE BLDC GLUCOMTR-MCNC: 111 MG/DL (ref 70–99)
GLUCOSE BLDC GLUCOMTR-MCNC: 111 MG/DL (ref 70–99)
GLUCOSE BLDC GLUCOMTR-MCNC: 123 MG/DL (ref 70–99)
GLUCOSE BLDC GLUCOMTR-MCNC: 128 MG/DL (ref 70–99)
GLUCOSE BLDC GLUCOMTR-MCNC: 145 MG/DL (ref 70–99)
GLUCOSE BLDC GLUCOMTR-MCNC: 147 MG/DL (ref 70–99)
GLUCOSE BLDC GLUCOMTR-MCNC: 194 MG/DL (ref 70–99)
GLUCOSE SERPL-MCNC: 151 MG/DL (ref 70–99)
HCO3 BLDV-SCNC: 22 MMOL/L (ref 21–28)
HCO3 BLDV-SCNC: 23 MMOL/L (ref 21–28)
HCO3 SERPL-SCNC: 20 MMOL/L (ref 22–29)
HCT VFR BLD AUTO: 44.5 % (ref 35–53)
HGB BLD-MCNC: 14.3 G/DL (ref 11.7–17.7)
L PNEUMO1 AG UR QL IA: NEGATIVE
MAGNESIUM SERPL-MCNC: 2 MG/DL (ref 1.7–2.3)
MAGNESIUM SERPL-MCNC: 2.8 MG/DL (ref 1.7–2.3)
MAGNESIUM SERPL-MCNC: >9.7 MG/DL (ref 1.7–2.3)
MCH RBC QN AUTO: 29.2 PG (ref 26.5–33)
MCHC RBC AUTO-ENTMCNC: 32.1 G/DL (ref 31.5–36.5)
MCV RBC AUTO: 91 FL (ref 78–100)
O2/TOTAL GAS SETTING VFR VENT: 40 %
O2/TOTAL GAS SETTING VFR VENT: 40 %
O2/TOTAL GAS SETTING VFR VENT: 60 %
O2/TOTAL GAS SETTING VFR VENT: 80 %
O2/TOTAL GAS SETTING VFR VENT: 80 %
OXYHGB MFR BLDV: 69 % (ref 70–75)
OXYHGB MFR BLDV: 72 % (ref 70–75)
OXYHGB MFR BLDV: 78 % (ref 70–75)
OXYHGB MFR BLDV: 81 % (ref 70–75)
OXYHGB MFR BLDV: 81 % (ref 70–75)
PCO2 BLDV: 39 MM HG (ref 40–50)
PCO2 BLDV: 41 MM HG (ref 40–50)
PCO2 BLDV: 47 MM HG (ref 40–50)
PCO2 BLDV: 56 MM HG (ref 40–50)
PCO2 BLDV: 57 MM HG (ref 40–50)
PH BLDV: 7.21 [PH] (ref 7.32–7.43)
PH BLDV: 7.23 [PH] (ref 7.32–7.43)
PH BLDV: 7.29 [PH] (ref 7.32–7.43)
PH BLDV: 7.36 [PH] (ref 7.32–7.43)
PH BLDV: 7.37 [PH] (ref 7.32–7.43)
PHOSPHATE SERPL-MCNC: 3.1 MG/DL (ref 2.5–4.5)
PLAT MORPH BLD: NORMAL
PLATELET # BLD AUTO: 126 10E3/UL (ref 150–450)
PO2 BLDV: 36 MM HG (ref 25–47)
PO2 BLDV: 36 MM HG (ref 25–47)
PO2 BLDV: 43 MM HG (ref 25–47)
PO2 BLDV: 49 MM HG (ref 25–47)
PO2 BLDV: 52 MM HG (ref 25–47)
POTASSIUM SERPL-SCNC: 3.5 MMOL/L (ref 3.4–5.3)
POTASSIUM SERPL-SCNC: 4.1 MMOL/L (ref 3.4–5.3)
PROCALCITONIN SERPL IA-MCNC: 15.45 NG/ML
RBC # BLD AUTO: 4.89 10E6/UL (ref 3.8–5.9)
RBC MORPH BLD: NORMAL
S PNEUM AG SPEC QL: NEGATIVE
SAO2 % BLDV: 69.7 % (ref 70–75)
SAO2 % BLDV: 72.9 % (ref 70–75)
SAO2 % BLDV: 78.6 % (ref 70–75)
SAO2 % BLDV: 81.3 % (ref 70–75)
SAO2 % BLDV: 81.8 % (ref 70–75)
SODIUM SERPL-SCNC: 134 MMOL/L (ref 135–145)
SODIUM SERPL-SCNC: 137 MMOL/L (ref 135–145)
SODIUM UR-SCNC: <20 MMOL/L
SPECIMEN TYPE: NORMAL
WBC # BLD AUTO: 6.8 10E3/UL (ref 4–11)

## 2024-09-23 PROCEDURE — 250N000009 HC RX 250: Performed by: INTERNAL MEDICINE

## 2024-09-23 PROCEDURE — 82805 BLOOD GASES W/O2 SATURATION: CPT | Performed by: INTERNAL MEDICINE

## 2024-09-23 PROCEDURE — 999N000157 HC STATISTIC RCP TIME EA 10 MIN

## 2024-09-23 PROCEDURE — 84145 PROCALCITONIN (PCT): CPT | Performed by: INTERNAL MEDICINE

## 2024-09-23 PROCEDURE — 999N000208 ECHOCARDIOGRAM COMPLETE

## 2024-09-23 PROCEDURE — 258N000003 HC RX IP 258 OP 636: Performed by: INTERNAL MEDICINE

## 2024-09-23 PROCEDURE — 250N000011 HC RX IP 250 OP 636: Performed by: INTERNAL MEDICINE

## 2024-09-23 PROCEDURE — 84100 ASSAY OF PHOSPHORUS: CPT | Performed by: INTERNAL MEDICINE

## 2024-09-23 PROCEDURE — 999N000254 HC STATISTIC VENTILATOR TRANSFER

## 2024-09-23 PROCEDURE — 272N000054 HC CANNULA HIGH FLOW, ADULT

## 2024-09-23 PROCEDURE — 93306 TTE W/DOPPLER COMPLETE: CPT | Mod: 26 | Performed by: INTERNAL MEDICINE

## 2024-09-23 PROCEDURE — 272N000064 HC CIRCUIT HUMIDITY W/CPAP BIPAP

## 2024-09-23 PROCEDURE — 85014 HEMATOCRIT: CPT | Performed by: INTERNAL MEDICINE

## 2024-09-23 PROCEDURE — 84295 ASSAY OF SERUM SODIUM: CPT | Performed by: INTERNAL MEDICINE

## 2024-09-23 PROCEDURE — 94640 AIRWAY INHALATION TREATMENT: CPT

## 2024-09-23 PROCEDURE — 80048 BASIC METABOLIC PNL TOTAL CA: CPT | Performed by: INTERNAL MEDICINE

## 2024-09-23 PROCEDURE — 82330 ASSAY OF CALCIUM: CPT | Performed by: INTERNAL MEDICINE

## 2024-09-23 PROCEDURE — 200N000001 HC R&B ICU

## 2024-09-23 PROCEDURE — 83735 ASSAY OF MAGNESIUM: CPT | Performed by: INTERNAL MEDICINE

## 2024-09-23 PROCEDURE — 94640 AIRWAY INHALATION TREATMENT: CPT | Mod: 76

## 2024-09-23 PROCEDURE — 250N000013 HC RX MED GY IP 250 OP 250 PS 637: Performed by: INTERNAL MEDICINE

## 2024-09-23 PROCEDURE — 99291 CRITICAL CARE FIRST HOUR: CPT | Performed by: INTERNAL MEDICINE

## 2024-09-23 PROCEDURE — 255N000002 HC RX 255 OP 636: Performed by: INTERNAL MEDICINE

## 2024-09-23 PROCEDURE — 250N000012 HC RX MED GY IP 250 OP 636 PS 637: Performed by: INTERNAL MEDICINE

## 2024-09-23 PROCEDURE — 84132 ASSAY OF SERUM POTASSIUM: CPT | Performed by: INTERNAL MEDICINE

## 2024-09-23 PROCEDURE — 94660 CPAP INITIATION&MGMT: CPT

## 2024-09-23 PROCEDURE — 999N000287 HC ICU ADULT ROUNDING, EACH 10 MINS

## 2024-09-23 RX ORDER — TOPIRAMATE 100 MG/1
100 TABLET, FILM COATED ORAL 2 TIMES DAILY
Status: DISCONTINUED | OUTPATIENT
Start: 2024-09-23 | End: 2024-10-02 | Stop reason: HOSPADM

## 2024-09-23 RX ORDER — NALOXONE HYDROCHLORIDE 0.4 MG/ML
0.2 INJECTION, SOLUTION INTRAMUSCULAR; INTRAVENOUS; SUBCUTANEOUS
Status: DISCONTINUED | OUTPATIENT
Start: 2024-09-23 | End: 2024-10-02 | Stop reason: HOSPADM

## 2024-09-23 RX ORDER — HYDROCODONE BITARTRATE AND ACETAMINOPHEN 5; 325 MG/1; MG/1
1-2 TABLET ORAL
Status: DISCONTINUED | OUTPATIENT
Start: 2024-09-23 | End: 2024-09-30

## 2024-09-23 RX ORDER — PREGABALIN 75 MG/1
150 CAPSULE ORAL 3 TIMES DAILY
Status: DISCONTINUED | OUTPATIENT
Start: 2024-09-23 | End: 2024-09-26

## 2024-09-23 RX ORDER — POTASSIUM CHLORIDE 29.8 MG/ML
20 INJECTION INTRAVENOUS ONCE
Status: COMPLETED | OUTPATIENT
Start: 2024-09-23 | End: 2024-09-23

## 2024-09-23 RX ORDER — MAGNESIUM SULFATE HEPTAHYDRATE 40 MG/ML
2 INJECTION, SOLUTION INTRAVENOUS ONCE
Status: COMPLETED | OUTPATIENT
Start: 2024-09-23 | End: 2024-09-23

## 2024-09-23 RX ORDER — POLYETHYLENE GLYCOL 3350 17 G/17G
17 POWDER, FOR SOLUTION ORAL DAILY PRN
Status: DISCONTINUED | OUTPATIENT
Start: 2024-09-23 | End: 2024-10-02 | Stop reason: HOSPADM

## 2024-09-23 RX ORDER — METHADONE HYDROCHLORIDE 5 MG/5ML
2.5 SOLUTION ORAL EVERY 12 HOURS
Status: DISCONTINUED | OUTPATIENT
Start: 2024-09-23 | End: 2024-09-23

## 2024-09-23 RX ORDER — METHADONE HYDROCHLORIDE 10 MG/1
10 TABLET ORAL 2 TIMES DAILY
Status: DISCONTINUED | OUTPATIENT
Start: 2024-09-23 | End: 2024-10-02 | Stop reason: HOSPADM

## 2024-09-23 RX ORDER — NALOXONE HYDROCHLORIDE 0.4 MG/ML
0.4 INJECTION, SOLUTION INTRAMUSCULAR; INTRAVENOUS; SUBCUTANEOUS
Status: DISCONTINUED | OUTPATIENT
Start: 2024-09-23 | End: 2024-10-02 | Stop reason: HOSPADM

## 2024-09-23 RX ORDER — CARBIDOPA/LEVODOPA 10MG-100MG
1 TABLET ORAL 3 TIMES DAILY
Status: DISCONTINUED | OUTPATIENT
Start: 2024-09-23 | End: 2024-10-02 | Stop reason: HOSPADM

## 2024-09-23 RX ORDER — CARBOXYMETHYLCELLULOSE SODIUM 5 MG/ML
1 SOLUTION/ DROPS OPHTHALMIC
Status: DISCONTINUED | OUTPATIENT
Start: 2024-09-23 | End: 2024-09-26

## 2024-09-23 RX ORDER — DULOXETIN HYDROCHLORIDE 60 MG/1
60 CAPSULE, DELAYED RELEASE ORAL EVERY MORNING
Status: DISCONTINUED | OUTPATIENT
Start: 2024-09-24 | End: 2024-10-02 | Stop reason: HOSPADM

## 2024-09-23 RX ADMIN — PIPERACILLIN AND TAZOBACTAM 3.38 G: 3; .375 INJECTION, POWDER, FOR SOLUTION INTRAVENOUS at 23:44

## 2024-09-23 RX ADMIN — SODIUM CHLORIDE 1500 MG: 9 INJECTION, SOLUTION INTRAVENOUS at 18:27

## 2024-09-23 RX ADMIN — METHYLPREDNISOLONE SODIUM SUCCINATE 62.5 MG: 125 INJECTION, POWDER, FOR SOLUTION INTRAMUSCULAR; INTRAVENOUS at 22:12

## 2024-09-23 RX ADMIN — POTASSIUM CHLORIDE 20 MEQ: 29.8 INJECTION, SOLUTION INTRAVENOUS at 03:24

## 2024-09-23 RX ADMIN — IPRATROPIUM BROMIDE AND ALBUTEROL SULFATE 3 ML: .5; 3 SOLUTION RESPIRATORY (INHALATION) at 08:30

## 2024-09-23 RX ADMIN — IPRATROPIUM BROMIDE AND ALBUTEROL SULFATE 3 ML: .5; 3 SOLUTION RESPIRATORY (INHALATION) at 17:26

## 2024-09-23 RX ADMIN — INSULIN ASPART 1 UNITS: 100 INJECTION, SOLUTION INTRAVENOUS; SUBCUTANEOUS at 05:14

## 2024-09-23 RX ADMIN — METHADONE HYDROCHLORIDE 2.5 MG: 5 TABLET ORAL at 20:12

## 2024-09-23 RX ADMIN — PREGABALIN 150 MG: 75 CAPSULE ORAL at 20:12

## 2024-09-23 RX ADMIN — CARBIDOPA AND LEVODOPA 1 TABLET: 10; 100 TABLET ORAL at 20:12

## 2024-09-23 RX ADMIN — IPRATROPIUM BROMIDE AND ALBUTEROL SULFATE 3 ML: .5; 3 SOLUTION RESPIRATORY (INHALATION) at 11:29

## 2024-09-23 RX ADMIN — PIPERACILLIN AND TAZOBACTAM 3.38 G: 3; .375 INJECTION, POWDER, FOR SOLUTION INTRAVENOUS at 07:59

## 2024-09-23 RX ADMIN — PERFLUTREN 2.5 ML: 6.52 INJECTION, SUSPENSION INTRAVENOUS at 10:38

## 2024-09-23 RX ADMIN — IPRATROPIUM BROMIDE AND ALBUTEROL SULFATE 3 ML: .5; 3 SOLUTION RESPIRATORY (INHALATION) at 19:38

## 2024-09-23 RX ADMIN — AZITHROMYCIN MONOHYDRATE 500 MG: 500 INJECTION, POWDER, LYOPHILIZED, FOR SOLUTION INTRAVENOUS at 22:11

## 2024-09-23 RX ADMIN — MAGNESIUM SULFATE HEPTAHYDRATE 2 G: 40 INJECTION, SOLUTION INTRAVENOUS at 03:25

## 2024-09-23 RX ADMIN — PIPERACILLIN AND TAZOBACTAM 3.38 G: 3; .375 INJECTION, POWDER, FOR SOLUTION INTRAVENOUS at 00:37

## 2024-09-23 RX ADMIN — PIPERACILLIN AND TAZOBACTAM 3.38 G: 3; .375 INJECTION, POWDER, FOR SOLUTION INTRAVENOUS at 16:19

## 2024-09-23 RX ADMIN — PANTOPRAZOLE SODIUM 40 MG: 40 INJECTION, POWDER, FOR SOLUTION INTRAVENOUS at 07:58

## 2024-09-23 RX ADMIN — FORMOTEROL FUMARATE 20 MCG: 20 SOLUTION RESPIRATORY (INHALATION) at 19:38

## 2024-09-23 RX ADMIN — INSULIN ASPART 1 UNITS: 100 INJECTION, SOLUTION INTRAVENOUS; SUBCUTANEOUS at 00:46

## 2024-09-23 RX ADMIN — TOPIRAMATE 100 MG: 100 TABLET, FILM COATED ORAL at 20:12

## 2024-09-23 RX ADMIN — INSULIN ASPART 1 UNITS: 100 INJECTION, SOLUTION INTRAVENOUS; SUBCUTANEOUS at 08:05

## 2024-09-23 ASSESSMENT — ACTIVITIES OF DAILY LIVING (ADL)
ADLS_ACUITY_SCORE: 28
ADLS_ACUITY_SCORE: 25
ADLS_ACUITY_SCORE: 28
ADLS_ACUITY_SCORE: 36
ADLS_ACUITY_SCORE: 28
ADLS_ACUITY_SCORE: 36
ADLS_ACUITY_SCORE: 28

## 2024-09-23 NOTE — PROCEDURES
"PICC Line Insertion Procedure Note  Pt. Name: Romaine Farah  MRN:        [id number]    Procedure: Insertion of a  triple Lumen  5 fr  Bard SOLO (valved) Power PICC, Lot number [id number]    Indications: Vasopressor,Access    Contraindications : left pacer    Procedure Details     Patient identified with 2 identifiers and \"Time Out\" conducted.  .     Central line insertion bundle followed: hand hygiene performed prior to procedure, site cleansed with cholraprep, hat, mask, sterile gloves, sterile gown worn, patient draped with maximum barrier head to toe drape, sterile field maintained.    The vein was assessed and found to be compressible and of adequate size.     Lidocaine 1% 2 ml administered sq to the insertion site. A 5 Fr PICC was inserted into the basilic vein of the right arm with ultrasound guidance. 1 attempt(s) required to access vein.   Catheter threaded without difficulty. Good blood return noted.    Modified Seldinger Technique used for insertion.    The 8 sharps that are included in the PICC insertion kit were accounted for and disposed of in the sharps container prior to breakdown of the sterile field.    Catheter secured with Statlock, biopatch and Tegaderm dressing applied.    Findings:    Total catheter length  44 cm, with 2 cm exposed. Mid upper arm circumference is 32 cm. Catheter was flushed with 30 cc NS. Patient  tolerated procedure well.    Tip placement verified by 3CG technology. Tip placement in the SVC/RA junction.    CLABSI prevention brochure left at bedside.    Patient's primary RN notified PICC is ready for use.      Comments:          Gwen HUICstatonia RN      "

## 2024-09-23 NOTE — PLAN OF CARE
[facility] - ICU    RN Progress Note:            Pertinent Assessments:      Please refer to flowsheet rows for full assessment     Patient drowsy but arousable to sppech, oriented to place,person and situation. Remained on BIPAP, off the Levophed since 10pm. Compliant with BIPAP use.           Key Events - This Shift:       See above                Barriers to Discharge / Downgrade:     BIPAP, IV ABX           Goal Outcome Evaluation:      Plan of Care Reviewed With: patient    Overall Patient Progress: improvingOverall Patient Progress: improving    Outcome Evaluation: remained in BIPAP, Off the pressors

## 2024-09-23 NOTE — PROGRESS NOTES
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     Neuro/Psych:   #. Acute toxic metabolic encephalopathy, suspect combined effect of hypoxia, hypercapnia, sepsis, and methadone effect.  #. Idiopathic peripheral neuropathy that is managed with a combination of Sinemet, duloxetine, Norco, methadone, pregabalin, and topiramate.  Treating underlying causes for respiratory failure   Norco, methadone, pregabalin, and topiramate was while patient is n.p.o. and encephalopathic, restart as soon as feasible   Plan on head CT to follow-up on report of a fall was negative    Pulmonary:   #. Acute hypoxic respiratory failure secondary to multifocal pneumonia.  Complicated by baseline reduced diffusion capacity.  IBW 73.3 kg.  #. Acute hypercapnic respiratory failure, likely combination of multifocal pneumonia with an acute exacerbation of underlying bronchitis (being treated as COPD in outpatient setting), moderately restrictive lung disease, and opiate-induced somnolence.  #. Chronic bronchitis with COPD features, treated with a triple combination inhaler in outpatient setting, currently in an exacerbation.  Sees Dr. Dejesus with the most recent visit on 2024.  Received DuoNeb on arrival to the ICU  AVAPS settings changed to 26/58/+10, FiO2 reduced from 100% to 80%  At this time patient is protecting his airway, plan to monitor his clinical progress on AVAPS therapy  Start methylprednisolone 60 mg IV qDAY  Start scheduled DuoNeb QID and formoterol neb BID in view of PTA  Trelegy (hold off on budesonide nebulizer as patient is receiving systemic corticosteroids)  Low threshold for intubation as long patient clinically appears to be clinically improving and looking than his blood gases would suggest  See ID for antibiotic discussion    Cardiovascular:   #. Shock, distributive/septic.  POCUS with quite limited cardiac views on admission  IJ based estimation of CVP approximately 5 cm H2O.  #. Baseline hypertension not on outpatient therapy.  #. Sick sinus syndrome status post PPM placement, follows with cardiology.  Goal MAP >/= 65 and SBP >/= 90  Fluid challenge w/ 500-mL NS bolus on admission   Continue norepinephrine (dose decreased on arrival to the ICU)  Formal TTE this am     Infectious:  #. Multifocal pneumonia, concern for a chronic aspiration component.   Continue vancomycin + zosyn empiric therapy  Add azithromycin for atypical coverage  RVP negative  Blood culture pending  Urinary antigens ordered & pending  PCT this morning 1> 15.45    Renal: baseline Cr ~1.0-1.1.   #. Borderline BERNICE, most likely combination of pre-renal vs septic ATN/AIN. Wilhelm in place.  Improved 1.28 > 1.13  #. Lactic acidosis, resolved.   Strict intake/output monitoring, avoiding nephrotoxic medications as able  High intensity electrolyte replacement protocols in place    GI:   #. GERD, not on medications at baseline.  IV PPI in setting of steroid therapy.  #. Diet: Strict n.p.o. in setting of respiratory failure requiring AVAPS therapy with an increased risk of decompensation and need for mechanical ventilation.  #.  Miscellaneous: Antiemetic and bowel regimen medications ordered and available.    Heme:   #. Leukopenia, acute, suspect to be related to severe sepsis.  #. Thrombocytopenia, acute, suspect to be related to severe sepsis.    Endo: POCT glucose monitoring q4H, low-intensity ISS available.    Access/Lines/Tubes: PIV x 2, PICC line, Wilhelm catheter    Prophylaxis:   #. Stress ulcer: PPI  #.  "Diet: strict NPO  #. VTE: SCD    CODE: FULL    Billing: This patient is critically ill: Yes. Total critical care time today 45 min, excluding procedures.     Miguel Hartmann MD  Pulmonary and Critical Care     Clinically Significant Risk Factors Present on Admission         # Hyponatremia: Lowest Na = 134 mmol/L in last 2 days, will monitor as appropriate  # Hypocalcemia: Lowest Ca = 8 mg/dL in last 2 days, will monitor and replace as appropriate       # Thrombocytopenia: Lowest platelets = 126 in last 2 days, will monitor for bleeding     # Circulatory Shock: required vasopressors within past 24 hours          # Obesity: Estimated body mass index is 30.92 kg/m  as calculated from the following:    Height as of this encounter: 1.778 m (5' 10\").    Weight as of this encounter: 97.8 kg (215 lb 8 oz).        # Pacemaker present         ALLERGIES:  No Known Allergies      Subjective  Denies pain, SOB is mild on NIV. More awake and seems appropriate and to understand his situation.      Objective    PHYSICAL EXAM:  Temp:  [97.1  F (36.2  C)-98.9  F (37.2  C)] 97.1  F (36.2  C)  Pulse:  [60-75] 60  Resp:  [11-33] 30  BP: ()/(50-66) 104/62  FiO2 (%):  [75 %-100 %] 80 %  SpO2:  [86 %-98 %] 96 %  FiO2 (%): 80 %, Resp: 30        Intake/Output Summary (Last 24 hours) at 9/23/2024 0728  Last data filed at 9/23/2024 0600  Gross per 24 hour   Intake 875 ml   Output 1150 ml   Net -275 ml           Physical Exam:   General:  awakes and answers question  HEENT: PERRL, unable to assess mucous membranes w/ BIPAP in place  CV: RRR, no M/R/G; extremities well perfused  Pulm: minimal accessory muscle use, diminished b/l breath sounds, no wheezing, no rhonchi, some crackles post, no rub appreciated  Abd: distended but soft, non-tender, normal bowel sounds  Msk: warm to touch, no peripheral edema  Derm: no acute lesions/rashes on limited exam; 2x3 cm fresh ecchymosis anterior to R armpit, pressure ulcer along the " coccyx  Neuro: no obvious focal sign, improved MS  Psych: calm      I have personally reviewed the following data over the past 24 hrs:    6.8  \   14.3   / 126 (L)     134 (L) 105 19.2 /  151 (H)   4.1 20 (L) 1.13 \     ALT: 13 AST: 62 (H) AP: 65 TBILI: 0.8   ALB: 3.6 TOT PROTEIN: 6.9 LIPASE: N/A     Trop: 38 (H) BNP: 1,499     Procal: 15.45 (HH) CRP: N/A Lactic Acid: 1.6         Imaging results reviewed over the past 24 hrs:   Recent Results (from the past 24 hour(s))   XR Chest Port 1 View    Narrative    EXAM: XR CHEST PORT 1 VIEW  LOCATION: [facility] [place]  DATE: 9/22/2024    INDICATION: Hypoxia.  COMPARISON: 12/14/2022      Impression    IMPRESSION:     Extensive patchy mid to lower lung airspace opacities, suspicious for multifocal pneumonia or aspiration.    Small bilateral pleural effusions. No pneumothorax.    The cardiomediastinal silhouette is partially obscured, limiting evaluation. Aortic calcifications.    Left subclavian approach pacemaker.   CT Chest Pulmonary Embolism w Contrast    Narrative    EXAM: CT CHEST PULMONARY EMBOLISM W CONTRAST  LOCATION: [facility] [place]  DATE: 9/22/2024    INDICATION: hypoxemia  COMPARISON: 12/26/2022  TECHNIQUE: CT chest pulmonary angiogram during arterial phase injection of IV contrast. Multiplanar reformats and MIP reconstructions were performed. Dose reduction techniques were used.   CONTRAST: [id number] 75ml    FINDINGS:  ANGIOGRAM CHEST: Pulmonary arteries are normal caliber and negative for pulmonary emboli. Thoracic aorta is not well opacified and is  indeterminate for dissection. No CT evidence of right heart strain.    LUNGS AND PLEURA: Multifocal, bilateral infiltrates. Left calcified pleural plaque. Trace pleural effusions. Emphysema.    MEDIASTINUM/AXILLAE: Mediastinal adenopathy. 1.6 x 2.4 cm right paratracheal node S4, 104. Right hilar nodes measuring 2.9 x 1.6 cm image 133. Small hiatal hernia. I PICC line tip  SVC.    CORONARY ARTERY CALCIFICATION: Mild.    UPPER ABDOMEN: The gallbladder is distended    MUSCULOSKELETAL: Degenerative change osseous structures.      Impression    IMPRESSION:  1.  No pulmonary emboli.  2.  Multifocal, bilateral infiltrates presumably infectious or inflammatory. Follow-up to confirm resolution.  3.  Mediastinal and right hilar adenopathy can be reassessed at time of follow-up.  4.  Trace pleural effusions.  5.  Calcified left pleural plaques suggesting previous asbestos exposure.   CT Head w/o Contrast    Narrative    EXAM: CT HEAD W/O CONTRAST  LOCATION: [facility]  DATE: 9/23/2024    INDICATION: encephalopathy, recent unwitnessed fall  COMPARISON: 12/26/2022  TECHNIQUE: Routine CT Head without IV contrast. Multiplanar reformats. Dose reduction techniques were used.    FINDINGS:  INTRACRANIAL CONTENTS: No intracranial hemorrhage, extraaxial collection, or mass effect.  No CT evidence of acute infarct. Moderate presumed chronic small vessel ischemic changes. Moderate generalized volume loss. No hydrocephalus.     VISUALIZED ORBITS/SINUSES/MASTOIDS: No intraorbital abnormality. No paranasal sinus mucosal disease. No middle ear or mastoid effusion.    BONES/SOFT TISSUES: No acute abnormality.      Impression    IMPRESSION:  1.  No acute intracranial process.     Recent Labs   Lab 09/23/24  0515 09/23/24  0513 09/23/24  0133 09/23/24  0046 09/22/24  1705   WBC 6.8  --   --   --  1.9*   HGB 14.3  --   --   --  16.5   MCV 91  --   --   --  88   *  --   --   --  143*   *  --  137  --  137   POTASSIUM 4.1  --  3.5  --  3.5   CHLORIDE 105  --   --   --  102   CO2 20*  --   --   --  23   BUN 19.2  --   --   --  19.4   CR 1.13  --  1.23*  --  1.28*   ANIONGAP 9  --   --   --  12   JOSSELYN 8.0*  --   --   --  8.9   * 147*  --  194* 108*   ALBUMIN  --   --   --   --  3.6   PROTTOTAL  --   --   --   --  6.9   BILITOTAL  --   --   --   --  0.8   ALKPHOS  --   --    --   --  65   ALT  --   --   --   --  13   AST  --   --   --   --  62*       Most Recent 3 CBC's:  Recent Labs   Lab Test 09/23/24 0515 09/22/24  1705 06/03/24  1438   WBC 6.8 1.9* 8.8   HGB 14.3 16.5 15.1   MCV 91 88 91   * 143* 190     Most Recent 3 BMP's:  Recent Labs   Lab Test 09/23/24 0515 09/23/24 0513 09/23/24 0133 09/23/24  0046 09/22/24  1705 11/16/23  1634   *  --  137  --  137 142   POTASSIUM 4.1  --  3.5  --  3.5 4.7   CHLORIDE 105  --   --   --  102 103   CO2 20*  --   --   --  23 27   BUN 19.2  --   --   --  19.4 16.4   CR 1.13  --  1.23*  --  1.28* 1.18*   ANIONGAP 9  --   --   --  12 12   JOSSELYN 8.0*  --   --   --  8.9 9.8   * 147*  --  194* 108* 90     Most Recent 3 Creatinines:  Recent Labs   Lab Test 09/23/24 0515 09/23/24 0133 09/22/24  1705   CR 1.13 1.23* 1.28*     Most Recent 6 Bacteria Isolates From Any Culture (See EPIC Reports for Culture Details):No lab results found.  Most Recent ABG:  Recent Labs   Lab Test 09/22/24 2036   PH 7.27*   PO2 82   PCO2 51*   HCO3 23   FERDINAND -4.2*     NOTE: Data reviewed over the past 24 hrs contributes toward MDM complexity

## 2024-09-23 NOTE — PROGRESS NOTES
Intensivist note    Patient weaned off norepinephrine around 10 PM.  Subsequent 500 mL NS bolus was not consistent with fluid responsive state.  Urine antigen testing negative for Legionella and strep pneumo.  Head CT negative for an acute intracranial process.  Central VBG's are essentially unchanged, however, patient's clinical status is much better than expected  with the present acute hypercapnea -- will monitor closely, and determine need for intubation based on combination of clinical presentation & worsening hypercapnea.     Discussed w/ RT & bedside RN.     Alize Baron MD

## 2024-09-23 NOTE — PROGRESS NOTES
Patient transported from ICU to CT and back to room on BIPAP with no complications. Removed patient's upper and lower dentures and placed into denture cup. Denture cup given to OTTONIEL.     Fifi Garcia, RT

## 2024-09-23 NOTE — PLAN OF CARE
Goal Outcome Evaluation:  [facility] - ICU    RN Progress Note:            Pertinent Assessments:      Please refer to flowsheet rows for full assessment     Cintinues to improve-more awake -VBGs improved Fio2 down to 60% with sats above 90s -may be able to eat something a little later           Key Events - This Shift:       improving                Barriers to Discharge / Downgrade:     BIPAP and pneumonia         Point of Contact Update:

## 2024-09-23 NOTE — H&P
Intensivist Consult  2024     Name: Romaine Farah  : 1945  MRN: [id number]  PCP: Simon Schmidt         ASSESSMENT/PLAN:  79 year old former smoker with history of chronic bronchitis on triple inhaler regimen, moderately severe restrictive lung disease, moderate SAMRA, SSS s/p PPM, chronic methadone therapy for idiopathic peripheral neuropathy, & GERD, admitted on 2024 w/ acute hypoxic and hypercapnic respiratory failure, multifocal pneumonia, and septic shock.     Neuro/Psych:   #. Acute toxic metabolic encephalopathy, suspect combined effect of hypoxia, hypercapnia, sepsis, and methadone effect.  #. Idiopathic peripheral neuropathy that is managed with a combination of Sinemet, duloxetine, Norco, methadone, pregabalin, and topiramate.  Treating underlying causes for respiratory failure  Holding Norco, methadone, pregabalin, and topiramate while patient is n.p.o. and encephalopathic  Plan on head CT to follow-up on report of a fall    Pulmonary:   #. Acute hypoxic respiratory failure secondary to multifocal pneumonia.  Complicated by baseline reduced diffusion capacity.  IBW 73.3 kg.  #. Acute hypercapnic respiratory failure, likely combination of multifocal pneumonia with an acute exacerbation of underlying bronchitis (being treated as COPD in outpatient setting), moderately restrictive lung disease, and opiate-induced somnolence.  #. Chronic bronchitis with COPD features, treated with a triple combination inhaler in outpatient setting, currently in an exacerbation.  Sees Dr. Dejesus with the most recent visit on 2024.  Received DuoNeb on arrival to the ICU  AVAPS settings changed to 26/58/+10, FiO2 reduced from 100% to 80%  At this time patient is protecting his airway, plan to monitor his clinical progress on AVAPS therapy  Start methylprednisolone 60 mg IV qDAY  Start scheduled DuoNeb QID and formoterol neb BID in view of PTA Trelegy (hold off on budesonide nebulizer as patient is  receiving systemic corticosteroids)  Low threshold for intubation, but at this time patient clinically appears to be better than his blood gases would suggest  See ID for antibiotic discussion    Cardiovascular:   #. Shock, distributive/septic.  POCUS with quite limited cardiac views.  IJ based estimation of CVP approximately 5 cm H2O.  #. Baseline hypertension not on outpatient therapy.  #. Sick sinus syndrome status post PPM placement, follows with cardiology.  Goal MAP >/= 65 and SBP >/= 90  Fluid challenge w/ 500-mL NS bolus  Continue norepinephrine (dose decreased on arrival to the ICU)  Formal TTE in the morning    Infectious:  #. Multifocal pneumonia, concern for a chronic aspiration component.   Continue vancomycin + zosyn empiric therapy  Add azithromycin for atypical coverage  RVP pending  Blood culture pending  Urinary antigens ordered & pending  Will recheck PCT in the morning to trend     Renal: baseline Cr ~1.0-1.1.   #. Borderline BERNICE, most likely combination of pre-renal vs septic ATN/AIN. Wilhelm in place.   #. Lactic acidosis, resolved.   Strict intake/output monitoring, avoiding nephrotoxic medications as able  Anticipate renal function potentially worsening due to contrast-induced nephropathy (he had an appropriate CTA PE study)  Checking FENa  High intensity electrolyte replacement protocols in place    GI:   #. GERD, not on medications at baseline.  Will start IV PPI in setting of steroid therapy.  #. Diet: Strict n.p.o. in setting of respiratory failure requiring AVAPS therapy with an increased risk of decompensation and need for mechanical ventilation.  #.  Miscellaneous: Antiemetic and bowel regimen medications ordered and available.    Heme:   #. Leukopenia, acute, suspect to be related to severe sepsis.  #. Thrombocytopenia, acute, suspect to be related to severe sepsis.    Endo: POCT glucose monitoring q4H, low-intensity ISS available.    Access/Lines/Tubes: PIV x 2, PICC line, Wilhelm  "catheter    Prophylaxis:   #. Stress ulcer: PPI  #. Diet: strict NPO  #. VTE: SCD    CODE: FULL    Billing: This patient is critically ill: Yes. Total critical care time today 63 min, excluding procedures.     Alize Baron MD  Pulmonary and Critical Care     Clinically Significant Risk Factors Present on Admission                    # Circulatory Shock: required vasopressors within past 24 hours          # Obesity: Estimated body mass index is 30.65 kg/m  as calculated from the following:    Height as of this encounter: 1.778 m (5' 10\").    Weight as of this encounter: 96.9 kg (213 lb 9.6 oz).        # Pacemaker present              HISTORY OF PRESENTING ILLNESS:  Romaine Farah is a 79 year old former smoker with history of chronic bronchitis, moderately restrictive lung disease, moderate SAMRA, SSS w/ PPM in place, GERD, idiopathic progressive neuropathy on chronic methadone therapy, admitted on 9/22/2024 with acute onset acute hypoxic hypercapnic respiratory failure, multifocal pneumonia, & shock.    Per report, patient was not feeling well (chills, malaise, fatigue) on 9/21.  Despite that he did some outside work, and then went to a family wedding that took place 90-minute drive away.  He did not feel at the wedding and was reportedly somnolent, and when trying to get up he had a brief syncopal episode.  Family took the patient back home where he remained somnolent. He slept in a recliner in the bedroom, and there may have been a fall during the night.  Today (9/22) patient remains somnolent and when his family checked his oxygen saturation, he was hypoxic in the 70s on room air.  They called EMS who brought him to the ED.  In the ED patient was found to be hypoxic and hypercapnic, and was placed on BiPAP.  He received broad-spectrum antibiotics for coverage of likely pneumonia.  He was hypotensive and was given 2 L NS bolus, after which she was started on norepinephrine.  Despite progression from BiPAP to " "AVAPS, patient continued to demonstrated acute hypercapnic respiratory failure, and was admitted to the ICU.    In the ICU patient reports feeling \"better\", but cannot specify what is better.  He appears comfortable on BiPAP and awakens easily to normal volume conversation.  He is able to follow simple directions.  He specifies that he would want CPR, including compressions and defibrillation, as well as mechanical ventilation should he need to be intubated.    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  SURGICAL HISTORY:  has a past surgical history that includes hip surgery (Right); Pacemaker/ICD check (2019); Eye surgery (Bilateral); Total Hip Arthroplasty (Right, 2/2/2015); Insert / Replace / Remove Pacemaker (02/12/2019); Ep Pacemaker Insert (N/A, 2/12/2019); Amputate toe(s) (Left, 2/20/2020); Amputate toe(s) (Right, 7/7/2020); and PICC/Midline Placement (9/22/2024).  SOCIAL HISTORY:  reports that Rom has quit smoking. Rom's smoking use included cigars, cigarillos or filtered cigars. Rom has never used smokeless tobacco. Bill reports that Rom does not currently use alcohol. Bill reports that Rom does not use drugs.  FAMILY HISTORY: family history includes Alcoholism in Rom's father; Arthritis in Rom's brother; Chronic Obstructive Pulmonary Disease in Rom's mother; Diabetes in Rom's mother; Esophageal Cancer in Rom's father; LUNG DISEASE in Rom's mother; Lupus in Rom's daughter; Neuropathy in Rom's sister; Other - See Comments in Rom's brother and brother; Thyroid Disease in Rom's daughter; Tremor in Rom's daughter, " father, sister, and sister.  MEDICATIONS: personally reviewed, including EMR/Care Everywhere. Pertinent information noted & updated.     ALLERGIES:  No Known Allergies    PHYSICAL EXAM:  Temp:  [98.9  F (37.2  C)] 98.9  F (37.2  C)  Pulse:  [60-75] 60  Resp:  [12-26] 25  BP: ()/(50-61) 123/56  FiO2 (%):  [75 %-100 %] 80 %  SpO2:  [86 %-95 %] 94 %  FiO2 (%): 80 %, Resp: 25    Physical Exam:   General: reclining in bed, somnolent but awakes quite easily to voice, NAD  HEENT: PERRL, unable to assess mucous membranes w/ BIPAP in place  CV: RRR, no M/R/G; extremities well perfused  Pulm: diminished b/l breath sounds, no wheezing, no rhonchi, pleural rub along the anterior/lateral R chest, no cough, very minimal accessory muscle use  Abd: distended but soft, non-tender, normal bowel sounds  Msk: warm to touch, no peripheral edema  Derm: no acute lesions/rashes on limited exam; 2x3 cm fresh ecchymosis anterior to R armpit, pressure ulcer along the coccyx  Neuro: somnolent but awakens easily to voice, able to follow simple directions; CN's difficult to assess w/ BIPAP mask in place; moves extremities w/o focal deficit  Psych: calm    LABS: I personally reviewed all available & pertinent laboratory studies w/in the EMR.    IMAGING/STUDIES: I personally reviewed all available & pertinent  imaging studies w/in the EMR.    This report was prepared using speech recognition software.  Any typographical errors are unintentional.  Please, contact me directly for any clarifications of my report.

## 2024-09-23 NOTE — PROGRESS NOTES
Patient arrived from ED to  at 2145 on BIPAP AVAPS mode. Changes made to settings: VT increase 500 to 580 and RR increase 24 to 26. BS diminished, sating 94% on 80% FIO2. Duoneb given, No change post neb treatment.     Hand off received from ED RT.  RT will follow.     Fifi Garcia, RT

## 2024-09-23 NOTE — PROGRESS NOTES
Patient was switched to AVAPS mode due to continuing desaturation despite increasing FiO2 to 100%. Pt is on AVAPS RR 24,  Epap 8, Pressures are 12-30 and 100% FiO2. ABG was drawn. Pt was transferred from ED to CT and then to ICU without complications.    Nupur Lerma, RT

## 2024-09-23 NOTE — PHARMACY-VANCOMYCIN DOSING SERVICE
"Pharmacy Vancomycin Initial Note  Date of Service 2024  Patient's  1945  79 year old, adult    Indication: Aspiration Pneumonia and Sepsis    Current estimated CrCl = Estimated Creatinine Clearance (based on SCr of 1.28 mg/dL (H))  Female: 45 mL/min (A)  Male: 54.7 mL/min (A)    Creatinine for last 3 days  2024:  5:05 PM Creatinine 1.28 mg/dL    Recent Vancomycin Level(s) for last 3 days  No results found for requested labs within last 3 days.      Vancomycin IV Administrations (past 72 hours)                     vancomycin (VANCOCIN) 2,000 mg in sodium chloride 0.9 % 500 mL intermittent infusion (mg) 2,000 mg New Bag 24                    Nephrotoxins and other renal medications (From now, onward)      Start     Dose/Rate Route Frequency Ordered Stop    24 0000  piperacillin-tazobactam (ZOSYN) 3.375 g vial to attach to  mL bag        Note to Pharmacy: For [facility], [facility] and [facility]: For Zosyn-naive patients, use the \"Zosyn initial dose + extended infusion\" order panel.    3.375 g  over 240 Minutes Intravenous EVERY 8 HOURS 24  norepinephrine (LEVOPHED) 4 mg in  mL infusion PREMIX         0.01-0.6 mcg/kg/min × 96.9 kg  3.6-218 mL/hr  Intravenous CONTINUOUS 24              Contrast Orders - past 72 hours (72h ago, onward)      Start     Dose/Rate Route Frequency Stop    24  iopamidol (ISOVUE-370) solution 75 mL         75 mL Intravenous ONCE 24            Re [employer] Prediction of Planned Initial Vancomycin Regimen  Regimen: 1500 mg IV every 24 hours.  Start time: 19:38 on 2024  Exposure target: AUC24 (range)400-600 mg/L.hr   AUC24,ss: 541 mg/L.hr  Probability of AUC24 > 400: 81 %  Ctrough,ss: 16.6 mg/L  Probability of Ctrough,ss > 20: 34 %  Probability of nephrotoxicity (Lodise MANUEL ): 12 %          Plan:  Start vancomycin 1500 mg IV q24h.   Vancomycin monitoring method: AUC  Vancomycin therapeutic " monitoring goal: 400-600 mg*h/L  Pharmacy will check vancomycin levels as appropriate in 1-3 Days.    Serum creatinine levels will be ordered daily for the first week of therapy and at least twice weekly for subsequent weeks.      Liberty Mac RPH

## 2024-09-24 ENCOUNTER — APPOINTMENT (OUTPATIENT)
Dept: RADIOLOGY | Facility: HOSPITAL | Age: 79
DRG: 871 | End: 2024-09-24
Attending: INTERNAL MEDICINE
Payer: COMMERCIAL

## 2024-09-24 ENCOUNTER — APPOINTMENT (OUTPATIENT)
Dept: SPEECH THERAPY | Facility: HOSPITAL | Age: 79
DRG: 871 | End: 2024-09-24
Attending: INTERNAL MEDICINE
Payer: COMMERCIAL

## 2024-09-24 LAB
ANION GAP SERPL CALCULATED.3IONS-SCNC: 12 MMOL/L (ref 7–15)
BASE EXCESS BLDV CALC-SCNC: -1.6 MMOL/L (ref -3–3)
BASE EXCESS BLDV CALC-SCNC: -1.6 MMOL/L (ref -3–3)
BASE EXCESS BLDV CALC-SCNC: -3.2 MMOL/L (ref -3–3)
BASOPHILS # BLD AUTO: 0.1 10E3/UL (ref 0–0.2)
BASOPHILS NFR BLD AUTO: 1 %
BUN SERPL-MCNC: 18.8 MG/DL (ref 8–23)
BURR CELLS BLD QL SMEAR: NORMAL
CA-I BLD-MCNC: 5 MG/DL (ref 4.4–5.2)
CALCIUM SERPL-MCNC: 6.9 MG/DL (ref 8.8–10.4)
CHLORIDE SERPL-SCNC: 108 MMOL/L (ref 98–107)
CREAT SERPL-MCNC: 0.88 MG/DL (ref 0.51–1.17)
EGFRCR SERPLBLD CKD-EPI 2021: 87 ML/MIN/1.73M2
EOSINOPHIL # BLD AUTO: 0 10E3/UL (ref 0–0.7)
EOSINOPHIL NFR BLD AUTO: 0 %
ERYTHROCYTE [DISTWIDTH] IN BLOOD BY AUTOMATED COUNT: 14.9 % (ref 10–15)
GLUCOSE BLDC GLUCOMTR-MCNC: 113 MG/DL (ref 70–99)
GLUCOSE BLDC GLUCOMTR-MCNC: 130 MG/DL (ref 70–99)
GLUCOSE BLDC GLUCOMTR-MCNC: 131 MG/DL (ref 70–99)
GLUCOSE BLDC GLUCOMTR-MCNC: 155 MG/DL (ref 70–99)
GLUCOSE BLDC GLUCOMTR-MCNC: 162 MG/DL (ref 70–99)
GLUCOSE BLDC GLUCOMTR-MCNC: 189 MG/DL (ref 70–99)
GLUCOSE SERPL-MCNC: 132 MG/DL (ref 70–99)
HCO3 BLDV-SCNC: 22 MMOL/L (ref 21–28)
HCO3 BLDV-SCNC: 24 MMOL/L (ref 21–28)
HCO3 BLDV-SCNC: 24 MMOL/L (ref 21–28)
HCO3 SERPL-SCNC: 21 MMOL/L (ref 22–29)
HCT VFR BLD AUTO: 39.1 % (ref 35–53)
HGB BLD-MCNC: 12.8 G/DL (ref 11.7–17.7)
IMM GRANULOCYTES # BLD: 0.2 10E3/UL
IMM GRANULOCYTES NFR BLD: 1 %
LYMPHOCYTES # BLD AUTO: 0.7 10E3/UL (ref 0.8–5.3)
LYMPHOCYTES NFR BLD AUTO: 5 %
MAGNESIUM SERPL-MCNC: 1.9 MG/DL (ref 1.7–2.3)
MCH RBC QN AUTO: 29.2 PG (ref 26.5–33)
MCHC RBC AUTO-ENTMCNC: 32.7 G/DL (ref 31.5–36.5)
MCV RBC AUTO: 89 FL (ref 78–100)
MONOCYTES # BLD AUTO: 0.6 10E3/UL (ref 0–1.3)
MONOCYTES NFR BLD AUTO: 4 %
MRSA DNA SPEC QL NAA+PROBE: NEGATIVE
NEUTROPHILS # BLD AUTO: 12 10E3/UL (ref 1.6–8.3)
NEUTROPHILS NFR BLD AUTO: 89 %
NRBC # BLD AUTO: 0 10E3/UL
NRBC BLD AUTO-RTO: 0 /100
O2/TOTAL GAS SETTING VFR VENT: 40 %
O2/TOTAL GAS SETTING VFR VENT: 61 %
O2/TOTAL GAS SETTING VFR VENT: 62 %
OXYHGB MFR BLDV: 72 % (ref 70–75)
OXYHGB MFR BLDV: 74 % (ref 70–75)
OXYHGB MFR BLDV: 77 % (ref 70–75)
PCO2 BLDV: 39 MM HG (ref 40–50)
PCO2 BLDV: 42 MM HG (ref 40–50)
PCO2 BLDV: 45 MM HG (ref 40–50)
PH BLDV: 7.35 [PH] (ref 7.32–7.43)
PH BLDV: 7.36 [PH] (ref 7.32–7.43)
PH BLDV: 7.37 [PH] (ref 7.32–7.43)
PHOSPHATE SERPL-MCNC: 2.2 MG/DL (ref 2.5–4.5)
PLAT MORPH BLD: NORMAL
PLATELET # BLD AUTO: 120 10E3/UL (ref 150–450)
PO2 BLDV: 37 MM HG (ref 25–47)
PO2 BLDV: 40 MM HG (ref 25–47)
PO2 BLDV: 44 MM HG (ref 25–47)
POTASSIUM SERPL-SCNC: 4.5 MMOL/L (ref 3.4–5.3)
RBC # BLD AUTO: 4.39 10E6/UL (ref 3.8–5.9)
RBC MORPH BLD: NORMAL
SA TARGET DNA: NEGATIVE
SAO2 % BLDV: 72.1 % (ref 70–75)
SAO2 % BLDV: 74.5 % (ref 70–75)
SAO2 % BLDV: 78 % (ref 70–75)
SODIUM SERPL-SCNC: 141 MMOL/L (ref 135–145)
VANCOMYCIN SERPL-MCNC: 15.3 UG/ML
WBC # BLD AUTO: 13.5 10E3/UL (ref 4–11)

## 2024-09-24 PROCEDURE — 200N000001 HC R&B ICU

## 2024-09-24 PROCEDURE — 92610 EVALUATE SWALLOWING FUNCTION: CPT | Mod: GN

## 2024-09-24 PROCEDURE — 82330 ASSAY OF CALCIUM: CPT | Performed by: INTERNAL MEDICINE

## 2024-09-24 PROCEDURE — 82310 ASSAY OF CALCIUM: CPT | Performed by: INTERNAL MEDICINE

## 2024-09-24 PROCEDURE — 71045 X-RAY EXAM CHEST 1 VIEW: CPT

## 2024-09-24 PROCEDURE — 258N000003 HC RX IP 258 OP 636: Performed by: INTERNAL MEDICINE

## 2024-09-24 PROCEDURE — 80048 BASIC METABOLIC PNL TOTAL CA: CPT | Performed by: INTERNAL MEDICINE

## 2024-09-24 PROCEDURE — 99291 CRITICAL CARE FIRST HOUR: CPT | Performed by: INTERNAL MEDICINE

## 2024-09-24 PROCEDURE — 999N000157 HC STATISTIC RCP TIME EA 10 MIN

## 2024-09-24 PROCEDURE — G0463 HOSPITAL OUTPT CLINIC VISIT: HCPCS

## 2024-09-24 PROCEDURE — 94640 AIRWAY INHALATION TREATMENT: CPT | Mod: 76

## 2024-09-24 PROCEDURE — 82805 BLOOD GASES W/O2 SATURATION: CPT | Performed by: INTERNAL MEDICINE

## 2024-09-24 PROCEDURE — 250N000013 HC RX MED GY IP 250 OP 250 PS 637: Performed by: INTERNAL MEDICINE

## 2024-09-24 PROCEDURE — 999N000287 HC ICU ADULT ROUNDING, EACH 10 MINS

## 2024-09-24 PROCEDURE — 250N000009 HC RX 250: Performed by: INTERNAL MEDICINE

## 2024-09-24 PROCEDURE — 85004 AUTOMATED DIFF WBC COUNT: CPT | Performed by: INTERNAL MEDICINE

## 2024-09-24 PROCEDURE — 94640 AIRWAY INHALATION TREATMENT: CPT

## 2024-09-24 PROCEDURE — 84100 ASSAY OF PHOSPHORUS: CPT | Performed by: INTERNAL MEDICINE

## 2024-09-24 PROCEDURE — 87641 MR-STAPH DNA AMP PROBE: CPT | Performed by: INTERNAL MEDICINE

## 2024-09-24 PROCEDURE — 250N000011 HC RX IP 250 OP 636: Performed by: INTERNAL MEDICINE

## 2024-09-24 PROCEDURE — 80202 ASSAY OF VANCOMYCIN: CPT | Performed by: INTERNAL MEDICINE

## 2024-09-24 PROCEDURE — 83735 ASSAY OF MAGNESIUM: CPT | Performed by: INTERNAL MEDICINE

## 2024-09-24 RX ORDER — MAGNESIUM SULFATE HEPTAHYDRATE 40 MG/ML
2 INJECTION, SOLUTION INTRAVENOUS ONCE
Status: COMPLETED | OUTPATIENT
Start: 2024-09-24 | End: 2024-09-24

## 2024-09-24 RX ORDER — OLANZAPINE 10 MG/1
5 INJECTION, POWDER, LYOPHILIZED, FOR SOLUTION INTRAMUSCULAR ONCE
Status: COMPLETED | OUTPATIENT
Start: 2024-09-24 | End: 2024-09-24

## 2024-09-24 RX ORDER — VANCOMYCIN HYDROCHLORIDE 1 G/200ML
1000 INJECTION, SOLUTION INTRAVENOUS EVERY 12 HOURS
Status: DISCONTINUED | OUTPATIENT
Start: 2024-09-24 | End: 2024-09-25

## 2024-09-24 RX ORDER — HYDROXYZINE HYDROCHLORIDE 25 MG/1
25 TABLET, FILM COATED ORAL ONCE
Status: COMPLETED | OUTPATIENT
Start: 2024-09-24 | End: 2024-09-24

## 2024-09-24 RX ORDER — QUETIAPINE FUMARATE 25 MG/1
50 TABLET, FILM COATED ORAL
Status: COMPLETED | OUTPATIENT
Start: 2024-09-24 | End: 2024-09-24

## 2024-09-24 RX ADMIN — METHADONE HYDROCHLORIDE 5 MG: 5 TABLET ORAL at 19:52

## 2024-09-24 RX ADMIN — PIPERACILLIN AND TAZOBACTAM 3.38 G: 3; .375 INJECTION, POWDER, FOR SOLUTION INTRAVENOUS at 08:28

## 2024-09-24 RX ADMIN — METHYLPREDNISOLONE SODIUM SUCCINATE 62.5 MG: 125 INJECTION, POWDER, FOR SOLUTION INTRAMUSCULAR; INTRAVENOUS at 22:09

## 2024-09-24 RX ADMIN — CARBIDOPA AND LEVODOPA 1 TABLET: 10; 100 TABLET ORAL at 20:43

## 2024-09-24 RX ADMIN — IPRATROPIUM BROMIDE AND ALBUTEROL SULFATE 3 ML: .5; 3 SOLUTION RESPIRATORY (INHALATION) at 07:16

## 2024-09-24 RX ADMIN — SODIUM PHOSPHATE, MONOBASIC, MONOHYDRATE AND SODIUM PHOSPHATE, DIBASIC, ANHYDROUS 15 MMOL: 142; 276 INJECTION, SOLUTION INTRAVENOUS at 05:20

## 2024-09-24 RX ADMIN — INSULIN ASPART 1 UNITS: 100 INJECTION, SOLUTION INTRAVENOUS; SUBCUTANEOUS at 12:25

## 2024-09-24 RX ADMIN — AZITHROMYCIN MONOHYDRATE 500 MG: 500 INJECTION, POWDER, LYOPHILIZED, FOR SOLUTION INTRAVENOUS at 22:08

## 2024-09-24 RX ADMIN — TOPIRAMATE 100 MG: 100 TABLET, FILM COATED ORAL at 08:29

## 2024-09-24 RX ADMIN — TOPIRAMATE 100 MG: 100 TABLET, FILM COATED ORAL at 20:43

## 2024-09-24 RX ADMIN — HYDROXYZINE HYDROCHLORIDE 25 MG: 25 TABLET, FILM COATED ORAL at 22:09

## 2024-09-24 RX ADMIN — DULOXETINE HYDROCHLORIDE 60 MG: 60 CAPSULE, DELAYED RELEASE PELLETS ORAL at 08:29

## 2024-09-24 RX ADMIN — CARBIDOPA AND LEVODOPA 1 TABLET: 10; 100 TABLET ORAL at 08:30

## 2024-09-24 RX ADMIN — PANTOPRAZOLE SODIUM 40 MG: 40 INJECTION, POWDER, FOR SOLUTION INTRAVENOUS at 08:28

## 2024-09-24 RX ADMIN — INSULIN ASPART 1 UNITS: 100 INJECTION, SOLUTION INTRAVENOUS; SUBCUTANEOUS at 19:53

## 2024-09-24 RX ADMIN — CALCIUM CHLORIDE 1 G: 100 INJECTION, SOLUTION INTRAVENOUS at 05:59

## 2024-09-24 RX ADMIN — PREGABALIN 150 MG: 75 CAPSULE ORAL at 20:44

## 2024-09-24 RX ADMIN — FORMOTEROL FUMARATE 20 MCG: 20 SOLUTION RESPIRATORY (INHALATION) at 19:51

## 2024-09-24 RX ADMIN — IPRATROPIUM BROMIDE AND ALBUTEROL SULFATE 3 ML: .5; 3 SOLUTION RESPIRATORY (INHALATION) at 16:40

## 2024-09-24 RX ADMIN — OLANZAPINE 5 MG: 10 INJECTION, POWDER, FOR SOLUTION INTRAMUSCULAR at 04:37

## 2024-09-24 RX ADMIN — CARBIDOPA AND LEVODOPA 1 TABLET: 10; 100 TABLET ORAL at 14:38

## 2024-09-24 RX ADMIN — PREGABALIN 150 MG: 75 CAPSULE ORAL at 14:38

## 2024-09-24 RX ADMIN — PIPERACILLIN AND TAZOBACTAM 3.38 G: 3; .375 INJECTION, POWDER, FOR SOLUTION INTRAVENOUS at 16:37

## 2024-09-24 RX ADMIN — IPRATROPIUM BROMIDE AND ALBUTEROL SULFATE 3 ML: .5; 3 SOLUTION RESPIRATORY (INHALATION) at 19:45

## 2024-09-24 RX ADMIN — PREGABALIN 150 MG: 75 CAPSULE ORAL at 08:28

## 2024-09-24 RX ADMIN — METHADONE HYDROCHLORIDE 2.5 MG: 5 TABLET ORAL at 08:33

## 2024-09-24 RX ADMIN — MAGNESIUM SULFATE HEPTAHYDRATE 2 G: 40 INJECTION, SOLUTION INTRAVENOUS at 04:58

## 2024-09-24 RX ADMIN — QUETIAPINE FUMARATE 50 MG: 25 TABLET ORAL at 01:01

## 2024-09-24 RX ADMIN — VANCOMYCIN HYDROCHLORIDE 1000 MG: 1 INJECTION, SOLUTION INTRAVENOUS at 12:34

## 2024-09-24 RX ADMIN — IPRATROPIUM BROMIDE AND ALBUTEROL SULFATE 3 ML: .5; 3 SOLUTION RESPIRATORY (INHALATION) at 12:05

## 2024-09-24 ASSESSMENT — ACTIVITIES OF DAILY LIVING (ADL)
ADLS_ACUITY_SCORE: 36
ADLS_ACUITY_SCORE: 41
ADLS_ACUITY_SCORE: 36
ADLS_ACUITY_SCORE: 41
ADLS_ACUITY_SCORE: 36
ADLS_ACUITY_SCORE: 36
DEPENDENT_IADLS:: CLEANING;COOKING;LAUNDRY;SHOPPING;TRANSPORTATION;MEAL PREPARATION;MEDICATION MANAGEMENT
ADLS_ACUITY_SCORE: 36

## 2024-09-24 NOTE — PHARMACY-VANCOMYCIN DOSING SERVICE
"Pharmacy Vancomycin Note  Date of Service 2024  Patient's  1945   79 year old, adult    Indication: Aspiration Pneumonia and Sepsis  Day of Therapy: 3  Current vancomycin regimen:  1500 mg IV q24h  Current vancomycin monitoring method: AUC  Current vancomycin therapeutic monitoring goal: 400-600 mg*h/L    [employer] Prediction of Current Vancomycin Regimen  Regimen: 1500 mg IV every 24 hours.  Start time: 18:27 on 2024  Exposure target: AUC24 (range)400-600 mg/L.hr   AUC24,ss: 386 mg/L.hr  Probability of AUC24 > 400: 43 %  Ctrough,ss: 10.7 mg/L  Probability of Ctrough,ss > 20: 4 %  Probability of nephrotoxicity (Lodise MANUEL ): 6 %      Current estimated CrCl = Estimated Creatinine Clearance (based on SCr of 0.88 mg/dL)  Female: 65.7 mL/min  Male: 79.9 mL/min    Creatinine for last 3 days  2024:  5:05 PM Creatinine 1.28 mg/dL  2024:  1:33 AM Creatinine 1.23 mg/dL;  5:15 AM Creatinine 1.13 mg/dL  2024:  3:53 AM Creatinine 0.88 mg/dL    Recent Vancomycin Levels (past 3 days)  2024:  3:53 AM Vancomycin 15.3 ug/mL    Vancomycin IV Administrations (past 72 hours)                     vancomycin (VANCOCIN) 1,500 mg in sodium chloride 0.9 % 250 mL intermittent infusion (mg) 1,500 mg New Bag 24 1827    vancomycin (VANCOCIN) 2,000 mg in sodium chloride 0.9 % 500 mL intermittent infusion (mg) 2,000 mg New Bag 24 1938                    Nephrotoxins and other renal medications (From now, onward)      Start     Dose/Rate Route Frequency Ordered Stop    24 1230  vancomycin (VANCOCIN) 1,000 mg in 200 mL dextrose intermittent infusion         1,000 mg  200 mL/hr over 1 Hours Intravenous EVERY 12 HOURS 24 1205      24 0000  piperacillin-tazobactam (ZOSYN) 3.375 g vial to attach to  mL bag        Note to Pharmacy: For [facility], [facility] and [facility]: For Zosyn-naive patients, use the \"Zosyn initial dose + extended infusion\" order panel.    3.375 g  over 240 Minutes " Intravenous EVERY 8 HOURS 09/22/24 2135                 Contrast Orders - past 72 hours (72h ago, onward)      Start     Dose/Rate Route Frequency Stop    09/23/24 1100  perflutren lipid microsphere (DEFINITY) injection SUSP 2.5 mL         2.5 mL Intravenous ONCE 09/23/24 1038    09/22/24 2130  iopamidol (ISOVUE-370) solution 75 mL         75 mL Intravenous ONCE 09/22/24 2139            Interpretation of levels and current regimen:  Vancomycin level is reflective of AUC less than 400    Has serum creatinine changed greater than 50% in last 72 hours: No    Urine output:  good urine output    Renal Function: Improving    [employer] Prediction of Planned New Vancomycin Regimen  Regimen: 1000 mg IV every 12 hours.  Start time: 18:27 on 09/24/2024  Exposure target: AUC24 (range)400-600 mg/L.hr   AUC24,ss: 504 mg/L.hr  Probability of AUC24 > 400: 88 %  Ctrough,ss: 16.9 mg/L  Probability of Ctrough,ss > 20: 28 %  Probability of nephrotoxicity (Lodise MANUEL 2009): 13 %      Plan:  Increase Dose to 1000 mg IV every 12 hours.  Vancomycin monitoring method: AUC  Vancomycin therapeutic monitoring goal: 400-600 mg*h/L  Pharmacy will check vancomycin levels as appropriate in 1-3 Days.  Serum creatinine levels will be ordered daily for the first week of therapy and at least twice weekly for subsequent weeks.    Rafael Mayen, [facility]

## 2024-09-24 NOTE — DISCHARGE INSTRUCTIONS
WOC DISCHARGE INSTRUCTIONS:  Buttocks wounds: Every 3 days   Cleanse the area with NS and pat dry.  Apply No sting film barrier film over discolored areas and over gluteal cleft, allow to dry about 60 seconds.  Cover sacrum with Sacral Mepilex (#[id number]), Mepilex 2 x 2 (#[id number])   Change dressing Q 3 days.  Turn and reposition Q 2hrs side to side only.  Ensure pt has Edis-cushion while sitting up in the chair. Encourage patient to sit with feet on floor when up in chair to keep pressure off buttocks.  FYI- If pt has constant incontinent loose stools needing dressing changes Q shift please discontinue the Mepilex dressing and apply criticaid barrier paste BID and PRN.

## 2024-09-24 NOTE — PROGRESS NOTES
Speech-Language Pathology: Clinical Swallow Evaluation     09/24/24 0905   Appointment Info   Signing Clinician's Name / Credentials (SLP) Allison Baca MA CCC-SLP   General Information   Onset of Illness/Injury or Date of Surgery 09/22/24   Referring Physician Dr. Hartmann   General Observations Alert and cooperative   Type of Evaluation   Type of Evaluation Swallow Evaluation   Oral Motor   Oral Musculature generally intact   Mucosal Quality good   Dentition (Oral Motor)   Dentition (Oral Motor) adequate dentition   Facial Symmetry (Oral Motor)   Facial Symmetry (Oral Motor) WNL   Lip Function (Oral Motor)   Lip Range of Motion (Oral Motor) WNL   Lip Strength (Oral Motor) WNL   Tongue Function (Oral Motor)   Tongue Strength (Oral Motor) WNL   Tongue Coordination/Speed (Oral Motor) WNL   Tongue ROM (Oral Motor) WNL   Jaw Function (Oral Motor)   Jaw Function (Oral Motor) WNL   Cough/Swallow/Gag Reflex (Oral Motor)   Comment, Cough/Swallow/Gag Reflex (Oral Motor) Cough-adequate   Vocal Quality/Secretion Management (Oral Motor)   Vocal Quality (Oral Motor) WNL   Secretion Management (Oral Motor) WNL   General Swallowing Observations   Past History of Dysphagia None per EMR, RN, or patient report.   Current Diet/Method of Nutritional Intake (General Swallowing Observations, NIS) NPO;ice chips   Swallowing Evaluation Clinical swallow evaluation   Clinical Swallow Evaluation   Clinical Swallow Evaluation Textures Trialed thin liquids;solid foods   Clinical Swallow Eval: Thin Liquid Texture Trial   Mode of Presentation, Thin Liquids cup;straw   Oral Phase of Swallow WFL   Pharyngeal Phase of Swallow intact   Diagnostic Statement No overt s/s aspiration   Clinical Swallow Evaluation: Solid Food Texture Trial   Mode of Presentation self-fed   Oral Phase WFL   Pharyngeal Phase intact   Diagnostic Statement No overt s/s aspiration   Esophageal Phase of Swallow   Patient reports or presents with symptoms of esophageal  dysphagia No   Swallowing Recommendations   Diet Consistency Recommendations regular diet;thin liquids (level 0)   Recommended Feeding/Eating Techniques (Swallow Eval) maintain upright sitting position for eating   Medication Administration Recommendations, Swallowing (SLP) Per patient preference   Instrumental Assessment Recommendations instrumental evaluation not recommended at this time   Comment, Swallowing Recommendations No s/s aspiration or dysphagia. Low concern for aspiration.   Clinical Impression   Criteria for Skilled Therapeutic Interventions Met (SLP Eval) Evaluation only   SLP Diagnosis Dysphagia   Risks & Benefits of therapy have been explained evaluation/treatment results reviewed;care plan/treatment goals reviewed;participants voiced agreement with care plan;participants included;patient   Clinical Impression Comments Patient participated in Clinical Swallow Evaluation. No s/s aspiration with any intake. Oral motor was WFL. Mastication was adequate. Hyolaryngeal movement was present. Recommend diet listed above. No anticipated SLP needs at this time. Please contact SLP with any questions or concerns.   SLP Total Evaluation Time   Eval: oral/pharyngeal swallow function, clinical swallow Minutes (73127) 20   SLP Discharge Planning   SLP Rationale for DC Rec No ST needs post-d/c   SLP Brief overview of current status  Recommend regular textures and thin liquids. No anticipated SLP needs at this time. Please contact SLP with any questions or concerns.   Total Session Time   Total Session Time (sum of timed and untimed services) 20

## 2024-09-24 NOTE — PLAN OF CARE
Problem: Adult Inpatient Plan of Care  Goal: Plan of Care Review  Description: The Plan of Care Review/Shift note should be completed every shift.  The Outcome Evaluation is a brief statement about your assessment that the patient is improving, declining, or no change.  This information will be displayed automatically on your shift  note.  Outcome: Progressing     Problem: Adult Inpatient Plan of Care  Goal: Absence of Hospital-Acquired Illness or Injury  Intervention: Prevent Skin Injury  Recent Flowsheet Documentation  Taken 9/23/2024 2222 by Arthur Collazo RN  Body Position:   turned   right  Taken 9/23/2024 2000 by Arthur Collazo RN  Body Position:   turned   left   Goal Outcome Evaluation:       [facility] - ICU    RN Progress Note:            Pertinent Assessments:      Please refer to flowsheet rows for full assessment     Alert and oriented. Denied pain. On HFNC 50 % and 30 L sating low 90's.            Key Events - This Shift:       Patient restless unable to sleep through out the night BIPAP was tolerated only for 3 hours Dr Baron was notified Seroquel and Zyprexa given with some effect. Turned every 2 hours tolerated well.                 Barriers to Discharge / Downgrade:     Per provider.

## 2024-09-24 NOTE — CONSULTS
Care Management Initial Consult    General Information  Assessment completed with: Patient, Spouse or significant other, Archana and pt Rom  Type of CM/SW Visit: Initial Assessment    Primary Care Provider verified and updated as needed: Yes   Readmission within the last 30 days: no previous admission in last 30 days         Advance Care Planning: Advance Care Planning Reviewed: other (see comments) (No ACP doc)          Communication Assessment  Patient's communication style: spoken language (English or Bilingual)    Hearing Difficulty or Deaf: yes   Wear Glasses or Blind: yes    Cognitive  Cognitive/Neuro/Behavioral: WDL  Level of Consciousness: alert  Arousal Level: opens eyes spontaneously  Orientation: oriented x 4  Mood/Behavior: anxious  Best Language: 0 - No aphasia  Speech: clear    Living Environment:   People in home: spouse  Archana  Current living Arrangements: town home      Able to return to prior arrangements: yes (Wife and pt want pt to return home)       Family/Social Support:  Care provided by: self, spouse/significant other  Provides care for: no one, unable/limited ability to care for self  Marital Status:   Support system: Wife, Children  Archana       Description of Support System: Supportive         Current Resources:   Patient receiving home care services: No        Community Resources: None  Equipment currently used at home: wheelchair, manual, walker, rolling, shower chair  Supplies currently used at home:      Employment/Financial:  Employment Status: retired        Financial Concerns:             Does the patient's insurance plan have a 3 day qualifying hospital stay waiver?  No    Lifestyle & Psychosocial Needs:  Social Determinants of Health     Food Insecurity: Low Risk  (9/22/2024)    Food Insecurity     Within the past 12 months, did you worry that your food would run out before you got money to buy more?: No     Within the past 12 months, did the food you bought just not last and  you didn t have money to get more?: No   Depression: Not at risk (7/25/2022)    PHQ-2     PHQ-2 Score: 0   Housing Stability: Low Risk  (9/22/2024)    Housing Stability     Do you have housing? : Yes     Are you worried about losing your housing?: No   Tobacco Use: Medium Risk (3/20/2024)    Patient History     Smoking Tobacco Use: Former     Smokeless Tobacco Use: Never     Passive Exposure: Not on file   Financial Resource Strain: Low Risk  (9/22/2024)    Financial Resource Strain     Within the past 12 months, have you or your family members you live with been unable to get utilities (heat, electricity) when it was really needed?: No   Alcohol Use: Not on file   Transportation Needs: Low Risk  (9/22/2024)    Transportation Needs     Within the past 12 months, has lack of transportation kept you from medical appointments, getting your medicines, non-medical meetings or appointments, work, or from getting things that you need?: No   Physical Activity: Not on file   Interpersonal Safety: High Risk (9/22/2024)    Interpersonal Safety     Do you feel physically and emotionally safe where you currently live?: No     Within the past 12 months, have you been hit, slapped, kicked or otherwise physically hurt by someone?: No     Within the past 12 months, have you been humiliated or emotionally abused in other ways by your partner or ex-partner?: No   Stress: Not on file   Social Connections: Unknown (12/29/2021)    [facility], [facility]    Social Connections     Frequency of Communication with Friends and Family: Not on file   Health Literacy: Not on file       Functional Status:  Prior to admission patient needed assistance:   Dependent ADLs:: Bathing  Dependent IADLs:: Cleaning, Cooking, Laundry, Shopping, Transportation, Meal Preparation, Medication Management       Mental Health Status:  Mental Health Status: No Current Concerns    "    Chemical Dependency Status:  Chemical Dependency Status: No Current Concerns             Values/Beliefs:  Spiritual, Cultural Beliefs, Protestant Practices, Values that affect care:    Description of Beliefs that Will Affect Care: no            Discussed  Partnership in Safe Discharge Planning  document with patient/family: No    Additional Information:  Assessed. RNCM introduced self and CM role to pt and pt's spouse Archana. Pts address and contacts verified. No ACP docs. Pt lives with spouse Archana in a townhouse. Pt gets some assist with ADLS such as bathing from spouse, and has a shower chair, walker, and manual w/c(only used for outings). Pt gets assist with mostly all IADLS, pt does manage finances. Pt is STBA/A-1. Pt's spouse Archana assists with all. She states, \"he is of sound mind to do these things but he has such bad tremors so I assist him. \" Pt has no home care svcs prior. Pt is retired. Has an established PCP. Pt/ wife anticipate pt returning home with her and family support,and possible home care if recommended. Family to transport.       Pt currently on HFNC.     Next Steps: Cm will continue to follow plan of care,review recommendations, and assist with any discharge needs anticipated.       Lexis Capps RN      "

## 2024-09-24 NOTE — CONSULTS
[facility] Nurse Inpatient Assessment     Consulted for: buttocks    Summary: wife at bedside, explained how she cares for his skin at home, she was very concerned and asked a lot of questions. We chatted about different incontinence scenarios and the care spectrum for incontinence care with some options for different phases of skin breakdown.     Patient History (according to provider note(s):      79 year old former smoker with history of chronic bronchitis on triple inhaler regimen with associated moderately severe restrictive lung disease, moderate SAMRA, SSS s/p PPM, chronic methadone therapy for idiopathic peripheral neuropathy, & GERD, admitted on 9/22/2024 w/ acute hypoxic and hypercapnic respiratory failure, multifocal pneumonia, and septic shock.     Assessment:      Skin Injury Location: Buttocks    9/24    Last photo: 9/24  Skin injury due to: Chronic skin discoloration, Chronic skin injury (often related to prolonged recliner use), and Irritant contact dermatitis due to fecal, urinary or dual incontinence   Skin history and plan of care:   wife says wounds open and close over the past 3 years, she uses various diaper rash creams. Currently skin is closed with discoloration with neoepithelium and scant maceration. Cannot fully rule out pressure injury but this appears to be more chronic skin discoloration. Patient had no pain when area was palpated.   Affected area:      Skin assessment: Intact     Measurements (length x width x depth, in cm) areas approximately 5x3cm in discoloration     Color: normal and consistent with surrounding tissue     Temperature  normal      Drainage: none .      Color: none      Odor: none  Pain: denies , none  Pain interventions prior to dressing change: slow and gentle cares   Treatment goal: Heal , Infection control/prevention, and Protection  STATUS: initial assessment  Supplies ordered: at bedside       Treatment Plan:     Buttocks wounds:  Every 3 days   Cleanse the area with NS and pat dry.  Apply No sting film barrier film over discolored areas and over gluteal cleft, allow to dry about 60 seconds.  Cover sacrum with Sacral Mepilex (#[id number]), Mepilex 2 x 2 (#[id number])   Change dressing Q 3 days.  Turn and reposition Q 2hrs side to side only.  Ensure pt has Edis-cushion while sitting up in the chair.  FYI- If pt has constant incontinent loose stools needing dressing changes Q shift please discontinue the Mepilex dressing and apply criticaid barrier paste BID and PRN.      Orders: Written    RECOMMEND PRIMARY TEAM ORDER: None, at this time  Education provided: importance of repositioning, plan of care, Moisture management, Hygiene, and Off-loading pressure  Discussed plan of care with: Patient, Family, and Nurse  WO nurse follow-up plan: weekly  Notify WOC if wound(s) deteriorate.  Nursing to notify the Provider(s) and re-consult the WOC Nurse if new skin concern.    DATA:     Current support surface: Standard  Low air loss (CITLALLI pump, Isolibrium, Pulsate)  Containment of urine/stool: Incontinence Protocol  BMI: Body mass index is 31.04 kg/m .   Active diet order: Orders Placed This Encounter      Combination Diet Regular Diet     Output: I/O last 3 completed shifts:  In: 1170 [P.O.:360; I.V.:810]  Out: 1825 [Urine:1825]     Labs:   Recent Labs   Lab 09/24/24  0353 09/23/24  0515 09/22/24  1705   ALBUMIN  --   --  3.6   HGB 12.8   < > 16.5   WBC 13.5*   < > 1.9*    < > = values in this interval not displayed.     Pressure injury risk assessment:   Sensory Perception: 3-->slightly limited  Moisture: 4-->rarely moist  Activity: 1-->bedfast  Mobility: 3-->slightly limited  Nutrition: 2-->probably inadequate  Friction and Shear: 2-->potential problem  Nicola Score: 15    ISI BoxN RN CWOCN  Pager no longer in use, please contact through [employer] group: [facility] [employer]

## 2024-09-24 NOTE — PROGRESS NOTES
Intensivist Progress note  2024     Name: Romaine Farah  : 1945  MRN: [id number]  PCP: Simon Schmidt         ASSESSMENT/PLAN:  79 year old former smoker with history of chronic bronchitis on triple inhaler regimen with associated moderately severe restrictive lung disease, moderate SAMRA, SSS s/p PPM, chronic methadone therapy for idiopathic peripheral neuropathy, & GERD, admitted on 2024 w/ acute hypoxic and hypercapnic respiratory failure, multifocal pneumonia, and septic shock.     Neuro/Psych:   #. Acute toxic metabolic encephalopathy,combined effect of hypoxia, hypercapnia, sepsis, and methadone effect. improved  #.Idiopathic peripheral neuropathy that is managed with a combination of Sinemet, duloxetine, Norco, methadone, pregabalin, and topiramate.  Treating underlying causes for respiratory failure with improvement   Norco, methadone, pregabalin, and topiramate was restarted but methadone at lower dose 2.5 yesterday and will to up to 5 tonight (home dose is 10)  head CT to follow-up on report of a fall was negative    Pulmonary:   #. Acute hypoxic respiratory failure secondary to multifocal pneumonia.  Complicated by baseline reduced diffusion capacity.  IBW 73.3 kg.  #. Acute hypercapnic respiratory failure, likely combination of multifocal pneumonia with an acute exacerbation of underlying bronchitis (being treated as COPD in outpatient setting), moderately restrictive lung disease, and opiate-induced somnolence.  #.Chronic bronchitis with COPD features, treated with a triple combination inhaler in outpatient setting, currently in an exacerbation.  Sees Dr. Dejesus with the most recent visit on 2024.  Continued on BD  AVAPS yesterday and switched to HFNC , PaCo2 come down nicely and acidemia has resolved  methylprednisolone 60 mg IV to day and can probably switch to po taper tomorrow  Sscheduled DuoNeb QID and formoterol neb BID in view of PTA Trelegy (hold off on budesonide  nebulizer as patient is receiving systemic corticosteroids)  Clinically improving   See ID for antibiotic discussion    Cardiovascular:   #. Shock, distributive/septic.  resoved  #. Baseline hypertension not on outpatient therapy. Currently BP WNL  #. Sick sinus syndrome status post PPM placement, follows with cardiology.  #echo 9-23   1. Normal left ventricular size and systolic performance with a visually  estimated ejection fraction of 60%.  2. No significant valvular heart disease is identified on this study.  3. Probable normal right ventricular size and systolic performance though  right-sided structures are not clearly visualized on all views on this study.  Plan:   Try to sat off pressors  Goal MAP >/= 65 and SBP >/= 90    Infectious:  #. Multifocal pneumonia, concern for a chronic aspiration component. Leukocytosis but on steroid and afebrile  Continue vancomycin + zosyn + azithro  Check for MrSA and if neg, stop vanco  RVP negative and all micro neg so far  Blood culture pending NTD  Urinary antigens ordered for Legionnella and strep pneumo neg  PCT went 1> 15.45 9-23  Swallow evalutation    Renal: baseline Cr ~1.0-1.1.   #. Borderline BERNICE, most likely combination of pre-renal vs septic ATN/AIN. Wilhelm in place.  Improved 1.28 > 1.13 > 0.88  #. Lactic acidosis, resolved.   Strict intake/output monitoring, avoiding nephrotoxic medications as able  High intensity electrolyte replacement protocols in place    GI:   #. GERD, not on medications at baseline.  IV PPI in setting of steroid therapy.  #. Diet: swalow evaluation and start feeding as much less risk of decompensation and need for mechanical ventilation.  #.  Miscellaneous: Antiemetic and bowel regimen medications ordered and available.    Heme:   #. Leukopenia, acute, suspect to be related to severe sepsis. resolved  #. Thrombocytopenia, acute, suspect to be related to severe sepsis.    Endo: POCT glucose monitoring q4H, low-intensity ISS  "available.    Access/Lines/Tubes: PIV x 2, PICC line, Wilhelm catheter    Prophylaxis:   #. Stress ulcer: PPI  #. Diet: strict NPO  #. VTE: SCD    CODE: FULL    Billing: This patient is critically ill: Yes. Total critical care time today 45 min, excluding procedures.     Miguel Hartmann MD  Pulmonary and Critical Care     Clinically Significant Risk Factors         # Hyponatremia: Lowest Na = 134 mmol/L in last 2 days, will monitor as appropriate  # Hypocalcemia: Lowest Ca = 6.9 mg/dL in last 2 days, will monitor and replace as appropriate       # Thrombocytopenia: Lowest platelets = 120 in last 2 days, will monitor for bleeding             # Obesity: Estimated body mass index is 31.04 kg/m  as calculated from the following:    Height as of this encounter: 1.778 m (5' 10\").    Weight as of this encounter: 98.1 kg (216 lb 4.8 oz)., PRESENT ON ADMISSION      # Pacemaker present          ALLERGIES:  No Known Allergies      Subjective  Chornic leg pain bothers him, has difficult sleeping, no new symptoms. Did not want NIV overnight and doing well on HFNC      Objective    PHYSICAL EXAM:  Temp:  [97.8  F (36.6  C)-98.8  F (37.1  C)] 98.8  F (37.1  C)  Pulse:  [60-73] 65  Resp:  [20-39] 25  BP: (100-136)/(50-73) 136/70  FiO2 (%):  [40 %-80 %] 55 %  SpO2:  [87 %-100 %] 93 %  FiO2 (%): 55 %, Resp: 25        Intake/Output Summary (Last 24 hours) at 9/23/2024 0728  Last data filed at 9/23/2024 0600  Gross per 24 hour   Intake 875 ml   Output 1150 ml   Net -275 ml           Physical Exam:   General:  wide awakes and answers question appropriately  HEENT: PER, HFCC  CV: RRR, no M/R/G; extremities well perfused  Pulm: minimal accessory muscle use, diminished b/l breath sounds, no wheezing, no rhonchi, some crackles post at the basis  Abd: distended but soft, non-tender, normal bowel sounds  Msk: warm to touch, no peripheral edema  Derm: no acute lesions/rashes on limited exam; 2x3 cm fresh ecchymosis anterior to R armpit, " pressure ulcer along the coccyx  Neuro: no obvious focal sign, MS back to baseline  Psych: calm      I have personally reviewed the following data over the past 24 hrs:    13.5 (H)  \   12.8   / 120 (L)     141 108 (H) 18.8 /  131 (H)   4.5 21 (L) 0.88 \       Imaging results reviewed over the past 24 hrs:     XAM: XR CHEST PORT 1 VIEW  LOCATION: [facility]  DATE: 2024     INDICATION: pneumonia  COMPARISON: 2024                                                                      IMPRESSION: Stable left subclavian approach pacemaker. Slight interval worsening of bilateral consolidation. No pneumothorax. Right PICC with tip near the cavoatrial junction. Stable cardiomediastinal silhouette.      Recent Results (from the past 24 hour(s))   Echocardiogram Complete    Narrative    [id number]  [id number]  [id number]  [id number]^ADELIA^ANDREA^[place] [address]     Name: VIVIANA OCHOA  MRN: [id number]  : 1945  Study Date: 2024 10:02 AM  Age: 79 yrs  Gender: Male  Patient Location: [facility]  Reason For Study: Shock  Ordering Physician: ANDREA DELVALLE  Performed By: [facility]     BSA: 2.2 m2  Height: 70 in  Weight: 215 lb  HR: 60  BP: 112/61 mmHg  ______________________________________________________________________________  Procedure  Complete Portable Echo Adult. Definity (NDC #31462-557) given intravenously.  2.5ml, lot # [id number]. Technically difficult study.Extremely difficult acoustic  windows despite the use of contrast for endcardial border definition.  ______________________________________________________________________________  Interpretation Summary     1. Normal left ventricular size and systolic performance with a visually  estimated ejection fraction of 60%.  2. No significant valvular heart disease is identified on this study.  3. Probable normal right ventricular size and systolic performance  though  right-sided structures are not clearly visualized on all views on this study.  ______________________________________________________________________________  Left ventricle:  Normal left ventricular size and systolic performance with a visually  estimated ejection fraction of 60%. There is normal regional wall motion. Left  ventricular wall thickness is normal.     Assessment of LV Diastolic Function: The cumulative findings are indeterminate  in the evaluation of diastolic function [The septal e' velocity is < 7 cm/s &  lateral e' velocity is < 10 cm/s. The average E/e' is < 14. TR velocity is <  2.8 m/s. Left atrial volume index is greater than 34 mL/mÂ ].     Right ventricle:  Probable normal right ventricular size and systolic performance though right-  sided structures are not clearly visualized on all views on this study.     Left atrium:  There is borderline left atrial enlargement.     Right atrium:  The right atrium is of normal size.     IVC:  The IVC is mildly dilated.     Aortic valve:  The aortic valve is comprised of three cusps. No significant aortic stenosis  or aortic insufficiency is detected on this study.     Mitral valve:  The mitral valve appears morphologically normal. There is trace mitral  insufficiency.     Tricuspid valve:  The tricuspid valve is grossly morphologically normal. There is trace-mild  tricuspid insufficiency.     Pulmonic valve:  The pulmonic valve is grossly morphologically normal.     Thoracic aorta:  The aortic root and proximal ascending aorta are of normal dimension.     Pericardium:  There is no significant pericardial effusion.  ______________________________________________________________________________  ______________________________________________________________________________  MMode/2D Measurements & Calculations  IVSd: 0.86 cm  LVIDd: 5.1 cm  LVIDs: 3.1 cm  LVPWd: 1.0 cm  FS: 38.6 %  LV mass(C)d: 171.5 grams  LV mass(C)dI: 79.7 grams/m2  Ao root  diam: 3.4 cm  asc Aorta Diam: 3.4 cm  LVOT diam: 2.1 cm  LVOT area: 3.6 cm2  Ao root diam index Ht(cm/m): 1.9  Ao root diam index BSA (cm/m2): 1.6  Asc Ao diam index BSA (cm/m2): 1.6  Asc Ao diam index Ht(cm/m): 1.9  EF Biplane: 58.2 %  LA Volume (BP): 77.8 ml     LA Volume Index (BP): 36.2 ml/m2  LA Volume Indexed (AL/bp): 33.9 ml/m2  RWT: 0.39  TAPSE: 2.8 cm     Doppler Measurements & Calculations  MV E max leo: 68.6 cm/sec  MV A max leo: 49.7 cm/sec  MV E/A: 1.4  MV dec slope: 428.2 cm/sec2  MV dec time: 0.16 sec  Ao V2 max: 102.5 cm/sec  Ao max P.0 mmHg  Ao V2 mean: 73.5 cm/sec  Ao mean P.4 mmHg  Ao V2 VTI: 18.4 cm  ELENA(I,D): 3.1 cm2  ELENA(V,D): 2.8 cm2  LV V1 max P.5 mmHg  LV V1 max: 79.7 cm/sec  LV V1 VTI: 16.1 cm  SV(LVOT): 57.8 ml  SI(LVOT): 26.8 ml/m2  PA V2 max: 79.9 cm/sec  PA max P.6 mmHg  PA acc time: 0.12 sec  TR max leo: 279.9 cm/sec  TR max P.3 mmHg  AV Leo Ratio (DI): 0.78  ELENA Index (cm2/m2): 1.5  E/E' avg: 10.6  Lateral E/e': 7.5  Medial E/e': 13.7  RV S Leo: 10.9 cm/sec     ______________________________________________________________________________  Report approved by: Dulce Gan 2024 11:27 AM         XR Chest Port 1 View    Narrative    EXAM: XR CHEST PORT 1 VIEW  LOCATION: [facility]  DATE: 2024    INDICATION: pneumonia  COMPARISON: 2024      Impression    IMPRESSION: Stable left subclavian approach pacemaker. Slight interval worsening of bilateral consolidation. No pneumothorax. Right PICC with tip near the cavoatrial junction. Stable cardiomediastinal silhouette.     Recent Labs   Lab 24  0358 24  0353 24  2336 24  0805 24  0515 24  0513 24  0133 24  0046 24  1705   WBC  --  13.5*  --   --  6.8  --   --   --  1.9*   HGB  --  12.8  --   --  14.3  --   --   --  16.5   MCV  --  89  --   --  91  --   --   --  88   PLT  --  120*  --   --  126*  --   --   --  143*   NA   --  141  --   --  134*  --  137  --  137   POTASSIUM  --  4.5  --   --  4.1  --  3.5  --  3.5   CHLORIDE  --  108*  --   --  105  --   --   --  102   CO2  --  21*  --   --  20*  --   --   --  23   BUN  --  18.8  --   --  19.2  --   --   --  19.4   CR  --  0.88  --   --  1.13  --  1.23*  --  1.28*   ANIONGAP  --  12  --   --  9  --   --   --  12   JOSSELYN  --  6.9*  --   --  8.0*  --   --   --  8.9   * 132* 111*   < > 151*   < >  --    < > 108*   ALBUMIN  --   --   --   --   --   --   --   --  3.6   PROTTOTAL  --   --   --   --   --   --   --   --  6.9   BILITOTAL  --   --   --   --   --   --   --   --  0.8   ALKPHOS  --   --   --   --   --   --   --   --  65   ALT  --   --   --   --   --   --   --   --  13   AST  --   --   --   --   --   --   --   --  62*    < > = values in this interval not displayed.       Most Recent 3 CBC's:  Recent Labs   Lab Test 09/24/24  0353 09/23/24  0515 09/22/24  1705   WBC 13.5* 6.8 1.9*   HGB 12.8 14.3 16.5   MCV 89 91 88   * 126* 143*     Most Recent 3 BMP's:  Recent Labs   Lab Test 09/24/24  0358 09/24/24  0353 09/23/24  2336 09/23/24  0805 09/23/24  0515 09/23/24  0513 09/23/24  0133 09/23/24  0046 09/22/24  1705   NA  --  141  --   --  134*  --  137  --  137   POTASSIUM  --  4.5  --   --  4.1  --  3.5  --  3.5   CHLORIDE  --  108*  --   --  105  --   --   --  102   CO2  --  21*  --   --  20*  --   --   --  23   BUN  --  18.8  --   --  19.2  --   --   --  19.4   CR  --  0.88  --   --  1.13  --  1.23*  --  1.28*   ANIONGAP  --  12  --   --  9  --   --   --  12   JOSSELYN  --  6.9*  --   --  8.0*  --   --   --  8.9   * 132* 111*   < > 151*   < >  --    < > 108*    < > = values in this interval not displayed.     Most Recent 3 Creatinines:  Recent Labs   Lab Test 09/24/24  0353 09/23/24  0515 09/23/24  0133   CR 0.88 1.13 1.23*     Most Recent 6 Bacteria Isolates From Any Culture (See EPIC Reports for Culture Details):No lab results found.  Most Recent ABG:  Recent Labs    Lab Test 09/22/24 2036   PH 7.27*   PO2 82   PCO2 51*   HCO3 23   FERDINAND -4.2*     NOTE: Data reviewed over the past 24 hrs contributes toward MDM complexity

## 2024-09-24 NOTE — PLAN OF CARE
Goal Outcome Evaluation:      Plan of Care Reviewed With: patient, spouse          Outcome Evaluation: Pt and spouse anticipate pt returning home with possible home care if needed and family support.

## 2024-09-25 ENCOUNTER — APPOINTMENT (OUTPATIENT)
Dept: OCCUPATIONAL THERAPY | Facility: HOSPITAL | Age: 79
DRG: 871 | End: 2024-09-25
Attending: NURSE PRACTITIONER
Payer: COMMERCIAL

## 2024-09-25 ENCOUNTER — APPOINTMENT (OUTPATIENT)
Dept: PHYSICAL THERAPY | Facility: HOSPITAL | Age: 79
DRG: 871 | End: 2024-09-25
Attending: NURSE PRACTITIONER
Payer: COMMERCIAL

## 2024-09-25 LAB
ANION GAP SERPL CALCULATED.3IONS-SCNC: 8 MMOL/L (ref 7–15)
BASE EXCESS BLDV CALC-SCNC: -1.8 MMOL/L (ref -3–3)
BASE EXCESS BLDV CALC-SCNC: -2 MMOL/L (ref -3–3)
BASE EXCESS BLDV CALC-SCNC: -3.6 MMOL/L (ref -3–3)
BASOPHILS # BLD AUTO: 0 10E3/UL (ref 0–0.2)
BASOPHILS NFR BLD AUTO: 0 %
BUN SERPL-MCNC: 22.7 MG/DL (ref 8–23)
CALCIUM SERPL-MCNC: 8.5 MG/DL (ref 8.8–10.4)
CHLORIDE SERPL-SCNC: 111 MMOL/L (ref 98–107)
CREAT SERPL-MCNC: 0.83 MG/DL (ref 0.51–1.17)
EGFRCR SERPLBLD CKD-EPI 2021: 89 ML/MIN/1.73M2
EOSINOPHIL # BLD AUTO: 0 10E3/UL (ref 0–0.7)
EOSINOPHIL NFR BLD AUTO: 0 %
ERYTHROCYTE [DISTWIDTH] IN BLOOD BY AUTOMATED COUNT: 15 % (ref 10–15)
GLUCOSE BLDC GLUCOMTR-MCNC: 113 MG/DL (ref 70–99)
GLUCOSE BLDC GLUCOMTR-MCNC: 137 MG/DL (ref 70–99)
GLUCOSE BLDC GLUCOMTR-MCNC: 138 MG/DL (ref 70–99)
GLUCOSE BLDC GLUCOMTR-MCNC: 151 MG/DL (ref 70–99)
GLUCOSE SERPL-MCNC: 160 MG/DL (ref 70–99)
HCO3 BLDV-SCNC: 23 MMOL/L (ref 21–28)
HCO3 BLDV-SCNC: 24 MMOL/L (ref 21–28)
HCO3 BLDV-SCNC: 25 MMOL/L (ref 21–28)
HCO3 SERPL-SCNC: 23 MMOL/L (ref 22–29)
HCT VFR BLD AUTO: 38.9 % (ref 35–53)
HGB BLD-MCNC: 12.8 G/DL (ref 11.7–17.7)
IMM GRANULOCYTES # BLD: 0.2 10E3/UL
IMM GRANULOCYTES NFR BLD: 1 %
LYMPHOCYTES # BLD AUTO: 0.7 10E3/UL (ref 0.8–5.3)
LYMPHOCYTES NFR BLD AUTO: 5 %
MAGNESIUM SERPL-MCNC: 2.3 MG/DL (ref 1.7–2.3)
MCH RBC QN AUTO: 28.9 PG (ref 26.5–33)
MCHC RBC AUTO-ENTMCNC: 32.9 G/DL (ref 31.5–36.5)
MCV RBC AUTO: 88 FL (ref 78–100)
MONOCYTES # BLD AUTO: 0.7 10E3/UL (ref 0–1.3)
MONOCYTES NFR BLD AUTO: 5 %
NEUTROPHILS # BLD AUTO: 13 10E3/UL (ref 1.6–8.3)
NEUTROPHILS NFR BLD AUTO: 89 %
NRBC # BLD AUTO: 0 10E3/UL
NRBC BLD AUTO-RTO: 0 /100
O2/TOTAL GAS SETTING VFR VENT: 50 %
O2/TOTAL GAS SETTING VFR VENT: 53 %
O2/TOTAL GAS SETTING VFR VENT: 70 %
OXYHGB MFR BLDV: 71 % (ref 70–75)
OXYHGB MFR BLDV: 77 % (ref 70–75)
OXYHGB MFR BLDV: 95 % (ref 70–75)
PCO2 BLDV: 44 MM HG (ref 40–50)
PCO2 BLDV: 45 MM HG (ref 40–50)
PCO2 BLDV: 47 MM HG (ref 40–50)
PH BLDV: 7.31 [PH] (ref 7.32–7.43)
PH BLDV: 7.33 [PH] (ref 7.32–7.43)
PH BLDV: 7.35 [PH] (ref 7.32–7.43)
PHOSPHATE SERPL-MCNC: 2.2 MG/DL (ref 2.5–4.5)
PLATELET # BLD AUTO: 126 10E3/UL (ref 150–450)
PO2 BLDV: 40 MM HG (ref 25–47)
PO2 BLDV: 42 MM HG (ref 25–47)
PO2 BLDV: 97 MM HG (ref 25–47)
POTASSIUM SERPL-SCNC: 3.6 MMOL/L (ref 3.4–5.3)
RBC # BLD AUTO: 4.43 10E6/UL (ref 3.8–5.9)
SAO2 % BLDV: 71.7 % (ref 70–75)
SAO2 % BLDV: 77 % (ref 70–75)
SAO2 % BLDV: 96.2 % (ref 70–75)
SODIUM SERPL-SCNC: 142 MMOL/L (ref 135–145)
WBC # BLD AUTO: 14.6 10E3/UL (ref 4–11)

## 2024-09-25 PROCEDURE — 250N000013 HC RX MED GY IP 250 OP 250 PS 637: Performed by: NURSE PRACTITIONER

## 2024-09-25 PROCEDURE — 82805 BLOOD GASES W/O2 SATURATION: CPT | Performed by: INTERNAL MEDICINE

## 2024-09-25 PROCEDURE — 99291 CRITICAL CARE FIRST HOUR: CPT | Performed by: NURSE PRACTITIONER

## 2024-09-25 PROCEDURE — 85004 AUTOMATED DIFF WBC COUNT: CPT | Performed by: INTERNAL MEDICINE

## 2024-09-25 PROCEDURE — 250N000012 HC RX MED GY IP 250 OP 636 PS 637: Performed by: NURSE PRACTITIONER

## 2024-09-25 PROCEDURE — 83735 ASSAY OF MAGNESIUM: CPT | Performed by: INTERNAL MEDICINE

## 2024-09-25 PROCEDURE — 82805 BLOOD GASES W/O2 SATURATION: CPT | Performed by: NURSE PRACTITIONER

## 2024-09-25 PROCEDURE — 999N000157 HC STATISTIC RCP TIME EA 10 MIN

## 2024-09-25 PROCEDURE — 250N000011 HC RX IP 250 OP 636: Performed by: NURSE PRACTITIONER

## 2024-09-25 PROCEDURE — 80048 BASIC METABOLIC PNL TOTAL CA: CPT | Performed by: INTERNAL MEDICINE

## 2024-09-25 PROCEDURE — 94660 CPAP INITIATION&MGMT: CPT

## 2024-09-25 PROCEDURE — 97165 OT EVAL LOW COMPLEX 30 MIN: CPT | Mod: GO

## 2024-09-25 PROCEDURE — 250N000013 HC RX MED GY IP 250 OP 250 PS 637: Performed by: INTERNAL MEDICINE

## 2024-09-25 PROCEDURE — 258N000003 HC RX IP 258 OP 636: Performed by: INTERNAL MEDICINE

## 2024-09-25 PROCEDURE — 250N000011 HC RX IP 250 OP 636: Performed by: INTERNAL MEDICINE

## 2024-09-25 PROCEDURE — 999N000287 HC ICU ADULT ROUNDING, EACH 10 MINS

## 2024-09-25 PROCEDURE — 84100 ASSAY OF PHOSPHORUS: CPT | Performed by: INTERNAL MEDICINE

## 2024-09-25 PROCEDURE — 250N000009 HC RX 250: Performed by: INTERNAL MEDICINE

## 2024-09-25 PROCEDURE — 97161 PT EVAL LOW COMPLEX 20 MIN: CPT | Mod: GP

## 2024-09-25 PROCEDURE — 200N000001 HC R&B ICU

## 2024-09-25 PROCEDURE — 94640 AIRWAY INHALATION TREATMENT: CPT

## 2024-09-25 PROCEDURE — 94640 AIRWAY INHALATION TREATMENT: CPT | Mod: 76

## 2024-09-25 RX ORDER — AMOXICILLIN 250 MG
1 CAPSULE ORAL 2 TIMES DAILY
Status: DISCONTINUED | OUTPATIENT
Start: 2024-09-25 | End: 2024-10-02 | Stop reason: HOSPADM

## 2024-09-25 RX ORDER — CARBOXYMETHYLCELLULOSE SODIUM 5 MG/ML
1 SOLUTION/ DROPS OPHTHALMIC
Status: DISCONTINUED | OUTPATIENT
Start: 2024-09-25 | End: 2024-10-02 | Stop reason: HOSPADM

## 2024-09-25 RX ORDER — PREDNISONE 20 MG/1
40 TABLET ORAL DAILY
Status: DISCONTINUED | OUTPATIENT
Start: 2024-09-25 | End: 2024-09-26

## 2024-09-25 RX ORDER — POTASSIUM CHLORIDE 7.45 MG/ML
10 INJECTION INTRAVENOUS
Status: COMPLETED | OUTPATIENT
Start: 2024-09-25 | End: 2024-09-25

## 2024-09-25 RX ORDER — HYDRALAZINE HYDROCHLORIDE 20 MG/ML
10 INJECTION INTRAMUSCULAR; INTRAVENOUS EVERY 6 HOURS PRN
Status: DISCONTINUED | OUTPATIENT
Start: 2024-09-25 | End: 2024-09-29

## 2024-09-25 RX ORDER — HEPARIN SODIUM 5000 [USP'U]/.5ML
5000 INJECTION, SOLUTION INTRAVENOUS; SUBCUTANEOUS EVERY 8 HOURS
Status: DISCONTINUED | OUTPATIENT
Start: 2024-09-25 | End: 2024-10-02 | Stop reason: HOSPADM

## 2024-09-25 RX ORDER — POLYETHYLENE GLYCOL 3350 17 G/17G
17 POWDER, FOR SOLUTION ORAL DAILY
Status: DISCONTINUED | OUTPATIENT
Start: 2024-09-25 | End: 2024-10-02 | Stop reason: HOSPADM

## 2024-09-25 RX ORDER — LABETALOL HYDROCHLORIDE 5 MG/ML
20 INJECTION, SOLUTION INTRAVENOUS EVERY 4 HOURS PRN
Status: DISCONTINUED | OUTPATIENT
Start: 2024-09-25 | End: 2024-09-29

## 2024-09-25 RX ORDER — AZITHROMYCIN 250 MG/1
500 TABLET, FILM COATED ORAL AT BEDTIME
Status: DISCONTINUED | OUTPATIENT
Start: 2024-09-25 | End: 2024-09-26

## 2024-09-25 RX ADMIN — PIPERACILLIN AND TAZOBACTAM 3.38 G: 3; .375 INJECTION, POWDER, FOR SOLUTION INTRAVENOUS at 08:12

## 2024-09-25 RX ADMIN — AZITHROMYCIN DIHYDRATE 500 MG: 250 TABLET, FILM COATED ORAL at 20:37

## 2024-09-25 RX ADMIN — HEPARIN SODIUM 5000 UNITS: 10000 INJECTION, SOLUTION INTRAVENOUS; SUBCUTANEOUS at 15:38

## 2024-09-25 RX ADMIN — VANCOMYCIN HYDROCHLORIDE 1000 MG: 1 INJECTION, SOLUTION INTRAVENOUS at 00:15

## 2024-09-25 RX ADMIN — PIPERACILLIN AND TAZOBACTAM 3.38 G: 3; .375 INJECTION, POWDER, FOR SOLUTION INTRAVENOUS at 23:19

## 2024-09-25 RX ADMIN — PREGABALIN 150 MG: 75 CAPSULE ORAL at 15:38

## 2024-09-25 RX ADMIN — POLYETHYLENE GLYCOL 3350 17 G: 17 POWDER, FOR SOLUTION ORAL at 08:09

## 2024-09-25 RX ADMIN — SODIUM PHOSPHATE, MONOBASIC, MONOHYDRATE AND SODIUM PHOSPHATE, DIBASIC, ANHYDROUS 15 MMOL: 142; 276 INJECTION, SOLUTION INTRAVENOUS at 06:28

## 2024-09-25 RX ADMIN — IPRATROPIUM BROMIDE AND ALBUTEROL SULFATE 3 ML: .5; 3 SOLUTION RESPIRATORY (INHALATION) at 12:13

## 2024-09-25 RX ADMIN — IPRATROPIUM BROMIDE AND ALBUTEROL SULFATE 3 ML: .5; 3 SOLUTION RESPIRATORY (INHALATION) at 19:39

## 2024-09-25 RX ADMIN — SENNOSIDES AND DOCUSATE SODIUM 1 TABLET: 8.6; 5 TABLET ORAL at 08:10

## 2024-09-25 RX ADMIN — TOPIRAMATE 100 MG: 100 TABLET, FILM COATED ORAL at 20:37

## 2024-09-25 RX ADMIN — DULOXETINE HYDROCHLORIDE 60 MG: 60 CAPSULE, DELAYED RELEASE PELLETS ORAL at 08:11

## 2024-09-25 RX ADMIN — FORMOTEROL FUMARATE 20 MCG: 20 SOLUTION RESPIRATORY (INHALATION) at 19:39

## 2024-09-25 RX ADMIN — PANTOPRAZOLE SODIUM 40 MG: 40 INJECTION, POWDER, FOR SOLUTION INTRAVENOUS at 08:12

## 2024-09-25 RX ADMIN — CARBIDOPA AND LEVODOPA 1 TABLET: 10; 100 TABLET ORAL at 08:11

## 2024-09-25 RX ADMIN — METHADONE HYDROCHLORIDE 5 MG: 5 TABLET ORAL at 08:10

## 2024-09-25 RX ADMIN — CARBIDOPA AND LEVODOPA 1 TABLET: 10; 100 TABLET ORAL at 20:37

## 2024-09-25 RX ADMIN — PIPERACILLIN AND TAZOBACTAM 3.38 G: 3; .375 INJECTION, POWDER, FOR SOLUTION INTRAVENOUS at 15:38

## 2024-09-25 RX ADMIN — CARBIDOPA AND LEVODOPA 1 TABLET: 10; 100 TABLET ORAL at 15:38

## 2024-09-25 RX ADMIN — POTASSIUM CHLORIDE 10 MEQ: 7.46 INJECTION, SOLUTION INTRAVENOUS at 06:05

## 2024-09-25 RX ADMIN — PIPERACILLIN AND TAZOBACTAM 3.38 G: 3; .375 INJECTION, POWDER, FOR SOLUTION INTRAVENOUS at 00:13

## 2024-09-25 RX ADMIN — IPRATROPIUM BROMIDE AND ALBUTEROL SULFATE 3 ML: .5; 3 SOLUTION RESPIRATORY (INHALATION) at 15:32

## 2024-09-25 RX ADMIN — METHADONE HYDROCHLORIDE 5 MG: 5 TABLET ORAL at 20:36

## 2024-09-25 RX ADMIN — INSULIN ASPART 1 UNITS: 100 INJECTION, SOLUTION INTRAVENOUS; SUBCUTANEOUS at 00:12

## 2024-09-25 RX ADMIN — CARBOXYMETHYLCELLULOSE SODIUM 1 DROP: 5 SOLUTION/ DROPS OPHTHALMIC at 05:18

## 2024-09-25 RX ADMIN — POTASSIUM CHLORIDE 10 MEQ: 7.46 INJECTION, SOLUTION INTRAVENOUS at 07:12

## 2024-09-25 RX ADMIN — FORMOTEROL FUMARATE 20 MCG: 20 SOLUTION RESPIRATORY (INHALATION) at 12:13

## 2024-09-25 RX ADMIN — HEPARIN SODIUM 5000 UNITS: 10000 INJECTION, SOLUTION INTRAVENOUS; SUBCUTANEOUS at 22:06

## 2024-09-25 RX ADMIN — TOPIRAMATE 100 MG: 100 TABLET, FILM COATED ORAL at 08:11

## 2024-09-25 RX ADMIN — PREGABALIN 150 MG: 75 CAPSULE ORAL at 08:10

## 2024-09-25 RX ADMIN — PREDNISONE 40 MG: 20 TABLET ORAL at 08:10

## 2024-09-25 RX ADMIN — SENNOSIDES AND DOCUSATE SODIUM 1 TABLET: 8.6; 5 TABLET ORAL at 20:37

## 2024-09-25 RX ADMIN — IPRATROPIUM BROMIDE AND ALBUTEROL SULFATE 3 ML: .5; 3 SOLUTION RESPIRATORY (INHALATION) at 07:45

## 2024-09-25 RX ADMIN — PREGABALIN 150 MG: 75 CAPSULE ORAL at 20:37

## 2024-09-25 ASSESSMENT — ACTIVITIES OF DAILY LIVING (ADL)
ADLS_ACUITY_SCORE: 40
ADLS_ACUITY_SCORE: 40
ADLS_ACUITY_SCORE: 41
ADLS_ACUITY_SCORE: 40
ADLS_ACUITY_SCORE: 41
ADLS_ACUITY_SCORE: 40
ADLS_ACUITY_SCORE: 41
ADLS_ACUITY_SCORE: 40
ADLS_ACUITY_SCORE: 41
ADLS_ACUITY_SCORE: 41
ADLS_ACUITY_SCORE: 40
ADLS_ACUITY_SCORE: 40
PREVIOUS_RESPONSIBILITIES: DRIVING
ADLS_ACUITY_SCORE: 40
ADLS_ACUITY_SCORE: 41
ADLS_ACUITY_SCORE: 40
ADLS_ACUITY_SCORE: 41

## 2024-09-25 NOTE — PROGRESS NOTES
"Care Management Follow Up    Length of Stay (days): 3    Expected Discharge Date: 09/24/2024    Anticipated Discharge Plan:   TBD     Transportation: TBD    PT Recommendations:  Therapy consults placed, awaiting recs   OT Recommendations:        Barriers to Discharge: medical stability/ HFNC. Plans to downgrade per rounds.     Prior Living Situation:  Pt lives with spouse Archana in a townhouse. Pt gets some assist with ADLS such as bathing from spouse, and has a shower chair, walker, and manual w/c(only used for outings). Pt gets assist with mostly all IADLS, pt does manage finances. Pt is STBA/A-1. Pt's spouse Archana assists with all. She states, \"he is of sound mind to do these things but he has such bad tremors so I assist him. \" Pt has no home care svcs prior. Pt is retired. Has an established PCP. Pt/ wife anticipate pt returning home with her and family support,and possible home care if recommended. Family to transport.       Discussed  Partnership in Safe Discharge Planning  document with patient/family: No     Handoff Completed: Not at this time     Patient/Spokesperson Updated: No    Additional Information:    Therapy consults placed.       Next Steps: Cm to follow therapy recs. CM will continue to follow plan of care,review recommendations, and assist with any discharge needs anticipated.    Lexis Capps RN      "

## 2024-09-25 NOTE — PROGRESS NOTES
SPIRITUAL HEALTH SERVICES (SHS)  SPIRITUAL ASSESSMENT Progress Note  [facility]. Unit ICU     REFERRAL SOURCE: Consult      Fr Gaxiola met with this family, as requested, this morning.      PLAN: SHS will follow.      Genia Rodriguez, Ph.D., [facility]      SHS available 24/7 for emergency requests/referrals, either by having the on-call  paged or by entering an ASAP/STAT consult in Epic (this will also page the on-call ).

## 2024-09-25 NOTE — PLAN OF CARE
Goal Outcome Evaluation:            Outcome Evaluation: Patient needing and slept well last night cares grouped together    [facility] - ICU    RN Progress Note:            Pertinent Assessments:      Please refer to flowsheet rows for full assessment     Respiratory, Mentation            Key Events - This Shift:       Patient continues on HF NC 55% 30 liter LS clear but diminished.  Patient was worried about anxiety, sleep,did talk to Doctor see orders. Patient was able to sleep and did group cares to maximizes amount of sleep.                 Barriers to Discharge / Downgrade:     O2 needs

## 2024-09-25 NOTE — PROGRESS NOTES
SPIRITUAL HEALTH SERVICES Progress Note    Saw pt Romaine Farah and offered [employer] sacrament of anointing for the healing of the sick.     Fr. Minor Gaytan

## 2024-09-25 NOTE — PROGRESS NOTES
09/25/24 1530   Appointment Info   Signing Clinician's Name / Credentials (PT) Solange Aly PT   Rehab Comments (PT) Patient in bed .Left up in chair -spouse present.   Living Environment   People in Home spouse   Current Living Arrangements house  ([facility])   Home Accessibility stairs to enter home   Number of Stairs, Main Entrance 1   Transportation Anticipated family or friend will provide;health plan transportation   Self-Care   Usual Activity Tolerance moderate   Current Activity Tolerance poor   Equipment Currently Used at Home cane, straight;walker, rolling;wheelchair, manual   Activity/Exercise/Self-Care Comment Patient independent with mobility .Spouse assists with bathing,and dressing.   General Information   Onset of Illness/Injury or Date of Surgery 09/22/24   Referring Physician Kadie Blanco   Patient/Family Therapy Goals Statement (PT) return home   Pertinent History of Current Problem (include personal factors and/or comorbidities that impact the POC) Admitted with acute respiratory failure hypoxia,pneumonia and sepsis.Patient has a hx of tremor,GERD.   Existing Precautions/Restrictions fall;NPO;oxygen therapy device and L/min   Weight-Bearing Status - LLE weight-bearing as tolerated   Weight-Bearing Status - RLE weight-bearing as tolerated   General Observations n HF O 2 at 30 l ,55 %   Cognition   Affect/Mental Status (Cognition) WFL   Orientation Status (Cognition) oriented x 4   Pain Assessment   Patient Currently in Pain No   Range of Motion (ROM)   Range of Motion ROM is WFL   Strength (Manual Muscle Testing)   Strength (Manual Muscle Testing) Deficits observed during functional mobility   Bed Mobility   Supine-Sit [place] (Bed Mobility) moderate assist (50% patient effort)   Bed Mobility Limitations decreased ability to use arms for pushing/pulling   Impairments Contributing to Impaired Bed Mobility decreased strength   Assistive Device (Bed Mobility) bed rails   Transfers    Transfers sit-stand transfer   Sit-Stand Transfer   Sit-Stand [place] (Transfers) minimum assist (75% patient effort);1 person to manage equipment   Assistive Device (Sit-Stand Transfers) walker, front-wheeled   Comment, (Sit-Stand Transfer) cue s for hands placement/safety   Gait/Stairs (Locomotion)   [place] Level (Gait) minimum assist (75% patient effort);2 person assist   Assistive Device (Gait) walker, front-wheeled   Distance in Feet (Gait) 4 feet   Pattern (Gait) swing-through   Deviations/Abnormal Patterns (Gait) base of support, narrow;claire decreased;festinating/shuffling;gait speed decreased;stride length decreased   Maintains Weight-bearing Status (Gait) verbal cues to maintain   Comment, (Gait/Stairs) O 2 sats dropped to 78 % with activity,91 % at rest   Clinical Impression   Criteria for Skilled Therapeutic Intervention Yes, treatment indicated   PT Diagnosis (PT) impaired functional mobility   Influenced by the following impairments medical dx,inactivity ,weakness   Functional limitations due to impairments bed mobility ,transfers,gait ,strength   Clinical Presentation (PT Evaluation Complexity) evolving   Clinical Presentation Rationale patient presents as med diagnosed   Clinical Decision Making (Complexity) low complexity   Planned Therapy Interventions (PT) bed mobility training;gait training;strengthening;transfer training   Risk & Benefits of therapy have been explained evaluation/treatment results reviewed;participants included;patient;spouse/significant other   Clinical Impression Comments Patient is a 78 yo male admitted with hypoxia,pneumonia and sepsis.preesnts with mobility deficits and low activity tolerance-on HF O2.Appropriate for skilled PT to mobilize and improve functional mobility for returning home.   PT Total Evaluation Time   PT Eval, Low Complexity Minutes (03019) 15   Physical Therapy Goals   PT Frequency 5x/week   PT Predicted Duration/Target Date for Goal  Attainment 10/02/24   PT Goals Bed Mobility;Transfers;Gait   PT: Bed Mobility Supervision/stand-by assist;Supine to/from sit   PT: Transfers Supervision/stand-by assist;Sit to/from stand;Assistive device   PT: Gait Supervision/stand-by assist;Rolling walker;50 feet   PT Discharge Planning   PT Plan OOB to chair ,ambulation as able on HF O2   PT Discharge Recommendation (DC Rec) Transitional Care Facility   PT Rationale for DC Rec Needs assist with mobility ,low activity tolerance -high O2 demends   PT Brief overview of current status mod/min assist to get OOb ,min assist of 2 sit -stand with FWW,4b feet with FWW ,min assist of 2   PT Equipment Needed at Discharge walker, rolling   Total Session Time   Total Session Time (sum of timed and untimed services) 15

## 2024-09-25 NOTE — PROGRESS NOTES
Intensivist Progress note       Name: Romaine Farah  : 1945  MRN: [id number]  PCP: Simon Schmidt         ASSESSMENT/PLAN:  79 year old former smoker with history of chronic bronchitis on triple inhaler regimen with associated moderately severe restrictive lung disease, moderate SAMRA, SSS s/p PPM, chronic methadone therapy for idiopathic peripheral neuropathy, & GERD. Admitted on 2024 w/ acute hypoxic and hypercapnic respiratory failure, multifocal pneumonia, and septic shock.     Neuro/Psych:   #. Acute toxic metabolic encephalopathy,combined effect of hypoxia, hypercapnia, sepsis, and methadone effect. improved  #.Idiopathic peripheral neuropathy that is managed with a combination of Sinemet, duloxetine, Norco, methadone, pregabalin, and topiramate.   Norco, methadone, pregabalin, and topiramate was restarted but methadone at lower dose 2.5, up to 5mg yesterday (home dose is 10)  head CT to follow-up on report of a fall was negative    Pulmonary:   #. Acute hypoxic respiratory failure secondary to multifocal pneumonia.  Complicated by baseline reduced diffusion capacity.   #. Acute hypercapnic respiratory failure, likely combination of multifocal pneumonia with an acute exacerbation of underlying bronchitis (being treated as COPD in outpatient setting), moderately restrictive lung disease, and opiate-induced somnolence.  #.Chronic bronchitis with COPD features, treated with a triple combination inhaler in outpatient setting, currently in an exacerbation.  Sees Dr. Dejesus with the most recent visit on 2024.  Continued on Bronchodilator   HFNC overnight but this morning had increasing FiO2 needs. Will restart AVAPS for now - alternate between the two.   Prednisone 40mg Daily  Sscheduled DuoNeb QID and formoterol neb BID in view of PTA Trelegy (hold off on budesonide nebulizer as patient is receiving systemic corticosteroids)  See ID for antibiotic discussion  When O2 needs stabilize, increasing  activity and get him out of bed.     Cardiovascular:   #. Shock, distributive/septic.  resolved  #. Baseline hypertension not on outpatient therapy. Currently BP WNL  #. Sick sinus syndrome status post PPM placement, follows with cardiology.  #echo 9-23   1. Normal left ventricular size and systolic performance with a visually  estimated ejection fraction of 60%.  2. No significant valvular heart disease is identified on this study.  3. Probable normal right ventricular size and systolic performance though  right-sided structures are not clearly visualized on all views on this study.  Plan:   Goal MAP >/= 65 and SBP >/= 90    Infectious:  #. Multifocal pneumonia, concern for a chronic aspiration component. Leukocytosis but on steroid and afebrile  zosyn + azithro continue.  MrSA PCR neg, stop vanco  RVP negative and all micro neg so far  Blood culture pending NTD  Urinary antigens ordered for Legionnella and strep pneumo neg  Swallow evalutation    Renal: baseline Cr ~1.0-1.1.   #. Borderline BERNICE, most likely combination of pre-renal vs septic ATN/AIN. Rick in place.  Improved 1.28 > 1.13 > 0.88  #. Lactic acidosis, resolved.   Strict intake/output monitoring, avoiding nephrotoxic medications as able  High intensity electrolyte replacement protocols in place  Remove rick     GI:   #. GERD, not on medications at baseline.  IV PPI in setting of steroid therapy.  #. Diet: Regular diet    #.  Miscellaneous: Antiemetic and bowel regimen medications ordered and available.    Heme:   #. Leukopenia, acute, suspect to be related to severe sepsis. resolved  #. Thrombocytopenia, acute, suspect to be related to severe sepsis.    Endo: POCT glucose monitoring q4H, low-intensity ISS available.    Access/Lines/Tubes: PIV x 2, PICC line, Rick catheter    Prophylaxis:   #. Stress ulcer: PPI  #. Diet: regular  #. VTE: SCD    CODE: FULL    Billing: This patient is critically ill: Yes - increasing FiO2 needs today. Total critical  "care time today 40 min, excluding procedures.     Kadie Blanco, CNP  [facility] Pulmonary/Critical Care     Clinically Significant Risk Factors                # Thrombocytopenia: Lowest platelets = 120 in last 2 days, will monitor for bleeding             # Obesity: Estimated body mass index is 31.8 kg/m  as calculated from the following:    Height as of this encounter: 1.778 m (5' 10\").    Weight as of this encounter: 100.5 kg (221 lb 9.6 oz)., PRESENT ON ADMISSION      # Pacemaker present          ALLERGIES:  No Known Allergies      Subjective  Not happy about needing to wear AVAPS this morning; hoping to get out of bed, move out of ICU.       Objective    PHYSICAL EXAM:  Temp:  [98.6  F (37  C)-98.8  F (37.1  C)] 98.7  F (37.1  C)  Pulse:  [60-83] 60  Resp:  [12-33] 16  BP: (101-168)/(52-77) 119/60  FiO2 (%):  [50 %-60 %] 50 %  SpO2:  [89 %-95 %] 94 %  FiO2 (%): 50 %, Resp: 16        Intake/Output Summary (Last 24 hours) at 9/23/2024 0728  Last data filed at 9/23/2024 0600  Gross per 24 hour   Intake 875 ml   Output 1150 ml   Net -275 ml           Physical Exam:   General:  no distress  HEENT: AT/NC; PERRL  CV: RRR, no M/R/G; extremities well perfused  Pulm:unlabored on HFNC; lungs are diminished, no wheeze  Abd: distended;  hypo bowel sounds.   Msk: warm to touch, no peripheral edema  Derm: visible skin intact.   Neuro: grossly nonfocal   Psych: calm      I have personally reviewed the following data over the past 24 hrs:    14.6 (H)  \   12.8   / 126 (L)     142 111 (H) 22.7 /  137 (H)   3.6 23 0.83 \       Imaging results reviewed over the past 24 hrs:     9/24 CXR - personally reviewed by me today: Stable left subclavian approach pacemaker. Slight interval worsening of bilateral consolidation. No pneumothorax. Right PICC with tip near the cavoatrial junction. Stable cardiomediastinal silhouette             Recent Labs   Lab 09/25/24  1220 09/25/24  0738 09/25/24  0354 09/24/24  0358 09/24/24  0353 " 09/23/24  0805 09/23/24  0515 09/23/24  0046 09/22/24  1705   WBC  --   --  14.6*  --  13.5*  --  6.8  --  1.9*   HGB  --   --  12.8  --  12.8  --  14.3  --  16.5   MCV  --   --  88  --  89  --  91  --  88   PLT  --   --  126*  --  120*  --  126*  --  143*   NA  --   --  142  --  141  --  134*   < > 137   POTASSIUM  --   --  3.6  --  4.5  --  4.1   < > 3.5   CHLORIDE  --   --  111*  --  108*  --  105  --  102   CO2  --   --  23  --  21*  --  20*  --  23   BUN  --   --  22.7  --  18.8  --  19.2  --  19.4   CR  --   --  0.83  --  0.88  --  1.13   < > 1.28*   ANIONGAP  --   --  8  --  12  --  9  --  12   JOSSELYN  --   --  8.5*  --  6.9*  --  8.0*  --  8.9   * 113* 160*   < > 132*   < > 151*   < > 108*   ALBUMIN  --   --   --   --   --   --   --   --  3.6   PROTTOTAL  --   --   --   --   --   --   --   --  6.9   BILITOTAL  --   --   --   --   --   --   --   --  0.8   ALKPHOS  --   --   --   --   --   --   --   --  65   ALT  --   --   --   --   --   --   --   --  13   AST  --   --   --   --   --   --   --   --  62*    < > = values in this interval not displayed.          Most Recent ABG:  Recent Labs   Lab Test 09/22/24 2036   PH 7.27*   PO2 82   PCO2 51*   HCO3 23   FERDINAND -4.2*     NOTE: Data reviewed over the past 24 hrs contributes toward MDM complexity

## 2024-09-26 ENCOUNTER — APPOINTMENT (OUTPATIENT)
Dept: RADIOLOGY | Facility: HOSPITAL | Age: 79
DRG: 871 | End: 2024-09-26
Attending: NURSE PRACTITIONER
Payer: COMMERCIAL

## 2024-09-26 LAB
ANION GAP SERPL CALCULATED.3IONS-SCNC: 8 MMOL/L (ref 7–15)
BASE EXCESS BLDA CALC-SCNC: -3.4 MMOL/L (ref -3–3)
BUN SERPL-MCNC: 23.5 MG/DL (ref 8–23)
CALCIUM SERPL-MCNC: 8.5 MG/DL (ref 8.8–10.4)
CHLORIDE SERPL-SCNC: 115 MMOL/L (ref 98–107)
COHGB MFR BLD: 96.3 % (ref 95–96)
CREAT SERPL-MCNC: 0.86 MG/DL (ref 0.51–1.17)
EGFRCR SERPLBLD CKD-EPI 2021: 88 ML/MIN/1.73M2
ERYTHROCYTE [DISTWIDTH] IN BLOOD BY AUTOMATED COUNT: 15.3 % (ref 10–15)
GLUCOSE BLDC GLUCOMTR-MCNC: 123 MG/DL (ref 70–99)
GLUCOSE BLDC GLUCOMTR-MCNC: 146 MG/DL (ref 70–99)
GLUCOSE BLDC GLUCOMTR-MCNC: 79 MG/DL (ref 70–99)
GLUCOSE BLDC GLUCOMTR-MCNC: 82 MG/DL (ref 70–99)
GLUCOSE SERPL-MCNC: 107 MG/DL (ref 70–99)
HCO3 BLD-SCNC: 23 MMOL/L (ref 21–28)
HCO3 SERPL-SCNC: 23 MMOL/L (ref 22–29)
HCT VFR BLD AUTO: 41.5 % (ref 35–53)
HGB BLD-MCNC: 13.3 G/DL (ref 11.7–17.7)
MAGNESIUM SERPL-MCNC: 2.2 MG/DL (ref 1.7–2.3)
MCH RBC QN AUTO: 28.2 PG (ref 26.5–33)
MCHC RBC AUTO-ENTMCNC: 32 G/DL (ref 31.5–36.5)
MCV RBC AUTO: 88 FL (ref 78–100)
O2/TOTAL GAS SETTING VFR VENT: 70 %
PCO2 BLD: 43 MM HG (ref 35–45)
PEEP: 5 CM H2O
PH BLD: 7.33 [PH] (ref 7.35–7.45)
PHOSPHATE SERPL-MCNC: 2.4 MG/DL (ref 2.5–4.5)
PLATELET # BLD AUTO: 120 10E3/UL (ref 150–450)
PO2 BLD: 84 MM HG (ref 80–105)
POTASSIUM SERPL-SCNC: 3.8 MMOL/L (ref 3.4–5.3)
POTASSIUM SERPL-SCNC: 3.8 MMOL/L (ref 3.4–5.3)
PROCALCITONIN SERPL IA-MCNC: 1.69 NG/ML
RBC # BLD AUTO: 4.71 10E6/UL (ref 3.8–5.9)
SAO2 % BLDA: 96 % (ref 92–100)
SODIUM SERPL-SCNC: 146 MMOL/L (ref 135–145)
WBC # BLD AUTO: 17.5 10E3/UL (ref 4–11)

## 2024-09-26 PROCEDURE — 999N000157 HC STATISTIC RCP TIME EA 10 MIN

## 2024-09-26 PROCEDURE — 84145 PROCALCITONIN (PCT): CPT | Performed by: NURSE PRACTITIONER

## 2024-09-26 PROCEDURE — 71045 X-RAY EXAM CHEST 1 VIEW: CPT

## 2024-09-26 PROCEDURE — 85027 COMPLETE CBC AUTOMATED: CPT | Performed by: INTERNAL MEDICINE

## 2024-09-26 PROCEDURE — 36600 WITHDRAWAL OF ARTERIAL BLOOD: CPT

## 2024-09-26 PROCEDURE — 99291 CRITICAL CARE FIRST HOUR: CPT | Performed by: NURSE PRACTITIONER

## 2024-09-26 PROCEDURE — 82805 BLOOD GASES W/O2 SATURATION: CPT | Performed by: NURSE PRACTITIONER

## 2024-09-26 PROCEDURE — 200N000001 HC R&B ICU

## 2024-09-26 PROCEDURE — 84132 ASSAY OF SERUM POTASSIUM: CPT | Performed by: INTERNAL MEDICINE

## 2024-09-26 PROCEDURE — 250N000011 HC RX IP 250 OP 636: Performed by: INTERNAL MEDICINE

## 2024-09-26 PROCEDURE — 94640 AIRWAY INHALATION TREATMENT: CPT | Mod: 76

## 2024-09-26 PROCEDURE — 258N000003 HC RX IP 258 OP 636: Performed by: NURSE PRACTITIONER

## 2024-09-26 PROCEDURE — 83735 ASSAY OF MAGNESIUM: CPT | Performed by: INTERNAL MEDICINE

## 2024-09-26 PROCEDURE — 250N000009 HC RX 250: Performed by: INTERNAL MEDICINE

## 2024-09-26 PROCEDURE — 94640 AIRWAY INHALATION TREATMENT: CPT

## 2024-09-26 PROCEDURE — 94660 CPAP INITIATION&MGMT: CPT

## 2024-09-26 PROCEDURE — 84132 ASSAY OF SERUM POTASSIUM: CPT | Performed by: NURSE PRACTITIONER

## 2024-09-26 PROCEDURE — 250N000011 HC RX IP 250 OP 636: Performed by: NURSE PRACTITIONER

## 2024-09-26 PROCEDURE — 250N000012 HC RX MED GY IP 250 OP 636 PS 637: Performed by: NURSE PRACTITIONER

## 2024-09-26 PROCEDURE — 80048 BASIC METABOLIC PNL TOTAL CA: CPT | Performed by: INTERNAL MEDICINE

## 2024-09-26 PROCEDURE — 250N000013 HC RX MED GY IP 250 OP 250 PS 637: Performed by: NURSE PRACTITIONER

## 2024-09-26 PROCEDURE — 84100 ASSAY OF PHOSPHORUS: CPT | Performed by: INTERNAL MEDICINE

## 2024-09-26 PROCEDURE — 250N000013 HC RX MED GY IP 250 OP 250 PS 637: Performed by: INTERNAL MEDICINE

## 2024-09-26 PROCEDURE — 250N000009 HC RX 250: Performed by: NURSE PRACTITIONER

## 2024-09-26 RX ORDER — METHYLPREDNISOLONE SODIUM SUCCINATE 40 MG/ML
40 INJECTION, POWDER, LYOPHILIZED, FOR SOLUTION INTRAMUSCULAR; INTRAVENOUS EVERY 6 HOURS
Status: DISCONTINUED | OUTPATIENT
Start: 2024-09-26 | End: 2024-09-28

## 2024-09-26 RX ORDER — FUROSEMIDE 10 MG/ML
20 INJECTION INTRAMUSCULAR; INTRAVENOUS ONCE
Status: COMPLETED | OUTPATIENT
Start: 2024-09-26 | End: 2024-09-26

## 2024-09-26 RX ORDER — SODIUM CHLORIDE FOR INHALATION 3 %
3 VIAL, NEBULIZER (ML) INHALATION
Status: DISCONTINUED | OUTPATIENT
Start: 2024-09-26 | End: 2024-09-28

## 2024-09-26 RX ORDER — PREGABALIN 100 MG/1
100 CAPSULE ORAL 3 TIMES DAILY
Status: DISCONTINUED | OUTPATIENT
Start: 2024-09-26 | End: 2024-10-02 | Stop reason: HOSPADM

## 2024-09-26 RX ORDER — POTASSIUM CHLORIDE 29.8 MG/ML
20 INJECTION INTRAVENOUS ONCE
Status: COMPLETED | OUTPATIENT
Start: 2024-09-26 | End: 2024-09-26

## 2024-09-26 RX ORDER — HYDROXYZINE HYDROCHLORIDE 10 MG/1
10 TABLET, FILM COATED ORAL EVERY 6 HOURS PRN
Status: DISCONTINUED | OUTPATIENT
Start: 2024-09-26 | End: 2024-10-02 | Stop reason: HOSPADM

## 2024-09-26 RX ORDER — LORAZEPAM 2 MG/ML
0.5 INJECTION INTRAMUSCULAR ONCE
Status: COMPLETED | OUTPATIENT
Start: 2024-09-26 | End: 2024-09-26

## 2024-09-26 RX ORDER — METHYLPREDNISOLONE SODIUM SUCCINATE 40 MG/ML
40 INJECTION, POWDER, LYOPHILIZED, FOR SOLUTION INTRAMUSCULAR; INTRAVENOUS EVERY 12 HOURS
Status: DISCONTINUED | OUTPATIENT
Start: 2024-09-26 | End: 2024-09-26

## 2024-09-26 RX ADMIN — PREGABALIN 150 MG: 75 CAPSULE ORAL at 08:56

## 2024-09-26 RX ADMIN — Medication 3 ML: at 12:08

## 2024-09-26 RX ADMIN — PIPERACILLIN AND TAZOBACTAM 3.38 G: 3; .375 INJECTION, POWDER, FOR SOLUTION INTRAVENOUS at 09:06

## 2024-09-26 RX ADMIN — FORMOTEROL FUMARATE 20 MCG: 20 SOLUTION RESPIRATORY (INHALATION) at 07:15

## 2024-09-26 RX ADMIN — POTASSIUM CHLORIDE 20 MEQ: 29.8 INJECTION, SOLUTION INTRAVENOUS at 06:08

## 2024-09-26 RX ADMIN — IPRATROPIUM BROMIDE AND ALBUTEROL SULFATE 3 ML: .5; 3 SOLUTION RESPIRATORY (INHALATION) at 12:08

## 2024-09-26 RX ADMIN — IPRATROPIUM BROMIDE AND ALBUTEROL SULFATE 3 ML: .5; 3 SOLUTION RESPIRATORY (INHALATION) at 15:25

## 2024-09-26 RX ADMIN — METHYLPREDNISOLONE SODIUM SUCCINATE 40 MG: 40 INJECTION, POWDER, FOR SOLUTION INTRAMUSCULAR; INTRAVENOUS at 21:00

## 2024-09-26 RX ADMIN — METHYLPREDNISOLONE SODIUM SUCCINATE 40 MG: 40 INJECTION, POWDER, FOR SOLUTION INTRAMUSCULAR; INTRAVENOUS at 10:40

## 2024-09-26 RX ADMIN — PREGABALIN 100 MG: 100 CAPSULE ORAL at 20:27

## 2024-09-26 RX ADMIN — PANTOPRAZOLE SODIUM 40 MG: 40 INJECTION, POWDER, FOR SOLUTION INTRAVENOUS at 08:56

## 2024-09-26 RX ADMIN — PREDNISONE 40 MG: 20 TABLET ORAL at 08:56

## 2024-09-26 RX ADMIN — PIPERACILLIN AND TAZOBACTAM 3.38 G: 3; .375 INJECTION, POWDER, FOR SOLUTION INTRAVENOUS at 16:33

## 2024-09-26 RX ADMIN — HYDRALAZINE HYDROCHLORIDE 10 MG: 20 INJECTION INTRAMUSCULAR; INTRAVENOUS at 06:37

## 2024-09-26 RX ADMIN — SODIUM PHOSPHATE, MONOBASIC, MONOHYDRATE AND SODIUM PHOSPHATE, DIBASIC, ANHYDROUS 15 MMOL: 142; 276 INJECTION, SOLUTION INTRAVENOUS at 06:09

## 2024-09-26 RX ADMIN — METHYLPREDNISOLONE SODIUM SUCCINATE 40 MG: 40 INJECTION, POWDER, FOR SOLUTION INTRAMUSCULAR; INTRAVENOUS at 16:34

## 2024-09-26 RX ADMIN — LABETALOL HYDROCHLORIDE 20 MG: 5 INJECTION, SOLUTION INTRAVENOUS at 07:33

## 2024-09-26 RX ADMIN — Medication 3 ML: at 15:26

## 2024-09-26 RX ADMIN — HEPARIN SODIUM 5000 UNITS: 10000 INJECTION, SOLUTION INTRAVENOUS; SUBCUTANEOUS at 06:09

## 2024-09-26 RX ADMIN — TOPIRAMATE 100 MG: 100 TABLET, FILM COATED ORAL at 08:57

## 2024-09-26 RX ADMIN — CARBIDOPA AND LEVODOPA 1 TABLET: 10; 100 TABLET ORAL at 08:57

## 2024-09-26 RX ADMIN — CARBIDOPA AND LEVODOPA 1 TABLET: 10; 100 TABLET ORAL at 20:27

## 2024-09-26 RX ADMIN — FORMOTEROL FUMARATE 20 MCG: 20 SOLUTION RESPIRATORY (INHALATION) at 20:32

## 2024-09-26 RX ADMIN — LORAZEPAM 0.5 MG: 2 INJECTION INTRAMUSCULAR; INTRAVENOUS at 08:55

## 2024-09-26 RX ADMIN — Medication 3 ML: at 20:32

## 2024-09-26 RX ADMIN — IPRATROPIUM BROMIDE AND ALBUTEROL SULFATE 3 ML: .5; 3 SOLUTION RESPIRATORY (INHALATION) at 20:32

## 2024-09-26 RX ADMIN — AZITHROMYCIN MONOHYDRATE 500 MG: 500 INJECTION, POWDER, LYOPHILIZED, FOR SOLUTION INTRAVENOUS at 20:43

## 2024-09-26 RX ADMIN — HEPARIN SODIUM 5000 UNITS: 10000 INJECTION, SOLUTION INTRAVENOUS; SUBCUTANEOUS at 12:51

## 2024-09-26 RX ADMIN — SENNOSIDES AND DOCUSATE SODIUM 1 TABLET: 8.6; 5 TABLET ORAL at 09:06

## 2024-09-26 RX ADMIN — SENNOSIDES AND DOCUSATE SODIUM 1 TABLET: 8.6; 5 TABLET ORAL at 20:27

## 2024-09-26 RX ADMIN — TOPIRAMATE 100 MG: 100 TABLET, FILM COATED ORAL at 20:27

## 2024-09-26 RX ADMIN — IPRATROPIUM BROMIDE AND ALBUTEROL SULFATE 3 ML: .5; 3 SOLUTION RESPIRATORY (INHALATION) at 07:15

## 2024-09-26 RX ADMIN — DULOXETINE HYDROCHLORIDE 60 MG: 60 CAPSULE, DELAYED RELEASE PELLETS ORAL at 08:56

## 2024-09-26 RX ADMIN — METHADONE HYDROCHLORIDE 2.5 MG: 5 TABLET ORAL at 20:27

## 2024-09-26 RX ADMIN — HEPARIN SODIUM 5000 UNITS: 10000 INJECTION, SOLUTION INTRAVENOUS; SUBCUTANEOUS at 21:00

## 2024-09-26 RX ADMIN — FUROSEMIDE 20 MG: 10 INJECTION, SOLUTION INTRAMUSCULAR; INTRAVENOUS at 14:56

## 2024-09-26 ASSESSMENT — ACTIVITIES OF DAILY LIVING (ADL)
ADLS_ACUITY_SCORE: 46
ADLS_ACUITY_SCORE: 38
ADLS_ACUITY_SCORE: 38
ADLS_ACUITY_SCORE: 46
ADLS_ACUITY_SCORE: 38
ADLS_ACUITY_SCORE: 46
ADLS_ACUITY_SCORE: 38
ADLS_ACUITY_SCORE: 38
ADLS_ACUITY_SCORE: 46
ADLS_ACUITY_SCORE: 38
ADLS_ACUITY_SCORE: 46
ADLS_ACUITY_SCORE: 46
ADLS_ACUITY_SCORE: 38
ADLS_ACUITY_SCORE: 42
ADLS_ACUITY_SCORE: 46
ADLS_ACUITY_SCORE: 38
ADLS_ACUITY_SCORE: 38
ADLS_ACUITY_SCORE: 46

## 2024-09-26 NOTE — PROGRESS NOTES
Intensivist Progress note       Name: Romaine Farah  : 1945  MRN: [id number]  PCP: Simon Schmidt         ASSESSMENT/PLAN:  79 year old former smoker with history of chronic bronchitis on triple inhaler regimen with associated moderately severe restrictive lung disease, moderate SAMRA, SSS s/p PPM, chronic methadone therapy for idiopathic peripheral neuropathy, & GERD. Admitted on 2024 w/ acute hypoxic and hypercapnic respiratory failure, multifocal pneumonia, and septic shock.     Neuro/Psych:   #. Acute toxic metabolic encephalopathy,combined effect of hypoxia, hypercapnia, sepsis, and methadone effect. improved  #.Idiopathic peripheral neuropathy that is managed with a combination of Sinemet, duloxetine, Norco, methadone, pregabalin, and topiramate.   Norco, methadone, pregabalin, and topiramate was restarted but methadone at lower dose 2.5, up to 5mg   (home dose is 10)  head CT to follow-up on report of a fall was negative    Pulmonary:   #. Acute hypoxic respiratory failure secondary to multifocal pneumonia.  Complicated by baseline reduced diffusion capacity.   #. Acute hypercapnic respiratory failure, likely combination of multifocal pneumonia with an acute exacerbation of underlying bronchitis (being treated as COPD in outpatient setting), moderately restrictive lung disease, and opiate-induced somnolence.  #.Chronic bronchitis with COPD features, treated with a triple combination inhaler in outpatient setting, currently in an exacerbation.  Sees Dr. Dejesus with the most recent visit on 2024.  Continued on Bronchodilator   Ongoing high FiO2 needs and requiring AVAPs.   Resume IV steroid at 40mg IV BID  Sscheduled DuoNeb QID and formoterol neb BID in view of PTA Trelegy (hold off on budesonide nebulizer as patient is receiving systemic corticosteroids)  Add on 3% NS QID with Volara for CPT. CXR with worsening consolidation this morning.      Cardiovascular:   #. Shock,  distributive/septic.  resolved  #. Baseline hypertension not on outpatient therapy. Currently BP WNL  #. Sick sinus syndrome status post PPM placement, follows with cardiology.  #echo 9-23   1. Normal left ventricular size and systolic performance with a visually  estimated ejection fraction of 60%.  2. No significant valvular heart disease is identified on this study.  3. Probable normal right ventricular size and systolic performance though  right-sided structures are not clearly visualized on all views on this study.  Plan:   Goal MAP >/= 65 and SBP >/= 90  PRN Hydralazine and Labetalol available     Infectious:  #. Multifocal pneumonia, concern for a chronic aspiration component. Leukocytosis but on steroid and afebrile  zosyn + azithro continue.  MrSA PCR neg, stopped vanco 9/25  RVP negative and all micro neg so far  Blood culture pending NTD  Urinary antigens ordered for Legionnella and strep pneumo neg  Worsening resp status and elevated procal concerning. Repeat procal this morning. May need to adjust antibiotic coverage if continues to rise.     Renal: baseline Cr ~1.0-1.1.   #. Borderline BERNICE, most likely combination of pre-renal vs septic ATN/AIN. Wilhelm removed.   #. Lactic acidosis, resolved.   Strict intake/output monitoring, avoiding nephrotoxic medications as able  High intensity electrolyte replacement protocols in place    GI:   #. GERD, not on medications at baseline.  IV PPI in setting of steroid therapy.  #. Diet: Regular diet    #. Last BM: this morning   #.  Miscellaneous: Antiemetic and bowel regimen medications ordered and available.    Heme:   #. Leukopenia, acute, suspect to be related to severe sepsis. resolved  #. Thrombocytopenia, acute, suspect to be related to severe sepsis.    Endo: POCT glucose monitoring q4H, low-intensity ISS available.    Access/Lines/Tubes: PIV x 2, PICC line     Prophylaxis:   #. Stress ulcer: PPI  #. Diet: regular  #. VTE: SCD    CODE: FULL    Billing: This  "patient is critically ill: Yes - increasing FiO2 needs  . Total critical care time today 35 min, excluding procedures.     Kadie Blanco, CNP  [facility] Pulmonary/Critical Care     Clinically Significant Risk Factors         # Hypernatremia: Highest Na = 146 mmol/L in last 2 days, will monitor as appropriate        # Thrombocytopenia: Lowest platelets = 120 in last 2 days, will monitor for bleeding             # Obesity: Estimated body mass index is 31.7 kg/m  as calculated from the following:    Height as of this encounter: 1.778 m (5' 10\").    Weight as of this encounter: 100.2 kg (220 lb 14.4 oz)., PRESENT ON ADMISSION      # Pacemaker present          ALLERGIES:  No Known Allergies      Subjective  Panic attack this morning - states he feels very anxious and cannot breathe. BP quite elevated at the time. Symptoms resolved with small dose lorazepam and labetalol.       Objective    PHYSICAL EXAM:  Temp:  [98  F (36.7  C)-98.7  F (37.1  C)] 98.6  F (37  C)  Pulse:  [59-96] 66  Resp:  [15-40] 21  BP: (102-186)/(57-95) 102/57  FiO2 (%):  [50 %] 50 %  SpO2:  [85 %-95 %] 91 %  FiO2 (%): 50 %, Resp: 21        Intake/Output Summary (Last 24 hours) at 9/23/2024 0728  Last data filed at 9/23/2024 0600  Gross per 24 hour   Intake 875 ml   Output 1150 ml   Net -275 ml           Physical Exam:   General:  in significant distress when I first saw him this morning.   HEENT: AT/NC; PERRL  CV: RRR, no M/R/G; extremities well perfused  Pulm: labored on AVAPS; lungs are diminished throughout, no wheeze.   Abd: distended;  hypo bowel sounds.   Msk: warm to touch, no peripheral edema  Derm: visible skin intact.   Neuro: grossly nonfocal   Psych: visibly anxious.       I have personally reviewed the following data over the past 24 hrs:    17.5 (H)  \   13.3   / 120 (L)     146 (H) 115 (H) 23.5 (H) /  82   3.8; 3.8 23 0.86 \       Imaging results reviewed over the past 24 hrs:     9/26 CXR - Stable right PICC. Increasing " consolidations in the mid to lower lungs. Increasing small pleural effusions. Stable heart size. Cardiac pacemaker.     9/24 CXR - personally reviewed by me today: Stable left subclavian approach pacemaker. Slight interval worsening of bilateral consolidation. No pneumothorax. Right PICC with tip near the cavoatrial junction. Stable cardiomediastinal silhouette             Recent Labs   Lab 09/26/24  0729 09/26/24  0343 09/25/24  2047 09/25/24  0738 09/25/24  0354 09/24/24  0358 09/24/24  0353 09/23/24  0046 09/22/24  1705   WBC  --  17.5*  --   --  14.6*  --  13.5*   < > 1.9*   HGB  --  13.3  --   --  12.8  --  12.8   < > 16.5   MCV  --  88  --   --  88  --  89   < > 88   PLT  --  120*  --   --  126*  --  120*   < > 143*   NA  --  146*  --   --  142  --  141   < > 137   POTASSIUM  --  3.8  3.8  --   --  3.6  --  4.5   < > 3.5   CHLORIDE  --  115*  --   --  111*  --  108*   < > 102   CO2  --  23  --   --  23  --  21*   < > 23   BUN  --  23.5*  --   --  22.7  --  18.8   < > 19.4   CR  --  0.86  --   --  0.83  --  0.88   < > 1.28*   ANIONGAP  --  8  --   --  8  --  12   < > 12   JOSSELYN  --  8.5*  --   --  8.5*  --  6.9*   < > 8.9   GLC 82 107* 151*   < > 160*   < > 132*   < > 108*   ALBUMIN  --   --   --   --   --   --   --   --  3.6   PROTTOTAL  --   --   --   --   --   --   --   --  6.9   BILITOTAL  --   --   --   --   --   --   --   --  0.8   ALKPHOS  --   --   --   --   --   --   --   --  65   ALT  --   --   --   --   --   --   --   --  13   AST  --   --   --   --   --   --   --   --  62*    < > = values in this interval not displayed.          Most Recent ABG:  Recent Labs   Lab Test 09/22/24 2036   PH 7.27*   PO2 82   PCO2 51*   HCO3 23   FERDINAND -4.2*     NOTE: Data reviewed over the past 24 hrs contributes toward MDM complexity

## 2024-09-26 NOTE — PLAN OF CARE
Goal Outcome Evaluation:      Plan of Care Reviewed With: patient, family    Overall Patient Progress: no changeOverall Patient Progress: no change    Outcome Evaluation: oxygen needs remain elevated and patient less responsive this afternoon.    [facility] - ICU    RN Progress Note:            Pertinent Assessments:      Please refer to flowsheet rows for full assessment     NSR with 1 degree AVB and BBB.  HR stable in the 80's-90's.  Afebrile.  Glucose 82, 79.  No BM.            Key Events - This Shift:       Panic attack this morning- required increasing oxygen requirements and IV ativan low dose x1.  Patient much more comfortable but less responsive.  Follows commands but he was unable to swallow any liquids and take pills this afternoon.  Opens eyes to pain. Bipap remains at 70%.  Lungs clear, diminished.  Large incontinent episode this afternoon requiring full bed change-patient laid flat for position changes and did desat to the mid 80%'s but no panicking.  Able to recover slowly once head was back up. ABG's drawn and nearly normal-provider aware.  Wilhelm placed and IV lasix administered.                 Barriers to Discharge / Downgrade:     Oxygen needs.         Point of Contact Update: YES-OR-NO: Yes  If No, reason:   Name:Archana  Phone Number:see chart  Summary of Conversation: at bedside- able to talk with care providers and updated on events of the day.

## 2024-09-26 NOTE — PROGRESS NOTES
"Care Management Follow Up    Length of Stay (days): 4    Expected Discharge Date: 10/01/2024    Anticipated Discharge Plan:  Family would prefer if pt returned home at discharge, with home PT/OT/RN/HHA, and family support.     Transportation: Anticipate Family/friend/ if safe to do so.     PT Recommendations: Transitional Care Facility  OT Recommendations:  (S) Transitional Care Facility     Barriers to Discharge: medical stability/ on HFNC.     Prior Living Situation: Pt lives with spouse Archana in a townhouse. Pt gets some assist with ADLS such as bathing from spouse, and has a shower chair, walker, and manual w/c(only used for outings). Pt gets assist with mostly all IADLS, pt does manage finances. Pt is STBA/A-1. Pt's spouse Archana assists with all. She states, \"he is of sound mind to do these things but he has such bad tremors so I assist him. \" Pt has no home care svcs prior. Pt is retired. Has an established PCP. Pt/ wife anticipate pt returning home with her and family support,and possible home care if recommended. Family to transport.       Discussed  Partnership in Safe Discharge Planning  document with patient/family: No     Handoff Completed: Not at this time     Patient/Spokesperson Updated: Yes. Who? Pt's wife Archana     Additional Information:    Therapy recs TCU. RNCM called pt's wife Archana.     Wife Archana stated, \"we are really trying to stay away from a rehab center, we would prefer that he returns home with any home care, and my children can assist too.\"     RNCM also gave the option if pt is needing 24/7 assist and wife needs some help that private pay home care is an option, at this time she thinks regular home care and family would be ok.       Referral placed for home PT/OT/RN/HHA (pending).       Next Steps: See accepting home care agency,and send orders at discharge. Continue to monitor therapy recs. Cm will continue to follow plan of care,review recommendations, and assist with any discharge needs " anticipated.       Lexis Capps RN

## 2024-09-26 NOTE — PLAN OF CARE
Goal Outcome Evaluation:       Outcome Evaluation: Wear Bipap through the night    [facility] - ICU    RN Progress Note:            Pertinent Assessments:      Please refer to flowsheet rows for full assessment     Respiratory           Key Events - This Shift:       Patient was able to wear Bipap until 0400 am this morning                 Barriers to Discharge / Downgrade:     O2 needs

## 2024-09-26 NOTE — PLAN OF CARE
Goal Outcome Evaluation:      Plan of Care Reviewed With: patient    Overall Patient Progress: improvingOverall Patient Progress: improving    Outcome Evaluation: Pt mentation [facility] - ICU    RN Progress Note:            Pertinent Assessments:      Please refer to flowsheet rows for full assessment     Pt mentation greatly improve this shift; pt noted be more alert and oriented x3 with inconsistent with time. Continues to be on Bipap 70% O2. Denies pain.            Key Events - This Shift:       Uneventful.                Barriers to Discharge / Downgrade:     On Bipap and continues ICU level of care          Point of Contact Update: YES-OR-NO: Yes  If No, reason:   Name:Family   Phone Number:  Summary of Conversation: Updated POC

## 2024-09-27 ENCOUNTER — APPOINTMENT (OUTPATIENT)
Dept: PHYSICAL THERAPY | Facility: HOSPITAL | Age: 79
DRG: 871 | End: 2024-09-27
Payer: COMMERCIAL

## 2024-09-27 LAB
ANION GAP SERPL CALCULATED.3IONS-SCNC: 11 MMOL/L (ref 7–15)
BACTERIA BLD CULT: NO GROWTH
BUN SERPL-MCNC: 24.1 MG/DL (ref 8–23)
CALCIUM SERPL-MCNC: 8.3 MG/DL (ref 8.8–10.4)
CHLORIDE SERPL-SCNC: 111 MMOL/L (ref 98–107)
CREAT SERPL-MCNC: 0.84 MG/DL (ref 0.51–1.17)
EGFRCR SERPLBLD CKD-EPI 2021: 89 ML/MIN/1.73M2
ERYTHROCYTE [DISTWIDTH] IN BLOOD BY AUTOMATED COUNT: 15.3 % (ref 10–15)
GLUCOSE BLDC GLUCOMTR-MCNC: 158 MG/DL (ref 70–99)
GLUCOSE BLDC GLUCOMTR-MCNC: 81 MG/DL (ref 70–99)
GLUCOSE BLDC GLUCOMTR-MCNC: 97 MG/DL (ref 70–99)
GLUCOSE BLDC GLUCOMTR-MCNC: 97 MG/DL (ref 70–99)
GLUCOSE SERPL-MCNC: 114 MG/DL (ref 70–99)
HCO3 SERPL-SCNC: 22 MMOL/L (ref 22–29)
HCT VFR BLD AUTO: 40.5 % (ref 35–53)
HGB BLD-MCNC: 13.2 G/DL (ref 11.7–17.7)
MAGNESIUM SERPL-MCNC: 2.2 MG/DL (ref 1.7–2.3)
MCH RBC QN AUTO: 28.4 PG (ref 26.5–33)
MCHC RBC AUTO-ENTMCNC: 32.6 G/DL (ref 31.5–36.5)
MCV RBC AUTO: 87 FL (ref 78–100)
PHOSPHATE SERPL-MCNC: 3.3 MG/DL (ref 2.5–4.5)
PLATELET # BLD AUTO: 114 10E3/UL (ref 150–450)
POTASSIUM SERPL-SCNC: 3.8 MMOL/L (ref 3.4–5.3)
POTASSIUM SERPL-SCNC: 3.9 MMOL/L (ref 3.4–5.3)
RBC # BLD AUTO: 4.65 10E6/UL (ref 3.8–5.9)
SODIUM SERPL-SCNC: 144 MMOL/L (ref 135–145)
WBC # BLD AUTO: 11.7 10E3/UL (ref 4–11)

## 2024-09-27 PROCEDURE — 87070 CULTURE OTHR SPECIMN AEROBIC: CPT | Performed by: NURSE PRACTITIONER

## 2024-09-27 PROCEDURE — 84132 ASSAY OF SERUM POTASSIUM: CPT | Performed by: NURSE PRACTITIONER

## 2024-09-27 PROCEDURE — 97110 THERAPEUTIC EXERCISES: CPT | Mod: GP

## 2024-09-27 PROCEDURE — 97530 THERAPEUTIC ACTIVITIES: CPT | Mod: GP

## 2024-09-27 PROCEDURE — 82310 ASSAY OF CALCIUM: CPT | Performed by: NURSE PRACTITIONER

## 2024-09-27 PROCEDURE — 250N000011 HC RX IP 250 OP 636: Performed by: INTERNAL MEDICINE

## 2024-09-27 PROCEDURE — G0008 ADMIN INFLUENZA VIRUS VAC: HCPCS | Performed by: NURSE PRACTITIONER

## 2024-09-27 PROCEDURE — 250N000009 HC RX 250: Performed by: INTERNAL MEDICINE

## 2024-09-27 PROCEDURE — 85027 COMPLETE CBC AUTOMATED: CPT | Performed by: INTERNAL MEDICINE

## 2024-09-27 PROCEDURE — 200N000001 HC R&B ICU

## 2024-09-27 PROCEDURE — 90662 IIV NO PRSV INCREASED AG IM: CPT | Performed by: NURSE PRACTITIONER

## 2024-09-27 PROCEDURE — 94640 AIRWAY INHALATION TREATMENT: CPT | Mod: 76

## 2024-09-27 PROCEDURE — 999N000157 HC STATISTIC RCP TIME EA 10 MIN

## 2024-09-27 PROCEDURE — 80048 BASIC METABOLIC PNL TOTAL CA: CPT | Performed by: NURSE PRACTITIONER

## 2024-09-27 PROCEDURE — 99233 SBSQ HOSP IP/OBS HIGH 50: CPT | Performed by: NURSE PRACTITIONER

## 2024-09-27 PROCEDURE — 250N000009 HC RX 250: Performed by: NURSE PRACTITIONER

## 2024-09-27 PROCEDURE — 94660 CPAP INITIATION&MGMT: CPT

## 2024-09-27 PROCEDURE — 250N000011 HC RX IP 250 OP 636: Performed by: NURSE PRACTITIONER

## 2024-09-27 PROCEDURE — 250N000013 HC RX MED GY IP 250 OP 250 PS 637: Performed by: NURSE PRACTITIONER

## 2024-09-27 PROCEDURE — 87077 CULTURE AEROBIC IDENTIFY: CPT | Performed by: NURSE PRACTITIONER

## 2024-09-27 PROCEDURE — 94640 AIRWAY INHALATION TREATMENT: CPT

## 2024-09-27 PROCEDURE — 84100 ASSAY OF PHOSPHORUS: CPT | Performed by: NURSE PRACTITIONER

## 2024-09-27 PROCEDURE — 83735 ASSAY OF MAGNESIUM: CPT | Performed by: INTERNAL MEDICINE

## 2024-09-27 PROCEDURE — 250N000013 HC RX MED GY IP 250 OP 250 PS 637: Performed by: INTERNAL MEDICINE

## 2024-09-27 RX ORDER — LISINOPRIL 5 MG/1
5 TABLET ORAL DAILY
Status: DISCONTINUED | OUTPATIENT
Start: 2024-09-27 | End: 2024-09-29

## 2024-09-27 RX ORDER — POTASSIUM CHLORIDE 29.8 MG/ML
20 INJECTION INTRAVENOUS ONCE
Status: COMPLETED | OUTPATIENT
Start: 2024-09-27 | End: 2024-09-27

## 2024-09-27 RX ADMIN — PIPERACILLIN AND TAZOBACTAM 3.38 G: 3; .375 INJECTION, POWDER, FOR SOLUTION INTRAVENOUS at 01:07

## 2024-09-27 RX ADMIN — METHYLPREDNISOLONE SODIUM SUCCINATE 40 MG: 40 INJECTION, POWDER, FOR SOLUTION INTRAMUSCULAR; INTRAVENOUS at 22:30

## 2024-09-27 RX ADMIN — IPRATROPIUM BROMIDE AND ALBUTEROL SULFATE 3 ML: .5; 3 SOLUTION RESPIRATORY (INHALATION) at 07:29

## 2024-09-27 RX ADMIN — METHADONE HYDROCHLORIDE 2.5 MG: 5 TABLET ORAL at 08:28

## 2024-09-27 RX ADMIN — INFLUENZA A VIRUS A/VICTORIA/4897/2022 IVR-238 (H1N1) ANTIGEN (FORMALDEHYDE INACTIVATED), INFLUENZA A VIRUS A/CALIFORNIA/122/2022 SAN-022 (H3N2) ANTIGEN (FORMALDEHYDE INACTIVATED), AND INFLUENZA B VIRUS B/MICHIGAN/01/2021 ANTIGEN (FORMALDEHYDE INACTIVATED) 0.5 ML: 60; 60; 60 INJECTION, SUSPENSION INTRAMUSCULAR at 11:14

## 2024-09-27 RX ADMIN — SENNOSIDES AND DOCUSATE SODIUM 1 TABLET: 8.6; 5 TABLET ORAL at 20:28

## 2024-09-27 RX ADMIN — PIPERACILLIN AND TAZOBACTAM 3.38 G: 3; .375 INJECTION, POWDER, FOR SOLUTION INTRAVENOUS at 17:13

## 2024-09-27 RX ADMIN — HYDRALAZINE HYDROCHLORIDE 10 MG: 20 INJECTION INTRAMUSCULAR; INTRAVENOUS at 05:25

## 2024-09-27 RX ADMIN — Medication 3 ML: at 20:10

## 2024-09-27 RX ADMIN — HYDROXYZINE HYDROCHLORIDE 10 MG: 10 TABLET ORAL at 05:57

## 2024-09-27 RX ADMIN — METHYLPREDNISOLONE SODIUM SUCCINATE 40 MG: 40 INJECTION, POWDER, FOR SOLUTION INTRAMUSCULAR; INTRAVENOUS at 17:13

## 2024-09-27 RX ADMIN — HEPARIN SODIUM 5000 UNITS: 10000 INJECTION, SOLUTION INTRAVENOUS; SUBCUTANEOUS at 05:25

## 2024-09-27 RX ADMIN — POTASSIUM CHLORIDE 20 MEQ: 29.8 INJECTION, SOLUTION INTRAVENOUS at 05:54

## 2024-09-27 RX ADMIN — METHADONE HYDROCHLORIDE 5 MG: 5 TABLET ORAL at 20:28

## 2024-09-27 RX ADMIN — PREGABALIN 100 MG: 100 CAPSULE ORAL at 14:29

## 2024-09-27 RX ADMIN — HEPARIN SODIUM 5000 UNITS: 10000 INJECTION, SOLUTION INTRAVENOUS; SUBCUTANEOUS at 14:29

## 2024-09-27 RX ADMIN — CARBIDOPA AND LEVODOPA 1 TABLET: 10; 100 TABLET ORAL at 20:29

## 2024-09-27 RX ADMIN — HEPARIN SODIUM 5000 UNITS: 10000 INJECTION, SOLUTION INTRAVENOUS; SUBCUTANEOUS at 22:30

## 2024-09-27 RX ADMIN — HYDROXYZINE HYDROCHLORIDE 10 MG: 10 TABLET ORAL at 23:54

## 2024-09-27 RX ADMIN — PREGABALIN 100 MG: 100 CAPSULE ORAL at 20:28

## 2024-09-27 RX ADMIN — IPRATROPIUM BROMIDE AND ALBUTEROL SULFATE 3 ML: .5; 3 SOLUTION RESPIRATORY (INHALATION) at 20:09

## 2024-09-27 RX ADMIN — SENNOSIDES AND DOCUSATE SODIUM 1 TABLET: 8.6; 5 TABLET ORAL at 08:27

## 2024-09-27 RX ADMIN — CARBIDOPA AND LEVODOPA 1 TABLET: 10; 100 TABLET ORAL at 14:30

## 2024-09-27 RX ADMIN — IPRATROPIUM BROMIDE AND ALBUTEROL SULFATE 3 ML: .5; 3 SOLUTION RESPIRATORY (INHALATION) at 11:47

## 2024-09-27 RX ADMIN — METHYLPREDNISOLONE SODIUM SUCCINATE 40 MG: 40 INJECTION, POWDER, FOR SOLUTION INTRAMUSCULAR; INTRAVENOUS at 04:11

## 2024-09-27 RX ADMIN — Medication 3 ML: at 07:29

## 2024-09-27 RX ADMIN — PIPERACILLIN AND TAZOBACTAM 3.38 G: 3; .375 INJECTION, POWDER, FOR SOLUTION INTRAVENOUS at 23:54

## 2024-09-27 RX ADMIN — HYDROXYZINE HYDROCHLORIDE 10 MG: 10 TABLET ORAL at 12:04

## 2024-09-27 RX ADMIN — POLYETHYLENE GLYCOL 3350 17 G: 17 POWDER, FOR SOLUTION ORAL at 08:26

## 2024-09-27 RX ADMIN — METHYLPREDNISOLONE SODIUM SUCCINATE 40 MG: 40 INJECTION, POWDER, FOR SOLUTION INTRAMUSCULAR; INTRAVENOUS at 11:08

## 2024-09-27 RX ADMIN — TOPIRAMATE 100 MG: 100 TABLET, FILM COATED ORAL at 20:29

## 2024-09-27 RX ADMIN — PIPERACILLIN AND TAZOBACTAM 3.38 G: 3; .375 INJECTION, POWDER, FOR SOLUTION INTRAVENOUS at 08:39

## 2024-09-27 RX ADMIN — Medication 3 ML: at 11:47

## 2024-09-27 RX ADMIN — CARBIDOPA AND LEVODOPA 1 TABLET: 10; 100 TABLET ORAL at 08:27

## 2024-09-27 RX ADMIN — FORMOTEROL FUMARATE 20 MCG: 20 SOLUTION RESPIRATORY (INHALATION) at 20:15

## 2024-09-27 RX ADMIN — LISINOPRIL 5 MG: 5 TABLET ORAL at 08:28

## 2024-09-27 RX ADMIN — PANTOPRAZOLE SODIUM 40 MG: 40 INJECTION, POWDER, FOR SOLUTION INTRAVENOUS at 08:31

## 2024-09-27 RX ADMIN — FORMOTEROL FUMARATE 20 MCG: 20 SOLUTION RESPIRATORY (INHALATION) at 07:29

## 2024-09-27 RX ADMIN — DULOXETINE HYDROCHLORIDE 60 MG: 60 CAPSULE, DELAYED RELEASE PELLETS ORAL at 08:26

## 2024-09-27 RX ADMIN — LABETALOL HYDROCHLORIDE 20 MG: 5 INJECTION, SOLUTION INTRAVENOUS at 14:07

## 2024-09-27 RX ADMIN — HYDROXYZINE HYDROCHLORIDE 10 MG: 10 TABLET ORAL at 18:23

## 2024-09-27 RX ADMIN — TOPIRAMATE 100 MG: 100 TABLET, FILM COATED ORAL at 08:30

## 2024-09-27 RX ADMIN — PREGABALIN 100 MG: 100 CAPSULE ORAL at 08:27

## 2024-09-27 ASSESSMENT — ACTIVITIES OF DAILY LIVING (ADL)
ADLS_ACUITY_SCORE: 38
ADLS_ACUITY_SCORE: 42
ADLS_ACUITY_SCORE: 37
ADLS_ACUITY_SCORE: 36
ADLS_ACUITY_SCORE: 37
ADLS_ACUITY_SCORE: 42
ADLS_ACUITY_SCORE: 38
ADLS_ACUITY_SCORE: 37
ADLS_ACUITY_SCORE: 38
ADLS_ACUITY_SCORE: 37
ADLS_ACUITY_SCORE: 42
ADLS_ACUITY_SCORE: 42
ADLS_ACUITY_SCORE: 37
ADLS_ACUITY_SCORE: 42
ADLS_ACUITY_SCORE: 38
ADLS_ACUITY_SCORE: 37
ADLS_ACUITY_SCORE: 37
ADLS_ACUITY_SCORE: 42

## 2024-09-27 NOTE — PLAN OF CARE
[facility] - ICU    RN Progress Note:            Pertinent Assessments:      Please refer to flowsheet rows for full assessment     Pt alertx4, forgetful about when which and when meds were given, reminded him throughout day. Scheduled iv methylprednisolone 40 mg q 6hr. Change in Schedule po methadone increased to 5mg q 12 (was 2.5mg). PRN po atarax 10 mg given at 1204 and 1823 for left foot neuropathy pain. Pt now on 5 L nasal cannula, pt ate 100% breakfast & 100% dinner (family brought in food/chick-mckayla-a), pt doesn't normally eat lunch.    Telemetry reads A-Paced.    Protocols:  K+: 3.8, am recheck, 9/28.  Magnesium: 2.2, am recheck, 9/28.  Phosphorus: 3.3, am recheck, 9/28.             Key Events - This Shift:       Change in Schedule po methadone increased to 5mg q 12 (was 2.5mg). (slowly increasing, pts home dose is 10 mg)  Sputum sample sent by RT  Nasal Cannula 5 L, SpO2 90%+.                Barriers to Discharge / Downgrade:     Nasal Cannula, IV zosyn, IV methylprednisolone.         Point of Contact Update: YES-OR-NO: Yes  If No, reason:   Name: Archana (wife)  Phone Number:  Summary of Conversation: Updated about medications, plan of care and goals.          Problem: Comorbidity Management  Goal: Blood Pressure in Desired Range  Outcome: Not Progressing  Intervention: Maintain Blood Pressure Management  Recent Flowsheet Documentation  Taken 9/27/2024 1200 by Bee Theodore, RN  Medication Review/Management: medications reviewed  Taken 9/27/2024 0800 by Bee Theodore, RN  Medication Review/Management: medications reviewed     Problem: Pneumonia  Goal: Fluid Balance  Outcome: Progressing  Goal: Resolution of Infection Signs and Symptoms  Outcome: Progressing  Goal: Effective Oxygenation and Ventilation  Outcome: Progressing  Intervention: Promote Airway Secretion Clearance  Recent Flowsheet Documentation  Taken 9/27/2024 1200 by Bee Theodore, RN  Cough And Deep Breathing: done  independently per patient  Taken 9/27/2024 0800 by Bee Theodore RN  Cough And Deep Breathing: done independently per patient  Intervention: Optimize Oxygenation and Ventilation  Recent Flowsheet Documentation  Taken 9/27/2024 0800 by Bee Theodore RN  Head of Bed (HOB) Positioning: HOB at 30-45 degrees     Problem: Comorbidity Management  Goal: Maintenance of COPD Symptom Control  Outcome: Progressing  Intervention: Maintain COPD Symptom Control  Recent Flowsheet Documentation  Taken 9/27/2024 1200 by Bee Theodore RN  Medication Review/Management: medications reviewed  Taken 9/27/2024 0800 by Bee Theodore RN  Medication Review/Management: medications reviewed  Goal: Blood Glucose Levels Within Targeted Range  Outcome: Progressing  Intervention: Monitor and Manage Glycemia  Recent Flowsheet Documentation  Taken 9/27/2024 1200 by Bee Theodore RN  Medication Review/Management: medications reviewed  Taken 9/27/2024 0800 by Bee Theodore RN  Medication Review/Management: medications reviewed

## 2024-09-27 NOTE — PLAN OF CARE
[facility] - ICU    RN Progress Note:            Pertinent Assessments:      Please refer to flowsheet rows for full assessment     Alert and oriented with occasional forgetfulness, remained in BIPAP most of the shift, Slept well until 6 am this morning. Patient very restless and anxious after that, tried turning and reposition every few mins. Gave prn vistaril with not much help. Switched to high flow from BIPAP as didn't want to be on BIPAP any more. Patient calms down when someone is in his room and talking to him.           Key Events - This Shift:       See above                Barriers to Discharge / Downgrade:     BIPAP, IV ABX           Goal Outcome Evaluation:      Plan of Care Reviewed With: patient    Overall Patient Progress: improvingOverall Patient Progress: improving

## 2024-09-27 NOTE — PROGRESS NOTES
Intensivist Progress note       Name: Romaine Farah  : 1945  MRN: [id number]  PCP: Simon Schmidt         ASSESSMENT/PLAN:  79 year old former smoker with history of chronic bronchitis on triple inhaler regimen with associated moderately severe restrictive lung disease, moderate SAMRA, SSS s/p PPM, chronic methadone therapy for idiopathic peripheral neuropathy, & GERD. Admitted on 2024 w/ acute hypoxic and hypercapnic respiratory failure, multifocal pneumonia, and septic shock.     Neuro/Psych:   #. Acute toxic metabolic encephalopathy,combined effect of hypoxia, hypercapnia, sepsis, and methadone effect. improved  #.Idiopathic peripheral neuropathy that is managed with a combination of Sinemet, duloxetine, Norco, methadone, pregabalin, and topiramate.  Methadone and lyrica doses decreased yesterday out of concern for oversedation.   Slowly uptitrate back to home doses. Will keep at Methadone 2.5 and Lyrica 100 today   head CT to follow-up on report of a fall was negative    Pulmonary:   #. Acute hypoxic respiratory failure secondary to multifocal pneumonia.  Complicated by baseline reduced diffusion capacity.   #. Acute hypercapnic respiratory failure, likely combination of multifocal pneumonia with an acute exacerbation of underlying bronchitis (being treated as COPD in outpatient setting), moderately restrictive lung disease, and opiate-induced somnolence.  #.Chronic bronchitis with COPD features, treated with a triple combination inhaler in outpatient setting, currently in an exacerbation.  Sees Dr. Dejesus with the most recent visit on 2024.  Continued on Bronchodilator   Improved O2 needs today. Continue SoluMedrol 40mg IV Q6h and start backing off tomorrow.    Scheduled DuoNeb QID and formoterol neb BID in view of PTA Trelegy (hold off on budesonide nebulizer as patient is receiving systemic corticosteroids)  3% NS QID with Volara for CPT. CXR with worsening consolidation this morning.  If  no response to these interventions will stop.   Mobilize to chair and work with PT/OT    Cardiovascular:   #. Shock, distributive/septic.  resolved  #. Baseline hypertension not on outpatient therapy. Currently BP WNL  #. Sick sinus syndrome status post PPM placement, follows with cardiology.  #echo 9-23   1. Normal left ventricular size and systolic performance with a visually  estimated ejection fraction of 60%.  2. No significant valvular heart disease is identified on this study.  3. Probable normal right ventricular size and systolic performance though  right-sided structures are not clearly visualized on all views on this study.  Plan:   Goal MAP >/= 65 and SBP >/= 90  PRN Hydralazine and Labetalol available   Start Lisinopril 5mg daily     Infectious:  #. Multifocal pneumonia, concern for a chronic aspiration component. Leukocytosis but on steroid and afebrile  zosyn + azithro continue.  MrSA PCR neg, stopped vanco 9/25  RVP negative and all micro neg so far  Blood culture pending NTD  Legionnella and strep pneumo neg  WBC down today, afebrile, procal down trending. Will put stop date on Zosyn of 10 days total.     Renal: baseline Cr ~1.0-1.1.   #. Borderline BERNICE, most likely combination of pre-renal vs septic ATN/AIN. Wilhelm removed.   #. Lactic acidosis, resolved.   Strict intake/output monitoring, avoiding nephrotoxic medications as able  High intensity electrolyte replacement protocols in place    GI:   #. GERD, not on medications at baseline.  IV PPI in setting of steroid therapy.  #. Diet: Regular diet    #. Last BM: 9/26  #.  Miscellaneous: Antiemetic and bowel regimen medications ordered and available.    Heme:   #. Leukopenia, acute, suspect to be related to severe sepsis. resolved  #. Thrombocytopenia, acute, suspect to be related to severe sepsis.    Endo: POCT glucose monitoring q4H, low-intensity ISS available.    Access/Lines/Tubes: PIV x 2, PICC line     Prophylaxis:   #. Stress ulcer: PPI  #.  "Diet: regular  #. VTE: SCD    CODE: FULL    Billing: This patient is critically ill: Yes - high FiO2 needs  . Total critical care time today 30 min, excluding procedures.     Kadie Blanco, CNP  [facility] Pulmonary/Critical Care     Clinically Significant Risk Factors         # Hypernatremia: Highest Na = 146 mmol/L in last 2 days, will monitor as appropriate        # Thrombocytopenia: Lowest platelets = 114 in last 2 days, will monitor for bleeding             # Obesity: Estimated body mass index is 30.81 kg/m  as calculated from the following:    Height as of this encounter: 1.778 m (5' 10\").    Weight as of this encounter: 97.4 kg (214 lb 11.2 oz).       # Pacemaker present          ALLERGIES:  No Known Allergies      Subjective  Significant improvement today; up in chair and chatty, smiling. He's feeling better but feels very \"antsy\" - explained to him that is likely the high dose of steroids he's receiving. Breathing is much improved, no nausea.       Objective    PHYSICAL EXAM:  Temp:  [97.4  F (36.3  C)-99  F (37.2  C)] 97.4  F (36.3  C)  Pulse:  [] 60  Resp:  [8-33] 25  BP: (100-191)/() 149/70  FiO2 (%):  [60 %-70 %] 60 %  SpO2:  [86 %-97 %] 96 %  FiO2 (%): 60 %, Resp: 25        Intake/Output Summary (Last 24 hours) at 9/23/2024 0728  Last data filed at 9/23/2024 0600  Gross per 24 hour   Intake 875 ml   Output 1150 ml   Net -275 ml           Physical Exam:   General: awake and alert. No distress  HEENT: AT/NC; PERRL  CV: RRR, no M/R/G; extremities well perfused  Pulm: Unlabored on HFNC; lungs are diminished, no wheeze.    Abd: distended but better than previous days. j  Msk: warm to touch, no peripheral edema  Derm: visible skin intact.   Neuro: grossly nonfocal   Psych: calm      I have personally reviewed the following data over the past 24 hrs:    11.7 (H)  \   13.2   / 114 (L)     144 111 (H) 24.1 (H) /  97   3.8; 3.9 22 0.84 \     Procal: N/A CRP: N/A Lactic Acid: N/A       "   Imaging results reviewed over the past 24 hrs:     9/26 CXR - Stable right PICC. Increasing consolidations in the mid to lower lungs. Increasing small pleural effusions. Stable heart size. Cardiac pacemaker.     9/24 CXR - personally reviewed by me today: Stable left subclavian approach pacemaker. Slight interval worsening of bilateral consolidation. No pneumothorax. Right PICC with tip near the cavoatrial junction. Stable cardiomediastinal silhouette             Recent Labs   Lab 09/27/24  0803 09/27/24  0428 09/26/24  2042 09/26/24  0729 09/26/24  0343 09/25/24  0738 09/25/24  0354 09/23/24  0046 09/22/24  1705   WBC  --  11.7*  --   --  17.5*  --  14.6*   < > 1.9*   HGB  --  13.2  --   --  13.3  --  12.8   < > 16.5   MCV  --  87  --   --  88  --  88   < > 88   PLT  --  114*  --   --  120*  --  126*   < > 143*   NA  --  144  --   --  146*  --  142   < > 137   POTASSIUM  --  3.9  3.8  --   --  3.8  3.8  --  3.6   < > 3.5   CHLORIDE  --  111*  --   --  115*  --  111*   < > 102   CO2  --  22  --   --  23  --  23   < > 23   BUN  --  24.1*  --   --  23.5*  --  22.7   < > 19.4   CR  --  0.84  --   --  0.86  --  0.83   < > 1.28*   ANIONGAP  --  11  --   --  8  --  8   < > 12   JOSSELYN  --  8.3*  --   --  8.5*  --  8.5*   < > 8.9   GLC 97 114* 146*   < > 107*   < > 160*   < > 108*   ALBUMIN  --   --   --   --   --   --   --   --  3.6   PROTTOTAL  --   --   --   --   --   --   --   --  6.9   BILITOTAL  --   --   --   --   --   --   --   --  0.8   ALKPHOS  --   --   --   --   --   --   --   --  65   ALT  --   --   --   --   --   --   --   --  13   AST  --   --   --   --   --   --   --   --  62*    < > = values in this interval not displayed.          Most Recent ABG:  Recent Labs   Lab Test 09/26/24  1336   PH 7.33*   PO2 84   PCO2 43   HCO3 23   FERDINAND -3.4*     NOTE: Data reviewed over the past 24 hrs contributes toward MDM complexity

## 2024-09-27 NOTE — PROGRESS NOTES
"CLINICAL NUTRITION SERVICES - ASSESSMENT NOTE     Nutrition Prescription    RECOMMENDATIONS FOR MDs/PROVIDERS TO ORDER:      Malnutrition Status:    Not noted at this time    Recommendations already ordered by Registered Dietitian (RD):  Ensure Enlive daily    Future/Additional Recommendations:       REASON FOR ASSESSMENT  Romaine Farah is a/an 79 year old adult assessed by the dietitian for LOS    79 year old former smoker with history of chronic bronchitis, SAMRA, SSS s/p PPM, chronic methadone therapy for idiopathic peripheral neuropathy, & GERD. Admitted on 9/22/2024 w/ acute hypoxic and hypercapnic respiratory failure, multifocal pneumonia, and septic shock.     NUTRITION HISTORY  Met patient, patient's spouse and daughter.  Patient reports that he eats 2 meals/day at home.  Patient ate a good breakfast this am.    CURRENT NUTRITION ORDERS  Diet: Regular  Four meals ordered this admission.    Patient was NPO 9/22-24.  Speech therapist saw patient 9/24 for swallow eval.      LABS  Labs reviewed    MEDICATIONS  Medications reviewed    ANTHROPOMETRICS  Height: 177.8 cm (5' 10\")  Most Recent Weight: 97.4 kg (214 lb 11.2 oz)    BMI: Obesity Grade I BMI 30-34.9  Weight History:   09/23/24 : 97.8 kg (215 lb 8 oz)   09/11/24 : 95.1 kg (209 lb 9.6 oz)   06/03/24 : 209.8 lbs   03/20/24 : 92.5 kg (204 lb)   02/27/24 : 96.2 kg (212 lb)   08/28/23 : 96.2 kg (212 lb)     Dosing Weight: 97.4 kg    ASSESSED NUTRITION NEEDS  Estimated Energy Needs: 1950+ kcals/day (20+ kcals/kg)  Justification: Increased needs  Estimated Protein Needs: 77+ grams protein/day (0.8+ grams of pro/kg)  Justification: Increased needs  Estimated Fluid Needs: 1950+ mL/day (1 mL/kcal)   Justification: Maintenance    PHYSICAL FINDINGS  See malnutrition section below.  Per chart:  LBM 9/26/24  Skin-buttocks-moisture damage    MALNUTRITION:  % Weight Loss:  Weight loss does not meet criteria for malnutrition   % Intake:  Decreased intake does not meet " criteria for malnutrition 4 days with decreased intake this admission  Subcutaneous Fat Loss:  None observed  Muscle Loss:  None observed  Fluid Retention:  None noted    Malnutrition Diagnosis: Patient does not meet two of the above criteria necessary for diagnosing malnutrition    NUTRITION DIAGNOSIS  Inadequate oral intake related to acute illness with  as evidenced by NPO x 3 days, and minimal intake 9/26/24.       INTERVENTIONS  Implementation  Nutrition Education: We discussed protein needs for healing/repletion.     Medical food supplement therapy -Start Ensure Enlive, chocolate, daily.     Goals  Patient to consume % of nutritionally adequate meals three times per day, or the equivalent with supplements/snacks.     Monitoring/Evaluation  Progress toward goals will be monitored and evaluated per protocol.  a

## 2024-09-28 LAB
ERYTHROCYTE [DISTWIDTH] IN BLOOD BY AUTOMATED COUNT: 15.3 % (ref 10–15)
GLUCOSE BLDC GLUCOMTR-MCNC: 102 MG/DL (ref 70–99)
GLUCOSE BLDC GLUCOMTR-MCNC: 107 MG/DL (ref 70–99)
GLUCOSE BLDC GLUCOMTR-MCNC: 114 MG/DL (ref 70–99)
GLUCOSE BLDC GLUCOMTR-MCNC: 139 MG/DL (ref 70–99)
GLUCOSE BLDC GLUCOMTR-MCNC: 150 MG/DL (ref 70–99)
HCT VFR BLD AUTO: 41.5 % (ref 35–53)
HGB BLD-MCNC: 13.6 G/DL (ref 11.7–17.7)
MAGNESIUM SERPL-MCNC: 2.1 MG/DL (ref 1.7–2.3)
MCH RBC QN AUTO: 28.2 PG (ref 26.5–33)
MCHC RBC AUTO-ENTMCNC: 32.8 G/DL (ref 31.5–36.5)
MCV RBC AUTO: 86 FL (ref 78–100)
PHOSPHATE SERPL-MCNC: 2.8 MG/DL (ref 2.5–4.5)
PLATELET # BLD AUTO: 136 10E3/UL (ref 150–450)
POTASSIUM SERPL-SCNC: 3.6 MMOL/L (ref 3.4–5.3)
RBC # BLD AUTO: 4.82 10E6/UL (ref 3.8–5.9)
WBC # BLD AUTO: 13.8 10E3/UL (ref 4–11)

## 2024-09-28 PROCEDURE — 250N000009 HC RX 250: Performed by: NURSE PRACTITIONER

## 2024-09-28 PROCEDURE — 250N000009 HC RX 250: Performed by: INTERNAL MEDICINE

## 2024-09-28 PROCEDURE — 200N000001 HC R&B ICU

## 2024-09-28 PROCEDURE — 84100 ASSAY OF PHOSPHORUS: CPT | Performed by: NURSE PRACTITIONER

## 2024-09-28 PROCEDURE — 250N000013 HC RX MED GY IP 250 OP 250 PS 637: Performed by: NURSE PRACTITIONER

## 2024-09-28 PROCEDURE — 94640 AIRWAY INHALATION TREATMENT: CPT

## 2024-09-28 PROCEDURE — 250N000011 HC RX IP 250 OP 636: Performed by: NURSE PRACTITIONER

## 2024-09-28 PROCEDURE — 99291 CRITICAL CARE FIRST HOUR: CPT | Performed by: INTERNAL MEDICINE

## 2024-09-28 PROCEDURE — 83735 ASSAY OF MAGNESIUM: CPT | Performed by: INTERNAL MEDICINE

## 2024-09-28 PROCEDURE — 250N000013 HC RX MED GY IP 250 OP 250 PS 637: Performed by: INTERNAL MEDICINE

## 2024-09-28 PROCEDURE — 250N000011 HC RX IP 250 OP 636: Performed by: INTERNAL MEDICINE

## 2024-09-28 PROCEDURE — 999N000287 HC ICU ADULT ROUNDING, EACH 10 MINS

## 2024-09-28 PROCEDURE — 999N000157 HC STATISTIC RCP TIME EA 10 MIN

## 2024-09-28 PROCEDURE — 85027 COMPLETE CBC AUTOMATED: CPT | Performed by: INTERNAL MEDICINE

## 2024-09-28 PROCEDURE — 84132 ASSAY OF SERUM POTASSIUM: CPT | Performed by: NURSE PRACTITIONER

## 2024-09-28 PROCEDURE — 94640 AIRWAY INHALATION TREATMENT: CPT | Mod: 76

## 2024-09-28 PROCEDURE — 94660 CPAP INITIATION&MGMT: CPT

## 2024-09-28 RX ORDER — LIDOCAINE HYDROCHLORIDE 20 MG/ML
JELLY TOPICAL EVERY 4 HOURS PRN
Status: DISCONTINUED | OUTPATIENT
Start: 2024-09-28 | End: 2024-10-02 | Stop reason: HOSPADM

## 2024-09-28 RX ORDER — LANOLIN ALCOHOL/MO/W.PET/CERES
3 CREAM (GRAM) TOPICAL
Status: DISCONTINUED | OUTPATIENT
Start: 2024-09-28 | End: 2024-09-29

## 2024-09-28 RX ORDER — METHYLPREDNISOLONE SODIUM SUCCINATE 40 MG/ML
40 INJECTION, POWDER, LYOPHILIZED, FOR SOLUTION INTRAMUSCULAR; INTRAVENOUS EVERY 12 HOURS
Status: DISCONTINUED | OUTPATIENT
Start: 2024-09-28 | End: 2024-09-29

## 2024-09-28 RX ORDER — POTASSIUM CHLORIDE 29.8 MG/ML
20 INJECTION INTRAVENOUS ONCE
Status: COMPLETED | OUTPATIENT
Start: 2024-09-28 | End: 2024-09-28

## 2024-09-28 RX ADMIN — METHADONE HYDROCHLORIDE 2.5 MG: 5 TABLET ORAL at 20:55

## 2024-09-28 RX ADMIN — HYDROXYZINE HYDROCHLORIDE 10 MG: 10 TABLET ORAL at 06:21

## 2024-09-28 RX ADMIN — FORMOTEROL FUMARATE 20 MCG: 20 SOLUTION RESPIRATORY (INHALATION) at 07:48

## 2024-09-28 RX ADMIN — PREGABALIN 100 MG: 100 CAPSULE ORAL at 14:11

## 2024-09-28 RX ADMIN — HYDROXYZINE HYDROCHLORIDE 10 MG: 10 TABLET ORAL at 13:19

## 2024-09-28 RX ADMIN — CARBIDOPA AND LEVODOPA 1 TABLET: 10; 100 TABLET ORAL at 14:11

## 2024-09-28 RX ADMIN — PREGABALIN 100 MG: 100 CAPSULE ORAL at 08:19

## 2024-09-28 RX ADMIN — DULOXETINE HYDROCHLORIDE 60 MG: 60 CAPSULE, DELAYED RELEASE PELLETS ORAL at 08:19

## 2024-09-28 RX ADMIN — PIPERACILLIN AND TAZOBACTAM 3.38 G: 3; .375 INJECTION, POWDER, FOR SOLUTION INTRAVENOUS at 17:36

## 2024-09-28 RX ADMIN — IPRATROPIUM BROMIDE AND ALBUTEROL SULFATE 3 ML: .5; 3 SOLUTION RESPIRATORY (INHALATION) at 07:48

## 2024-09-28 RX ADMIN — IPRATROPIUM BROMIDE AND ALBUTEROL SULFATE 3 ML: .5; 3 SOLUTION RESPIRATORY (INHALATION) at 16:46

## 2024-09-28 RX ADMIN — IPRATROPIUM BROMIDE AND ALBUTEROL SULFATE 3 ML: .5; 3 SOLUTION RESPIRATORY (INHALATION) at 11:43

## 2024-09-28 RX ADMIN — HEPARIN SODIUM 5000 UNITS: 10000 INJECTION, SOLUTION INTRAVENOUS; SUBCUTANEOUS at 06:20

## 2024-09-28 RX ADMIN — HYDRALAZINE HYDROCHLORIDE 10 MG: 20 INJECTION INTRAMUSCULAR; INTRAVENOUS at 00:10

## 2024-09-28 RX ADMIN — PIPERACILLIN AND TAZOBACTAM 3.38 G: 3; .375 INJECTION, POWDER, FOR SOLUTION INTRAVENOUS at 23:50

## 2024-09-28 RX ADMIN — FORMOTEROL FUMARATE 20 MCG: 20 SOLUTION RESPIRATORY (INHALATION) at 20:09

## 2024-09-28 RX ADMIN — POTASSIUM CHLORIDE 20 MEQ: 29.8 INJECTION, SOLUTION INTRAVENOUS at 06:18

## 2024-09-28 RX ADMIN — METHADONE HYDROCHLORIDE 5 MG: 5 TABLET ORAL at 23:46

## 2024-09-28 RX ADMIN — PREGABALIN 100 MG: 100 CAPSULE ORAL at 20:13

## 2024-09-28 RX ADMIN — PANTOPRAZOLE SODIUM 40 MG: 40 INJECTION, POWDER, FOR SOLUTION INTRAVENOUS at 08:17

## 2024-09-28 RX ADMIN — CARBIDOPA AND LEVODOPA 1 TABLET: 10; 100 TABLET ORAL at 08:19

## 2024-09-28 RX ADMIN — METHADONE HYDROCHLORIDE 5 MG: 5 TABLET ORAL at 08:18

## 2024-09-28 RX ADMIN — METHYLPREDNISOLONE SODIUM SUCCINATE 40 MG: 40 INJECTION, POWDER, FOR SOLUTION INTRAMUSCULAR; INTRAVENOUS at 03:44

## 2024-09-28 RX ADMIN — HYDROXYZINE HYDROCHLORIDE 10 MG: 10 TABLET ORAL at 20:13

## 2024-09-28 RX ADMIN — CARBIDOPA AND LEVODOPA 1 TABLET: 10; 100 TABLET ORAL at 20:12

## 2024-09-28 RX ADMIN — HEPARIN SODIUM 5000 UNITS: 10000 INJECTION, SOLUTION INTRAVENOUS; SUBCUTANEOUS at 20:56

## 2024-09-28 RX ADMIN — LISINOPRIL 5 MG: 5 TABLET ORAL at 08:18

## 2024-09-28 RX ADMIN — PIPERACILLIN AND TAZOBACTAM 3.38 G: 3; .375 INJECTION, POWDER, FOR SOLUTION INTRAVENOUS at 08:17

## 2024-09-28 RX ADMIN — POLYETHYLENE GLYCOL 3350 17 G: 17 POWDER, FOR SOLUTION ORAL at 08:17

## 2024-09-28 RX ADMIN — TOPIRAMATE 100 MG: 100 TABLET, FILM COATED ORAL at 08:18

## 2024-09-28 RX ADMIN — TOPIRAMATE 100 MG: 100 TABLET, FILM COATED ORAL at 20:13

## 2024-09-28 RX ADMIN — Medication 3 ML: at 07:48

## 2024-09-28 RX ADMIN — HEPARIN SODIUM 5000 UNITS: 10000 INJECTION, SOLUTION INTRAVENOUS; SUBCUTANEOUS at 14:11

## 2024-09-28 RX ADMIN — IPRATROPIUM BROMIDE AND ALBUTEROL SULFATE 3 ML: .5; 3 SOLUTION RESPIRATORY (INHALATION) at 20:07

## 2024-09-28 RX ADMIN — LIDOCAINE HYDROCHLORIDE: 20 JELLY TOPICAL at 21:03

## 2024-09-28 RX ADMIN — METHYLPREDNISOLONE SODIUM SUCCINATE 40 MG: 40 INJECTION, POWDER, FOR SOLUTION INTRAMUSCULAR; INTRAVENOUS at 11:25

## 2024-09-28 RX ADMIN — Medication 3 MG: at 20:13

## 2024-09-28 RX ADMIN — METHYLPREDNISOLONE SODIUM SUCCINATE 40 MG: 40 INJECTION INTRAMUSCULAR; INTRAVENOUS at 20:55

## 2024-09-28 RX ADMIN — METHADONE HYDROCHLORIDE 2.5 MG: 5 TABLET ORAL at 09:18

## 2024-09-28 ASSESSMENT — ACTIVITIES OF DAILY LIVING (ADL)
ADLS_ACUITY_SCORE: 34
ADLS_ACUITY_SCORE: 36
ADLS_ACUITY_SCORE: 35
ADLS_ACUITY_SCORE: 36
ADLS_ACUITY_SCORE: 35
ADLS_ACUITY_SCORE: 33
ADLS_ACUITY_SCORE: 36
ADLS_ACUITY_SCORE: 36
ADLS_ACUITY_SCORE: 35
ADLS_ACUITY_SCORE: 36
ADLS_ACUITY_SCORE: 35
ADLS_ACUITY_SCORE: 36
ADLS_ACUITY_SCORE: 36
ADLS_ACUITY_SCORE: 35
ADLS_ACUITY_SCORE: 36
ADLS_ACUITY_SCORE: 35
ADLS_ACUITY_SCORE: 35

## 2024-09-28 NOTE — PLAN OF CARE
[facility] - ICU    RN Progress Note:            Pertinent Assessments:      Please refer to flowsheet rows for full assessment     Pt did not sleep overnight at all. Pt alertx4, on oxymask 8 L with SpO2 94%. Lung sounds diminished, clear and small crackles on RLL. Pt ate Late breakfast 100% an dinner 100%.     Telemetry reads Normal Sinus Rhythm.    Protocols:   K+: 3.6, am recheck, 9/29.  Magnesium: 2.1, am recheck, 9/29.  Phosphorus: 2.8, am recheck, 9/29.             Key Events - This Shift:       Pt off Highflow. Placed on 6 L Nasal cannula for 30 min with SpO2 88-90%, placed back on oxymask 8 L.  Po methadone increased to 7.5 mg q 12 hr (from 5mg q 12hr).  PRN po melatonin 3 mg added for bedtime.                Barriers to Discharge / Downgrade:     Highflow needs overnight, IV abx, ambulation.         Point of Contact Update: YES-OR-NO: Yes  If No, reason:   Name: Archana (Wife)  Phone Number:  Summary of Conversation: Updated about plan of care, medications and goals.            Problem: Risk for Delirium  Goal: Improved Sleep  Outcome: Not Progressing     Problem: Risk for Delirium  Goal: Optimal Coping  Outcome: Progressing     Problem: Pneumonia  Goal: Fluid Balance  Outcome: Progressing  Goal: Resolution of Infection Signs and Symptoms  Outcome: Progressing  Goal: Effective Oxygenation and Ventilation  Outcome: Progressing  Intervention: Promote Airway Secretion Clearance  Recent Flowsheet Documentation  Taken 9/28/2024 1600 by Bee Theodore RN  Cough And Deep Breathing: done independently per patient  Taken 9/28/2024 1200 by Bee Theodore RN  Cough And Deep Breathing: done independently per patient  Taken 9/28/2024 0800 by Bee Theodore RN  Cough And Deep Breathing: done independently per patient     Problem: Comorbidity Management  Goal: Maintenance of COPD Symptom Control  Outcome: Progressing  Intervention: Maintain COPD Symptom Control  Recent Flowsheet  Documentation  Taken 9/28/2024 1600 by Bee Theodore RN  Medication Review/Management: medications reviewed  Taken 9/28/2024 1200 by Bee Theodore RN  Medication Review/Management: medications reviewed  Taken 9/28/2024 0800 by Bee Theodore RN  Medication Review/Management: medications reviewed  Goal: Blood Glucose Levels Within Targeted Range  Outcome: Progressing  Intervention: Monitor and Manage Glycemia  Recent Flowsheet Documentation  Taken 9/28/2024 1600 by Bee Theodore RN  Glycemic Management: blood glucose monitored  Medication Review/Management: medications reviewed  Taken 9/28/2024 1200 by Bee Theodore RN  Glycemic Management: blood glucose monitored  Medication Review/Management: medications reviewed  Taken 9/28/2024 0800 by Bee Theodore RN  Glycemic Management: blood glucose monitored  Medication Review/Management: medications reviewed  Goal: Blood Pressure in Desired Range  Outcome: Progressing  Intervention: Maintain Blood Pressure Management  Recent Flowsheet Documentation  Taken 9/28/2024 1600 by Bee Theodore RN  Medication Review/Management: medications reviewed  Taken 9/28/2024 1200 by Bee Theodore RN  Medication Review/Management: medications reviewed  Taken 9/28/2024 0800 by Bee Theodore RN  Medication Review/Management: medications reviewed

## 2024-09-28 NOTE — PROGRESS NOTES
"Critical Care Progress Note      09/28/2024    Name: Romaine Farah MRN#: [id number]   Age: 79 year old YOB: 1945     Hsptl Day# 6  ICU DAY #    MV DAY #             Problem List:   Active Problems:    Acute respiratory failure with hypoxia and hypercapnia (H)    Pneumonia of both lower lobes due to infectious organism    Sepsis, due to unspecified organism, unspecified whether acute organ dysfunction present (H)    Clinically Significant Risk Factors                # Thrombocytopenia: Lowest platelets = 114 in last 2 days, will monitor for bleeding             # Obesity: Estimated body mass index is 31.35 kg/m  as calculated from the following:    Height as of this encounter: 1.778 m (5' 10\").    Weight as of this encounter: 99.1 kg (218 lb 8 oz).       # Pacemaker present                         Summary/Hospital Course:     79 year old former smoker with history of chronic bronchitis on triple inhaler regimen with associated moderately severe restrictive lung disease, moderate SAMRA, SSS s/p PPM, chronic methadone therapy for idiopathic peripheral neuropathy, & GERD. Admitted on 9/22/2024 w/ acute hypoxic and hypercapnic respiratory failure, multifocal pneumonia, and septic shock.       Assessment and plan :       I have personally reviewed the daily labs, imaging studies, cultures and discussed the case with referring physician and consulting physicians.     My assessment and plan by system for this patient is as follows:    Neurology/Psychiatry:   Sinemet I presume for tremors  Neuropathy  Currently on duloxetine, methadone, pregabalin and topiramate  Home dose of methadone is 10 mg twice daily, currently on 5 due to sleepiness on presentation.  Increased to 7-1/2 twice daily      Cardiovascular:   Sick sinus syndrome status post pacemaker  Not on pressors  Normal LV function      Pulmonary/Ventilator Management:   Chronic bronchitis  COPD  Acute hypoxic and hypercapnic respiratory " 2nd attempt - Left message to call back to schedule appointment with physician. failure    Yesterday he was down to 6 L nasal cannula but then overnight he required BiPAP and high flow.  Currently weaning high flow nasal cannula    Bilateral infiltrates on presentation, questionable due to aspiration due to decreased mental status  Sputum culture from yesterday is negative    Trial of hypertonic saline and volar wrap.  Minimal sputum output so stopped.  Decrease Solu-Medrol to 40 twice daily    GI and Nutrition :   As tolerated      Renal/Fluids/Electrolytes:     - monitor function and electrolytes as needed with replacement per ICU protocols.  - generally avoid nephrotoxic agents such as NSAID, IV contrast unless specifically required  - adjust medications as needed for renal clearance  - follow I/O's as appropriate.    Infectious Disease:   Chronic aspiration pneumonia.  Zosyn with stop date 10 days      IV/Access:   1. Venous access -   2. Arterial access -   3.  Plan  - central access required and necessary      ICU Prophylaxis:   1. DVT: Hep Subq    3. Stress Ulcer: PPI           Key Medications:     Current Facility-Administered Medications   Medication Dose Route Frequency Provider Last Rate Last Admin    carbidopa-levodopa (SINEMET)  MG per tablet 1 tablet  1 tablet Oral TID Miguel Hartmann MD   1 tablet at 09/28/24 0819    DULoxetine (CYMBALTA) DR capsule 60 mg  60 mg Oral QAM Miguel Hartmann MD   60 mg at 09/28/24 0819    formoterol (PERFOROMIST) neb solution 20 mcg  20 mcg Nebulization 2 times daily Miguel Hartmann MD   20 mcg at 09/28/24 0748    heparin ANTICOAGULANT injection 5,000 Units  5,000 Units Subcutaneous Q8H Kadie Blanco APRN CNP   5,000 Units at 09/28/24 0620    insulin aspart (NovoLOG) injection (RAPID ACTING)  1-7 Units Subcutaneous TID AC Kadie Blanco APRN CNP        insulin aspart (NovoLOG) injection (RAPID ACTING)  1-5 Units Subcutaneous At Bedtime Kadie Blanco APRN CNP        ipratropium - albuterol 0.5 mg/2.5  mg/3 mL (DUONEB) neb solution 3 mL  3 mL Nebulization 4x daily Miguel Hartmann MD   3 mL at 09/28/24 0748    lisinopril (ZESTRIL) tablet 5 mg  5 mg Oral Daily Kadie Blanco APRN CNP   5 mg at 09/28/24 0818    methadone (DOLOPHINE) half-tab 7.5 mg  7.5 mg Oral Q12H VIDAL (08/20) Amanda Walker MD        [Held by provider] methadone (DOLOPHINE) tablet 10 mg  10 mg Oral BID Miguel Hartmann MD        methylPREDNISolone sodium succinate (SOLU-MEDROL) injection 40 mg  40 mg Intravenous Q6H Kadie Blanco APRN CNP   40 mg at 09/28/24 0344    pantoprazole (PROTONIX) IV push injection 40 mg  40 mg Intravenous Daily with breakfast Alize Baron MD   40 mg at 09/28/24 0817    piperacillin-tazobactam (ZOSYN) 3.375 g vial to attach to  mL bag  3.375 g Intravenous Q8H Kadie Blanco APRN CNP   3.375 g at 09/28/24 0817    polyethylene glycol (MIRALAX) Packet 17 g  17 g Oral Daily Kadie Blanco APRN CNP   17 g at 09/28/24 0817    pregabalin (LYRICA) capsule 100 mg  100 mg Oral TID Kadie Blanco APRN CNP   100 mg at 09/28/24 0819    senna-docusate (SENOKOT-S/PERICOLACE) 8.6-50 MG per tablet 1 tablet  1 tablet Oral BID Kadie Blanco APRN CNP   1 tablet at 09/27/24 2028    sodium chloride (PF) 0.9% PF flush 10-40 mL  10-40 mL Intracatheter Q7 Days Israel Shipman DO   20 mL at 09/22/24 2210    topiramate (TOPAMAX) tablet 100 mg  100 mg Oral BID Miguel Hartmann MD   100 mg at 09/28/24 0818     Current Facility-Administered Medications   Medication Dose Route Frequency Provider Last Rate Last Admin    No lozenges or gum should be given while patient on BIPAP/AVAPS/AVAPS AE   Does not apply Continuous PRN Kadie Blanco APRN CNP        Patient may continue current oral medications   Does not apply Continuous PRN Kadie Blanco APRN CNP                   Physical Examination:   Temp:  [97.6  F (36.4  C)-99.1  F (37.3  C)] 97.6  F (36.4  C)  Pulse:  [60-81]  62  Resp:  [18-28] 19  BP: (109-196)/(54-86) 133/60  FiO2 (%):  [45 %-70 %] 50 %  SpO2:  [90 %-98 %] 91 %    Intake/Output Summary (Last 24 hours) at 9/28/2024 1105  Last data filed at 9/28/2024 0800  Gross per 24 hour   Intake 809 ml   Output 2300 ml   Net -1491 ml     Wt Readings from Last 4 Encounters:   09/28/24 99.1 kg (218 lb 8 oz)   09/11/24 95.1 kg (209 lb 9.6 oz)   03/20/24 92.5 kg (204 lb)   02/27/24 96.2 kg (212 lb)     BP - Mean:  [] 87  FiO2 (%): 50 %, Resp: 19  Recent Labs   Lab 09/26/24  1336 09/25/24  1220 09/25/24  0354 09/25/24  0022 09/22/24  2233 09/22/24 2036   PH 7.33*  --   --   --   --  7.27*   PCO2 43  --   --   --   --  51*   PO2 84  --   --   --   --  82   HCO3 23  --   --   --   --  23   O2PER 70 70 50 53   < > 100    < > = values in this interval not displayed.       GEN: no acute distress   HEENT: head ncat, sclera anicteric, OP patent, trachea midline   PULM: Decreased breath sounds bibasilar  CV/COR: RRR S1S2 no gallop,  No rub, no murmur  ABD: soft nontender, hypoactive bowel sounds, no mass  EXT: No significant edema  NEURO: grossly intact  SKIN: no obvious rash  LINES: clean, dry intact         Data:   All data and imaging reviewed     ROUTINE ICU LABS (Last four results)  CMP  Recent Labs   Lab 09/28/24  0812 09/28/24  0356 09/27/24 2023 09/27/24  1611 09/27/24  0803 09/27/24  0428 09/26/24  0729 09/26/24  0343 09/25/24  0738 09/25/24  0354 09/24/24  0358 09/24/24  0353 09/22/24  1951 09/22/24  1705   NA  --   --   --   --   --  144  --  146*  --  142  --  141   < > 137   POTASSIUM  --  3.6  --   --   --  3.9  3.8  --  3.8  3.8  --  3.6  --  4.5   < > 3.5   CHLORIDE  --   --   --   --   --  111*  --  115*  --  111*  --  108*   < > 102   CO2  --   --   --   --   --  22  --  23  --  23  --  21*   < > 23   ANIONGAP  --   --   --   --   --  11  --  8  --  8  --  12   < > 12   * 114* 158* 97   < > 114*   < > 107*   < > 160*   < > 132*   < > 108*   BUN  --   --   --    "--   --  24.1*  --  23.5*  --  22.7  --  18.8   < > 19.4   CR  --   --   --   --   --  0.84  --  0.86  --  0.83  --  0.88   < > 1.28*   GFRESTIMATED  --   --   --   --   --  89  --  88  --  89  --  87   < > 57*   JOSSELYN  --   --   --   --   --  8.3*  --  8.5*  --  8.5*  --  6.9*   < > 8.9   MAG  --  2.1  --   --   --  2.2  --  2.2  --  2.3  --  1.9   < >  --    PHOS  --  2.8  --   --   --  3.3  --  2.4*  --  2.2*  --  2.2*   < >  --    PROTTOTAL  --   --   --   --   --   --   --   --   --   --   --   --   --  6.9   ALBUMIN  --   --   --   --   --   --   --   --   --   --   --   --   --  3.6   BILITOTAL  --   --   --   --   --   --   --   --   --   --   --   --   --  0.8   ALKPHOS  --   --   --   --   --   --   --   --   --   --   --   --   --  65   AST  --   --   --   --   --   --   --   --   --   --   --   --   --  62*   ALT  --   --   --   --   --   --   --   --   --   --   --   --   --  13    < > = values in this interval not displayed.     CBC  Recent Labs   Lab 09/28/24  0356 09/27/24  0428 09/26/24  0343 09/25/24  0354   WBC 13.8* 11.7* 17.5* 14.6*   RBC 4.82 4.65 4.71 4.43   HGB 13.6 13.2 13.3 12.8   HCT 41.5 40.5 41.5 38.9   MCV 86 87 88 88   MCH 28.2 28.4 28.2 28.9   MCHC 32.8 32.6 32.0 32.9   RDW 15.3* 15.3* 15.3* 15.0   * 114* 120* 126*     INRNo lab results found in last 7 days.  Arterial Blood Gas  Recent Labs   Lab 09/26/24  1336 09/25/24  1220 09/25/24  0354 09/25/24  0022 09/22/24  2233 09/22/24  2036   PH 7.33*  --   --   --   --  7.27*   PCO2 43  --   --   --   --  51*   PO2 84  --   --   --   --  82   HCO3 23  --   --   --   --  23   O2PER 70 70 50 53   < > 100    < > = values in this interval not displayed.       All cultures:  No results for input(s): \"CULT\" in the last 168 hours.  No results found for this or any previous visit (from the past 24 hour(s)).      Billing: This patient is critically ill: Yes. Total critical care time today 33 min exclusive of procedures or teaching.  Managing " hypoxic respiratory failure

## 2024-09-28 NOTE — PLAN OF CARE
"[facility] - ICU    RN Progress Note:            Pertinent Assessments:      Please refer to flowsheet rows for full assessment     Respiratory:  Patient on 6 liters at 1900 with SpO2 at 90 and RR in low 20s. Placed patient on oxymizer at 10 L while getting up to have a BM. Desaturated to 85; was unable to make it to restroom so a commode was brought to his bedside.  Patient denied feeling significantly SOB, though it took him a few minutes to recover. At that point, patient was placed on bipap and sent to bed to rest.  FiO2 initially at 65; brought itn down to 50. SpO2 in mid 90s with this setting but patient felt too anxious with mask on, despite prn atarax being given.    Switched to high flow nasal cannula at 65% and 40L. SpO2 in mid 90s & RR in low 20s at rest.      Delirium:  Patient unable to sleep. States that he felt restless and admitted to being anxious. Per his wife, it is okay to call her so she can talk directly to him and help calm his anxiety.  Offered to call her around 02:30. Patient refused to talk to her stating that he did not want to wake her up at that hour and that he would \"be fine\".  I sat in room with patient and talked to him to provide distraction and emotional comfort. Patient acknowledged feeling better after our chat, though he does not appear to be sleeping. Patient might benefit from a sleep aid tomorrow night. Will pass off to next shift as it is much too late to administer sleep aid.           Key Events - This Shift:       Increased O2 needs; little to no sleep.   One time prn hydralazine given for SBP in upper 170s.                Barriers to Discharge / Downgrade:     FiO2 on highflow         Point of Contact Update: YES-OR-NO: Yes  If No, reason: no; patient requested that his wife not be called overnight.       "

## 2024-09-28 NOTE — PROGRESS NOTES
Pt not tolerating BiPAP switched to HFNC 40 lpm 45%. Sats 95%. Pt feels more comfortable.    Gerry Perez,  RT

## 2024-09-29 ENCOUNTER — APPOINTMENT (OUTPATIENT)
Dept: OCCUPATIONAL THERAPY | Facility: HOSPITAL | Age: 79
DRG: 871 | End: 2024-09-29
Payer: COMMERCIAL

## 2024-09-29 LAB
ANION GAP SERPL CALCULATED.3IONS-SCNC: 10 MMOL/L (ref 7–15)
BUN SERPL-MCNC: 21 MG/DL (ref 8–23)
CALCIUM SERPL-MCNC: 8.3 MG/DL (ref 8.8–10.4)
CHLORIDE SERPL-SCNC: 108 MMOL/L (ref 98–107)
CREAT SERPL-MCNC: 0.84 MG/DL (ref 0.51–1.17)
EGFRCR SERPLBLD CKD-EPI 2021: 89 ML/MIN/1.73M2
ERYTHROCYTE [DISTWIDTH] IN BLOOD BY AUTOMATED COUNT: 15.3 % (ref 10–15)
GLUCOSE BLDC GLUCOMTR-MCNC: 108 MG/DL (ref 70–99)
GLUCOSE BLDC GLUCOMTR-MCNC: 152 MG/DL (ref 70–99)
GLUCOSE BLDC GLUCOMTR-MCNC: 83 MG/DL (ref 70–99)
GLUCOSE BLDC GLUCOMTR-MCNC: 85 MG/DL (ref 70–99)
GLUCOSE SERPL-MCNC: 111 MG/DL (ref 70–99)
HCO3 SERPL-SCNC: 21 MMOL/L (ref 22–29)
HCT VFR BLD AUTO: 44.8 % (ref 35–53)
HGB BLD-MCNC: 14.8 G/DL (ref 11.7–17.7)
MAGNESIUM SERPL-MCNC: 2.1 MG/DL (ref 1.7–2.3)
MCH RBC QN AUTO: 28.4 PG (ref 26.5–33)
MCHC RBC AUTO-ENTMCNC: 33 G/DL (ref 31.5–36.5)
MCV RBC AUTO: 86 FL (ref 78–100)
PHOSPHATE SERPL-MCNC: 3.2 MG/DL (ref 2.5–4.5)
PLATELET # BLD AUTO: 171 10E3/UL (ref 150–450)
POTASSIUM SERPL-SCNC: 3.9 MMOL/L (ref 3.4–5.3)
POTASSIUM SERPL-SCNC: 3.9 MMOL/L (ref 3.4–5.3)
RBC # BLD AUTO: 5.22 10E6/UL (ref 3.8–5.9)
SODIUM SERPL-SCNC: 139 MMOL/L (ref 135–145)
WBC # BLD AUTO: 14.8 10E3/UL (ref 4–11)

## 2024-09-29 PROCEDURE — 250N000009 HC RX 250: Performed by: INTERNAL MEDICINE

## 2024-09-29 PROCEDURE — 94640 AIRWAY INHALATION TREATMENT: CPT | Mod: 76

## 2024-09-29 PROCEDURE — 250N000013 HC RX MED GY IP 250 OP 250 PS 637: Performed by: INTERNAL MEDICINE

## 2024-09-29 PROCEDURE — 84132 ASSAY OF SERUM POTASSIUM: CPT | Performed by: NURSE PRACTITIONER

## 2024-09-29 PROCEDURE — 83735 ASSAY OF MAGNESIUM: CPT | Performed by: INTERNAL MEDICINE

## 2024-09-29 PROCEDURE — 250N000011 HC RX IP 250 OP 636: Performed by: NURSE PRACTITIONER

## 2024-09-29 PROCEDURE — 250N000011 HC RX IP 250 OP 636: Performed by: INTERNAL MEDICINE

## 2024-09-29 PROCEDURE — 85014 HEMATOCRIT: CPT | Performed by: INTERNAL MEDICINE

## 2024-09-29 PROCEDURE — 250N000013 HC RX MED GY IP 250 OP 250 PS 637: Performed by: NURSE PRACTITIONER

## 2024-09-29 PROCEDURE — 99233 SBSQ HOSP IP/OBS HIGH 50: CPT | Performed by: INTERNAL MEDICINE

## 2024-09-29 PROCEDURE — 84100 ASSAY OF PHOSPHORUS: CPT | Performed by: NURSE PRACTITIONER

## 2024-09-29 PROCEDURE — 120N000001 HC R&B MED SURG/OB

## 2024-09-29 PROCEDURE — 999N000157 HC STATISTIC RCP TIME EA 10 MIN

## 2024-09-29 PROCEDURE — 97110 THERAPEUTIC EXERCISES: CPT | Mod: GO

## 2024-09-29 PROCEDURE — 94640 AIRWAY INHALATION TREATMENT: CPT

## 2024-09-29 PROCEDURE — 80048 BASIC METABOLIC PNL TOTAL CA: CPT | Performed by: STUDENT IN AN ORGANIZED HEALTH CARE EDUCATION/TRAINING PROGRAM

## 2024-09-29 RX ORDER — METHYLPREDNISOLONE SODIUM SUCCINATE 40 MG/ML
40 INJECTION, POWDER, LYOPHILIZED, FOR SOLUTION INTRAMUSCULAR; INTRAVENOUS EVERY 24 HOURS
Status: DISCONTINUED | OUTPATIENT
Start: 2024-09-30 | End: 2024-10-01

## 2024-09-29 RX ORDER — AMLODIPINE BESYLATE 5 MG/1
5 TABLET ORAL DAILY
Status: DISCONTINUED | OUTPATIENT
Start: 2024-09-29 | End: 2024-09-29

## 2024-09-29 RX ORDER — LANOLIN ALCOHOL/MO/W.PET/CERES
3 CREAM (GRAM) TOPICAL AT BEDTIME
Status: DISCONTINUED | OUTPATIENT
Start: 2024-09-29 | End: 2024-10-02 | Stop reason: HOSPADM

## 2024-09-29 RX ORDER — AMLODIPINE BESYLATE 5 MG/1
5 TABLET ORAL DAILY
Status: DISCONTINUED | OUTPATIENT
Start: 2024-09-30 | End: 2024-09-29

## 2024-09-29 RX ORDER — AMLODIPINE BESYLATE 2.5 MG/1
2.5 TABLET ORAL DAILY
Status: DISCONTINUED | OUTPATIENT
Start: 2024-09-30 | End: 2024-10-02 | Stop reason: HOSPADM

## 2024-09-29 RX ORDER — HYDRALAZINE HYDROCHLORIDE 20 MG/ML
20 INJECTION INTRAMUSCULAR; INTRAVENOUS EVERY 4 HOURS PRN
Status: DISCONTINUED | OUTPATIENT
Start: 2024-09-29 | End: 2024-10-02 | Stop reason: HOSPADM

## 2024-09-29 RX ADMIN — HEPARIN SODIUM 5000 UNITS: 10000 INJECTION, SOLUTION INTRAVENOUS; SUBCUTANEOUS at 05:39

## 2024-09-29 RX ADMIN — PREGABALIN 100 MG: 100 CAPSULE ORAL at 15:09

## 2024-09-29 RX ADMIN — METHYLPREDNISOLONE SODIUM SUCCINATE 40 MG: 40 INJECTION INTRAMUSCULAR; INTRAVENOUS at 11:16

## 2024-09-29 RX ADMIN — CARBIDOPA AND LEVODOPA 1 TABLET: 10; 100 TABLET ORAL at 20:06

## 2024-09-29 RX ADMIN — PIPERACILLIN AND TAZOBACTAM 3.38 G: 3; .375 INJECTION, POWDER, FOR SOLUTION INTRAVENOUS at 15:10

## 2024-09-29 RX ADMIN — IPRATROPIUM BROMIDE AND ALBUTEROL SULFATE 3 ML: .5; 3 SOLUTION RESPIRATORY (INHALATION) at 21:25

## 2024-09-29 RX ADMIN — TOPIRAMATE 100 MG: 100 TABLET, FILM COATED ORAL at 20:06

## 2024-09-29 RX ADMIN — CARBIDOPA AND LEVODOPA 1 TABLET: 10; 100 TABLET ORAL at 15:09

## 2024-09-29 RX ADMIN — PREGABALIN 100 MG: 100 CAPSULE ORAL at 08:55

## 2024-09-29 RX ADMIN — HYDROXYZINE HYDROCHLORIDE 10 MG: 10 TABLET ORAL at 15:09

## 2024-09-29 RX ADMIN — HEPARIN SODIUM 5000 UNITS: 10000 INJECTION, SOLUTION INTRAVENOUS; SUBCUTANEOUS at 21:04

## 2024-09-29 RX ADMIN — SENNOSIDES AND DOCUSATE SODIUM 1 TABLET: 8.6; 5 TABLET ORAL at 20:06

## 2024-09-29 RX ADMIN — PIPERACILLIN AND TAZOBACTAM 3.38 G: 3; .375 INJECTION, POWDER, FOR SOLUTION INTRAVENOUS at 08:42

## 2024-09-29 RX ADMIN — HEPARIN SODIUM 5000 UNITS: 10000 INJECTION, SOLUTION INTRAVENOUS; SUBCUTANEOUS at 15:09

## 2024-09-29 RX ADMIN — HYDRALAZINE HYDROCHLORIDE 10 MG: 20 INJECTION INTRAMUSCULAR; INTRAVENOUS at 03:07

## 2024-09-29 RX ADMIN — TOPIRAMATE 100 MG: 100 TABLET, FILM COATED ORAL at 08:42

## 2024-09-29 RX ADMIN — LISINOPRIL 5 MG: 5 TABLET ORAL at 08:34

## 2024-09-29 RX ADMIN — FORMOTEROL FUMARATE 20 MCG: 20 SOLUTION RESPIRATORY (INHALATION) at 08:13

## 2024-09-29 RX ADMIN — HYDROXYZINE HYDROCHLORIDE 10 MG: 10 TABLET ORAL at 03:12

## 2024-09-29 RX ADMIN — CARBIDOPA AND LEVODOPA 1 TABLET: 10; 100 TABLET ORAL at 08:34

## 2024-09-29 RX ADMIN — PANTOPRAZOLE SODIUM 40 MG: 40 INJECTION, POWDER, FOR SOLUTION INTRAVENOUS at 08:34

## 2024-09-29 RX ADMIN — IPRATROPIUM BROMIDE AND ALBUTEROL SULFATE 3 ML: .5; 3 SOLUTION RESPIRATORY (INHALATION) at 12:14

## 2024-09-29 RX ADMIN — IPRATROPIUM BROMIDE AND ALBUTEROL SULFATE 3 ML: .5; 3 SOLUTION RESPIRATORY (INHALATION) at 16:22

## 2024-09-29 RX ADMIN — FORMOTEROL FUMARATE 20 MCG: 20 SOLUTION RESPIRATORY (INHALATION) at 21:25

## 2024-09-29 RX ADMIN — Medication 3 MG: at 20:05

## 2024-09-29 RX ADMIN — HYDROXYZINE HYDROCHLORIDE 10 MG: 10 TABLET ORAL at 22:24

## 2024-09-29 RX ADMIN — IPRATROPIUM BROMIDE AND ALBUTEROL SULFATE 3 ML: .5; 3 SOLUTION RESPIRATORY (INHALATION) at 08:10

## 2024-09-29 RX ADMIN — DULOXETINE HYDROCHLORIDE 60 MG: 60 CAPSULE, DELAYED RELEASE PELLETS ORAL at 08:32

## 2024-09-29 RX ADMIN — METHADONE HYDROCHLORIDE 7.5 MG: 5 TABLET ORAL at 08:33

## 2024-09-29 RX ADMIN — METHADONE HYDROCHLORIDE 7.5 MG: 5 TABLET ORAL at 21:04

## 2024-09-29 RX ADMIN — PREGABALIN 100 MG: 100 CAPSULE ORAL at 20:05

## 2024-09-29 ASSESSMENT — ACTIVITIES OF DAILY LIVING (ADL)
ADLS_ACUITY_SCORE: 35
ADLS_ACUITY_SCORE: 33
ADLS_ACUITY_SCORE: 33
ADLS_ACUITY_SCORE: 34
ADLS_ACUITY_SCORE: 33
ADLS_ACUITY_SCORE: 34
ADLS_ACUITY_SCORE: 34
ADLS_ACUITY_SCORE: 33
ADLS_ACUITY_SCORE: 35
ADLS_ACUITY_SCORE: 34
ADLS_ACUITY_SCORE: 33

## 2024-09-29 NOTE — PROGRESS NOTES
"Critical Care Progress Note      09/29/2024    Name: Romaine Farah MRN#: [id number]   Age: 79 year old YOB: 1945     Hsptl Day# 7  ICU DAY #    MV DAY #             Problem List:   Active Problems:    Acute respiratory failure with hypoxia and hypercapnia (H)    Pneumonia of both lower lobes due to infectious organism    Sepsis, due to unspecified organism, unspecified whether acute organ dysfunction present (H)    Clinically Significant Risk Factors                            # Obesity: Estimated body mass index is 30.36 kg/m  as calculated from the following:    Height as of this encounter: 1.778 m (5' 10\").    Weight as of this encounter: 96 kg (211 lb 9.6 oz).       # Pacemaker present                         Summary/Hospital Course:     79 year old former smoker with history of chronic bronchitis on triple inhaler regimen with associated moderately severe restrictive lung disease, moderate SAMRA, SSS s/p PPM, chronic methadone therapy for idiopathic peripheral neuropathy, & GERD. Admitted on 9/22/2024 w/ acute hypoxic and hypercapnic respiratory failure, multifocal pneumonia, and septic shock.       Assessment and plan :       I have personally reviewed the daily labs, imaging studies, cultures and discussed the case with referring physician and consulting physicians.     My assessment and plan by system for this patient is as follows:    Neurology/Psychiatry:   Sinemet I presume for tremors  Neuropathy  Currently on duloxetine, methadone, pregabalin and topiramate  Home dose of methadone is 10 mg twice daily, currently on 5 due to sleepiness on presentation.  Increased to 7-1/2 twice daily      Cardiovascular:   Sick sinus syndrome status post pacemaker  Not on pressors  Normal LV function    He was started on lisinopril for hypertension on Friday.  Not on any meds at home.  Will switch to amlodipine    Hydralazine as needed      Pulmonary/Ventilator Management:   Chronic bronchitis  COPD  Acute " hypoxic and hypercapnic respiratory failure    He is back to 6 L nasal cannula  Will decrease Solu-Medrol to daily    Complete 10 days of Zosyn and then DC    Bilateral infiltrates on presentation, questionable due to aspiration due to decreased mental status  Sputum culture from yesterday is negative    Trial of hypertonic saline and volar wrap.  Minimal sputum output so stopped.  Decrease Solu-Medrol to 40 twice daily    GI and Nutrition :   As tolerated      Renal/Fluids/Electrolytes:   7 L negative  - monitor function and electrolytes as needed with replacement per ICU protocols.  - generally avoid nephrotoxic agents such as NSAID, IV contrast unless specifically required  - adjust medications as needed for renal clearance  - follow I/O's as appropriate.    Infectious Disease:   Chronic aspiration pneumonia.  Zosyn with stop date after 10 days        ICU Prophylaxis:   1. DVT: Hep Subq    3. Stress Ulcer: PPI           Key Medications:     Current Facility-Administered Medications   Medication Dose Route Frequency Provider Last Rate Last Admin    [START ON 9/30/2024] amLODIPine (NORVASC) tablet 5 mg  5 mg Oral Daily Jass Lorenzo MD        carbidopa-levodopa (SINEMET)  MG per tablet 1 tablet  1 tablet Oral TID Miguel Hartmann MD   1 tablet at 09/29/24 0834    DULoxetine (CYMBALTA) DR capsule 60 mg  60 mg Oral QAM Miguel Hartmann MD   60 mg at 09/29/24 0832    formoterol (PERFOROMIST) neb solution 20 mcg  20 mcg Nebulization 2 times daily Miguel Hartmann MD   20 mcg at 09/29/24 0813    heparin ANTICOAGULANT injection 5,000 Units  5,000 Units Subcutaneous Q8H Kadie Blanco APRN CNP   5,000 Units at 09/29/24 0539    insulin aspart (NovoLOG) injection (RAPID ACTING)  1-7 Units Subcutaneous TID AC Kadie Blanco APRN CNP        insulin aspart (NovoLOG) injection (RAPID ACTING)  1-5 Units Subcutaneous At Bedtime Kadie Blanco APRN CNP        ipratropium -  albuterol 0.5 mg/2.5 mg/3 mL (DUONEB) neb solution 3 mL  3 mL Nebulization 4x daily Miguel Hartmann MD   3 mL at 09/29/24 0810    melatonin tablet 3 mg  3 mg Oral At Bedtime Amanda Walker MD        methadone (DOLOPHINE) half-tab 7.5 mg  7.5 mg Oral Q12H VIDAL (08/20) Amanda Walker MD   7.5 mg at 09/29/24 0833    [Held by provider] methadone (DOLOPHINE) tablet 10 mg  10 mg Oral BID Miguel Hartmann MD        methylPREDNISolone sodium succinate (SOLU-MEDROL) injection 40 mg  40 mg Intravenous Q12H IvJass xavier MD   40 mg at 09/28/24 2055    pantoprazole (PROTONIX) IV push injection 40 mg  40 mg Intravenous Daily with breakfast Alize Baron MD   40 mg at 09/29/24 0834    piperacillin-tazobactam (ZOSYN) 3.375 g vial to attach to  mL bag  3.375 g Intravenous Q8H Kadie Blanco APRN CNP   3.375 g at 09/29/24 0842    polyethylene glycol (MIRALAX) Packet 17 g  17 g Oral Daily Kadie Blanco APRN CNP   17 g at 09/28/24 0817    pregabalin (LYRICA) capsule 100 mg  100 mg Oral TID Kadie Blanco APRN CNP   100 mg at 09/29/24 0855    senna-docusate (SENOKOT-S/PERICOLACE) 8.6-50 MG per tablet 1 tablet  1 tablet Oral BID Kadie Blanco APRN CNP   1 tablet at 09/27/24 2028    sodium chloride (PF) 0.9% PF flush 10-40 mL  10-40 mL Intracatheter Q7 Days Israel Shipman DO   20 mL at 09/22/24 2210    topiramate (TOPAMAX) tablet 100 mg  100 mg Oral BID Miguel Hartmann MD   100 mg at 09/29/24 0842     Current Facility-Administered Medications   Medication Dose Route Frequency Provider Last Rate Last Admin    No lozenges or gum should be given while patient on BIPAP/AVAPS/AVAPS AE   Does not apply Continuous PRN Kadie Blanco APRN CNP        Patient may continue current oral medications   Does not apply Continuous PRN Kadie Blanco APRN CNP                   Physical Examination:   Temp:  [97.7  F (36.5  C)-98.6  F (37  C)] 98.4  F (36.9  C)  Pulse:  [60-81]  60  Resp:  [17-22] 17  BP: (102-197)/(50-91) 105/57  SpO2:  [84 %-98 %] 95 %    Intake/Output Summary (Last 24 hours) at 9/28/2024 1105  Last data filed at 9/28/2024 0800  Gross per 24 hour   Intake 809 ml   Output 2300 ml   Net -1491 ml     Wt Readings from Last 4 Encounters:   09/29/24 96 kg (211 lb 9.6 oz)   09/11/24 95.1 kg (209 lb 9.6 oz)   03/20/24 92.5 kg (204 lb)   02/27/24 96.2 kg (212 lb)     BP - Mean:  [] 77  FiO2 (%): 50 %, Resp: 17  Recent Labs   Lab 09/26/24  1336 09/25/24  1220 09/25/24  0354 09/25/24  0022 09/22/24 2233 09/22/24 2036   PH 7.33*  --   --   --   --  7.27*   PCO2 43  --   --   --   --  51*   PO2 84  --   --   --   --  82   HCO3 23  --   --   --   --  23   O2PER 70 70 50 53   < > 100    < > = values in this interval not displayed.       GEN: no acute distress   HEENT: head ncat, sclera anicteric, OP patent, trachea midline   PULM: Decreased breath sounds bibasilar  CV/COR: RRR S1S2 no gallop,  No rub, no murmur  ABD: soft nontender, hypoactive bowel sounds, no mass  EXT: No significant edema  NEURO: grossly intact  SKIN: no obvious rash  LINES: clean, dry intact         Data:   All data and imaging reviewed     ROUTINE ICU LABS (Last four results)  CMP  Recent Labs   Lab 09/29/24  0749 09/29/24  0524 09/28/24 2011 09/28/24  1549 09/28/24  1126 09/28/24  0812 09/28/24  0356 09/27/24  0803 09/27/24  0428 09/26/24  0729 09/26/24  0343 09/25/24  0738 09/25/24  0354 09/24/24  0358 09/24/24  0353 09/22/24  1951 09/22/24  1705   NA  --   --   --   --   --   --   --   --  144  --  146*  --  142  --  141   < > 137   POTASSIUM  --  3.9  --   --   --   --  3.6  --  3.9  3.8  --  3.8  3.8  --  3.6  --  4.5   < > 3.5   CHLORIDE  --   --   --   --   --   --   --   --  111*  --  115*  --  111*  --  108*   < > 102   CO2  --   --   --   --   --   --   --   --  22  --  23  --  23  --  21*   < > 23   ANIONGAP  --   --   --   --   --   --   --   --  11  --  8  --  8  --  12   < > 12   GLC 85  --   150* 107* 139*   < > 114*   < > 114*   < > 107*   < > 160*   < > 132*   < > 108*   BUN  --   --   --   --   --   --   --   --  24.1*  --  23.5*  --  22.7  --  18.8   < > 19.4   CR  --   --   --   --   --   --   --   --  0.84  --  0.86  --  0.83  --  0.88   < > 1.28*   GFRESTIMATED  --   --   --   --   --   --   --   --  89  --  88  --  89  --  87   < > 57*   JOSSELYN  --   --   --   --   --   --   --   --  8.3*  --  8.5*  --  8.5*  --  6.9*   < > 8.9   MAG  --  2.1  --   --   --   --  2.1  --  2.2  --  2.2  --  2.3  --  1.9   < >  --    PHOS  --  3.2  --   --   --   --  2.8  --  3.3  --  2.4*  --  2.2*  --  2.2*   < >  --    PROTTOTAL  --   --   --   --   --   --   --   --   --   --   --   --   --   --   --   --  6.9   ALBUMIN  --   --   --   --   --   --   --   --   --   --   --   --   --   --   --   --  3.6   BILITOTAL  --   --   --   --   --   --   --   --   --   --   --   --   --   --   --   --  0.8   ALKPHOS  --   --   --   --   --   --   --   --   --   --   --   --   --   --   --   --  65   AST  --   --   --   --   --   --   --   --   --   --   --   --   --   --   --   --  62*   ALT  --   --   --   --   --   --   --   --   --   --   --   --   --   --   --   --  13    < > = values in this interval not displayed.     CBC  Recent Labs   Lab 09/29/24  0524 09/28/24  0356 09/27/24  0428 09/26/24  0343   WBC 14.8* 13.8* 11.7* 17.5*   RBC 5.22 4.82 4.65 4.71   HGB 14.8 13.6 13.2 13.3   HCT 44.8 41.5 40.5 41.5   MCV 86 86 87 88   MCH 28.4 28.2 28.4 28.2   MCHC 33.0 32.8 32.6 32.0   RDW 15.3* 15.3* 15.3* 15.3*    136* 114* 120*     INRNo lab results found in last 7 days.  Arterial Blood Gas  Recent Labs   Lab 09/26/24  1336 09/25/24  1220 09/25/24  0354 09/25/24  0022 09/22/24  2233 09/22/24  2036   PH 7.33*  --   --   --   --  7.27*   PCO2 43  --   --   --   --  51*   PO2 84  --   --   --   --  82   HCO3 23  --   --   --   --  23   O2PER 70 70 50 53   < > 100    < > = values in this interval not displayed.  "      All cultures:  No results for input(s): \"CULT\" in the last 168 hours.  No results found for this or any previous visit (from the past 24 hour(s)).      30 minutes spent with the patient         "

## 2024-09-29 NOTE — PROGRESS NOTES
[facility] Progress Note - Hospitalist Service    Date of Admission:  9/22/2024    Assessment & Plan      PATIENT DOWNGRADED FROM ICU ON 9/29 - SEE ICU NOTE FOR FURTHER DETAILS.     Romaine Farah is a 79 year old male with a past medical history of COPD, Chronic Bronchitis who was admitted on 9/22/24 for acute hypoxic respiratory failure, multifocal pneumonia, and septic shock.  Patient initially admitted to the ICU requiring BIPAP/HFNC.  Patient was maintained with DuoNebs, IV steroids, and antibiotics narrowed to Zosyn with improvement to 6 L nasal cannula on 9/29.  Hospitalist team consulted for downgrade from the ICU to general medical floor.      #Acute Hypoxic Respiratory Failure   #COPD Exacerbation  #Chronic Aspiration Pneumonia  -Patient initially requiring BiPAP and HFNC on admission.  Down to 6 L nasal cannula on transfer 9/29.  - Chronic bronchitis with COPD features, treated with a triple combination inhaler in outpatient setting, currently in an exacerbation. Sees Dr. Dejesus with the most recent visit on 9/11/2024.   -Patient does not use any supplemental oxygen at home.  Plan  -Patient has DuoNebs ordered every 6 hours scheduled with additional albuterol nebulizers as needed  -Continue Solu-Medrol every 24 hours IV started in the ICU.  -Continue antibiotics with Zosyn for aspiration pneumonia  -Scheduled formoterol.  -Sputum culture negative and labs  -Pain management team managing opiates to avoid methadone related somnolence and aspiration/hypoxia    #Sick sinus syndrome status post pacemaker   -Patient has a history of sick sinus syndrome that has required pacemaker, follows with cardiology outpatient.  -Echo TTE in 9/23 with EF 60%.  -Heart rate stable at 60s at time of transfer 9/29  Plan  - Continue to monitor    #Borderline BERNICE - Resolved  -Baseline creatinine 1.0-1.1.  -Briefly peaked during admission, now stable at 0.8.  Plan  - Continue to  "monitor    #Hypertension  - Patient with elevated blood pressures during admission.  - Not on home medications outpatient. Started on amlodipine while in hospital  Plan  - Continue amlodipine.  - Hydralazine as needed.    #Chronic Neuropathy  Home medications include duloxetine, methadone 10mg BID, pregabalin and topiramate.  Plan  - Continue methadone at 7.5mg BID for now  - Pain Management consult placed    #Tremor  - Home medication include Senimet  Plan  - Continued during admission thus far.          Diet: Combination Diet Regular Diet  Snacks/Supplements Adult: Ensure Enlive; Between Meals    DVT Prophylaxis: Pneumatic Compression Devices  Wilhelm Catheter: Not present  Lines: PRESENT      PICC 09/22/24 Triple Lumen Right Basilic Vasopressor,Acute access-Site Assessment: WDL      Cardiac Monitoring: ACTIVE order. Indication: hypoxia  Code Status: Full Code      Clinically Significant Risk Factors                            # Obesity: Estimated body mass index is 30.36 kg/m  as calculated from the following:    Height as of this encounter: 1.778 m (5' 10\").    Weight as of this encounter: 96 kg (211 lb 9.6 oz).       # Pacemaker present       Disposition Plan     Medically Ready for Discharge: Anticipated in 2-4 Days             Farhan Pillai MD  Hospitalist Service  [facility]  Securely message with [email or web address] (more info)  Text page via [employer] Paging/Directory   ______________________________________________________________________    Interval History   - Patient feeling well, minimal shortness of breath on 6L - no coughing, no sputum production.    Physical Exam   Vital Signs: Temp: 97.5  F (36.4  C) Temp src: Oral BP: 122/60 Pulse: 61   Resp: 16 SpO2: 97 % O2 Device: Nasal cannula Oxygen Delivery: 6 LPM  Weight: 211 lbs 9.6 oz    General: Alert and Oriented x3. Answers questions appropriately. Follows commands.  Eyes:EOMI. PERRL. Normal Conjunctivae.  HENT: Normocephalic. " Atraumatic.  Resp: Decreased breath sounds over bases bilaterally. Minimal expiratory wheezing.  Cardio: Regular rate and rhythm. No murmurs. No friction rub.  Abd: Soft. Non-distended. Non-tender. Bowel sounds normal. No rebound tenderness.  MSK: No areas of ecchymosis or erythema.  Neuro: CN 2-12 grossly intact. Strength 5/5 in all 4 extremities.  Skin:No rashes. No jaundice.       Medical Decision Making       40 MINUTES SPENT BY ME on the date of service doing chart review, history, exam, documentation & further activities per the note.  MANAGEMENT DISCUSSED with the following over the past 24 hours: ICU intensivist   Tests ORDERED & REVIEWED in the past 24 hours:  - BMP  - CBC  - Magnesium  - Phosphorus  Medical complexity over the past 24 hours:  - Prescription DRUG MANAGEMENT performed  - Treatment limited by SOCIAL DETERMINANTS OF HEALTH      Data     I have personally reviewed the following data over the past 24 hrs:    14.8 (H)  \   14.8   / 171     139 108 (H) 21.0 /  152 (H)   3.9; 3.9 21 (L) 0.84 \       Imaging results reviewed over the past 24 hrs:   No results found for this or any previous visit (from the past 24 hour(s)).

## 2024-09-29 NOTE — PLAN OF CARE
[facility] - ICU    RN Progress Note:            Pertinent Assessments:      Please refer to flowsheet rows for full assessment     - Alert and orientedx4. Calm and cooperative.         Key Events - This Shift:     - Urgency when he needs to use urinal at times.    - PRN atarax x1 given for Anxiety and helpful.  - SBP was >180. PRN hydralazine 10mg given x1 and effective. Dr. Baron increased PRN hydralazine 10mg to 20mg to keep SBP <165.  - Oxymask now at 6Lmin. 02 sats  >94%. Gets SOB when he moves in bed.           Barriers to Discharge / Downgrade:     - weaning 02 Needs.    Problem: Pneumonia  Goal: Effective Oxygenation and Ventilation  Outcome: Progressing  Intervention: Promote Airway Secretion Clearance    Intervention: Optimize Oxygenation and Ventilation       Problem: Risk for Delirium  Goal: Optimal Coping  Intervention: Optimize Psychosocial Adjustment to Delirium  Goal: Improved Behavioral Control  Intervention: Prevent and Manage Agitation  Intervention: Minimize Safety Risk    Goal: Improved Attention and Thought Clarity  Intervention: Maximize Cognitive Function  Goal: Improved Sleep  Outcome: Progressing

## 2024-09-29 NOTE — PLAN OF CARE
Goal Outcome Evaluation:         Pt. Alert and oriented with VSS. 122/60. 97.5.  Oxygen saturation 97% on 2L NC. Pt. With continuous pulse oximetry. Settled into his room and eating dinner with his wife.

## 2024-09-29 NOTE — PLAN OF CARE
Goal Outcome Evaluation:      Plan of Care Reviewed With: patient, spouse    Overall Patient Progress: improvingOverall Patient Progress: improving    Outcome Evaluation: Pt wore oxymask overnight not the highflow. Ambulating well with A1, pain better controlled.    [facility] - ICU    RN Progress Note:            Pertinent Assessments:      Please refer to flowsheet rows for full assessment     Pt slept 4-5 hours overnight interrupted by having to use urinal.Pt alertx4, up in chair at 0915, marched in place prior to sitting. PICC dressing changed. Pt on 6 L nasal cannula while eating and sitting, needs oxymask 7-8L while marching in place and ambulating r/t SpO2 drops to 86-88%. Hospitalist consulted, downgraded. Pt transferred to  at 1645.    Telemetry reads Normal Sinus Rhythm, A-paced occasionally.    Protocols:  K+: 3.9, am recheck, 9/30.  Magnesium: 2.1, am recheck, 9/30.  Phosphorus: 3.2, am recheck, 9/30.             Key Events - This Shift:       Tapering down IV Solu-Medrol to daily (was bid).  Scheduled BP med changed from po lisinopril 5 mg daily to amlodipine 5 mg daily.                Barriers to Discharge / Downgrade:     Downgraded from ICU. IV abx, tapering down IV solumedrol.         Point of Contact Update: YES-OR-NO: Yes  If No, reason:   Name:Archana  Phone Number:  Summary of Conversation: Updated plan of care and medications with daughter and mother as well as goals.

## 2024-09-30 ENCOUNTER — APPOINTMENT (OUTPATIENT)
Dept: OCCUPATIONAL THERAPY | Facility: HOSPITAL | Age: 79
DRG: 871 | End: 2024-09-30
Payer: COMMERCIAL

## 2024-09-30 ENCOUNTER — APPOINTMENT (OUTPATIENT)
Dept: PHYSICAL THERAPY | Facility: HOSPITAL | Age: 79
DRG: 871 | End: 2024-09-30
Payer: COMMERCIAL

## 2024-09-30 LAB
ANION GAP SERPL CALCULATED.3IONS-SCNC: 8 MMOL/L (ref 7–15)
BACTERIA SPT CULT: ABNORMAL
BUN SERPL-MCNC: 20.3 MG/DL (ref 8–23)
CALCIUM SERPL-MCNC: 7.9 MG/DL (ref 8.8–10.4)
CHLORIDE SERPL-SCNC: 109 MMOL/L (ref 98–107)
CREAT SERPL-MCNC: 0.88 MG/DL (ref 0.51–1.17)
EGFRCR SERPLBLD CKD-EPI 2021: 87 ML/MIN/1.73M2
ERYTHROCYTE [DISTWIDTH] IN BLOOD BY AUTOMATED COUNT: 15.4 % (ref 10–15)
GLUCOSE BLDC GLUCOMTR-MCNC: 102 MG/DL (ref 70–99)
GLUCOSE BLDC GLUCOMTR-MCNC: 135 MG/DL (ref 70–99)
GLUCOSE BLDC GLUCOMTR-MCNC: 68 MG/DL (ref 70–99)
GLUCOSE BLDC GLUCOMTR-MCNC: 80 MG/DL (ref 70–99)
GLUCOSE SERPL-MCNC: 100 MG/DL (ref 70–99)
GRAM STAIN RESULT: ABNORMAL
HCO3 SERPL-SCNC: 25 MMOL/L (ref 22–29)
HCT VFR BLD AUTO: 43.1 % (ref 35–53)
HGB BLD-MCNC: 14 G/DL (ref 11.7–17.7)
MAGNESIUM SERPL-MCNC: 2.2 MG/DL (ref 1.7–2.3)
MCH RBC QN AUTO: 28.2 PG (ref 26.5–33)
MCHC RBC AUTO-ENTMCNC: 32.5 G/DL (ref 31.5–36.5)
MCV RBC AUTO: 87 FL (ref 78–100)
PHOSPHATE SERPL-MCNC: 3.8 MG/DL (ref 2.5–4.5)
PLATELET # BLD AUTO: 169 10E3/UL (ref 150–450)
POTASSIUM SERPL-SCNC: 3.4 MMOL/L (ref 3.4–5.3)
POTASSIUM SERPL-SCNC: 4.2 MMOL/L (ref 3.4–5.3)
RBC # BLD AUTO: 4.97 10E6/UL (ref 3.8–5.9)
SODIUM SERPL-SCNC: 142 MMOL/L (ref 135–145)
WBC # BLD AUTO: 12.3 10E3/UL (ref 4–11)

## 2024-09-30 PROCEDURE — 85027 COMPLETE CBC AUTOMATED: CPT | Performed by: INTERNAL MEDICINE

## 2024-09-30 PROCEDURE — 250N000013 HC RX MED GY IP 250 OP 250 PS 637: Performed by: STUDENT IN AN ORGANIZED HEALTH CARE EDUCATION/TRAINING PROGRAM

## 2024-09-30 PROCEDURE — 250N000011 HC RX IP 250 OP 636: Performed by: INTERNAL MEDICINE

## 2024-09-30 PROCEDURE — 99233 SBSQ HOSP IP/OBS HIGH 50: CPT | Performed by: HOSPITALIST

## 2024-09-30 PROCEDURE — 97110 THERAPEUTIC EXERCISES: CPT | Mod: GO

## 2024-09-30 PROCEDURE — 83735 ASSAY OF MAGNESIUM: CPT | Performed by: INTERNAL MEDICINE

## 2024-09-30 PROCEDURE — 250N000013 HC RX MED GY IP 250 OP 250 PS 637: Performed by: INTERNAL MEDICINE

## 2024-09-30 PROCEDURE — 94640 AIRWAY INHALATION TREATMENT: CPT

## 2024-09-30 PROCEDURE — 97116 GAIT TRAINING THERAPY: CPT | Mod: GP

## 2024-09-30 PROCEDURE — 250N000009 HC RX 250: Performed by: INTERNAL MEDICINE

## 2024-09-30 PROCEDURE — 84100 ASSAY OF PHOSPHORUS: CPT | Performed by: INTERNAL MEDICINE

## 2024-09-30 PROCEDURE — 250N000011 HC RX IP 250 OP 636: Performed by: HOSPITALIST

## 2024-09-30 PROCEDURE — 84132 ASSAY OF SERUM POTASSIUM: CPT | Performed by: HOSPITALIST

## 2024-09-30 PROCEDURE — 999N000157 HC STATISTIC RCP TIME EA 10 MIN

## 2024-09-30 PROCEDURE — 250N000011 HC RX IP 250 OP 636: Performed by: STUDENT IN AN ORGANIZED HEALTH CARE EDUCATION/TRAINING PROGRAM

## 2024-09-30 PROCEDURE — 250N000013 HC RX MED GY IP 250 OP 250 PS 637: Performed by: PHYSICIAN ASSISTANT

## 2024-09-30 PROCEDURE — 250N000013 HC RX MED GY IP 250 OP 250 PS 637: Performed by: HOSPITALIST

## 2024-09-30 PROCEDURE — 97530 THERAPEUTIC ACTIVITIES: CPT | Mod: GP

## 2024-09-30 PROCEDURE — 94640 AIRWAY INHALATION TREATMENT: CPT | Mod: 76

## 2024-09-30 PROCEDURE — 80048 BASIC METABOLIC PNL TOTAL CA: CPT | Performed by: INTERNAL MEDICINE

## 2024-09-30 PROCEDURE — 120N000001 HC R&B MED SURG/OB

## 2024-09-30 PROCEDURE — 99222 1ST HOSP IP/OBS MODERATE 55: CPT | Performed by: PHYSICIAN ASSISTANT

## 2024-09-30 RX ORDER — CEFTRIAXONE 2 G/1
2 INJECTION, POWDER, FOR SOLUTION INTRAMUSCULAR; INTRAVENOUS EVERY 24 HOURS
Status: DISCONTINUED | OUTPATIENT
Start: 2024-09-30 | End: 2024-10-01

## 2024-09-30 RX ORDER — ACETAMINOPHEN 325 MG/1
650 TABLET ORAL EVERY 4 HOURS PRN
Status: DISCONTINUED | OUTPATIENT
Start: 2024-09-30 | End: 2024-10-02 | Stop reason: HOSPADM

## 2024-09-30 RX ORDER — HYDROCODONE BITARTRATE AND ACETAMINOPHEN 5; 325 MG/1; MG/1
1 TABLET ORAL EVERY 12 HOURS PRN
Status: DISCONTINUED | OUTPATIENT
Start: 2024-09-30 | End: 2024-10-02 | Stop reason: HOSPADM

## 2024-09-30 RX ORDER — ACETAMINOPHEN 650 MG/1
650 SUPPOSITORY RECTAL EVERY 4 HOURS PRN
Status: DISCONTINUED | OUTPATIENT
Start: 2024-09-30 | End: 2024-10-02 | Stop reason: HOSPADM

## 2024-09-30 RX ORDER — POTASSIUM CHLORIDE 1500 MG/1
40 TABLET, EXTENDED RELEASE ORAL ONCE
Status: COMPLETED | OUTPATIENT
Start: 2024-09-30 | End: 2024-09-30

## 2024-09-30 RX ORDER — HYDROCODONE BITARTRATE AND ACETAMINOPHEN 5; 325 MG/1; MG/1
1-2 TABLET ORAL EVERY 6 HOURS PRN
Status: DISCONTINUED | OUTPATIENT
Start: 2024-09-30 | End: 2024-09-30

## 2024-09-30 RX ADMIN — POTASSIUM CHLORIDE 40 MEQ: 1500 TABLET, EXTENDED RELEASE ORAL at 11:22

## 2024-09-30 RX ADMIN — POLYETHYLENE GLYCOL 3350 17 G: 17 POWDER, FOR SOLUTION ORAL at 09:22

## 2024-09-30 RX ADMIN — FORMOTEROL FUMARATE 20 MCG: 20 SOLUTION RESPIRATORY (INHALATION) at 08:00

## 2024-09-30 RX ADMIN — PREGABALIN 100 MG: 100 CAPSULE ORAL at 20:50

## 2024-09-30 RX ADMIN — HEPARIN SODIUM 5000 UNITS: 10000 INJECTION, SOLUTION INTRAVENOUS; SUBCUTANEOUS at 05:57

## 2024-09-30 RX ADMIN — IPRATROPIUM BROMIDE AND ALBUTEROL SULFATE 3 ML: .5; 3 SOLUTION RESPIRATORY (INHALATION) at 12:27

## 2024-09-30 RX ADMIN — CARBIDOPA AND LEVODOPA 1 TABLET: 10; 100 TABLET ORAL at 20:52

## 2024-09-30 RX ADMIN — CARBIDOPA AND LEVODOPA 1 TABLET: 10; 100 TABLET ORAL at 08:05

## 2024-09-30 RX ADMIN — HEPARIN SODIUM 5000 UNITS: 10000 INJECTION, SOLUTION INTRAVENOUS; SUBCUTANEOUS at 14:52

## 2024-09-30 RX ADMIN — HYDROXYZINE HYDROCHLORIDE 10 MG: 10 TABLET ORAL at 18:38

## 2024-09-30 RX ADMIN — SENNOSIDES AND DOCUSATE SODIUM 1 TABLET: 8.6; 5 TABLET ORAL at 09:19

## 2024-09-30 RX ADMIN — TOPIRAMATE 100 MG: 100 TABLET, FILM COATED ORAL at 22:18

## 2024-09-30 RX ADMIN — PIPERACILLIN AND TAZOBACTAM 3.38 G: 3; .375 INJECTION, POWDER, FOR SOLUTION INTRAVENOUS at 00:10

## 2024-09-30 RX ADMIN — PREGABALIN 100 MG: 100 CAPSULE ORAL at 14:52

## 2024-09-30 RX ADMIN — DULOXETINE HYDROCHLORIDE 60 MG: 60 CAPSULE, DELAYED RELEASE PELLETS ORAL at 08:02

## 2024-09-30 RX ADMIN — TOPIRAMATE 100 MG: 100 TABLET, FILM COATED ORAL at 08:03

## 2024-09-30 RX ADMIN — IPRATROPIUM BROMIDE AND ALBUTEROL SULFATE 3 ML: .5; 3 SOLUTION RESPIRATORY (INHALATION) at 20:18

## 2024-09-30 RX ADMIN — Medication 3 MG: at 20:50

## 2024-09-30 RX ADMIN — CEFTRIAXONE SODIUM 2 G: 2 INJECTION, POWDER, FOR SOLUTION INTRAMUSCULAR; INTRAVENOUS at 14:56

## 2024-09-30 RX ADMIN — ACETAMINOPHEN 650 MG: 325 TABLET ORAL at 18:38

## 2024-09-30 RX ADMIN — PANTOPRAZOLE SODIUM 40 MG: 40 INJECTION, POWDER, FOR SOLUTION INTRAVENOUS at 09:02

## 2024-09-30 RX ADMIN — AMLODIPINE BESYLATE 2.5 MG: 2.5 TABLET ORAL at 08:02

## 2024-09-30 RX ADMIN — PREGABALIN 100 MG: 100 CAPSULE ORAL at 08:02

## 2024-09-30 RX ADMIN — METHADONE HYDROCHLORIDE 7.5 MG: 5 TABLET ORAL at 08:02

## 2024-09-30 RX ADMIN — HYDROCODONE BITARTRATE AND ACETAMINOPHEN 1 TABLET: 5; 325 TABLET ORAL at 18:38

## 2024-09-30 RX ADMIN — PIPERACILLIN AND TAZOBACTAM 3.38 G: 3; .375 INJECTION, POWDER, FOR SOLUTION INTRAVENOUS at 09:02

## 2024-09-30 RX ADMIN — IPRATROPIUM BROMIDE AND ALBUTEROL SULFATE 3 ML: .5; 3 SOLUTION RESPIRATORY (INHALATION) at 07:58

## 2024-09-30 RX ADMIN — METHYLPREDNISOLONE SODIUM SUCCINATE 40 MG: 40 INJECTION, POWDER, FOR SOLUTION INTRAMUSCULAR; INTRAVENOUS at 11:22

## 2024-09-30 RX ADMIN — CARBIDOPA AND LEVODOPA 1 TABLET: 10; 100 TABLET ORAL at 14:52

## 2024-09-30 RX ADMIN — HEPARIN SODIUM 5000 UNITS: 10000 INJECTION, SOLUTION INTRAVENOUS; SUBCUTANEOUS at 22:19

## 2024-09-30 RX ADMIN — METHADONE HYDROCHLORIDE 7.5 MG: 5 TABLET ORAL at 20:49

## 2024-09-30 RX ADMIN — SENNOSIDES AND DOCUSATE SODIUM 1 TABLET: 8.6; 5 TABLET ORAL at 20:50

## 2024-09-30 ASSESSMENT — ACTIVITIES OF DAILY LIVING (ADL)
ADLS_ACUITY_SCORE: 35
ADLS_ACUITY_SCORE: 37
ADLS_ACUITY_SCORE: 35
ADLS_ACUITY_SCORE: 33
ADLS_ACUITY_SCORE: 35
ADLS_ACUITY_SCORE: 37
ADLS_ACUITY_SCORE: 35
ADLS_ACUITY_SCORE: 37
ADLS_ACUITY_SCORE: 38
ADLS_ACUITY_SCORE: 35
ADLS_ACUITY_SCORE: 33
ADLS_ACUITY_SCORE: 35
ADLS_ACUITY_SCORE: 33
ADLS_ACUITY_SCORE: 37
ADLS_ACUITY_SCORE: 33
ADLS_ACUITY_SCORE: 35
ADLS_ACUITY_SCORE: 33

## 2024-09-30 NOTE — CONSULTS
[facility] ACUTE INPATIENT PAIN SERVICE    [facility], [facility], [facility], [facility], [place]   PAIN CONSULT      Assessment/Plan:  Romaine Farah is a 79 year old adult who was admitted on 9/22/2024.  I was asked by Dr. Farhan Anderson to see the patient for management of his chronic pain syndrome. Managed on chronic opioid therapy with [facility]. Admitted for acute hypoxic respiratory failure, multifocal pneumonia, and septic shock . History of COPD, Chronic Bronchitis who was admitted on 9/22/24 for acute hypoxic respiratory failure, multifocal pneumonia, and septic shock. Describes pain as 4/10.    [facility] reviewed: Pain contract is with [facility]. Established with Grazyna Barrett CNP. Coordinated care with their office today. He has a follow up appointment scheduled for 10/10.  *Methadone 10mg bid, last filled 09/09/24  *Norco 5-325mg tid prn, last filled 09/15/24  *Pregabalin 150mg tid      PLAN:  Chronic pain syndrome with acute hypoxic respiratory failure  Multimodal Medication Therapy:   Adjuvants:   - Pregabalin 100mg tid  -Topamax 100mg bid  - APAP prn  - Hydroxyzine 10mg q6h prn  Opioids:   - Methadone 7.5mg bid  - Add Norco 5-325mg bid prn. Hold for RASS<-2. Hold for SBP <90. Hold for sedation. Hold for RR<12. Hold for SpO2<90%  Non-medication interventions- Ice  Constipation Prophylaxis- Bisacodyl , Senna-S  Follow up /Discharge Recommendations - We recommend prescribing the following at the time of discharge: Will provide bridge Rx of Methadone 7.5mg. He has a scheduled follow up with the pain clinic on 10/10.          Subjective:  Rom is seen today in his chair alert and oriented in no acute distress. Reports his pain is currently a 4/10. Reviewed current hospitalization and his chronic opiates. Will gradually titrate with continued monitoring of his oxygen levels. He will follow up with his pain specialist for ongoing monitoring and recommendations after discharge. His wife was present  "today and contributes to his care and the conversation.      Denies concerns with nausea, vomiting, constipation.      Reviewed continuing with current plan with medication changes mentioned above, all questions answered.            History   Drug Use Unknown     Comment: cigars         Tobacco Use      Smoking status: Former        Types: Cigars, cigarillos or filtered cigars      Smokeless tobacco: Never        Objective:  Vital signs in last 24 hours:  B/P: 185/82[RN notified[, T: 98.2, P: 65, R: 18   Blood pressure (!) 185/82, pulse 65, temperature 98.2  F (36.8  C), temperature source Oral, resp. rate 18, height 1.778 m (5' 10\"), weight 96 kg (211 lb 9.6 oz), SpO2 98%.        Review of Systems:   As per subjective, all others negative.    Physical Exam    General: in no apparent distress and non-toxic   HEENT: Head normocephalic atraumatic, oral mucosa moist. Sclerae anicteric  CV: Regular rhythm, normal rate, no murmurs  Resp: Decreased breath sounds. On oxygen.   GI: Normoactive bowel sounds  Skin: No rashes or lesions  Extremities: No peripheral edema  Psych: Normal affect, mood euthymic  Neuro: Grossly normal          Imaging:  Personally Reviewed.    Results for orders placed or performed during the hospital encounter of 09/22/24   XR Chest Port 1 View    Impression    IMPRESSION:     Extensive patchy mid to lower lung airspace opacities, suspicious for multifocal pneumonia or aspiration.    Small bilateral pleural effusions. No pneumothorax.    The cardiomediastinal silhouette is partially obscured, limiting evaluation. Aortic calcifications.    Left subclavian approach pacemaker.   CT Chest Pulmonary Embolism w Contrast    Impression    IMPRESSION:  1.  No pulmonary emboli.  2.  Multifocal, bilateral infiltrates presumably infectious or inflammatory. Follow-up to confirm resolution.  3.  Mediastinal and right hilar adenopathy can be reassessed at time of follow-up.  4.  Trace pleural effusions.  5.  " Calcified left pleural plaques suggesting previous asbestos exposure.   CT Head w/o Contrast    Impression    IMPRESSION:  1.  No acute intracranial process.   XR Chest Port 1 View    Impression    IMPRESSION: Stable left subclavian approach pacemaker. Slight interval worsening of bilateral consolidation. No pneumothorax. Right PICC with tip near the cavoatrial junction. Stable cardiomediastinal silhouette.   XR Chest Port 1 View    Impression    IMPRESSION: Stable right PICC. Increasing consolidations in the mid to lower lungs. Increasing small pleural effusions. Stable heart size. Cardiac pacemaker.        Lab Results:  Personally Reviewed.   Last Comprehensive Metabolic Panel:  Sodium   Date Value Ref Range Status   09/30/2024 142 135 - 145 mmol/L Final     Potassium   Date Value Ref Range Status   09/30/2024 3.4 3.4 - 5.3 mmol/L Final   06/20/2022 4.2 3.5 - 5.0 mmol/L Final     Chloride   Date Value Ref Range Status   09/30/2024 109 (H) 98 - 107 mmol/L Final   06/20/2022 105 98 - 107 mmol/L Final     Carbon Dioxide (CO2)   Date Value Ref Range Status   09/30/2024 25 22 - 29 mmol/L Final   06/20/2022 25 22 - 31 mmol/L Final     Anion Gap   Date Value Ref Range Status   09/30/2024 8 7 - 15 mmol/L Final   06/20/2022 9 5 - 18 mmol/L Final     Glucose   Date Value Ref Range Status   09/30/2024 100 (H) 70 - 99 mg/dL Final   06/20/2022 109 70 - 125 mg/dL Final     GLUCOSE BY METER POCT   Date Value Ref Range Status   09/30/2024 80 70 - 99 mg/dL Final     Urea Nitrogen   Date Value Ref Range Status   09/30/2024 20.3 8.0 - 23.0 mg/dL Final   06/20/2022 22 8 - 28 mg/dL Final     Creatinine   Date Value Ref Range Status   09/30/2024 0.88 0.51 - 1.17 mg/dL Final     Comment:     Male and Female  0-2 Months    0.31-0.88 mg/dL  2-12 Months   0.16-0.39 mg/dL  1-2 Years     0.18-0.35 mg/dL  3-4 Years     0.26-0.42 mg/dL  5-6 Years     0.29-0.47 mg/dL  7-8 Years     0.34-0.53 mg/dL  9-10 Years    0.33-0.64 mg/dL  11-12 Years    "0.44-0.68 mg/dL  13-14 Years   0.46-0.77 mg/dL    Female  15 Years and older  0.51-0.95 mg/dL    Male  15 Years and older  0.67-1.17 mg/dL         GFR Estimate   Date Value Ref Range Status   09/30/2024 87 >60 mL/min/1.73m2 Final     Comment:     The generation of the estimated GFR is currently based on binary male or female sex. If the electronic health record information indicates another gender identity or if Legal Sex is recorded as \"Unknown\", GFR estimates are not automatically calculated, and application of GFR equations or a direct GFR measurement should be considered according to the individual's appropriate clinical context.  eGFR calculated using 2021 CKD-EPI equation.   07/06/2020 60 (L) >60 mL/min/1.73m2 Final     Calcium   Date Value Ref Range Status   09/30/2024 7.9 (L) 8.8 - 10.4 mg/dL Final     Comment:     Reference intervals for this test were updated on 7/16/2024 to reflect our healthy population more accurately. There may be differences in the flagging of prior results with similar values performed with this method. Those prior results can be interpreted in the context of the updated reference intervals.        UA: No results found for: \"UAMP\", \"UBARB\", \"BENZODIAZEUR\", \"UCANN\", \"UCOC\", \"OPIT\", \"UPCP\"           Please see A&P for additional details of medical decision making.    Steven Mccormick PA-C  Acute Care Pain Management  Team  Hours of pain coverage Mon-Fri 8-1600, afterhours please call the house officer   PERLA Toscano (EDUARDO, Adam, SD, RH)   Page via [employer] web console -Click for Vocera            "

## 2024-09-30 NOTE — PLAN OF CARE
"7:41 AM bed alarm activated, sleeping. Placed call light at side    7:44 AM BG 80, /82. Message to hospitalist that scheduled meds not yet given, so will give these to avoid BP crash. Provider agrees.    9:30 AM sats 97 on 6lpm, turned down to 5lpm. Lung sounds dim but clear, maybe rub on LLL?  A&O, waiting for wife to order breakfast. Refused chair alarm, \"I don't need it\"    11:14 AM lunch here for patient. Needs BG check, IV methylpred, and K replace    11:28 AM BG 68, drank OJ. Wife present. Teaching on steroid impact on BG. Sats 95 on 5lpm, reduced to 4lpm    (Therapy reduced O2 flow to 3lpm and notified writer)    4:04 PM wife asked for assist to get patient bottom wiped. Noted area of darkened skin on bilateral gluteals, worse on left side    5:01 PM unable to obtain specimen from PICC. Call to lab to ask them to draw patient    6:42 PM patient complains of 8/10 pain in feet as well as anxiety. 1 tab norco (5 hydrocodone-325 acetaminophen) plus 650 acetaminophen administered for pain; hydroxyzine given for anxiety.        Goal Outcome Evaluation:       Problem: Comorbidity Management  Goal: Maintenance of COPD Symptom Control  Outcome: Progressing  Goal: Blood Glucose Levels Within Targeted Range  Outcome: Progressing  Goal: Blood Pressure in Desired Range  Outcome: Progressing     Problem: Adult Inpatient Plan of Care  Goal: Absence of Hospital-Acquired Illness or Injury  Intervention: Identify and Manage Fall Risk  Recent Flowsheet Documentation  Taken 9/30/2024 0807 by Bety Greeen, RN  Safety Promotion/Fall Prevention:   activity supervised   assistive device/personal items within reach   room near nurse's station   room organization consistent   safety round/check completed  Intervention: Prevent and Manage VTE (Venous Thromboembolism) Risk  Recent Flowsheet Documentation  Taken 9/30/2024 0807 by Bety Greene, RN  VTE Prevention/Management: (pt on heparin injections) other (see comments)      "

## 2024-09-30 NOTE — TREATMENT PLAN
[facility]AT Treatment Plan    Patient Score: 10    Patient Acuity: 4    Clinical Indication for Therapy: COPD    Therapy Ordered: Duoneb QID, and perforomist BID per home regiment.     Assessment Summary: RT will follow per RCAT protocol.

## 2024-09-30 NOTE — PROGRESS NOTES
[facility]    Medicine Progress Note - Hospitalist Service    Date of Admission:  9/22/2024    Assessment & Plan                Romaine Farah is a 79 year old adult with history of COPD, Chronic Bronchitis who was admitted on 9/22/24 for acute hypoxic respiratory failure, multifocal pneumonia, and septic shock.  Patient initially admitted to the ICU requiring BIPAP/HFNC.  Patient was maintained with DuoNebs, IV steroids, and antibiotics narrowed to Zosyn with improvement to 6 L nasal cannula on 9/29; then downgraded from the ICU.  Hospital Day: 9      #COPD Exacerbation  - Chronic bronchitis with COPD features, treated with a triple combination inhaler in outpatient setting, currently in an exacerbation. Sees Dr. Dejesus with the most recent visit on 9/11/2024. Brad Dejesus with concern about patient's opioid use contributing to exacerbations. Current episode sounds like he was very drowsy and sedated prior to presentation- unclear if that was brewing hypercarbia/sepsis or if he may have aspirated due to being drowsy from opioids?  -DuoNebs ordered every 6 hours scheduled  -Scheduled formoterol neb.  -Continue tapering IV steroid, currently on Solumedrol 40mg daily, lungs still sounding pretty tight and he has a low BG today, not sure ready to transition to PO steroid yet, will do another day IV, likely could change to PO taper tomorrow  -Pulm not actively following, consult PRN  -Pain management team managing opiates to avoid methadone related somnolence and aspiration/hypoxia    #Multifocal Pneumonia, concern for chronic aspiration  #Septic shock, resolved  -On antibiotics with Zosyn for aspiration pneumonia, planning 10 days per intensivist  -Sputum culture growing Klebsiella and E coli, the latter is resistant to Zosyn so will change to Rocephin. Could do Omnicef at discharge  -Speech saw on 9/24, passed bedside swallow. Given enteric organisms growing from sputum, could consider VSS to  "rule out silent aspiration.      #Acute Hypoxic hypercarbic Respiratory Failure, hypercarbia resolved   -from the above  -Patient initially requiring BiPAP and HFNC on admission.  Down to 5 L nasal cannula now.  -Patient does not use any supplemental oxygen at home. Suspect he may need home O2 now    #Sick sinus syndrome status post pacemaker   -stable  -can stop tele     #Borderline BERNICE - Resolved  -Baseline creatinine 1.0-1.1.  -Briefly peaked during admission, now stable at 0.8.     #Hypertension  - Patient with labile blood pressures during admission.  - Not on medications outpatient. Started on low dose amlodipine while in hospital  - Continue to monitor.     #Idiopathic peripheral Neuropathy  #Chronic pain syndrome  Home medications include duloxetine, methadone 10mg BID, pregabalin, topiramate. Also takes Norco 1-2 tabs at bedtime. Opioids have been held due to respiratory distress, now his pain is exacerbated. Methadone currently at 7.5mg BID.  - Pain Management following, appreciate assistance.  -should discuss long term taper plan with his pain clinic     #Tremor  - Home Senimet    #Hypoglycemia  -?due to poor PO  -wean steroid slow  -could consider cosyntropin stim test but I think we have other explanation            Diet: Combination Diet Regular Diet  Snacks/Supplements Adult: Ensure Enlive; Between Meals    DVT Prophylaxis: Moderate risk.   Heparin SQ  Wilhelm Catheter: Not present  Lines: PRESENT      PICC 09/22/24 Triple Lumen Right Basilic Vasopressor,Acute access-Site Assessment: WDL      Cardiac Monitoring: ACTIVE order. Indication: hypoxia  Code Status: Full Code      Clinically Significant Risk Factors                            # Obesity: Estimated body mass index is 30.36 kg/m  as calculated from the following:    Height as of this encounter: 1.778 m (5' 10\").    Weight as of this encounter: 96 kg (211 lb 9.6 oz).       # Pacemaker present       Disposition Plan     Medically Ready for " Discharge: Anticipated in 2-4 Days         Discharge barrier(s): hypoxia, steroid taper, weakness  Care discussed with: patient, wife      Shirley Camara MD  Hospitalist Service  [facility]  Securely message with [employer] (more info)  Text page via [employer] Paging/Directory   ______________________________________________________________________      Physical Exam   Vital Signs: Temp: 98  F (36.7  C) Temp src: Oral BP: 106/57 Pulse: 61   Resp: 18 SpO2: 98 % O2 Device: Nasal cannula Oxygen Delivery: 4 LPM  Weight: 211 lbs 9.6 oz    General: in no apparent distress, non-toxic, and alert adult sitting in bedside chair oriented x3  HEENT: Head normocephalic atraumatic, oral mucosa moist. Sclerae anicteric  CV: Regular rhythm, normal rate, no murmurs  Resp: Scattered end expiratory wheezes,  decreased air movement throughout  GI: Belly soft, nondistended, nontender, bowel sounds present  Skin: No rashes or lesions  Extremities: No peripheral edema  Psych: Normal affect, mood euthymic  Neuro: Grossly normal      Medical Decision Making               Data   Recent Results (from the past 16 hour(s))   CBC with platelets    Collection Time: 09/30/24  5:32 AM   Result Value Ref Range    WBC Count 12.3 (H) 4.0 - 11.0 10e3/uL    RBC Count 4.97 3.80 - 5.90 10e6/uL    Hemoglobin 14.0 11.7 - 17.7 g/dL    Hematocrit 43.1 35.0 - 53.0 %    MCV 87 78 - 100 fL    MCH 28.2 26.5 - 33.0 pg    MCHC 32.5 31.5 - 36.5 g/dL    RDW 15.4 (H) 10.0 - 15.0 %    Platelet Count 169 150 - 450 10e3/uL   Basic metabolic panel    Collection Time: 09/30/24  5:32 AM   Result Value Ref Range    Sodium 142 135 - 145 mmol/L    Potassium 3.4 3.4 - 5.3 mmol/L    Chloride 109 (H) 98 - 107 mmol/L    Carbon Dioxide (CO2) 25 22 - 29 mmol/L    Anion Gap 8 7 - 15 mmol/L    Urea Nitrogen 20.3 8.0 - 23.0 mg/dL    Creatinine 0.88 0.51 - 1.17 mg/dL    GFR Estimate 87 >60 mL/min/1.73m2    Calcium 7.9 (L) 8.8 - 10.4 mg/dL    Glucose 100 (H) 70 - 99 mg/dL    Magnesium    Collection Time: 09/30/24  5:32 AM   Result Value Ref Range    Magnesium 2.2 1.7 - 2.3 mg/dL   Phosphorus    Collection Time: 09/30/24  5:32 AM   Result Value Ref Range    Phosphorus 3.8 2.5 - 4.5 mg/dL   Glucose by meter    Collection Time: 09/30/24  7:41 AM   Result Value Ref Range    GLUCOSE BY METER POCT 80 70 - 99 mg/dL   Glucose by meter    Collection Time: 09/30/24 11:20 AM   Result Value Ref Range    GLUCOSE BY METER POCT 68 (L) 70 - 99 mg/dL       Interval History     Patient is doing okay today.  Currently on 5 L nasal cannula.  Weaning down slowly.  Moving decent air on exam, still some end expiratory wheezing.  Appetite fair.  Passed bowel movement 2 days ago.  Urinating well.  Seems to be improving slowly.  Still on IV steroid, anticipate he may be able to transition to oral.  Starting physical therapy today.  Patient is motivated to go home, he does not have home oxygen previously.  Anticipate possibly home with home O2 maybe in 2 days.

## 2024-09-30 NOTE — PLAN OF CARE
Problem: Adult Inpatient Plan of Care  Goal: Optimal Comfort and Wellbeing  Outcome: Progressing     Problem: Risk for Delirium  Goal: Improved Sleep  Outcome: Progressing     Problem: Comorbidity Management  Goal: Blood Glucose Levels Within Targeted Range  Intervention: Monitor and Manage Glycemia  Recent Flowsheet Documentation  Taken 9/30/2024 0413 by Morelia Figueroa RN  Glycemic Management: blood glucose monitored  Taken 9/29/2024 2010 by Morelia Figueroa RN  Glycemic Management: blood glucose monitored   Goal Outcome Evaluation:    Patient alert and oriented with VSS on 6L O2 via NC. SpO2 drops with minimal activity, pulse oximetry continued. Ambulating A1 with walker and GB. Tremors are at baseline. Pain reported to BLE, scheduled meds helpful. PRN atarax helpful with anxiety. Lung sounds are diminished. K/Mag/Phos protocols with am rechecks. Using urinal at bedside overnight. IV Abx as ordered.

## 2024-10-01 ENCOUNTER — APPOINTMENT (OUTPATIENT)
Dept: OCCUPATIONAL THERAPY | Facility: HOSPITAL | Age: 79
DRG: 871 | End: 2024-10-01
Payer: COMMERCIAL

## 2024-10-01 LAB
ERYTHROCYTE [DISTWIDTH] IN BLOOD BY AUTOMATED COUNT: 15.8 % (ref 10–15)
GLUCOSE BLDC GLUCOMTR-MCNC: 64 MG/DL (ref 70–99)
GLUCOSE BLDC GLUCOMTR-MCNC: 66 MG/DL (ref 70–99)
GLUCOSE BLDC GLUCOMTR-MCNC: 82 MG/DL (ref 70–99)
GLUCOSE BLDC GLUCOMTR-MCNC: 82 MG/DL (ref 70–99)
HCT VFR BLD AUTO: 44.7 % (ref 35–53)
HGB BLD-MCNC: 14.7 G/DL (ref 11.7–17.7)
MAGNESIUM SERPL-MCNC: 2.2 MG/DL (ref 1.7–2.3)
MAGNESIUM SERPL-MCNC: 2.3 MG/DL (ref 1.7–2.3)
MCH RBC QN AUTO: 28.7 PG (ref 26.5–33)
MCHC RBC AUTO-ENTMCNC: 32.9 G/DL (ref 31.5–36.5)
MCV RBC AUTO: 87 FL (ref 78–100)
PHOSPHATE SERPL-MCNC: 3.2 MG/DL (ref 2.5–4.5)
PLATELET # BLD AUTO: 178 10E3/UL (ref 150–450)
RBC # BLD AUTO: 5.12 10E6/UL (ref 3.8–5.9)
WBC # BLD AUTO: 12 10E3/UL (ref 4–11)

## 2024-10-01 PROCEDURE — 36415 COLL VENOUS BLD VENIPUNCTURE: CPT | Performed by: INTERNAL MEDICINE

## 2024-10-01 PROCEDURE — 250N000011 HC RX IP 250 OP 636: Performed by: INTERNAL MEDICINE

## 2024-10-01 PROCEDURE — 120N000001 HC R&B MED SURG/OB

## 2024-10-01 PROCEDURE — 999N000157 HC STATISTIC RCP TIME EA 10 MIN

## 2024-10-01 PROCEDURE — 94640 AIRWAY INHALATION TREATMENT: CPT

## 2024-10-01 PROCEDURE — 250N000013 HC RX MED GY IP 250 OP 250 PS 637: Performed by: INTERNAL MEDICINE

## 2024-10-01 PROCEDURE — 85027 COMPLETE CBC AUTOMATED: CPT | Performed by: HOSPITALIST

## 2024-10-01 PROCEDURE — 99232 SBSQ HOSP IP/OBS MODERATE 35: CPT | Performed by: INTERNAL MEDICINE

## 2024-10-01 PROCEDURE — 99233 SBSQ HOSP IP/OBS HIGH 50: CPT | Performed by: INTERNAL MEDICINE

## 2024-10-01 PROCEDURE — 83735 ASSAY OF MAGNESIUM: CPT | Performed by: INTERNAL MEDICINE

## 2024-10-01 PROCEDURE — 97535 SELF CARE MNGMENT TRAINING: CPT | Mod: GO

## 2024-10-01 PROCEDURE — 250N000009 HC RX 250: Performed by: INTERNAL MEDICINE

## 2024-10-01 PROCEDURE — 97110 THERAPEUTIC EXERCISES: CPT | Mod: GO

## 2024-10-01 PROCEDURE — G0463 HOSPITAL OUTPT CLINIC VISIT: HCPCS

## 2024-10-01 PROCEDURE — 99232 SBSQ HOSP IP/OBS MODERATE 35: CPT | Performed by: PHYSICIAN ASSISTANT

## 2024-10-01 PROCEDURE — 94640 AIRWAY INHALATION TREATMENT: CPT | Mod: 76

## 2024-10-01 PROCEDURE — 84100 ASSAY OF PHOSPHORUS: CPT | Performed by: INTERNAL MEDICINE

## 2024-10-01 RX ORDER — PREDNISONE 20 MG/1
20 TABLET ORAL DAILY
Status: DISCONTINUED | OUTPATIENT
Start: 2024-10-08 | End: 2024-10-02 | Stop reason: HOSPADM

## 2024-10-01 RX ORDER — PREDNISONE 20 MG/1
20 TABLET ORAL DAILY
Status: DISCONTINUED | OUTPATIENT
Start: 2024-10-07 | End: 2024-10-01

## 2024-10-01 RX ORDER — PREDNISONE 20 MG/1
40 TABLET ORAL DAILY
Status: DISCONTINUED | OUTPATIENT
Start: 2024-10-01 | End: 2024-10-01

## 2024-10-01 RX ORDER — PREDNISONE 5 MG/1
10 TABLET ORAL DAILY
Status: DISCONTINUED | OUTPATIENT
Start: 2024-10-10 | End: 2024-10-01

## 2024-10-01 RX ORDER — LEVOFLOXACIN 750 MG/1
750 TABLET, FILM COATED ORAL DAILY
Status: DISCONTINUED | OUTPATIENT
Start: 2024-10-01 | End: 2024-10-02 | Stop reason: HOSPADM

## 2024-10-01 RX ORDER — PREDNISONE 5 MG/1
10 TABLET ORAL DAILY
Status: DISCONTINUED | OUTPATIENT
Start: 2024-10-11 | End: 2024-10-02 | Stop reason: HOSPADM

## 2024-10-01 RX ORDER — PREDNISONE 20 MG/1
40 TABLET ORAL DAILY
Status: DISCONTINUED | OUTPATIENT
Start: 2024-10-02 | End: 2024-10-02 | Stop reason: HOSPADM

## 2024-10-01 RX ADMIN — PREGABALIN 100 MG: 100 CAPSULE ORAL at 09:09

## 2024-10-01 RX ADMIN — METHYLPREDNISOLONE SODIUM SUCCINATE 40 MG: 40 INJECTION, POWDER, FOR SOLUTION INTRAMUSCULAR; INTRAVENOUS at 10:55

## 2024-10-01 RX ADMIN — TOPIRAMATE 100 MG: 100 TABLET, FILM COATED ORAL at 20:55

## 2024-10-01 RX ADMIN — IPRATROPIUM BROMIDE AND ALBUTEROL SULFATE 3 ML: .5; 3 SOLUTION RESPIRATORY (INHALATION) at 12:41

## 2024-10-01 RX ADMIN — PREGABALIN 100 MG: 100 CAPSULE ORAL at 14:50

## 2024-10-01 RX ADMIN — FORMOTEROL FUMARATE 20 MCG: 20 SOLUTION RESPIRATORY (INHALATION) at 08:42

## 2024-10-01 RX ADMIN — AMLODIPINE BESYLATE 2.5 MG: 2.5 TABLET ORAL at 09:05

## 2024-10-01 RX ADMIN — HYDROXYZINE HYDROCHLORIDE 10 MG: 10 TABLET ORAL at 00:32

## 2024-10-01 RX ADMIN — TOPIRAMATE 100 MG: 100 TABLET, FILM COATED ORAL at 09:09

## 2024-10-01 RX ADMIN — METHADONE HYDROCHLORIDE 7.5 MG: 5 TABLET ORAL at 20:52

## 2024-10-01 RX ADMIN — LEVOFLOXACIN 750 MG: 750 TABLET, FILM COATED ORAL at 15:15

## 2024-10-01 RX ADMIN — METHADONE HYDROCHLORIDE 7.5 MG: 5 TABLET ORAL at 09:08

## 2024-10-01 RX ADMIN — HEPARIN SODIUM 5000 UNITS: 10000 INJECTION, SOLUTION INTRAVENOUS; SUBCUTANEOUS at 21:01

## 2024-10-01 RX ADMIN — HEPARIN SODIUM 5000 UNITS: 10000 INJECTION, SOLUTION INTRAVENOUS; SUBCUTANEOUS at 14:47

## 2024-10-01 RX ADMIN — PANTOPRAZOLE SODIUM 40 MG: 40 INJECTION, POWDER, FOR SOLUTION INTRAVENOUS at 09:08

## 2024-10-01 RX ADMIN — SENNOSIDES AND DOCUSATE SODIUM 1 TABLET: 8.6; 5 TABLET ORAL at 09:09

## 2024-10-01 RX ADMIN — HEPARIN SODIUM 5000 UNITS: 10000 INJECTION, SOLUTION INTRAVENOUS; SUBCUTANEOUS at 05:46

## 2024-10-01 RX ADMIN — DULOXETINE HYDROCHLORIDE 60 MG: 60 CAPSULE, DELAYED RELEASE PELLETS ORAL at 09:07

## 2024-10-01 RX ADMIN — CARBIDOPA AND LEVODOPA 1 TABLET: 10; 100 TABLET ORAL at 14:46

## 2024-10-01 RX ADMIN — Medication 3 MG: at 20:52

## 2024-10-01 RX ADMIN — CARBIDOPA AND LEVODOPA 1 TABLET: 10; 100 TABLET ORAL at 20:55

## 2024-10-01 RX ADMIN — IPRATROPIUM BROMIDE AND ALBUTEROL SULFATE 3 ML: .5; 3 SOLUTION RESPIRATORY (INHALATION) at 16:07

## 2024-10-01 RX ADMIN — CARBIDOPA AND LEVODOPA 1 TABLET: 10; 100 TABLET ORAL at 09:06

## 2024-10-01 RX ADMIN — FORMOTEROL FUMARATE 20 MCG: 20 SOLUTION RESPIRATORY (INHALATION) at 20:45

## 2024-10-01 RX ADMIN — PREGABALIN 100 MG: 100 CAPSULE ORAL at 20:52

## 2024-10-01 RX ADMIN — HYDROXYZINE HYDROCHLORIDE 10 MG: 10 TABLET ORAL at 18:04

## 2024-10-01 RX ADMIN — SENNOSIDES AND DOCUSATE SODIUM 1 TABLET: 8.6; 5 TABLET ORAL at 20:52

## 2024-10-01 RX ADMIN — IPRATROPIUM BROMIDE AND ALBUTEROL SULFATE 3 ML: .5; 3 SOLUTION RESPIRATORY (INHALATION) at 08:37

## 2024-10-01 RX ADMIN — IPRATROPIUM BROMIDE AND ALBUTEROL SULFATE 3 ML: .5; 3 SOLUTION RESPIRATORY (INHALATION) at 20:44

## 2024-10-01 ASSESSMENT — ACTIVITIES OF DAILY LIVING (ADL)
ADLS_ACUITY_SCORE: 37
ADLS_ACUITY_SCORE: 35
ADLS_ACUITY_SCORE: 37
ADLS_ACUITY_SCORE: 35
ADLS_ACUITY_SCORE: 37
ADLS_ACUITY_SCORE: 36
ADLS_ACUITY_SCORE: 35
ADLS_ACUITY_SCORE: 36
ADLS_ACUITY_SCORE: 37
ADLS_ACUITY_SCORE: 36
ADLS_ACUITY_SCORE: 35
ADLS_ACUITY_SCORE: 37
ADLS_ACUITY_SCORE: 35
ADLS_ACUITY_SCORE: 37
ADLS_ACUITY_SCORE: 35
ADLS_ACUITY_SCORE: 37
ADLS_ACUITY_SCORE: 35

## 2024-10-01 NOTE — PROGRESS NOTES
"Wife requested something for his anxiety. \"He usually isn't anxious.\" Patient feels anxious. Ate well for supper brought in by his wife. Oxygen off for adequate saturation. Education on IS.done per Dr. Morfin's order. Return demonstration done well.  "

## 2024-10-01 NOTE — PROGRESS NOTES
[facility] ACUTE INPATIENT PAIN SERVICE    [facility], [facility], [facility], [facility], [place]   PAIN PROGRESS      Assessment/Plan:  Romaine Farah is a 79 year old adult who was admitted on 9/22/2024.  I was asked by Dr. Farhan Anderson to see the patient for management of his chronic pain syndrome. Managed on chronic opioid therapy with [facility]. Admitted for acute hypoxic respiratory failure, multifocal pneumonia, and septic shock . History of COPD, Chronic Bronchitis who was admitted on 9/22/24 for acute hypoxic respiratory failure, multifocal pneumonia, and septic shock. Describes pain as 0/10 today. Reports worsening anxiety with his admission. Hydroxyzine has been helpful.     [facility] reviewed: Pain contract is with [facility]. Established with Grazyna Barrett CNP. Coordinated care with their office today. He has a follow up appointment scheduled for 10/10/24  *Methadone 10mg bid, last filled 09/09/24  *Norco 5-325mg tid prn, last filled 09/15/24  *Pregabalin 150mg tid    Opioids used in the past 24h:  *(1) 7.5mg Methadone tabelt  *(1) 5-325mg Norco tablet        PLAN:  Chronic pain syndrome with acute hypoxic respiratory failure  Multimodal Medication Therapy:   Adjuvants:   - Pregabalin 100mg tid  -Topamax 100mg bid  - APAP prn  - Hydroxyzine 10mg q6h prn  Opioids:   - Methadone 7.5mg bid  - Norco 5-325mg bid prn Hold for RASS<-2. Hold for SBP <90. Hold for sedation. Hold for RR<12. Hold for SpO2<90%  Non-medication interventions- Ice  Constipation Prophylaxis- Bisacodyl , Senna-S  Follow up /Discharge Recommendations - We recommend prescribing the following at the time of discharge: Will provide bridge Rx of Methadone 7.5mg. He has a scheduled follow up with the pain clinic on 10/10. Should continue Lyrica at lower dose of 100mg tid until he follows up outpatient.            Subjective:  Rom is seen today in his bed alert and oriented in no acute distress. Reports his pain is currently a 0/10.  "Has concerns with worsening anxiety. Reporting benefit with Hydroxyzine.      Denies nausea, vomiting, constipation.      Reviewed continuing with current plan, all questions answered. His wife is bedside with him today and contributes to his care.          History   Drug Use Unknown     Comment: cigars         Tobacco Use      Smoking status: Former        Types: Cigars, cigarillos or filtered cigars      Smokeless tobacco: Never        Objective:  Vital signs in last 24 hours:  B/P: 170/74[notified nurse[, T: 97.9, P: 68, R: 14   Blood pressure (!) 170/74, pulse 68, temperature 97.9  F (36.6  C), temperature source Oral, resp. rate 14, height 1.778 m (5' 10\"), weight 97.5 kg (214 lb 15.2 oz), SpO2 98%.        Review of Systems:   As per subjective, all others negative.    Physical Exam    General: in no apparent distress and non-toxic   HEENT: Head normocephalic atraumatic, oral mucosa moist. Sclerae anicteric  CV: Regular rhythm, normal rate, no murmurs  Resp: Decreased breath sounds. On oxygen.   GI: Normoactive bowel sounds  Skin: No rashes or lesions  Extremities: No peripheral edema  Psych: Normal affect, mood euthymic  Neuro: Grossly randa       Imaging:  Personally Reviewed.    Results for orders placed or performed during the hospital encounter of 09/22/24   XR Chest Port 1 View    Impression    IMPRESSION:     Extensive patchy mid to lower lung airspace opacities, suspicious for multifocal pneumonia or aspiration.    Small bilateral pleural effusions. No pneumothorax.    The cardiomediastinal silhouette is partially obscured, limiting evaluation. Aortic calcifications.    Left subclavian approach pacemaker.   CT Chest Pulmonary Embolism w Contrast    Impression    IMPRESSION:  1.  No pulmonary emboli.  2.  Multifocal, bilateral infiltrates presumably infectious or inflammatory. Follow-up to confirm resolution.  3.  Mediastinal and right hilar adenopathy can be reassessed at time of follow-up.  4.  Trace " pleural effusions.  5.  Calcified left pleural plaques suggesting previous asbestos exposure.   CT Head w/o Contrast    Impression    IMPRESSION:  1.  No acute intracranial process.   XR Chest Port 1 View    Impression    IMPRESSION: Stable left subclavian approach pacemaker. Slight interval worsening of bilateral consolidation. No pneumothorax. Right PICC with tip near the cavoatrial junction. Stable cardiomediastinal silhouette.   XR Chest Port 1 View    Impression    IMPRESSION: Stable right PICC. Increasing consolidations in the mid to lower lungs. Increasing small pleural effusions. Stable heart size. Cardiac pacemaker.        Lab Results:  Personally Reviewed.   Last Comprehensive Metabolic Panel:  Sodium   Date Value Ref Range Status   09/30/2024 142 135 - 145 mmol/L Final     Potassium   Date Value Ref Range Status   09/30/2024 4.2 3.4 - 5.3 mmol/L Final   06/20/2022 4.2 3.5 - 5.0 mmol/L Final     Chloride   Date Value Ref Range Status   09/30/2024 109 (H) 98 - 107 mmol/L Final   06/20/2022 105 98 - 107 mmol/L Final     Carbon Dioxide (CO2)   Date Value Ref Range Status   09/30/2024 25 22 - 29 mmol/L Final   06/20/2022 25 22 - 31 mmol/L Final     Anion Gap   Date Value Ref Range Status   09/30/2024 8 7 - 15 mmol/L Final   06/20/2022 9 5 - 18 mmol/L Final     Glucose   Date Value Ref Range Status   06/20/2022 109 70 - 125 mg/dL Final     GLUCOSE BY METER POCT   Date Value Ref Range Status   10/01/2024 64 (L) 70 - 99 mg/dL Final     Urea Nitrogen   Date Value Ref Range Status   09/30/2024 20.3 8.0 - 23.0 mg/dL Final   06/20/2022 22 8 - 28 mg/dL Final     Creatinine   Date Value Ref Range Status   09/30/2024 0.88 0.51 - 1.17 mg/dL Final     Comment:     Male and Female  0-2 Months    0.31-0.88 mg/dL  2-12 Months   0.16-0.39 mg/dL  1-2 Years     0.18-0.35 mg/dL  3-4 Years     0.26-0.42 mg/dL  5-6 Years     0.29-0.47 mg/dL  7-8 Years     0.34-0.53 mg/dL  9-10 Years    0.33-0.64 mg/dL  11-12 Years   0.44-0.68  "mg/dL  13-14 Years   0.46-0.77 mg/dL    Female  15 Years and older  0.51-0.95 mg/dL    Male  15 Years and older  0.67-1.17 mg/dL         GFR Estimate   Date Value Ref Range Status   09/30/2024 87 >60 mL/min/1.73m2 Final     Comment:     The generation of the estimated GFR is currently based on binary male or female sex. If the electronic health record information indicates another gender identity or if Legal Sex is recorded as \"Unknown\", GFR estimates are not automatically calculated, and application of GFR equations or a direct GFR measurement should be considered according to the individual's appropriate clinical context.  eGFR calculated using 2021 CKD-EPI equation.   07/06/2020 60 (L) >60 mL/min/1.73m2 Final     Calcium   Date Value Ref Range Status   09/30/2024 7.9 (L) 8.8 - 10.4 mg/dL Final     Comment:     Reference intervals for this test were updated on 7/16/2024 to reflect our healthy population more accurately. There may be differences in the flagging of prior results with similar values performed with this method. Those prior results can be interpreted in the context of the updated reference intervals.        UA: No results found for: \"UAMP\", \"UBARB\", \"BENZODIAZEUR\", \"UCANN\", \"UCOC\", \"OPIT\", \"UPCP\"           Please see A&P for additional details of medical decision making.      Steven Mccormick PA-C  Acute Care Pain Management  Team  Hours of pain coverage Mon-Fri 8-1600, afterhours please call the house officer   PERLA Toscano (EDUARDO, Adam, SD, RH)   Page via [email or web address] web console -Click for Full Color [employer]           " EOAE (evoked otoacoustic emission)

## 2024-10-01 NOTE — PLAN OF CARE
Problem: Adult Inpatient Plan of Care  Goal: Optimal Comfort and Wellbeing  Outcome: Progressing     Problem: Risk for Delirium  Goal: Improved Sleep  Outcome: Progressing     Problem: Pneumonia  Goal: Resolution of Infection Signs and Symptoms  Outcome: Progressing  Goal: Effective Oxygenation and Ventilation  Outcome: Progressing  Intervention: Promote Airway Secretion Clearance  Recent Flowsheet Documentation  Taken 10/1/2024 0109 by Deepika Slaughter RN  Cough And Deep Breathing: done independently per patient  Taken 9/30/2024 2030 by Deepika Slaughter RN  Cough And Deep Breathing: done independently per patient  Intervention: Optimize Oxygenation and Ventilation  Recent Flowsheet Documentation  Taken 10/1/2024 0218 by Deepika Slaughter RN  Head of Bed (HOB) Positioning: HOB at 20-30 degrees  Taken 10/1/2024 0018 by Deepika Slaughter RN  Head of Bed (HOB) Positioning: HOB at 20-30 degrees  Taken 9/30/2024 2218 by Deepika Slaughter RN  Head of Bed (HOB) Positioning: HOB at 20-30 degrees  Taken 9/30/2024 2018 by Deepika Slaughter RN  Head of Bed (HOB) Positioning: HOB at 20-30 degrees     Problem: Comorbidity Management  Goal: Maintenance of COPD Symptom Control  Outcome: Progressing  Intervention: Maintain COPD Symptom Control  Recent Flowsheet Documentation  Taken 10/1/2024 0109 by Deepika Slaughter RN  Medication Review/Management: medications reviewed  Taken 9/30/2024 2030 by Deepika Slaughter RN  Medication Review/Management: medications reviewed  Goal: Blood Glucose Levels Within Targeted Range  Outcome: Progressing  Intervention: Monitor and Manage Glycemia  Recent Flowsheet Documentation  Taken 10/1/2024 0109 by Deepika Slaughter RN  Glycemic Management: blood glucose monitored  Medication Review/Management: medications reviewed  Taken 9/30/2024 2030 by Deepika Slaughter RN  Glycemic Management: blood glucose monitored  Medication Review/Management: medications reviewed   Goal Outcome  Evaluation: Pt alert and oriented X4. Atrax x1 given for reported anxiety. Pain to bilateral LE managed with scheduled pain medications. VSS. On 2L via NC, maintaining SATs >92%.

## 2024-10-01 NOTE — PROGRESS NOTES
Care Management Follow Up    Length of Stay (days): 9    Expected Discharge Date: 10/02/2024    Anticipated Discharge Plan:   anticipate home with home care    Transportation: Anticipate Family/friend    PT Recommendations: Transitional Care Facility  OT Recommendations:  (S) home with home care occupational therapy     Barriers to Discharge: medical stability, home O2 eval     Prior Living Situation: town home with spouse    Discussed  Partnership in Safe Discharge Planning  document with patient/family: No     Handoff Completed: No, handoff not indicated or clinically appropriate    Patient/Spokesperson Updated: No    Additional Information:  Chart reviewed- anticipate pt will be ready to discharge on 10/2/2024. Lives in a [facility] with wife who is supportive. Pt is working with PT, ambulating 60ft x2 with CGA.   Home oxygen assessment likely tomorrow 10/2.   [facility] has accepted referral.      Casandra Khanna, OLIVIASW

## 2024-10-01 NOTE — PROGRESS NOTES
[facility] Nurse Inpatient Assessment     Consulted for: buttocks    Summary: wife at bedside, explained how she cares for his skin at home, she was very concerned and asked a lot of questions. We chatted about different incontinence scenarios and the care spectrum for incontinence care with some options for different phases of skin breakdown.     Patient History (according to provider note(s):      79 year old former smoker with history of chronic bronchitis on triple inhaler regimen with associated moderately severe restrictive lung disease, moderate SAMRA, SSS s/p PPM, chronic methadone therapy for idiopathic peripheral neuropathy, & GERD, admitted on 9/22/2024 w/ acute hypoxic and hypercapnic respiratory failure, multifocal pneumonia, and septic shock.     Assessment:      Skin Injury Location: Buttocks     9/24                                                                  10/1    Last photo: 10/1  Skin injury due to: Chronic skin discoloration, Chronic skin injury (often related to prolonged recliner use), and Irritant contact dermatitis due to fecal, urinary or dual incontinence  - area of erythema is blanchable  Skin history and plan of care:   wife says wounds open and close over the past 3 years, she uses various diaper rash creams. Currently skin is closed with discoloration with neoepithelium and scant maceration. Cannot fully rule out pressure injury but this appears to be more chronic skin discoloration. Patient had no pain when area was palpated.   Affected area:      Skin assessment: Intact     Measurements: No open areas noted at this time     Color: red - blanchable in areas not affected by chronic discoloration     Temperature  normal      Drainage: none .      Color: none      Odor: none  Pain: denies , none  Pain interventions prior to dressing change: slow and gentle cares   Treatment goal: Heal , Infection control/prevention, and Protection  STATUS:  stable  Supplies ordered: at bedside       Treatment Plan:     Buttocks wounds: Every 3 days   Cleanse the area with NS and pat dry.  Apply No sting film barrier film over discolored areas and over gluteal cleft, allow to dry about 60 seconds.  Cover sacrum with Sacral Mepilex (#[id number]), Mepilex 2 x 2 (#[id number])   Change dressing Q 3 days.  Turn and reposition Q 2hrs side to side only.  Ensure pt has Edis-cushion while sitting up in the chair. Encourage patient to sit with feet on floor when up in chair to keep pressure off buttocks.  FYI- If pt has constant incontinent loose stools needing dressing changes Q shift please discontinue the Mepilex dressing and apply criticaid barrier paste BID and PRN.      Orders: Reviewed and Updated    RECOMMEND PRIMARY TEAM ORDER: None, at this time  Education provided: importance of repositioning, plan of care, Moisture management, Hygiene, and Off-loading pressure  Discussed plan of care with: Patient and Family  WO nurse follow-up plan: weekly  Notify WOC if wound(s) deteriorate.  Nursing to notify the Provider(s) and re-consult the WOC Nurse if new skin concern.    DATA:     Current support surface: Standard  Low air loss (CITLALLI pump, Isolibrium, Pulsate)  Containment of urine/stool: Incontinence Protocol  BMI: Body mass index is 30.84 kg/m .   Active diet order: Orders Placed This Encounter      Combination Diet Regular Diet     Output: I/O last 3 completed shifts:  In: 480 [P.O.:480]  Out: 1850 [Urine:1850]     Labs:   Recent Labs   Lab 09/30/24  0532   HGB 14.0   WBC 12.3*     Pressure injury risk assessment:   Sensory Perception: 4-->no impairment  Moisture: 3-->occasionally moist  Activity: 3-->walks occasionally  Mobility: 3-->slightly limited  Nutrition: 3-->adequate  Friction and Shear: 2-->potential problem  Nicola Score: 18    Radha Garcia, MSN RN CWOCN  Pager no longer is use, please contact through VIOSO group: [facility] [employer]  [facility]

## 2024-10-01 NOTE — PLAN OF CARE
Problem: Adult Inpatient Plan of Care  Goal: Plan of Care Review  Description: The Plan of Care Review/Shift note should be completed every shift.  The Outcome Evaluation is a brief statement about your assessment that the patient is improving, declining, or no change.  This information will be displayed automatically on your shift  note.  Outcome: Progressing   Goal Outcome Evaluation:         Plan of care discussed in room with wife present. , Pulmonologist, gave them good information about his respiratory status and follow-up at discharge. Answered all of their questions.  Problem: Adult Inpatient Plan of Care  Goal: Absence of Hospital-Acquired Illness or Injury  Intervention: Identify and Manage Fall Risk  Recent Flowsheet Documentation  Taken 10/1/2024 0837 by Drea Doran RN  Safety Promotion/Fall Prevention:   activity supervised   assistive device/personal items within reach   nonskid shoes/slippers when out of bed   patient and family education   room door open       Weak and tires easily, uses call light appropriately.  Problem: Adult Inpatient Plan of Care  Goal: Optimal Comfort and Wellbeing  Outcome: Progressing  Intervention: Monitor Pain and Promote Comfort  Recent Flowsheet Documentation  Taken 10/1/2024 0807 by Drea Doran RN  Pain Management Interventions: medication (see MAR)   States he has chronic neuropathic pain in his feet. Makes ambulation difficult.

## 2024-10-01 NOTE — PROGRESS NOTES
CLINICAL NUTRITION SERVICES NOTE    Diet: Regular  Supplement: Ensure daily.  Intake: Pt eating 2 meals daily as his usual habit at home. 75 - 105.  His daughter has been bringing his dinner most days. Pt does not like Ensure.  He may like if strawberry but, declined when RD offered to send this flavor.    Pt and his wife report low BG and wondering why and if this should be managed at home.  Pt is eating a snack to bring up, he did not eat much for breakfast (interrupted too often).  We talked about including a snack at home between his two meals.  Pt and wife will ask MD about need for home BG monitoring.  Will discontinue Glucerna and note in Dietary system - pt may order supplements as needed.

## 2024-10-01 NOTE — CONSULTS
Pulmonary Progress Note  10/1/2024      Admit Date: 9/22/2024  CODE: Full Code    Reason for Consult: acute resp failure with hypoxemia. Multifocal pneumonia with COPD exacerbation.     Assessment/Plan:   79M w/ hx of COPD, chronic bronchitis, admitted 9/22 with severe hypoxemic respiratory failure due to e. Coli and klebsiella pneumonia, with respiratory failure. Required NIPPV/HFNC. Dramatic improvement despite impressive infiltrates on the CT scan. Now on minimal supplemental (can likely wean off O2 soon).     Recommendations:  - titrate FiO2 for goal SPO2 88-92%, avoid hyperoxia.   - home O2 eval prior to discharge  - encourage OOB, PT/OT, push IS when able  - prednisone taper ordered  - stop IV ceftriaxone -> change to PO levofloxacin for 7 more days  - follow up any remaining culture data  - continue scheduled duonebs, albuterol nebs prn, flutter valve  - resume PTA inhalers upon discharge.   - will arrange follow up in pulmonary clinic with Dr. Dejesus.     No further recommendations; we'll sign off. Please call with additional questions.     Dakotah (Urban) MD [place]  [facility] ([facility])  Send me a secure message using [employer] [phone]  Fax [phone]     Subjective/Interim Events:   Last seen by our service on 9/29 when he was in [facility]    Asked to re-consult re: multifocal pneumonia and respiratory failure.  He says he feels much better.  Minimal supplemental O2 need  No hemoptysis.       Medications:     Current Facility-Administered Medications   Medication Dose Route Frequency Provider Last Rate Last Admin    No lozenges or gum should be given while patient on BIPAP/AVAPS/AVAPS AE   Does not apply Continuous PRN Jass Lorenzo MD        Patient may continue current oral medications   Does not apply Continuous PRN Jass Lorenzo MD         Current Facility-Administered Medications   Medication Dose Route Frequency Provider Last Rate Last  Admin    amLODIPine (NORVASC) tablet 2.5 mg  2.5 mg Oral Daily Jass Lorenzo MD   2.5 mg at 10/01/24 0905    carbidopa-levodopa (SINEMET)  MG per tablet 1 tablet  1 tablet Oral TID Jass Lorenzo MD   1 tablet at 10/01/24 0906    DULoxetine (CYMBALTA) DR capsule 60 mg  60 mg Oral QAM Jass Lorenzo MD   60 mg at 10/01/24 0907    formoterol (PERFOROMIST) neb solution 20 mcg  20 mcg Nebulization 2 times daily Jass Lorenzo MD   20 mcg at 10/01/24 0842    heparin ANTICOAGULANT injection 5,000 Units  5,000 Units Subcutaneous Q8H Jass Lorenzo MD   5,000 Units at 10/01/24 0546    ipratropium - albuterol 0.5 mg/2.5 mg/3 mL (DUONEB) neb solution 3 mL  3 mL Nebulization 4x daily Jass Lorenzo MD   3 mL at 10/01/24 0837    levofloxacin (LEVAQUIN) tablet 750 mg  750 mg Oral Daily Dakotah Morfin MD        melatonin tablet 3 mg  3 mg Oral At Bedtime Jass Lorenzo MD   3 mg at 09/30/24 2050    methadone (DOLOPHINE) half-tab 7.5 mg  7.5 mg Oral Q12H VIDAL (08/20) Jass Lorenzo MD   7.5 mg at 10/01/24 0908    [Held by provider] methadone (DOLOPHINE) tablet 10 mg  10 mg Oral BID Miguel Hartmann MD        pantoprazole (PROTONIX) IV push injection 40 mg  40 mg Intravenous Daily with breakfast Jass Lorenzo MD   40 mg at 10/01/24 0908    polyethylene glycol (MIRALAX) Packet 17 g  17 g Oral Daily Jass Lorenzo MD   17 g at 10/01/24 0908    predniSONE (DELTASONE) tablet 40 mg  40 mg Oral Daily Dakotah Morfin MD        Followed by    [START ON 10/4/2024] predniSONE (DELTASONE) tablet 30 mg  30 mg Oral Daily Dakotah Morfin MD        Followed by    [START ON 10/7/2024] predniSONE (DELTASONE) tablet 20 mg  20 mg Oral Daily Dakotah Morfin MD        Followed by    [START ON 10/10/2024] predniSONE (DELTASONE) tablet 10 mg  10 mg Oral Daily Dakotah Morfin MD        pregabalin (LYRICA) capsule 100 mg  100 mg Oral TID Jass Lorenzo MD   100 mg  "at 10/01/24 0909    senna-docusate (SENOKOT-S/PERICOLACE) 8.6-50 MG per tablet 1 tablet  1 tablet Oral BID Jass Lorenzo MD   1 tablet at 10/01/24 0909    sodium chloride (PF) 0.9% PF flush 10-40 mL  10-40 mL Intracatheter Q7 Days Farhan Pillai MD   20 mL at 09/29/24 2007    topiramate (TOPAMAX) tablet 100 mg  100 mg Oral BID Jass Lorenzo MD   100 mg at 10/01/24 0909         Exam/Data:   Vitals  /66 (BP Location: Left arm, Patient Position: Semi-Rouse's)   Pulse 69   Temp 97.9  F (36.6  C) (Oral)   Resp 14   Ht 1.778 m (5' 10\")   Wt 97.5 kg (214 lb 15.2 oz)   SpO2 98%   BMI 30.84 kg/m       I/O last 3 completed shifts:  In: 480 [P.O.:480]  Out: 1850 [Urine:1850]  Weight change:   [unfilled]  Resp: 14    EXAM:  Physical Exam  Gen: awake, alert, oriented, no distress  HEENT: NT, no SARAH BETH  CV: RRR, no m/g/r  Resp: CTAB  Abd: soft, nontender, BS+  Skin: no rashes or lesions  Ext: no edema  Neuro: PERRL, nonfocal exam    ROS:  A 10-system review was obtained and is negative with the exception of the symptoms noted above.    DATA:    Micro  PCT 1.69 on 9/26, down from 15.45 on 9/23  Viral swabs neg  Sputum 2+ e. Coli, 1+ k. Pneumoniae, both sens to levofloxacin    PFT DATA   Previously reviewed from 2019  FEV1/FVC is 0.66 and is normal (less than 0.7 but greater than the demographically adjusted lower limit of normal).  FEV1 is 59% predicted and is reduced.  FVC is 67% predicted and is reduced.  There was no improvement in spirometry after a single inhaled dose of bronchodilator.  TLC is 64% predicted and is reduced.  RV is 73% predicted and is reduced.  DLCO is 57% predicted and is reduced when it   is corrected for hemoglobin.  The flow volume loop shows some expiratory concavity.     Impression:  Pulmonary function testing shows moderate-severe pathophysiology, primarily restrictive but with a likely obstructive component.  Spirometry is not consistent with reversibility.  Diffusion " capacity when corrected for hemoglobin is moderately reduced.      IMAGING:   Echocardiogram Complete    Result Date: 2024  [id number]^ADELIA^ANDREA^[place] [address]  Name: VIVIANA OCHOA MRN: [id number] : 1945 Study Date: 2024 10:02 AM Age: 79 yrs Gender: Male Patient Location: [facility] Reason For Study: Shock Ordering Physician: ANDREA DELVALLE Performed By: [facility]  BSA: 2.2 m2 Height: 70 in Weight: 215 lb HR: 60 BP: 112/61 mmHg ______________________________________________________________________________ Procedure Complete Portable Echo Adult. Definity (NDC #81876-793) given intravenously. 2.5ml, lot # [id number]. Technically difficult study.Extremely difficult acoustic windows despite the use of contrast for endcardial border definition. ______________________________________________________________________________ Interpretation Summary  1. Normal left ventricular size and systolic performance with a visually estimated ejection fraction of 60%. 2. No significant valvular heart disease is identified on this study. 3. Probable normal right ventricular size and systolic performance though right-sided structures are not clearly visualized on all views on this study. ______________________________________________________________________________ Left ventricle: Normal left ventricular size and systolic performance with a visually estimated ejection fraction of 60%. There is normal regional wall motion. Left ventricular wall thickness is normal.  Assessment of LV Diastolic Function: The cumulative findings are indeterminate in the evaluation of diastolic function [The septal e' velocity is < 7 cm/s & lateral e' velocity is < 10 cm/s. The average E/e' is < 14. TR velocity is < 2.8 m/s. Left atrial volume index is greater than 34 mL/mÂ ].  Right ventricle: Probable normal right ventricular size and systolic performance  though right- sided structures are not clearly visualized on all views on this study.  Left atrium: There is borderline left atrial enlargement.  Right atrium: The right atrium is of normal size.  IVC: The IVC is mildly dilated.  Aortic valve: The aortic valve is comprised of three cusps. No significant aortic stenosis or aortic insufficiency is detected on this study.  Mitral valve: The mitral valve appears morphologically normal. There is trace mitral insufficiency.  Tricuspid valve: The tricuspid valve is grossly morphologically normal. There is trace-mild tricuspid insufficiency.  Pulmonic valve: The pulmonic valve is grossly morphologically normal.  Thoracic aorta: The aortic root and proximal ascending aorta are of normal dimension.  Pericardium: There is no significant pericardial effusion. ______________________________________________________________________________ ______________________________________________________________________________ MMode/2D Measurements & Calculations IVSd: 0.86 cm LVIDd: 5.1 cm LVIDs: 3.1 cm LVPWd: 1.0 cm FS: 38.6 % LV mass(C)d: 171.5 grams LV mass(C)dI: 79.7 grams/m2 Ao root diam: 3.4 cm asc Aorta Diam: 3.4 cm LVOT diam: 2.1 cm LVOT area: 3.6 cm2 Ao root diam index Ht(cm/m): 1.9 Ao root diam index BSA (cm/m2): 1.6 Asc Ao diam index BSA (cm/m2): 1.6 Asc Ao diam index Ht(cm/m): 1.9 EF Biplane: 58.2 % LA Volume (BP): 77.8 ml  LA Volume Index (BP): 36.2 ml/m2 LA Volume Indexed (AL/bp): 33.9 ml/m2 RWT: 0.39 TAPSE: 2.8 cm  Doppler Measurements & Calculations MV E max rm: 68.6 cm/sec MV A max rm: 49.7 cm/sec MV E/A: 1.4 MV dec slope: 428.2 cm/sec2 MV dec time: 0.16 sec Ao V2 max: 102.5 cm/sec Ao max P.0 mmHg Ao V2 mean: 73.5 cm/sec Ao mean P.4 mmHg Ao V2 VTI: 18.4 cm ELENA(I,D): 3.1 cm2 ELENA(V,D): 2.8 cm2 LV V1 max P.5 mmHg LV V1 max: 79.7 cm/sec LV V1 VTI: 16.1 cm SV(LVOT): 57.8 ml SI(LVOT): 26.8 ml/m2 PA V2 max: 79.9 cm/sec PA max P.6 mmHg PA acc time: 0.12 sec TR  max leo: 279.9 cm/sec TR max P.3 mmHg AV Leo Ratio (DI): 0.78 ELENA Index (cm2/m2): 1.5 E/E' avg: 10.6 Lateral E/e': 7.5 Medial E/e': 13.7 RV S Leo: 10.9 cm/sec  ______________________________________________________________________________ Report approved by: Dulce Gan 2024 11:27 AM       CT Head w/o Contrast    Result Date: 2024  EXAM: CT HEAD W/O CONTRAST LOCATION: [facility] DATE: 2024 INDICATION: encephalopathy, recent unwitnessed fall COMPARISON: 2022 TECHNIQUE: Routine CT Head without IV contrast. Multiplanar reformats. Dose reduction techniques were used. FINDINGS: INTRACRANIAL CONTENTS: No intracranial hemorrhage, extraaxial collection, or mass effect.  No CT evidence of acute infarct. Moderate presumed chronic small vessel ischemic changes. Moderate generalized volume loss. No hydrocephalus. VISUALIZED ORBITS/SINUSES/MASTOIDS: No intraorbital abnormality. No paranasal sinus mucosal disease. No middle ear or mastoid effusion. BONES/SOFT TISSUES: No acute abnormality.     IMPRESSION: 1.  No acute intracranial process.    CT Chest Pulmonary Embolism w Contrast    Result Date: 2024  EXAM: CT CHEST PULMONARY EMBOLISM W CONTRAST LOCATION: [facility] DATE: 2024 INDICATION: hypoxemia COMPARISON: 2022 TECHNIQUE: CT chest pulmonary angiogram during arterial phase injection of IV contrast. Multiplanar reformats and MIP reconstructions were performed. Dose reduction techniques were used. CONTRAST: [id number] 75ml FINDINGS: ANGIOGRAM CHEST: Pulmonary arteries are normal caliber and negative for pulmonary emboli. Thoracic aorta is not well opacified and is  indeterminate for dissection. No CT evidence of right heart strain. LUNGS AND PLEURA: Multifocal, bilateral infiltrates. Left calcified pleural plaque. Trace pleural effusions. Emphysema. MEDIASTINUM/AXILLAE: Mediastinal adenopathy. 1.6 x 2.4 cm right paratracheal  node S4, 104. Right hilar nodes measuring 2.9 x 1.6 cm image 133. Small hiatal hernia. I PICC line tip SVC. CORONARY ARTERY CALCIFICATION: Mild. UPPER ABDOMEN: The gallbladder is distended MUSCULOSKELETAL: Degenerative change osseous structures.     IMPRESSION: 1.  No pulmonary emboli. 2.  Multifocal, bilateral infiltrates presumably infectious or inflammatory. Follow-up to confirm resolution. 3.  Mediastinal and right hilar adenopathy can be reassessed at time of follow-up. 4.  Trace pleural effusions. 5.  Calcified left pleural plaques suggesting previous asbestos exposure.    XR Chest Port 1 View    Result Date: 9/22/2024  EXAM: XR CHEST PORT 1 VIEW LOCATION: [facility] DATE: 9/22/2024 INDICATION: Hypoxia. COMPARISON: 12/14/2022     IMPRESSION: Extensive patchy mid to lower lung airspace opacities, suspicious for multifocal pneumonia or aspiration. Small bilateral pleural effusions. No pneumothorax. The cardiomediastinal silhouette is partially obscured, limiting evaluation. Aortic calcifications. Left subclavian approach pacemaker.

## 2024-10-01 NOTE — PROGRESS NOTES
[facility]    PROGRESS NOTE - Hospitalist Service    ASSESSMENT AND PLAN     Active Problems:    Idiopathic progressive polyneuropathy    Chronic obstructive pulmonary disease (H)    Opioid dependence (H)    Acute respiratory failure with hypoxia and hypercapnia (H)    Pneumonia of both lower lobes due to infectious organism    Sepsis, due to unspecified organism, unspecified whether acute organ dysfunction present (H)    Romaine Farah is a 79 year old adult with history of COPD, Chronic Bronchitis who was admitted on 9/22/24 for acute hypoxic respiratory failure, multifocal pneumonia, and septic shock.  Patient initially admitted to the ICU requiring BIPAP/HFNC.  Patient was maintained with DuoNebs, IV steroids, and antibiotics narrowed to Zosyn with improvement to 6 L nasal cannula on 9/29; then downgraded from the ICU.        COPD Exacerbation  - Chronic bronchitis with COPD features, treated with a triple combination inhaler in outpatient setting, currently in an exacerbation. Sees Dr. Dejesus with the most recent visit on 9/11/2024. Brad Dejesus with concern about patient's opioid use contributing to exacerbations. Current episode sounds like he was very drowsy and sedated prior to presentation- unclear if that was brewing hypercarbia/sepsis or if he may have aspirated due to being drowsy from opioids?  -DuoNebs ordered every 6 hours scheduled  -Scheduled formoterol neb.  -IV Solu-Medrol transition to oral prednisone taper 10/1.  Ceftriaxone transition to oral Levaquin for 7 more days on 10/1   -Pain management team managing opiates to avoid methadone related somnolence and aspiration/hypoxia  -Pulm stating patient stable for discharge     Multifocal Pneumonia, concern for chronic aspiration  Septic shock, resolved  -Received Zosyn followed by ceftriaxone.  Transition to Levaquin today 10/1.  -Sputum culture growing Klebsiella and E coli, the latter is resistant to Zosyn -Speech saw on  "9/24, passed bedside swallow. Given enteric organisms growing from sputum, could consider VSS to rule out silent aspiration.      Acute Hypoxic hypercarbic Respiratory Failure, hypercarbia resolved   -Patient initially requiring BiPAP and HFNC on admission.  Down to 2 L nasal cannula now.  -Patient does not use any supplemental oxygen at home. Suspect he may need home O2 now  Home oxygen study planned for tomorrow 10/2     Sick sinus syndrome status post pacemaker   -stable     Borderline BERNICE - Resolved  -Baseline creatinine 1.0-1.1.  -Briefly peaked during admission, now stable at 0.8.     Hypertension  - Patient with labile blood pressures during admission.  - Not on medications outpatient. Started on low dose amlodipine while in hospital  - Continue to monitor.     #Idiopathic peripheral Neuropathy  #Chronic pain syndrome  Home medications include duloxetine, methadone 10mg BID, pregabalin, topiramate. Also takes Norco 1-2 tabs at bedtime. Opioids have been held due to respiratory distress, now his pain is exacerbated. Methadone currently at 7.5mg BID.  - Pain Management following, appreciate assistance.  -should discuss long term taper plan with his pain clinic     Tremor  - Home Senimet     Hypoglycemia  -?due to poor PO  -wean steroid slow    Barriers to discharge: Home O2 evaluation, possible TCU placement    Anticipated length of stay: 1 more day    Medically Ready for Discharge: Anticipated Tomorrow    Clinically Significant Risk Factors                            # Obesity: Estimated body mass index is 30.84 kg/m  as calculated from the following:    Height as of this encounter: 1.778 m (5' 10\").    Weight as of this encounter: 97.5 kg (214 lb 15.2 oz).       # Pacemaker present         Subjective:  Patient resting comfortably in a chair.  Wife present at bedside.  Feeling as though his breathing is back to prior to admission level.  No other complaints.    PHYSICAL EXAM  Temp:  [97.7  F (36.5  C)-97.9  F " (36.6  C)] 97.9  F (36.6  C)  Pulse:  [60-69] 69  Resp:  [14-20] 14  BP: (111-170)/(56-75) 134/66  SpO2:  [95 %-98 %] 95 %  Wt Readings from Last 1 Encounters:   09/30/24 97.5 kg (214 lb 15.2 oz)       Intake/Output Summary (Last 24 hours) at 10/1/2024 1358  Last data filed at 10/1/2024 0239  Gross per 24 hour   Intake 480 ml   Output 1300 ml   Net -820 ml      Body mass index is 30.84 kg/m .    GENRL: Alert and answering questions appropriately. Not in acute distress. Sitting in a chair   CHEST: Diminished throughout.    HEART: Regular rate   EXTRM: No pedal edema  NEURO: Following commands and moving extremities.    PSYCH: Normal affect and mood.   INTGM: No skin rash    Medical Decision Making       55 MINUTES SPENT BY ME on the date of service doing chart review, history, exam, documentation & further activities per the note.      PERTINENT LABS/IMAGING:  Results for orders placed or performed during the hospital encounter of 09/22/24   XR Chest Port 1 View    Impression    IMPRESSION:     Extensive patchy mid to lower lung airspace opacities, suspicious for multifocal pneumonia or aspiration.    Small bilateral pleural effusions. No pneumothorax.    The cardiomediastinal silhouette is partially obscured, limiting evaluation. Aortic calcifications.    Left subclavian approach pacemaker.   CT Chest Pulmonary Embolism w Contrast    Impression    IMPRESSION:  1.  No pulmonary emboli.  2.  Multifocal, bilateral infiltrates presumably infectious or inflammatory. Follow-up to confirm resolution.  3.  Mediastinal and right hilar adenopathy can be reassessed at time of follow-up.  4.  Trace pleural effusions.  5.  Calcified left pleural plaques suggesting previous asbestos exposure.   CT Head w/o Contrast    Impression    IMPRESSION:  1.  No acute intracranial process.   XR Chest Port 1 View    Impression    IMPRESSION: Stable left subclavian approach pacemaker. Slight interval worsening of bilateral consolidation. No  pneumothorax. Right PICC with tip near the cavoatrial junction. Stable cardiomediastinal silhouette.   XR Chest Port 1 View    Impression    IMPRESSION: Stable right PICC. Increasing consolidations in the mid to lower lungs. Increasing small pleural effusions. Stable heart size. Cardiac pacemaker.     Most Recent 3 CBC's:  Recent Labs   Lab Test 10/01/24  0921 09/30/24  0532 09/29/24  0524   WBC 12.0* 12.3* 14.8*   HGB 14.7 14.0 14.8   MCV 87 87 86    169 171     Most Recent 3 BMP's:  Recent Labs   Lab Test 10/01/24  1317 10/01/24  1211 10/01/24  0813 09/30/24  2104 09/30/24  1702 09/30/24  0741 09/30/24  0532 09/29/24  0749 09/29/24  0524 09/27/24  0803 09/27/24  0428   NA  --   --   --   --   --   --  142  --  139  --  144   POTASSIUM  --   --   --   --  4.2  --  3.4  --  3.9  3.9   < > 3.9  3.8   CHLORIDE  --   --   --   --   --   --  109*  --  108*  --  111*   CO2  --   --   --   --   --   --  25  --  21*  --  22   BUN  --   --   --   --   --   --  20.3  --  21.0  --  24.1*   CR  --   --   --   --   --   --  0.88  --  0.84  --  0.84   ANIONGAP  --   --   --   --   --   --  8  --  10  --  11   JOSSELYN  --   --   --   --   --   --  7.9*  --  8.3*  --  8.3*   GLC 82 66* 64*   < >  --    < > 100*   < > 111*   < > 114*    < > = values in this interval not displayed.     Most Recent 2 LFT's:  Recent Labs   Lab Test 09/22/24  1705 11/16/23  1634   AST 62* 21   ALT 13 15   ALKPHOS 65 66   BILITOTAL 0.8 0.3       Recent Labs   Lab Test 11/16/23  1634   CHOL 201*   HDL 48   *   TRIG 115     Recent Labs   Lab Test 11/16/23  1634 06/20/22  1424 07/07/17  0858   * 121 116     Recent Labs   Lab Test 10/01/24  1317 09/30/24  2104 09/30/24  1702 09/30/24  0741 09/30/24  0532   NA  --   --   --   --  142   POTASSIUM  --   --  4.2  --  3.4   CHLORIDE  --   --   --   --  109*   CO2  --   --   --   --  25   GLC 82   < >  --    < > 100*   BUN  --   --   --   --  20.3   CR  --   --   --   --  0.88   GFRESTIMATED   "--   --   --   --  87   JOSSELYN  --   --   --   --  7.9*    < > = values in this interval not displayed.     No results for input(s): \"A1C\" in the last 27129 hours.  Recent Labs   Lab Test 10/01/24  0921 09/30/24  0532 09/29/24  0524   HGB 14.7 14.0 14.8     Recent Labs   Lab Test 10/04/19  1936 02/11/19  1124   TROPONINI <0.01 <0.01     Recent Labs   Lab Test 09/22/24  1705 12/26/22  1613   NTBNPI 1,499 266     Recent Labs   Lab Test 12/26/22  1613   TSH 1.33     Recent Labs   Lab Test 02/11/19  1124   INR 1.15*       Liberty Hurley, DO  Hospitalist Service  [facility]      "

## 2024-10-02 VITALS
TEMPERATURE: 98.2 F | WEIGHT: 214.95 LBS | RESPIRATION RATE: 15 BRPM | SYSTOLIC BLOOD PRESSURE: 106 MMHG | DIASTOLIC BLOOD PRESSURE: 57 MMHG | OXYGEN SATURATION: 90 % | HEART RATE: 60 BPM | BODY MASS INDEX: 30.77 KG/M2 | HEIGHT: 70 IN

## 2024-10-02 LAB
GLUCOSE BLDC GLUCOMTR-MCNC: 83 MG/DL (ref 70–99)
GLUCOSE BLDC GLUCOMTR-MCNC: 87 MG/DL (ref 70–99)

## 2024-10-02 PROCEDURE — 99232 SBSQ HOSP IP/OBS MODERATE 35: CPT | Performed by: PHYSICIAN ASSISTANT

## 2024-10-02 PROCEDURE — 250N000009 HC RX 250: Performed by: INTERNAL MEDICINE

## 2024-10-02 PROCEDURE — 250N000013 HC RX MED GY IP 250 OP 250 PS 637: Performed by: INTERNAL MEDICINE

## 2024-10-02 PROCEDURE — 250N000011 HC RX IP 250 OP 636: Performed by: INTERNAL MEDICINE

## 2024-10-02 PROCEDURE — 99239 HOSP IP/OBS DSCHRG MGMT >30: CPT | Performed by: INTERNAL MEDICINE

## 2024-10-02 PROCEDURE — 250N000012 HC RX MED GY IP 250 OP 636 PS 637: Performed by: INTERNAL MEDICINE

## 2024-10-02 RX ORDER — LEVOFLOXACIN 750 MG/1
750 TABLET, FILM COATED ORAL DAILY
Qty: 6 TABLET | Refills: 0 | Status: SHIPPED | OUTPATIENT
Start: 2024-10-02

## 2024-10-02 RX ORDER — PREGABALIN 100 MG/1
100 CAPSULE ORAL 3 TIMES DAILY
Qty: 90 CAPSULE | Refills: 0 | Status: SHIPPED | OUTPATIENT
Start: 2024-10-02 | End: 2024-10-02

## 2024-10-02 RX ORDER — LEVOFLOXACIN 750 MG/1
750 TABLET, FILM COATED ORAL DAILY
Qty: 6 TABLET | Refills: 0 | Status: SHIPPED | OUTPATIENT
Start: 2024-10-02 | End: 2024-10-02

## 2024-10-02 RX ORDER — PREDNISONE 20 MG/1
TABLET ORAL
Qty: 13 TABLET | Refills: 0 | Status: SHIPPED | OUTPATIENT
Start: 2024-10-02 | End: 2024-10-02

## 2024-10-02 RX ORDER — AMLODIPINE BESYLATE 2.5 MG/1
2.5 TABLET ORAL DAILY
Qty: 30 TABLET | Refills: 0 | Status: SHIPPED | OUTPATIENT
Start: 2024-10-02

## 2024-10-02 RX ORDER — PREDNISONE 20 MG/1
TABLET ORAL
Qty: 13 TABLET | Refills: 0 | Status: SHIPPED | OUTPATIENT
Start: 2024-10-02 | End: 2024-10-13

## 2024-10-02 RX ORDER — METHADONE HYDROCHLORIDE 5 MG/1
7.5 TABLET ORAL EVERY 12 HOURS
Qty: 16 TABLET | Refills: 0 | Status: SHIPPED | OUTPATIENT
Start: 2024-10-02 | End: 2024-10-10

## 2024-10-02 RX ORDER — PREGABALIN 100 MG/1
100 CAPSULE ORAL 3 TIMES DAILY
Qty: 90 CAPSULE | Refills: 0 | Status: SHIPPED | OUTPATIENT
Start: 2024-10-02

## 2024-10-02 RX ORDER — AMLODIPINE BESYLATE 2.5 MG/1
2.5 TABLET ORAL DAILY
Qty: 30 TABLET | Refills: 0 | Status: SHIPPED | OUTPATIENT
Start: 2024-10-02 | End: 2024-10-02

## 2024-10-02 RX ADMIN — LEVOFLOXACIN 750 MG: 750 TABLET, FILM COATED ORAL at 10:01

## 2024-10-02 RX ADMIN — HYDROXYZINE HYDROCHLORIDE 10 MG: 10 TABLET ORAL at 01:09

## 2024-10-02 RX ADMIN — DULOXETINE HYDROCHLORIDE 60 MG: 60 CAPSULE, DELAYED RELEASE PELLETS ORAL at 10:02

## 2024-10-02 RX ADMIN — PREGABALIN 100 MG: 100 CAPSULE ORAL at 10:02

## 2024-10-02 RX ADMIN — PREDNISONE 40 MG: 20 TABLET ORAL at 10:02

## 2024-10-02 RX ADMIN — PANTOPRAZOLE SODIUM 40 MG: 40 INJECTION, POWDER, FOR SOLUTION INTRAVENOUS at 09:57

## 2024-10-02 RX ADMIN — METHADONE HYDROCHLORIDE 7.5 MG: 5 TABLET ORAL at 10:03

## 2024-10-02 RX ADMIN — HEPARIN SODIUM 5000 UNITS: 10000 INJECTION, SOLUTION INTRAVENOUS; SUBCUTANEOUS at 06:20

## 2024-10-02 RX ADMIN — CARBIDOPA AND LEVODOPA 1 TABLET: 10; 100 TABLET ORAL at 10:00

## 2024-10-02 RX ADMIN — AMLODIPINE BESYLATE 2.5 MG: 2.5 TABLET ORAL at 10:04

## 2024-10-02 RX ADMIN — TOPIRAMATE 100 MG: 100 TABLET, FILM COATED ORAL at 10:00

## 2024-10-02 ASSESSMENT — ACTIVITIES OF DAILY LIVING (ADL)
ADLS_ACUITY_SCORE: 35
ADLS_ACUITY_SCORE: 36
ADLS_ACUITY_SCORE: 35

## 2024-10-02 NOTE — PLAN OF CARE
"  Problem: Adult Inpatient Plan of Care  Goal: Plan of Care Review  Description: The Plan of Care Review/Shift note should be completed every shift.  The Outcome Evaluation is a brief statement about your assessment that the patient is improving, declining, or no change.  This information will be displayed automatically on your shift  note.  Outcome: Met  Goal: Patient-Specific Goal (Individualized)  Description: You can add care plan individualizations to a care plan. Examples of Individualization might be:  \"Parent requests to be called daily at 9am for status\", \"I have a hard time hearing out of my right ear\", or \"Do not touch me to wake me up as it startles  me\".  Outcome: Met  Goal: Absence of Hospital-Acquired Illness or Injury  Outcome: Met  Intervention: Identify and Manage Fall Risk  Recent Flowsheet Documentation  Taken 10/2/2024 1000 by Jovanna Garcia RN  Safety Promotion/Fall Prevention:   assistive device/personal items within reach   clutter free environment maintained   nonskid shoes/slippers when out of bed   patient and family education   safety round/check completed  Goal: Optimal Comfort and Wellbeing  Outcome: Met  Goal: Readiness for Transition of Care  Outcome: Met     Problem: Risk for Delirium  Goal: Optimal Coping  Outcome: Met  Goal: Improved Behavioral Control  Outcome: Met  Goal: Improved Attention and Thought Clarity  Outcome: Met  Goal: Improved Sleep  Outcome: Met     Problem: Pneumonia  Goal: Fluid Balance  Outcome: Met  Goal: Resolution of Infection Signs and Symptoms  Outcome: Met  Goal: Effective Oxygenation and Ventilation  Outcome: Met     Problem: Comorbidity Management  Goal: Maintenance of COPD Symptom Control  Outcome: Met  Goal: Blood Glucose Levels Within Targeted Range  Outcome: Met  Goal: Blood Pressure in Desired Range  Outcome: Met   Goal Outcome Evaluation:       Patient denied pain prior to discharge. Discharge instructions discussed with patient and family. Discharge " medications given to patient's family. Patient left with all personal belongings.     MD notified about home oxygen evaluation - patient's oxygen saturation dropped to 89% with activity but patient refused to be discharged with oxygen. Patient denied symptoms. Family aware.

## 2024-10-02 NOTE — PROGRESS NOTES
Care Management Discharge Note    Discharge Date: 10/02/2024       Discharge Disposition:  home w/homecare    Discharge Services:  homecare RN/PT/HHA/OT    Discharge DME:  none    Discharge Transportation: family or friend will provide, health plan transportation    Private pay costs discussed: Not applicable    Does the patient's insurance plan have a 3 day qualifying hospital stay waiver?  No    PAS Confirmation Code:    Patient/family educated on Medicare website which has current facility and service quality ratings:      Education Provided on the Discharge Plan:    Persons Notified of Discharge Plans: spouse  Patient/Family in Agreement with the Plan:      Handoff Referral Completed: Yes, [facility] PCP: Internal handoff referral completed    Additional Information:  Pt will discharge home via private vehicle, wife and dtr transporting. Orders sent to [facility] for RN/PT/OT/HHA.     Wilman Perry RN

## 2024-10-02 NOTE — PROGRESS NOTES
"I checked in with patient x2 this morning. He refused his neb treatments stating, \"I'm not going to take my nebulizers today.\"   "

## 2024-10-02 NOTE — PROGRESS NOTES
[facility] ACUTE INPATIENT PAIN SERVICE    [facility], [facility], [facility], [facility], [place]   PAIN PROGRESS      Assessment/Plan:  Romaine Farah is a 79 year old adult who was admitted on 9/22/2024.  I was asked by Dr. Farhan Anderson to see the patient for management of his chronic pain syndrome. Managed on chronic opioid therapy with [facility]. Admitted for acute hypoxic respiratory failure, multifocal pneumonia, and septic shock . History of COPD, Chronic Bronchitis who was admitted on 9/22/24 for acute hypoxic respiratory failure, multifocal pneumonia, and septic shock. Describes pain as 3/10 today.     [facility] reviewed: Pain contract is with [facility]. Established with Grazyna Barrett CNP. Coordinated care with their office. He has a follow up appointment scheduled for 10/10/24.  *Methadone 10mg bid, last filled 09/09/24  *Norco 5-325mg tid prn, last filled 09/15/24  *Pregabalin 150mg tid, last filled 08/30/24    Opioids used in the past 24h:  *(2) 7.5mg Methadone tablets  *(0) 5-325mg Norco tablets        PLAN:  Chronic pain syndrome with acute hypoxic respiratory failure  Multimodal Medication Therapy:   Adjuvants:   - Pregabalin 100mg tid  -Topamax 100mg bid  - APAP prn  - Hydroxyzine 10mg q6h prn  Opioids:   - Methadone 7.5mg bid  - Norco 5-325mg bid prn Hold for RASS<-2. Hold for SBP <90. Hold for sedation. Hold for RR<12. Hold for SpO2<90%  Non-medication interventions- Ice  Constipation Prophylaxis- Bisacodyl , Senna-S    Follow up /Discharge Recommendations: Will provide bridge Rx of Methadone 7.5mg. He has a follow up with [facility] on 10/10. Should continue Lyrica at a reduced dose of 100mg tid until he follows up outpatient. [place] [facility]         Subjective:  Rom is seen today in his bed alert and oriented in no acute distress. Reports his pain is currently a 3/10. Anxiety is worse compared to pain control. Receiving benefit from Hydroxyzine.      Denies nausea,  "vomiting, constipation. BM overnight.      Reviewed continuing with current plan, all questions answered. No other pain concerns.            History   Drug Use Unknown     Comment: cigars         Tobacco Use      Smoking status: Former        Types: Cigars, cigarillos or filtered cigars      Smokeless tobacco: Never        Objective:  Vital signs in last 24 hours:  B/P: 143/73, T: 98.2, P: 60, R: 15   Blood pressure (!) 143/73, pulse 60, temperature 98.2  F (36.8  C), temperature source Oral, resp. rate 15, height 1.778 m (5' 10\"), weight 97.5 kg (214 lb 15.2 oz), SpO2 90%.        Review of Systems:   As per subjective, all others negative.    Physical Exam    General: in no apparent distress and non-toxic   HEENT: Head normocephalic atraumatic, oral mucosa moist. Sclerae anicteric  CV: Regular rhythm, normal rate, no murmurs  Resp: Decreased breath sounds.   GI: Normoactive bowel sounds  Skin: No rashes or lesions  Extremities: No peripheral edema  Psych: Normal affect, mood euthymic  Neuro: Grossly normal          Imaging:  Personally Reviewed.    Results for orders placed or performed during the hospital encounter of 09/22/24   XR Chest Port 1 View    Impression    IMPRESSION:     Extensive patchy mid to lower lung airspace opacities, suspicious for multifocal pneumonia or aspiration.    Small bilateral pleural effusions. No pneumothorax.    The cardiomediastinal silhouette is partially obscured, limiting evaluation. Aortic calcifications.    Left subclavian approach pacemaker.   CT Chest Pulmonary Embolism w Contrast    Impression    IMPRESSION:  1.  No pulmonary emboli.  2.  Multifocal, bilateral infiltrates presumably infectious or inflammatory. Follow-up to confirm resolution.  3.  Mediastinal and right hilar adenopathy can be reassessed at time of follow-up.  4.  Trace pleural effusions.  5.  Calcified left pleural plaques suggesting previous asbestos exposure.   CT Head w/o Contrast    Impression    " IMPRESSION:  1.  No acute intracranial process.   XR Chest Port 1 View    Impression    IMPRESSION: Stable left subclavian approach pacemaker. Slight interval worsening of bilateral consolidation. No pneumothorax. Right PICC with tip near the cavoatrial junction. Stable cardiomediastinal silhouette.   XR Chest Port 1 View    Impression    IMPRESSION: Stable right PICC. Increasing consolidations in the mid to lower lungs. Increasing small pleural effusions. Stable heart size. Cardiac pacemaker.        Lab Results:  Personally Reviewed.   Last Comprehensive Metabolic Panel:  Sodium   Date Value Ref Range Status   09/30/2024 142 135 - 145 mmol/L Final     Potassium   Date Value Ref Range Status   09/30/2024 4.2 3.4 - 5.3 mmol/L Final   06/20/2022 4.2 3.5 - 5.0 mmol/L Final     Chloride   Date Value Ref Range Status   09/30/2024 109 (H) 98 - 107 mmol/L Final   06/20/2022 105 98 - 107 mmol/L Final     Carbon Dioxide (CO2)   Date Value Ref Range Status   09/30/2024 25 22 - 29 mmol/L Final   06/20/2022 25 22 - 31 mmol/L Final     Anion Gap   Date Value Ref Range Status   09/30/2024 8 7 - 15 mmol/L Final   06/20/2022 9 5 - 18 mmol/L Final     Glucose   Date Value Ref Range Status   06/20/2022 109 70 - 125 mg/dL Final     GLUCOSE BY METER POCT   Date Value Ref Range Status   10/02/2024 87 70 - 99 mg/dL Final     Urea Nitrogen   Date Value Ref Range Status   09/30/2024 20.3 8.0 - 23.0 mg/dL Final   06/20/2022 22 8 - 28 mg/dL Final     Creatinine   Date Value Ref Range Status   09/30/2024 0.88 0.51 - 1.17 mg/dL Final     Comment:     Male and Female  0-2 Months    0.31-0.88 mg/dL  2-12 Months   0.16-0.39 mg/dL  1-2 Years     0.18-0.35 mg/dL  3-4 Years     0.26-0.42 mg/dL  5-6 Years     0.29-0.47 mg/dL  7-8 Years     0.34-0.53 mg/dL  9-10 Years    0.33-0.64 mg/dL  11-12 Years   0.44-0.68 mg/dL  13-14 Years   0.46-0.77 mg/dL    Female  15 Years and older  0.51-0.95 mg/dL    Male  15 Years and older  0.67-1.17 mg/dL         GFR  "Estimate   Date Value Ref Range Status   09/30/2024 87 >60 mL/min/1.73m2 Final     Comment:     The generation of the estimated GFR is currently based on binary male or female sex. If the electronic health record information indicates another gender identity or if Legal Sex is recorded as \"Unknown\", GFR estimates are not automatically calculated, and application of GFR equations or a direct GFR measurement should be considered according to the individual's appropriate clinical context.  eGFR calculated using 2021 CKD-EPI equation.   07/06/2020 60 (L) >60 mL/min/1.73m2 Final     Calcium   Date Value Ref Range Status   09/30/2024 7.9 (L) 8.8 - 10.4 mg/dL Final     Comment:     Reference intervals for this test were updated on 7/16/2024 to reflect our healthy population more accurately. There may be differences in the flagging of prior results with similar values performed with this method. Those prior results can be interpreted in the context of the updated reference intervals.        UA: No results found for: \"UAMP\", \"UBARB\", \"BENZODIAZEUR\", \"UCANN\", \"UCOC\", \"OPIT\", \"UPCP\"           Please see A&P for additional details of medical decision making.    Steven Mccormick PA-C  Acute Care Pain Management  Team  Hours of pain coverage Mon-Fri 8-1600, afterhours please call the house officer   [facility] (EDUARDO, [place], [place], [place])   Page via Vocera text web console -Click for Vocera           "

## 2024-10-02 NOTE — PLAN OF CARE
Problem: Risk for Delirium  Goal: Improved Sleep  Outcome: Progressing     Problem: Comorbidity Management  Goal: Maintenance of COPD Symptom Control  Outcome: Progressing   Goal Outcome Evaluation:    Patient alert and oriented with VSS now on RA. Up A1 with walker and TB, tremors are at baseline. PRN hydroxyzine helpful with anxiety. Large loose BM overnight. Able to make needs known, pleasant with cares.

## 2024-10-02 NOTE — DISCHARGE SUMMARY
"[facility]  Hospitalist Discharge Summary      Date of Admission:  9/22/2024  Date of Discharge:  10/2/2024 12:42 PM  Discharging Provider: Liberty Hurley DO  Discharge Service: Hospitalist Service    Discharge Diagnoses   Multifocal pneumonia  Septic shock  Acute hypoxic hypercarbic respiratory failure  COPD exacerbation  BERNICE    Clinically Significant Risk Factors     # Obesity: Estimated body mass index is 30.84 kg/m  as calculated from the following:    Height as of this encounter: 1.778 m (5' 10\").    Weight as of this encounter: 97.5 kg (214 lb 15.2 oz).       Follow-ups Needed After Discharge   Follow-up Appointments     Follow-up and recommended labs and tests       Follow up with primary care provider, Simon Schmidt, within 3-4 days   .    Follow up with pulmonology Dr. Dejesus            Unresulted Labs Ordered in the Past 30 Days of this Admission       No orders found from 8/23/2024 to 9/23/2024.            Discharge Disposition   Discharged to home with home care  Condition at discharge: Stable    Hospital Course   Romaine Farah is a 79 year old adult with history of COPD, Chronic Bronchitis who was admitted on 9/22/24 for acute hypoxic respiratory failure, multifocal pneumonia, COPD exacerbation and septic shock.  Patient initially admitted to the ICU requiring BIPAP/HFNC.  Patient was maintained with DuoNebs, IV steroids, and antibiotics narrowed to Zosyn with improvement to 6 L nasal cannula on 9/29; then downgraded from the ICU.  Patient was evaluated by pulmonology on the floor and placed on a prednisone taper along with Levaquin for 7 days.  He was weaned off oxygen.  He desatted to 89% with activity and did not meet requirement for oxygen at discharge.  He was discharged home with home care.  PT/OT had recommended TCU but he declined.    Consultations This Hospital Stay   PHARMACY TO DOSE VANCO  VASCULAR ACCESS ADULT IP CONSULT  PHARMACY TO DOSE VANCO  SPEECH " LANGUAGE PATH ADULT IP CONSULT  WOUND OSTOMY CONTINENCE NURSE  IP CONSULT  CARE MANAGEMENT / SOCIAL WORK IP CONSULT  SPEECH LANGUAGE PATH ADULT IP CONSULT  SPIRITUAL HEALTH SERVICES IP CONSULT  HOSPITALIST IP CONSULT  PHYSICAL THERAPY ADULT IP CONSULT  OCCUPATIONAL THERAPY ADULT IP CONSULT  HOSPITALIST IP CONSULT  PAIN MANAGEMENT ADULT IP CONSULT  PULMONARY IP CONSULT  CARE MANAGEMENT / SOCIAL WORK IP CONSULT    Code Status   Full Code    Time Spent on this Encounter   I, Liberty Hurley DO, personally saw the patient today and spent greater than 30 minutes discharging this patient.       Liberty Hurley DO  [address] [address]  Phone: [phone]  Fax: [phone]  ______________________________________________________________________    Physical Exam   Vital Signs: Temp: 98.2  F (36.8  C) Temp src: Oral BP: 106/57 Pulse: 60   Resp: 15 SpO2: 90 % O2 Device: None (Room air) Oxygen Delivery: 2 LPM  Weight: 214 lbs 15.18 oz         Primary Care Physician   Simon Schmidt    Discharge Orders      Primary Care - Care Coordination Referral      Home Care Referral      Reason for your hospital stay    COPD exacerbation, septic shock, pneumonia, acute hypoxic respiratory failure     Follow-up and recommended labs and tests     Follow up with primary care provider, Simon Schmidt, within 3-4 days .    Follow up with pulmonology Dr. Dejesus     Activity    Your activity upon discharge: activity as tolerated     Diet    Follow this diet upon discharge: Current Diet:Orders Placed This Encounter      Combination Diet Regular Diet       Significant Results and Procedures   Most Recent 3 CBC's:  Recent Labs   Lab Test 10/01/24  0921 09/30/24  0532 09/29/24  0524   WBC 12.0* 12.3* 14.8*   HGB 14.7 14.0 14.8   MCV 87 87 86    169 171     Most Recent 3 BMP's:  Recent Labs   Lab Test 10/02/24  0856 10/02/24  0514 10/01/24  1317 09/30/24  2104  09/30/24  1702 09/30/24  0741 09/30/24  0532 09/29/24  0749 09/29/24  0524 09/27/24  0803 09/27/24  0428   NA  --   --   --   --   --   --  142  --  139  --  144   POTASSIUM  --   --   --   --  4.2  --  3.4  --  3.9  3.9   < > 3.9  3.8   CHLORIDE  --   --   --   --   --   --  109*  --  108*  --  111*   CO2  --   --   --   --   --   --  25  --  21*  --  22   BUN  --   --   --   --   --   --  20.3  --  21.0  --  24.1*   CR  --   --   --   --   --   --  0.88  --  0.84  --  0.84   ANIONGAP  --   --   --   --   --   --  8  --  10  --  11   JOSSELYN  --   --   --   --   --   --  7.9*  --  8.3*  --  8.3*   GLC 83 87 82   < >  --    < > 100*   < > 111*   < > 114*    < > = values in this interval not displayed.     Most Recent 2 LFT's:  Recent Labs   Lab Test 09/22/24  1705 11/16/23  1634   AST 62* 21   ALT 13 15   ALKPHOS 65 66   BILITOTAL 0.8 0.3   ,   Results for orders placed or performed during the hospital encounter of 09/22/24   XR Chest Port 1 View    Narrative    EXAM: XR CHEST PORT 1 VIEW  LOCATION: [facility]  DATE: 9/22/2024    INDICATION: Hypoxia.  COMPARISON: 12/14/2022      Impression    IMPRESSION:     Extensive patchy mid to lower lung airspace opacities, suspicious for multifocal pneumonia or aspiration.    Small bilateral pleural effusions. No pneumothorax.    The cardiomediastinal silhouette is partially obscured, limiting evaluation. Aortic calcifications.    Left subclavian approach pacemaker.   CT Chest Pulmonary Embolism w Contrast    Narrative    EXAM: CT CHEST PULMONARY EMBOLISM W CONTRAST  LOCATION: [facility]  DATE: 9/22/2024    INDICATION: hypoxemia  COMPARISON: 12/26/2022  TECHNIQUE: CT chest pulmonary angiogram during arterial phase injection of IV contrast. Multiplanar reformats and MIP reconstructions were performed. Dose reduction techniques were used.   CONTRAST: [id number] 75ml    FINDINGS:  ANGIOGRAM CHEST: Pulmonary arteries are normal  caliber and negative for pulmonary emboli. Thoracic aorta is not well opacified and is  indeterminate for dissection. No CT evidence of right heart strain.    LUNGS AND PLEURA: Multifocal, bilateral infiltrates. Left calcified pleural plaque. Trace pleural effusions. Emphysema.    MEDIASTINUM/AXILLAE: Mediastinal adenopathy. 1.6 x 2.4 cm right paratracheal node S4, 104. Right hilar nodes measuring 2.9 x 1.6 cm image 133. Small hiatal hernia. I PICC line tip SVC.    CORONARY ARTERY CALCIFICATION: Mild.    UPPER ABDOMEN: The gallbladder is distended    MUSCULOSKELETAL: Degenerative change osseous structures.      Impression    IMPRESSION:  1.  No pulmonary emboli.  2.  Multifocal, bilateral infiltrates presumably infectious or inflammatory. Follow-up to confirm resolution.  3.  Mediastinal and right hilar adenopathy can be reassessed at time of follow-up.  4.  Trace pleural effusions.  5.  Calcified left pleural plaques suggesting previous asbestos exposure.   CT Head w/o Contrast    Narrative    EXAM: CT HEAD W/O CONTRAST  LOCATION: [facility]  DATE: 9/23/2024    INDICATION: encephalopathy, recent unwitnessed fall  COMPARISON: 12/26/2022  TECHNIQUE: Routine CT Head without IV contrast. Multiplanar reformats. Dose reduction techniques were used.    FINDINGS:  INTRACRANIAL CONTENTS: No intracranial hemorrhage, extraaxial collection, or mass effect.  No CT evidence of acute infarct. Moderate presumed chronic small vessel ischemic changes. Moderate generalized volume loss. No hydrocephalus.     VISUALIZED ORBITS/SINUSES/MASTOIDS: No intraorbital abnormality. No paranasal sinus mucosal disease. No middle ear or mastoid effusion.    BONES/SOFT TISSUES: No acute abnormality.      Impression    IMPRESSION:  1.  No acute intracranial process.   XR Chest Port 1 View    Narrative    EXAM: XR CHEST PORT 1 VIEW  LOCATION: [facility]  DATE: 9/24/2024    INDICATION:  pneumonia  COMPARISON: 2024      Impression    IMPRESSION: Stable left subclavian approach pacemaker. Slight interval worsening of bilateral consolidation. No pneumothorax. Right PICC with tip near the cavoatrial junction. Stable cardiomediastinal silhouette.   XR Chest Port 1 View    Narrative    EXAM: XR CHEST PORT 1 VIEW  LOCATION: [facility]  DATE: 2024    INDICATION: increasing O2 needs  COMPARISON: 2024      Impression    IMPRESSION: Stable right PICC. Increasing consolidations in the mid to lower lungs. Increasing small pleural effusions. Stable heart size. Cardiac pacemaker.   Echocardiogram Complete    Narrative    [id number]  [id number]  [id number]  [id number]^ADELIA^ANDREA^[place] [address]     Name: VIVIANA OCHOA  MRN: [id number]  : 1945  Study Date: 2024 10:02 AM  Age: 79 yrs  Gender: Male  Patient Location: [facility]  Reason For Study: Shock  Ordering Physician: ANDREA DELVALLE  Performed By: GINA     BSA: 2.2 m2  Height: 70 in  Weight: 215 lb  HR: 60  BP: 112/61 mmHg  ______________________________________________________________________________  Procedure  Complete Portable Echo Adult. Definity (NDC #32793-460) given intravenously.  2.5ml, lot # [id number]. Technically difficult study.Extremely difficult acoustic  windows despite the use of contrast for endcardial border definition.  ______________________________________________________________________________  Interpretation Summary     1. Normal left ventricular size and systolic performance with a visually  estimated ejection fraction of 60%.  2. No significant valvular heart disease is identified on this study.  3. Probable normal right ventricular size and systolic performance though  right-sided structures are not clearly visualized on all views on this  study.  ______________________________________________________________________________  Left ventricle:  Normal left ventricular size and systolic performance with a visually  estimated ejection fraction of 60%. There is normal regional wall motion. Left  ventricular wall thickness is normal.     Assessment of LV Diastolic Function: The cumulative findings are indeterminate  in the evaluation of diastolic function [The septal e' velocity is < 7 cm/s &  lateral e' velocity is < 10 cm/s. The average E/e' is < 14. TR velocity is <  2.8 m/s. Left atrial volume index is greater than 34 mL/mÂ ].     Right ventricle:  Probable normal right ventricular size and systolic performance though right-  sided structures are not clearly visualized on all views on this study.     Left atrium:  There is borderline left atrial enlargement.     Right atrium:  The right atrium is of normal size.     IVC:  The IVC is mildly dilated.     Aortic valve:  The aortic valve is comprised of three cusps. No significant aortic stenosis  or aortic insufficiency is detected on this study.     Mitral valve:  The mitral valve appears morphologically normal. There is trace mitral  insufficiency.     Tricuspid valve:  The tricuspid valve is grossly morphologically normal. There is trace-mild  tricuspid insufficiency.     Pulmonic valve:  The pulmonic valve is grossly morphologically normal.     Thoracic aorta:  The aortic root and proximal ascending aorta are of normal dimension.     Pericardium:  There is no significant pericardial effusion.  ______________________________________________________________________________  ______________________________________________________________________________  MMode/2D Measurements & Calculations  IVSd: 0.86 cm  LVIDd: 5.1 cm  LVIDs: 3.1 cm  LVPWd: 1.0 cm  FS: 38.6 %  LV mass(C)d: 171.5 grams  LV mass(C)dI: 79.7 grams/m2  Ao root diam: 3.4 cm  asc Aorta Diam: 3.4 cm  LVOT diam: 2.1 cm  LVOT area: 3.6 cm2  Ao  root diam index Ht(cm/m): 1.9  Ao root diam index BSA (cm/m2): 1.6  Asc Ao diam index BSA (cm/m2): 1.6  Asc Ao diam index Ht(cm/m): 1.9  EF Biplane: 58.2 %  LA Volume (BP): 77.8 ml     LA Volume Index (BP): 36.2 ml/m2  LA Volume Indexed (AL/bp): 33.9 ml/m2  RWT: 0.39  TAPSE: 2.8 cm     Doppler Measurements & Calculations  MV E max leo: 68.6 cm/sec  MV A max leo: 49.7 cm/sec  MV E/A: 1.4  MV dec slope: 428.2 cm/sec2  MV dec time: 0.16 sec  Ao V2 max: 102.5 cm/sec  Ao max P.0 mmHg  Ao V2 mean: 73.5 cm/sec  Ao mean P.4 mmHg  Ao V2 VTI: 18.4 cm  ELENA(I,D): 3.1 cm2  ELENA(V,D): 2.8 cm2  LV V1 max P.5 mmHg  LV V1 max: 79.7 cm/sec  LV V1 VTI: 16.1 cm  SV(LVOT): 57.8 ml  SI(LVOT): 26.8 ml/m2  PA V2 max: 79.9 cm/sec  PA max P.6 mmHg  PA acc time: 0.12 sec  TR max leo: 279.9 cm/sec  TR max P.3 mmHg  AV Leo Ratio (DI): 0.78  ELENA Index (cm2/m2): 1.5  E/E' avg: 10.6  Lateral E/e': 7.5  Medial E/e': 13.7  RV S Leo: 10.9 cm/sec     ______________________________________________________________________________  Report approved by: Dulce Gan 2024 11:27 AM             Discharge Medications   Discharge Medication List as of 10/2/2024 11:30 AM        START taking these medications    Details   !! methadone (DOLOPHINE) 5 MG tablet Take 1.5 tablets (7.5 mg) by mouth every 12 hours for 8 days., Disp-16 tablet, R-0, E-Prescribe       !! - Potential duplicate medications found. Please discuss with provider.        CONTINUE these medications which have CHANGED    Details   amLODIPine (NORVASC) 2.5 MG tablet Take 1 tablet (2.5 mg) by mouth daily., Disp-30 tablet, R-0, E-Prescribe      levofloxacin (LEVAQUIN) 750 MG tablet Take 1 tablet (750 mg) by mouth daily., Disp-6 tablet, R-0, E-Prescribe      predniSONE (DELTASONE) 20 MG tablet Take 2 tablets (40 mg) by mouth daily for 2 days, THEN 1.5 tablets (30 mg) daily for 3 days, THEN 1 tablet (20 mg) daily for 3 days, THEN 0.5 tablets (10 mg) daily for 3 days.,  Disp-13 tablet, R-0, E-Prescribe      pregabalin (LYRICA) 100 MG capsule Take 1 capsule (100 mg) by mouth 3 times daily., Disp-90 capsule, R-0, E-Prescribe           CONTINUE these medications which have NOT CHANGED    Details   calcium carbonate-vitamin D (OSCAL W/D) 500-200 MG-UNIT tablet Take 1 tablet by mouth daily , Historical      carbidopa-levodopa (SINEMET)  MG tablet Take 1 tablet by mouth 3 times daily, Historical      clobetasol (TEMOVATE) 0.05 % external ointment Apply topically 2 times daily as needed.Disp-60 g, R-11Historical      DULoxetine (CYMBALTA) 30 MG capsule Take 60 mg by mouth every morning., Historical      fluocinonide (LIDEX) 0.05 % external cream Apply topically 2 times daily as needed.Historical      Fluticasone-Umeclidin-Vilanterol (TRELEGY ELLIPTA) 200-62.5-25 MCG/ACT oral inhaler Inhale 1 puff into the lungs daily, Disp-60 each, R-5, E-Prescribe      HYDROcodone-acetaminophen (NORCO) 5-325 MG tablet Take 1-2 tablets by mouth nightly as needed., Historical      ibuprofen (ADVIL/MOTRIN) 200 MG tablet Take 200 mg by mouth every 8 hours as needed for mild pain, Historical      levocetirizine (XYZAL) 5 MG tablet Take 5 mg by mouth every evening, Historical      nystatin-triamcinolone (MYCOLOG II) 932168-7.1 UNIT/GM-% external cream Apply topically 2 times daily as needed.Historical      Omega-3 Fatty Acids (FISH OIL BURP-LESS) 1000 MG CAPS Take 2 capsules by mouth every morning., Historical      polyethylene glycol (MIRALAX) 17 GM/Dose powder Take 1 Capful by mouth daily as needed for constipation., Historical      topiramate (TOPAMAX) 100 MG tablet Take 1 tablet by mouth 2 times daily., Historical      vitamin (B COMPLEX) tablet Take 1 tablet by mouth every morning., Historical      vitamin C (ASCORBIC ACID) 100 MG tablet Take 100 mg by mouth every morning., Historical      Vitamin D3 (CHOLECALCIFEROL) 25 mcg (1000 units) tablet Take 1 tablet by mouth every morning., Historical       naloxone (NARCAN) 4 MG/0.1ML nasal spray Spray 4 mg into one nostril alternating nostrils once as needed., Historical      !! methadone (DOLOPHINE) 10 MG tablet Take 10 mg by mouth 2 times daily, Disp-1 tablet, R-0, Historical       !! - Potential duplicate medications found. Please discuss with provider.        Allergies   No Known Allergies

## 2024-10-02 NOTE — PLAN OF CARE
Occupational Therapy Discharge Summary    Reason for therapy discharge:    Discharged to home.    Progress towards therapy goal(s). See goals on Care Plan in [facility] electronic health record for goal details.  Goals partially met.  Barriers to achieving goals:   discharge from facility.    Therapy recommendation(s):    Continued therapy is recommended.  Rationale/Recommendations:  To monitor home oxygen needs with functional tasks as pt. declining oxygen with oxygen level below 90%.

## 2024-10-02 NOTE — PROGRESS NOTES
Physical Therapy Discharge Summary    Reason for therapy discharge:    Discharged to home.    Progress towards therapy goal(s). See goals on Care Plan in [facility] electronic health record for goal details.  Goals partially met.  Barriers to achieving goals:   discharge from facility.    Therapy recommendation(s):    Further recommended therapy is related to documented deficits, and is necessary to maximize functional independence in order for patient to return to prior level of function.      Marisela Nava, SLOAN 10/2/2024

## 2024-10-07 ENCOUNTER — ANCILLARY PROCEDURE (OUTPATIENT)
Dept: CARDIOLOGY | Facility: CLINIC | Age: 79
End: 2024-10-07
Attending: INTERNAL MEDICINE
Payer: COMMERCIAL

## 2024-10-07 DIAGNOSIS — I49.5 SICK SINUS SYNDROME (H): ICD-10-CM

## 2024-10-07 DIAGNOSIS — Z95.0 CARDIAC PACEMAKER IN SITU: ICD-10-CM

## 2024-10-14 LAB
MDC_IDC_EPISODE_DTM: NORMAL
MDC_IDC_EPISODE_DURATION: 1250 S
MDC_IDC_EPISODE_DURATION: 138 S
MDC_IDC_EPISODE_DURATION: 2242 S
MDC_IDC_EPISODE_DURATION: 27 S
MDC_IDC_EPISODE_DURATION: 376 S
MDC_IDC_EPISODE_DURATION: 4189 S
MDC_IDC_EPISODE_DURATION: 42 S
MDC_IDC_EPISODE_DURATION: 47 S
MDC_IDC_EPISODE_DURATION: 57 S
MDC_IDC_EPISODE_DURATION: 6243 S
MDC_IDC_EPISODE_DURATION: 66 S
MDC_IDC_EPISODE_DURATION: 941 S
MDC_IDC_EPISODE_DURATION: 9678 S
MDC_IDC_EPISODE_ID: NORMAL
MDC_IDC_EPISODE_TYPE: NORMAL
MDC_IDC_EPISODE_TYPE_INDUCED: NO
MDC_IDC_LEAD_CONNECTION_STATUS: NORMAL
MDC_IDC_LEAD_IMPLANT_DT: NORMAL
MDC_IDC_LEAD_LOCATION: NORMAL
MDC_IDC_LEAD_LOCATION_DETAIL_1: NORMAL
MDC_IDC_LEAD_MFG: NORMAL
MDC_IDC_LEAD_MODEL: NORMAL
MDC_IDC_LEAD_POLARITY_TYPE: NORMAL
MDC_IDC_LEAD_SERIAL: NORMAL
MDC_IDC_LEAD_SPECIAL_FUNCTION: NORMAL
MDC_IDC_MSMT_BATTERY_DTM: NORMAL
MDC_IDC_MSMT_BATTERY_REMAINING_LONGEVITY: 66 MO
MDC_IDC_MSMT_BATTERY_REMAINING_PERCENTAGE: 80 %
MDC_IDC_MSMT_BATTERY_STATUS: NORMAL
MDC_IDC_MSMT_LEADCHNL_RA_IMPEDANCE_VALUE: 727 OHM
MDC_IDC_PG_IMPLANT_DTM: NORMAL
MDC_IDC_PG_MFG: NORMAL
MDC_IDC_PG_MODEL: NORMAL
MDC_IDC_PG_SERIAL: NORMAL
MDC_IDC_PG_TYPE: NORMAL
MDC_IDC_SESS_CLINIC_NAME: NORMAL
MDC_IDC_SESS_DTM: NORMAL
MDC_IDC_SESS_TYPE: NORMAL
MDC_IDC_SET_BRADY_LOWRATE: 60 {BEATS}/MIN
MDC_IDC_SET_BRADY_MAX_SENSOR_RATE: 130 {BEATS}/MIN
MDC_IDC_SET_BRADY_MODE: NORMAL
MDC_IDC_SET_LEADCHNL_RA_PACING_AMPLITUDE: 1.8 V
MDC_IDC_SET_LEADCHNL_RA_PACING_CAPTURE_MODE: NORMAL
MDC_IDC_SET_LEADCHNL_RA_PACING_POLARITY: NORMAL
MDC_IDC_SET_LEADCHNL_RA_PACING_PULSEWIDTH: 0.4 MS
MDC_IDC_SET_LEADCHNL_RA_SENSING_ADAPTATION_MODE: NORMAL
MDC_IDC_SET_LEADCHNL_RA_SENSING_POLARITY: NORMAL
MDC_IDC_SET_LEADCHNL_RA_SENSING_SENSITIVITY: 0.25 MV
MDC_IDC_SET_ZONE_DETECTION_INTERVAL: 375 MS
MDC_IDC_SET_ZONE_STATUS: NORMAL
MDC_IDC_SET_ZONE_TYPE: NORMAL
MDC_IDC_SET_ZONE_VENDOR_TYPE: NORMAL
MDC_IDC_STAT_BRADY_DTM_END: NORMAL
MDC_IDC_STAT_BRADY_DTM_START: NORMAL
MDC_IDC_STAT_BRADY_RA_PERCENT_PACED: 96 %
MDC_IDC_STAT_EPISODE_RECENT_COUNT: 0
MDC_IDC_STAT_EPISODE_RECENT_COUNT: 14
MDC_IDC_STAT_EPISODE_RECENT_COUNT_DTM_END: NORMAL
MDC_IDC_STAT_EPISODE_RECENT_COUNT_DTM_END: NORMAL
MDC_IDC_STAT_EPISODE_RECENT_COUNT_DTM_START: NORMAL
MDC_IDC_STAT_EPISODE_RECENT_COUNT_DTM_START: NORMAL
MDC_IDC_STAT_EPISODE_TYPE: NORMAL
MDC_IDC_STAT_EPISODE_TYPE: NORMAL
MDC_IDC_STAT_EPISODE_VENDOR_TYPE: NORMAL

## 2024-10-14 PROCEDURE — 93296 REM INTERROG EVL PM/IDS: CPT | Performed by: INTERNAL MEDICINE

## 2024-10-14 PROCEDURE — 93294 REM INTERROG EVL PM/LDLS PM: CPT | Performed by: INTERNAL MEDICINE

## 2024-10-15 ENCOUNTER — TELEPHONE (OUTPATIENT)
Dept: PULMONOLOGY | Facility: CLINIC | Age: 79
End: 2024-10-15
Payer: COMMERCIAL

## 2024-10-29 ENCOUNTER — OFFICE VISIT (OUTPATIENT)
Dept: PULMONOLOGY | Facility: CLINIC | Age: 79
End: 2024-10-29
Payer: COMMERCIAL

## 2024-10-29 VITALS
HEART RATE: 63 BPM | DIASTOLIC BLOOD PRESSURE: 60 MMHG | WEIGHT: 215 LBS | BODY MASS INDEX: 30.85 KG/M2 | SYSTOLIC BLOOD PRESSURE: 122 MMHG | OXYGEN SATURATION: 95 %

## 2024-10-29 DIAGNOSIS — Z92.89 HISTORY OF RECENT HOSPITALIZATION: ICD-10-CM

## 2024-10-29 DIAGNOSIS — J41.0 SIMPLE CHRONIC BRONCHITIS (H): ICD-10-CM

## 2024-10-29 DIAGNOSIS — J18.9 PNEUMONIA DUE TO INFECTIOUS ORGANISM, UNSPECIFIED LATERALITY, UNSPECIFIED PART OF LUNG: Primary | ICD-10-CM

## 2024-10-29 PROCEDURE — 99214 OFFICE O/P EST MOD 30 MIN: CPT | Performed by: NURSE PRACTITIONER

## 2024-10-29 RX ORDER — BETAMETHASONE DIPROPIONATE 0.5 MG/G
CREAM TOPICAL
COMMUNITY
Start: 2024-10-11

## 2024-10-29 RX ORDER — HYDROCODONE BITARTRATE AND ACETAMINOPHEN 10; 325 MG/1; MG/1
TABLET ORAL
COMMUNITY
Start: 2024-10-10

## 2024-10-29 RX ORDER — METHADONE HYDROCHLORIDE 5 MG/1
TABLET ORAL
COMMUNITY
Start: 2024-10-10

## 2024-10-29 RX ORDER — AZITHROMYCIN 250 MG/1
TABLET, FILM COATED ORAL
Qty: 6 TABLET | Refills: 0 | Status: SHIPPED | OUTPATIENT
Start: 2024-10-29 | End: 2024-11-03

## 2024-10-29 RX ORDER — PREDNISONE 10 MG/1
TABLET ORAL
Qty: 30 TABLET | Refills: 0 | Status: SHIPPED | OUTPATIENT
Start: 2024-10-29 | End: 2024-11-10

## 2024-10-29 NOTE — PATIENT INSTRUCTIONS
It was a pleasure seeing you in clinic today. This is what we discussed:    Your sputum culture grew 2 bacteria.   2+ Escherichia coli    1+ Klebsiella pneumoniae    We will get a chest x-ray in a few weeks to make sure the pneumonia has cleared.   If you use the prednisone and azithromycin call and let the nurse know.   Use your nebulizer every 4-6 hours as needed for cough, shortness of breath, wheeze, or chest tightness. Use the flutter valve afterward.    Follow up with Mayuri Stephenson in 3 months  If you have worsening symptoms, questions, or need to speak with the nurse please call [phone].

## 2024-10-29 NOTE — PROGRESS NOTES
Pulmonary Clinic Acute Visit   October 29, 2024     Assessment/Plan:     79-year-old man with history of chronic bronchitis, HTN, neuropathy/chronic pain on multiple sedating medications here for follow-up.      #.E. coli and Klebsiella pneumonia, chronic bronchitis:  Appears well today.  His symptoms are baseline.  Last chest x-ray while inpatient did show increasing pleural effusion and consolidation so we will get follow-up x-ray in a few weeks.  - Chest x-ray 2 weeks.  His wife will call and schedule this.  -Continue Trelegy, 1 puff daily.  He was instructed to rinse and gargle after each use  -If cough, mucus, or shortness of breath increase he was instructed to start DuoNebs 4 times daily followed by flutter valve and contact clinic.  -Supplied with action plan to have on hand.  They were instructed to call the clinic if used.    Follow-up: 3 months with Mayuri Stephenson CNP, sooner if needed.    Saige Naik CNP  Pulmonary Medicine  [facility]   [phone]     CC:     Chief Complaint   Patient presents with     Hospitalization     Date of Admission: 9/22/24  Date of Discharge: 10/2/24  Reason for Consult: Acute resp failure with hypoxemia. Multifocal pneumonia with COPD exacerbation.  CT CHEST PE 9/22/24         HPI:     Rom was last seen in clinic on 9/11. Previously followed in this clinic by Dr. Dejesus and Mayuri Stephenson CNP. Unfortunately, he was hospitalized shortly after that visit on 9/22-10/2 with acute respiratory failure, e. Coli and klebsiella pneumonia, and sepsis.  Initially required BiPAP/high flow oxygen.  Decreased to 6 L via nasal cannula and eventually weaned off oxygen altogether prior to discharge.  He did have desaturation to 89% with activity but that did not meet requirement for oxygen at discharge.  Today he is feeling well and reports return to baseline symptoms.  Has not been using nebulizer or flutter valve at home.  He was unaware if he should.  Does not have much  cough.  Occasional sputum in the morning.  Shortness of breath with activity is at baseline.  He denies SOB, chest pain, muscle aches, fevers, sinus pain/congestion.      Reviewed medications d/t lethargy:  He is on lyrica, methadone QID, topamax.     ROS:     10-point ROS performed and is negative aside from those listed in HPI.    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 Added automatically from request for surgery [id number]     Past Surgical History:   Procedure Laterality Date    AMPUTATE TOE(S) Left 2/20/2020    Procedure: AMPUTATION, third digit left foot;  Surgeon: Wai Armstrong DPM;  Location: [facility];  Service: Podiatry    AMPUTATE TOE(S) Right 7/7/2020    Procedure: AMPUTATION, second digit right foot;  Surgeon: Wai Armstrong DPM;  Location: [facility];  Service: Podiatry    EP PACEMAKER INSERT N/A 2/12/2019    Procedure: EP Pacemaker Insertion;  Surgeon: Micheal Mahoney MD;  Location: [facility];  Service: Cardiology    EYE SURGERY Bilateral     HIP SURGERY Right     total hip    INSERT / REPLACE / REMOVE PACEMAKER  02/12/2019    PACEMAKER/ICD CHECK  2019    low heart rate - 35    PICC TRIPLE LUMEN PLACEMENT  9/22/2024    TOTAL HIP ARTHROPLASTY Right 2/2/2015    Procedure: #3   HIP TOTAL ARTHROPLASTY, RIGHT;  Surgeon: Aryan Glass MD;  Location: [facility];  Service:      Social History     Tobacco Use    Smoking status: Former     Types: Cigars, cigarillos or filtered cigars     Passive exposure: Past (Parents were smokers)     Smokeless tobacco: Never   Vaping Use    Vaping status: Never Used   Substance Use Topics    Alcohol use: Not Currently    Drug use: Never     Comment: cigars      No Known Allergies     Physical Exam:     /60 (BP Location: Left arm, Patient Position: Sitting, Cuff Size: Adult Regular)   Pulse 63   Wt 97.5 kg (215 lb)   SpO2 95%   BMI 30.85 kg/m      Physical Exam  Constitutional:       General: Rom is not in acute distress.     Appearance: Rom is not ill-appearing.   Cardiovascular:      Rate and Rhythm: Normal rate and regular rhythm.      Heart sounds: Normal heart sounds.   Pulmonary:      Effort: Pulmonary effort is normal. No respiratory distress.      Breath sounds: Decreased breath sounds present. No wheezing or rhonchi.   Musculoskeletal:      Right lower leg: No edema.      Left lower leg: No edema.   Neurological:      Mental Status: Rom is alert.   Psychiatric:         Behavior: Behavior normal.           Data:     EXAM: XR CHEST PORT 1 VIEW  LOCATION: [facility]  DATE: 9/26/2024  INDICATION: increasing O2 needs  COMPARISON: 9/24/2024                                                                IMPRESSION: Stable right PICC. Increasing consolidations in the mid to lower lungs. Increasing small pleural effusions. Stable heart size. Cardiac pacemaker.    EXAM: CT CHEST WO CONTRAST  LOCATION: [facility]  DATE/TIME: 2/10/2021 11:58 AM  INDICATION: Follow-up pulmonary nodules.  COMPARISON: 2/19/2020 and 10/17/2019  TECHNIQUE: CT chest without IV contrast. Multiplanar reformats were obtained. Dose reduction techniques were used.  CONTRAST: None.  FINDINGS:   LUNGS AND PLEURA: Stable smooth chronic pleural thickening with mild atelectasis in the posterior lower lobes as well as the right middle lobe and lingula all stable.  Stable faint 3 mm nodule left upper lobe image 27. The nodule lower in the left upper lobe seen previously is no longer  present. The should be considered benign.  No significant findings to recommend follow-up.  MEDIASTINUM/AXILLAE: No lymphadenopathy. Some minimal mucous in the trachea   UPPER ABDOMEN: No significant finding.  MUSCULOSKELETAL: Unremarkable.  IMPRESSION:  1.  Stable small chronic pleural thickening with some mild atelectasis in the lower lungs.  2.  Stable tiny nodule in the left upper lobe with the second tiny nodule not clearly seen today. The should be considered benign.  3.  No additional follow-up recommended.    PFTs 11/27/2019:  FEV1/FVC is 0.66 and is normal (less than 0.7 but greater than the demographically adjusted lower limit of normal).  FEV1 is 59% predicted and is reduced.  FVC is 67% predicted and is reduced.  There was no improvement in spirometry after a single inhaled dose of bronchodilator.  TLC is 64% predicted and is reduced.  RV is 73% predicted and is reduced.  DLCO is 57% predicted and is reduced when it   is corrected for hemoglobin.  The flow volume loop shows some expiratory concavity.     Impression:  Pulmonary function testing shows moderate-severe pathophysiology, primarily restrictive but with a likely obstructive component.  Spirometry is not consistent with reversibility.  Diffusion capacity when corrected for hemoglobin is moderately reduced.

## 2025-01-23 ENCOUNTER — ANCILLARY PROCEDURE (OUTPATIENT)
Dept: CARDIOLOGY | Facility: CLINIC | Age: 80
End: 2025-01-23
Attending: INTERNAL MEDICINE
Payer: COMMERCIAL

## 2025-01-23 DIAGNOSIS — I49.5 SICK SINUS SYNDROME (H): ICD-10-CM

## 2025-01-23 DIAGNOSIS — Z95.0 CARDIAC PACEMAKER IN SITU: ICD-10-CM

## 2025-01-27 ENCOUNTER — VIRTUAL VISIT (OUTPATIENT)
Dept: PULMONOLOGY | Facility: CLINIC | Age: 80
End: 2025-01-27
Payer: COMMERCIAL

## 2025-01-27 DIAGNOSIS — J41.0 SIMPLE CHRONIC BRONCHITIS (H): Primary | ICD-10-CM

## 2025-01-27 DIAGNOSIS — R04.0 BLEEDING FROM THE NOSE: ICD-10-CM

## 2025-01-27 PROCEDURE — 98005 SYNCH AUDIO-VIDEO EST LOW 20: CPT | Performed by: NURSE PRACTITIONER

## 2025-01-27 NOTE — PATIENT INSTRUCTIONS
It was a pleasure seeing you today. This is what we discussed:    Continue the Trelegy 1 puff daily as you have been. Remember to gargle and rinse after use.   Use your albuterol every 4 hours as needed for cough, shortness of breath, wheeze, or chest tightness. If you do not feel better after 1 day start the azithromycin and prednisone.    Follow up 6 months, sooner if needed   If you have worsening symptoms, questions, or need to speak with the nurse please call [phone].

## 2025-01-27 NOTE — PROGRESS NOTES
"Virtual Visit Details    Type of service:  Video Visit   Video Start Time: 1:31 PM  Video End Time:1:47 PM    Originating Location (pt. Location): Home    Distant Location (provider location):  On-site  Platform used for Video Visit: [facility]       Pulmonary Clinic Acute Visit   January 27, 2025     Assessment/Plan:     79-year-old man with history of chronic bronchitis, HTN, neuropathy/chronic pain on multiple sedating medications here for follow-up.      PFTs last done in 2019 show shows moderate-severe pathophysiology, primarily restrictive but with a likely obstructive component. \"strict    #. Chronic bronchitis:  Appears well today.  His symptoms are baseline. Hospital stay in 2024 for E. coli and Klebsiella pneumonia. Symptoms resolved to baseline.   -Continue Trelegy, 1 puff daily.  He was instructed to rinse and gargle after each use  -If cough, mucus, or shortness of breath increase he was instructed to start DuoNebs 4 times daily followed by flutter valve and contact clinic.  -Supplied with action plan to have on hand.  They were instructed to call the clinic if used.  #. HCM: UTD with COVID-vaccine (10/2024), seasonal flu, PCV13, PPSV23, Tdap (2016).     If his symptoms exacerbate: Prednisone 40 mg daily x 5 days.  If sputum amount or thickness increased add azithromycin Z-Silvestre x 5 days    Follow-up: 6 months, sooner if needed     Saige Naik, CNP  Pulmonary Medicine  [facility]   [phone]     CC:     Chief Complaint   Patient presents with    RECHECK      HPI:     Rom was last seen in clinic in 07/2024.  At that time he was recovering from hospitalization for pneumonia.  Since that time he reports baseline breathing symptoms and has no complaints.  Has a minimal cough in the AM to clear to some phlegm. No cough the rest of the day.   Has not been using nebulizer or flutter valve at home.  He was unaware if he should.  Shortness of breath with activity is at baseline.  He denies SOB, chest pain, " muscle aches, fevers, sinus pain/congestion.    Has been having intermittent issues with nosebleeds.  Sleeping with a humidifier at bedside and is going to try some nasal saline gel.    Previously followed in this clinic by Dr. Dejesus.    Hospital stay from 9/22-10/2 with acute respiratory failure, e. Coli and klebsiella pneumonia, and sepsis.  Initially required BiPAP/high flow oxygen.  Decreased to 6 L via nasal cannula and eventually weaned off oxygen altogether prior to discharge.  He did have desaturation to 89% with activity but that did not meet requirement for oxygen at discharge.    Reviewed medications d/t lethargy:  He is on lyrica, methadone QID, prn hydrocodone and topamax.        5/13/2020    11:00 AM 7/29/2021     3:00 PM 2/16/2022     1:00 PM 2/6/2023    11:00 AM 2/27/2024     1:54 PM 9/11/2024    10:48 AM 10/29/2024    11:06 AM   COPD assessment test (CAT)   Cough 3 2 3 0 1 1 2   Phlegm 3 2 3 1 1 1 1   Chest tightness 1 1 0 1 0 2 0   Walk up hill 4 4 3 4 4 4 2   Limited activities 4 4 5 4 3 4 3   Leaving my home 0 1 0 4 2 3 1   Sleep 0 3 3 1 4 3 3   Energy 2 1 4 4 3 4 3   Total Score 17 18 21 19 18 22 15        Patient-reported       ROS:     10-point ROS performed and is negative aside from those listed in HPI.    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 Added automatically from request for surgery [id number]     Past Surgical History:   Procedure Laterality Date    AMPUTATE TOE(S) Left 2/20/2020    Procedure: AMPUTATION, third digit left foot;  Surgeon: Nathaniel  Wai ASHLEY DPM;  Location: [facility];  Service: Podiatry    AMPUTATE TOE(S) Right 7/7/2020    Procedure: AMPUTATION, second digit right foot;  Surgeon: Wai Armstrong DPM;  Location: [facility] OR;  Service: Podiatry    EP PACEMAKER INSERT N/A 2/12/2019    Procedure: EP Pacemaker Insertion;  Surgeon: Micheal Mahoney MD;  Location: [facility];  Service: Cardiology    EYE SURGERY Bilateral     HIP SURGERY Right     total hip    INSERT / REPLACE / REMOVE PACEMAKER  02/12/2019    PACEMAKER/ICD CHECK  2019    low heart rate - 35    PICC TRIPLE LUMEN PLACEMENT  9/22/2024    TOTAL HIP ARTHROPLASTY Right 2/2/2015    Procedure: #3   HIP TOTAL ARTHROPLASTY, RIGHT;  Surgeon: Aryan Glass MD;  Location: [facility] OR;  Service:      Social History     Tobacco Use    Smoking status: Former     Types: Cigars, cigarillos or filtered cigars     Passive exposure: Past (Parents were smokers)    Smokeless tobacco: Never   Vaping Use    Vaping status: Never Used   Substance Use Topics    Alcohol use: Not Currently    Drug use: Never     Comment: cigars      No Known Allergies     Physical Exam:     There were no vitals taken for this visit.  Video visit     Physical Exam  Constitutional:       General: Bill is not in acute distress.     Appearance: Bill is not ill-appearing.   Pulmonary:      Effort: Pulmonary effort is normal. No respiratory distress.      Breath sounds: Decreased breath sounds present.   Neurological:      Mental Status: Bill is alert.   Psychiatric:         Behavior: Behavior normal.           Data:     XR CHEST PORT 1 VIEW, DATE: 9/26/2024  INDICATION: increasing O2 needs  COMPARISON: 9/24/2024                                                                IMPRESSION: Stable right PICC. Increasing consolidations in the mid to lower lungs. Increasing small pleural effusions. Stable heart size. Cardiac pacemaker.    CT CHEST WO CONTRAST, DATE/TIME: 2/10/2021 11:58 AM  INDICATION:  Follow-up pulmonary nodules.  COMPARISON: 2/19/2020 and 10/17/2019  TECHNIQUE: CT chest without IV contrast. Multiplanar reformats were obtained. Dose reduction techniques were used.  CONTRAST: None.  FINDINGS:   LUNGS AND PLEURA: Stable smooth chronic pleural thickening with mild atelectasis in the posterior lower lobes as well as the right middle lobe and lingula all stable.  Stable faint 3 mm nodule left upper lobe image 27. The nodule lower in the left upper lobe seen previously is no longer present. The should be considered benign.  No significant findings to recommend follow-up.  MEDIASTINUM/AXILLAE: No lymphadenopathy. Some minimal mucous in the trachea   UPPER ABDOMEN: No significant finding.  MUSCULOSKELETAL: Unremarkable.  IMPRESSION:  1.  Stable small chronic pleural thickening with some mild atelectasis in the lower lungs.  2.  Stable tiny nodule in the left upper lobe with the second tiny nodule not clearly seen today. The should be considered benign.  3.  No additional follow-up recommended.    PFTs 11/27/2019:  FEV1/FVC is 0.66 and is normal (less than 0.7 but greater than the demographically adjusted lower limit of normal).  FEV1 is 59% predicted and is reduced.  FVC is 67% predicted and is reduced.  There was no improvement in spirometry after a single inhaled dose of bronchodilator.  TLC is 64% predicted and is reduced.  RV is 73% predicted and is reduced.  DLCO is 57% predicted and is reduced when it   is corrected for hemoglobin.  The flow volume loop shows some expiratory concavity.     Impression:  Pulmonary function testing shows moderate-severe pathophysiology, primarily restrictive but with a likely obstructive component.  Spirometry is not consistent with reversibility.  Diffusion capacity when corrected for hemoglobin is moderately reduced.

## 2025-01-27 NOTE — NURSING NOTE
Current patient location: [address] [place] [id number]    Is the patient currently in the state of [place]? YES    Visit mode: VIDEO    If the visit is dropped, the patient can be reconnected by:VIDEO VISIT: Text to cell phone:   Telephone Information:   Mobile [phone]       Will anyone else be joining the visit? NO  (If patient encounters technical issues they should call [phone])    Are changes needed to the allergy or medication list? Yes Unsure if Amlodipine should be removed    Are refills needed on medications prescribed by this physician? NO    Rooming Documentation:  Questionnaire(s) completed    Reason for visit: GENAROECK    JABIER MOORE

## 2025-01-30 LAB
MDC_IDC_EPISODE_DTM: NORMAL
MDC_IDC_EPISODE_DURATION: 18 S
MDC_IDC_EPISODE_ID: NORMAL
MDC_IDC_EPISODE_TYPE: NORMAL
MDC_IDC_EPISODE_TYPE_INDUCED: NO
MDC_IDC_LEAD_CONNECTION_STATUS: NORMAL
MDC_IDC_LEAD_IMPLANT_DT: NORMAL
MDC_IDC_LEAD_LOCATION: NORMAL
MDC_IDC_LEAD_LOCATION_DETAIL_1: NORMAL
MDC_IDC_LEAD_MFG: NORMAL
MDC_IDC_LEAD_MODEL: NORMAL
MDC_IDC_LEAD_POLARITY_TYPE: NORMAL
MDC_IDC_LEAD_SERIAL: NORMAL
MDC_IDC_LEAD_SPECIAL_FUNCTION: NORMAL
MDC_IDC_MSMT_BATTERY_DTM: NORMAL
MDC_IDC_MSMT_BATTERY_REMAINING_LONGEVITY: 60 MO
MDC_IDC_MSMT_BATTERY_REMAINING_PERCENTAGE: 72 %
MDC_IDC_MSMT_BATTERY_STATUS: NORMAL
MDC_IDC_MSMT_LEADCHNL_RA_IMPEDANCE_VALUE: 710 OHM
MDC_IDC_PG_IMPLANT_DTM: NORMAL
MDC_IDC_PG_MFG: NORMAL
MDC_IDC_PG_MODEL: NORMAL
MDC_IDC_PG_SERIAL: NORMAL
MDC_IDC_PG_TYPE: NORMAL
MDC_IDC_SESS_CLINIC_NAME: NORMAL
MDC_IDC_SESS_DTM: NORMAL
MDC_IDC_SESS_TYPE: NORMAL
MDC_IDC_SET_BRADY_LOWRATE: 60 {BEATS}/MIN
MDC_IDC_SET_BRADY_MAX_SENSOR_RATE: 130 {BEATS}/MIN
MDC_IDC_SET_BRADY_MODE: NORMAL
MDC_IDC_SET_LEADCHNL_RA_PACING_AMPLITUDE: 1.8 V
MDC_IDC_SET_LEADCHNL_RA_PACING_CAPTURE_MODE: NORMAL
MDC_IDC_SET_LEADCHNL_RA_PACING_POLARITY: NORMAL
MDC_IDC_SET_LEADCHNL_RA_PACING_PULSEWIDTH: 0.4 MS
MDC_IDC_SET_LEADCHNL_RA_SENSING_ADAPTATION_MODE: NORMAL
MDC_IDC_SET_LEADCHNL_RA_SENSING_POLARITY: NORMAL
MDC_IDC_SET_LEADCHNL_RA_SENSING_SENSITIVITY: 0.25 MV
MDC_IDC_SET_ZONE_DETECTION_INTERVAL: 375 MS
MDC_IDC_SET_ZONE_STATUS: NORMAL
MDC_IDC_SET_ZONE_TYPE: NORMAL
MDC_IDC_SET_ZONE_VENDOR_TYPE: NORMAL
MDC_IDC_STAT_BRADY_DTM_END: NORMAL
MDC_IDC_STAT_BRADY_DTM_START: NORMAL
MDC_IDC_STAT_BRADY_RA_PERCENT_PACED: 96 %
MDC_IDC_STAT_EPISODE_RECENT_COUNT: 0
MDC_IDC_STAT_EPISODE_RECENT_COUNT: 1
MDC_IDC_STAT_EPISODE_RECENT_COUNT_DTM_END: NORMAL
MDC_IDC_STAT_EPISODE_RECENT_COUNT_DTM_END: NORMAL
MDC_IDC_STAT_EPISODE_RECENT_COUNT_DTM_START: NORMAL
MDC_IDC_STAT_EPISODE_RECENT_COUNT_DTM_START: NORMAL
MDC_IDC_STAT_EPISODE_TYPE: NORMAL
MDC_IDC_STAT_EPISODE_TYPE: NORMAL
MDC_IDC_STAT_EPISODE_VENDOR_TYPE: NORMAL

## 2025-01-30 PROCEDURE — 93296 REM INTERROG EVL PM/IDS: CPT | Performed by: INTERNAL MEDICINE

## 2025-01-30 PROCEDURE — 93294 REM INTERROG EVL PM/LDLS PM: CPT | Performed by: INTERNAL MEDICINE

## 2025-03-06 NOTE — PROGRESS NOTES
"SUBJECTIVE/OBJECTIVE:                Romaine Farah is a 73 year old male called for a follow-up visit for Medication Therapy Management.  He was referred to me from Dr. Corey.     Chief Complaint: Follow up from our visit on 4/22/19  Tobacco: History of tobacco dependence - quit many years ago  Alcohol: none    Medication Adherence/Access: no issues reported - goes to [facility] in [place] ([facility])    Tremor: Currently taking topiramate 50 mg twice daily. Patient was able to taper off of primidone as instructed and states that he is doing \"considerably better\" since going off primidone. He is feeling more alert, more stable on his feet, and sleeping better at night. He has had a slight worsening of tremor but \"not terrible.\" Patient is planning to attend the deep brain stimulation work up class and in the meantime would like to try going back on a lower dose of primidone or increasing topiramate. Patient has seen Dr[facility] Epperson ([facility]) and Dr. Corey ([facility]). Patient has had no change in appetite since being on topiramate.    Neuropathy: Currently taking duloxetine 60 mg daily in the morning, pregabalin 200 mg 3 times daily, topiramate 50 mg twice daily, and methadone 10 mg in the AM, 5 mg at 1 pm, 10 mg at 5 pm, and 10 pm at 10 pm. States that neuropathy has been unchanged. He had a few bad weeks but reports the last 4-5 days have been better. He saw pain management clinic on 5/13/19 and they are considering further tapering the methadone over time.     Of note, patient stopped taking Benadryl after our last visit and his wife thinks this has improved his cognition and physical stability.     ASSESSMENT:              Current medications were reviewed today as discussed above.      Medication Adherence: excellent, no issues identified    Tremor: Needs improvement. It seems that the patient does not tolerate primidone well, even at lower doses, so he would benefit from staying off of it " 1st attempt to schedule. LVM. Consult/ [facility]    and instead optimizing topiramate for tremor. Doses of topiramate 200-400 mg/day have been shown to be the most effective and safe for essential tremor.     Neuropathy: Improving. Increase in topiramate may also improve neuropathy which may allow further reduction in methadone dose.     PLAN:                  1. Stay off primidone.   2. Recommendation to Dr. Corey: consider increasing topiramate to 75 mg AM/50 mg PM x 1 week, then 75 mg AM/75 mg PM x 1 week, then 100 mg AM/75 mg PM x 1 week, then 100 mg AM/100 mg PM thereafter.  Addendum 5/16/19: Dr. Corey agrees with above plan for titration of dose. Updated rx sent to pharmacy for one month supply of titration.     I spent 15 minutes with this patient today. All changes were made via verbal approval with Dr. Corey. A copy of the visit note was provided to the patient's referring provider.     Will follow up in one month: 6/20/19.    The patient was sent via [employer] a summary of these recommendations as an after visit summary.    Nenita Polo, Pharm.D.  Medication Therapy Management Pharmacist  Phone: [phone]

## 2025-03-19 NOTE — PROGRESS NOTES
Medication Therapy Management (MTM) Encounter    ASSESSMENT:                            Medication Adherence/Access: No issues identified.    Pain    Managed by pain clinic.     Tremors   ***     Hyperlipidemia   Stable.     Allergy   Stable.      COPD   Rinse mouth      Supplements   Stable.     Constipation  Stable.     PLAN:                            Ask about increasing carbidopa-levodopa with Dr. Schmidt -- tremors are bugging him, trouble eating   Rinse mouth after using trelegy     Follow-up: No follow-ups on file.    SUBJECTIVE/OBJECTIVE:                          Rom Farah is a 79 year old adult seen for a yearly medication review. Rom was referred to me from insurance plan.      Reason for visit: Yearly med review.    Allergies/ADRs: None   Past Medical History: Reviewed in chart  Tobacco: Rom reports that Rom has quit smoking. Rom's smoking use included cigars, cigarillos or filtered cigars. Rom has been exposed to tobacco smoke. Rom has never used smokeless tobacco.  Alcohol: rare    Medication Adherence/Access: no issues reported.      Pain    ***  Pain is the worst at night and it is in his feet and ankles   Pain type/location: Feet  Pain is described as { :[id number]}.  Patient feels that current therapy is effective.   Patient reports the following side effects: {mtmpainse:048027}.    Topiramate 100 MG Tablet,1 tablet Orally 2 times daily   Methadone HCl 5 MG Tablet, 1.5 tablet orally 2 times daily   HYDROcodone-Acetaminophen 5-325 MG Tablet, 1-2 tabs Orally at bedtime   Narcan 4 MG/0.1ML Liquid, as directed intranasally once  Pregabalin 100 MG Capsule, Si capsule Orally 3 times daily  IBU-200 200 MG Tablet, Si tab(s) orally every 6 hours-- as needed  DULoxetine HCl 30 MG Capsule DR TAKE TWO CAPSULES BY MOUTH EVERY DAY     Tremors   Carbidopa-Levodopa  MG Tablet, one tablet three times daily        Hyperlipidemia   Fish Oil 1000 mg twice daily  Patient reports no significant side  effects.  The 10-year ASCVD risk score (Erlin BERGER, et al., 2019) is: 29.2%    Values used to calculate the score:      Age: 79 years      Sex: Male      Is Non- : No      Diabetic: No      Tobacco smoker: No      Systolic Blood Pressure: 122 mmHg      Is BP treated: No      HDL Cholesterol: 48 mg/dL      Total Cholesterol: 201 mg/dL     Allergy   Xyzal  5 mg once daily  Patient reports no current medication side effects.    Primary symptoms are itching and irritation.   Patient feels that current therapy is effective.      COPD   Trelegy Ellipta 100-62.5-25 MCG/INH one puff daily   Patient does not rinse their mouth after using steroid inhaler.    Patient reports no current medication side effects.    Patient reports the following symptoms: none.        Supplements   Vitamin D3 25 MCG (1000 UT) Tablet, 1 tab(s) orally once a day   Vitamin B6 100 MG Tablet,  1 tablet Orally Once a day  Calcium 600 MG Tablet, 1 tablet with meals Orally daily  Taking for general health and denies side effects at this time.         Constipation  Polyethylene Glycol - Powder,1 capful orally daily as needed for constipation  Daily use          Today's Vitals: There were no vitals taken for this visit.  ----------------      I spent 20 minutes with this patient today (an extra 15 minutes was spent creating the Medication Action Plan). { :[id number]}.     A summary of these recommendations was sent via [employer].    Kyra Colon, PharmD  Medication Therapy Management Pharmacist     Telemedicine Visit Details  The patient's medications can be safely assessed via a telemedicine encounter.  Type of service:  Telephone visit  Originating Location (pt. Location): Home  {PROVIDER LOCATION On-site should be selected for visits conducted from your clinic location or adjoining [facility] hospital, academic office, or other nearby [facility] building. Off-site should be selected for all other provider locations, including  [place]:[id number]}  Distant Location (provider location):  On-site  Start Time:  1:34 PM  End Time: 1:54 PM     Medication Therapy Recommendations  No medication therapy recommendations to display

## 2025-03-20 ENCOUNTER — VIRTUAL VISIT (OUTPATIENT)
Dept: PHARMACY | Facility: PHYSICIAN GROUP | Age: 80
End: 2025-03-20
Payer: COMMERCIAL

## 2025-03-20 DIAGNOSIS — E78.2 MIXED HYPERLIPIDEMIA: ICD-10-CM

## 2025-03-20 DIAGNOSIS — K59.00 CONSTIPATION, UNSPECIFIED CONSTIPATION TYPE: ICD-10-CM

## 2025-03-20 DIAGNOSIS — R25.1 TREMOR: ICD-10-CM

## 2025-03-20 DIAGNOSIS — G89.29 OTHER CHRONIC PAIN: Primary | ICD-10-CM

## 2025-03-20 DIAGNOSIS — J30.2 SEASONAL ALLERGIC RHINITIS, UNSPECIFIED TRIGGER: ICD-10-CM

## 2025-03-20 DIAGNOSIS — Z78.9 TAKES DIETARY SUPPLEMENTS: ICD-10-CM

## 2025-03-20 DIAGNOSIS — J41.0 SIMPLE CHRONIC BRONCHITIS (H): ICD-10-CM

## 2025-03-20 DIAGNOSIS — J44.9 CHRONIC OBSTRUCTIVE PULMONARY DISEASE, UNSPECIFIED COPD TYPE (H): ICD-10-CM

## 2025-03-20 PROCEDURE — 99605 MTMS BY PHARM NP 15 MIN: CPT | Mod: 93 | Performed by: PHARMACIST

## 2025-03-20 PROCEDURE — 99607 MTMS BY PHARM ADDL 15 MIN: CPT | Mod: 93 | Performed by: PHARMACIST

## 2025-03-20 RX ORDER — LEVOCETIRIZINE DIHYDROCHLORIDE 5 MG/1
5 TABLET, FILM COATED ORAL EVERY EVENING
COMMUNITY

## 2025-03-20 NOTE — LETTER
"Recommended To-Do List      Prepared on: Mar 20, 2025       You can get the best results from your medications by completing the items on this \"To-Do List.\"      Bring your To-Do List when you go to your doctor. And, share it with your family or caregivers.    My To-Do List:  What we talked about: What I should do:   Your medication dosage being too low    I will talk to Dr. Schmidt about increasing your dosage of carbidopa-levodopa (SINEMET)          What we talked about: What I should do:   An issue with your medication    Education: Rinse your mouth with water after using your Trelegy inhaler.           What we talked about: What I should do:                     "

## 2025-03-20 NOTE — LETTER
_  Medication List        Prepared on: Mar 20, 2025     Bring your Medication List when you go to the doctor, hospital, or   emergency room. And, share it with your family or caregivers.     Note any changes to how you take your medications.  Cross out medications when you no longer use them.    Medication How I take it Why I use it Prescriber   calcium carbonate-vitamin D (OSCAL W/D) 500-200 MG-UNIT tablet Take 1 tablet by mouth daily   [facility] Israel Corey MD   carbidopa-levodopa (SINEMET)  MG tablet Take 1 tablet by mouth 3 times daily. Tremor Simon Schmidt MD   clobetasol (TEMOVATE) 0.05 % external ointment Apply topically 2 times daily as needed. Rash Israel Corey MD   DULoxetine (CYMBALTA) 30 MG capsule Take 60 mg by mouth every morning. Musculoskeletal Pain Grazyna Barrett MD   fluocinonide (LIDEX) 0.05 % external cream Apply topically 2 times daily as needed. Rash Israel Corey MD   Fluticasone-Umeclidin-Vilanterol (TRELEGY ELLIPTA) 200-62.5-25 MCG/ACT oral inhaler Inhale 1 puff into the lungs daily. Simple Chronic Bronchitis (H) WILBERT Pillai CNP   HYDROcodone-acetaminophen (NORCO) 5-325 MG tablet Take 1-2 tablets by mouth nightly as needed. Pain Grazyna Barrett MD   ibuprofen (ADVIL/MOTRIN) 200 MG tablet Take 200 mg by mouth every 8 hours as needed for mild pain. Pain Israel Corey MD   levocetirizine (XYZAL) 5 MG tablet Take 5 mg by mouth every evening.  Allergies Patient Reported   methadone (DOLOPHINE) 5 MG tablet Take 7.5 mg by mouth every 12 hours. Pain Grazyna Barrett MD   naloxone (NARCAN) 4 MG/0.1ML nasal spray Spray 4 mg into one nostril alternating nostrils once as needed.  Overdose Simon Schmidt   Omega-3 Fatty Acids (FISH OIL BURP-LESS) 1000 MG CAPS Take 2 capsules by mouth every morning. High Amount of Cholesterol in the Blood Israel Corey MD   polyethylene glycol (MIRALAX) 17 GM/Dose powder Take 1 Capful by mouth daily as needed for  constipation.  Constipation Patient Reported   pregabalin (LYRICA) 100 MG capsule Take 1 capsule (100 mg) by mouth 3 times daily. Chronic Pain Liberty Hurley DO   topiramate (TOPAMAX) 100 MG tablet Take 1 tablet by mouth 2 times daily. Chronic Pain Grazyna Barrett MD   vitamin (B COMPLEX) tablet Take 1 tablet by mouth every morning. Vitamin Deficiency Patient Reported   Vitamin D3 (CHOLECALCIFEROL) 25 mcg (1000 units) tablet Take 1 tablet by mouth every morning. Vitamin D Deficiency Patient Reported         Add new medications, over-the-counter drugs, herbals, vitamins, or  minerals in the blank rows below.    Medication How I take it Why I use it Prescriber                                      Allergies:      No Active Allergies        Side effects I have had:      Not on File        Other Information:              My notes and questions:

## 2025-03-20 NOTE — LETTER
March 24, 2025  Romaine Farah  [address] [place] [id number]    Dear   G[place], [facility]     Thank you for talking with me on Mar 20, 2025 about your health and medications. As a follow-up to our conversation, I have included two documents:      Your Recommended To-Do List has steps you should take to get the best results from your medications.  Your Medication List will help you keep track of your medications and how to take them.    If you want to talk about these documents, please call Kyra Colon RPH at phone: [phone], Monday-Friday 8-4:30pm.    I look forward to working with you and your doctors to make sure your medications work well for you.    Sincerely,  Kyra Colon RPH  [facility] Pharmacist, [facility]

## 2025-03-26 NOTE — PROGRESS NOTES
HEART CARE ENCOUNTER CONSULTATON [facility]  [phone]      Assessment/Recommendations   Assessment:   Sick sinus syndrome: Normal device function, AP 96%, 4.5 years battery. Device check shows 5 hours suggestive of atrial fibrillation 9/24/2024 while in hospital with septic shock/pneumonia, appears nearly all AHR during this hospitalization at which time chronic methadone therapy was also briefly interrupted according to patient.  Abnormal exercise echo stress test 1/2023: a small size of inducible myocardial in anteroseptal wall, LVEF 60-65%, good exercise capacity. With primary cardiologist Dr. Hinojosa determined no further workup at that time as he was asymptomatic.  Continues to deny chest pain and exertion, some stable dyspnea with exertion over the last year with hx COPD.  Compensated on exam.  Patient would like to continue monitoring with symptoms.  History of hypertension: Controlled, not currently on antihypertensive medication  Opioid dependence on methadone for neuropathy      Plan:   Continue regular device checks  Discussed monitoring for development of chest discomfort/pain, particularly with exertion, progression of shortness of breath.  Return to exercise on stationary bike.      Follow up in 1 year or sooner as needed     History of Present Illness/Subjective    HPI: Romaine Farah is a 78 year old male with PMHx of sick sinus syndrome with pacemaker, history of HTN, opioid dependence, neuropathy presents for follow up.    Patient has no new cardiac concerns today.  Continues to be without chest pain or shortness of breath.  His device check today shows 34 episodes of AHR, approximately 5 hours of atrial fibrillation is suggested on 9/24/2024.  All of these episodes correlate with hospitalization for sepsis and pneumonia, as well as a brief discontinuation of his chronic methadone therapy for neuropathy.  There has been no recurrence since.  Denies any palpitations,  "new lower extremity edema, lightheadedness.  Feels he has made a full recovery to his baseline following this hospitalization.  Echocardiogram at that time showed preserved ejection fraction and no new valve disease.       TTE 9/24/2024  1. Normal left ventricular size and systolic performance with a visually  estimated ejection fraction of 60%.  2. No significant valvular heart disease is identified on this study.  3. Probable normal right ventricular size and systolic performance though  right-sided structures are not clearly visualized on all views on this study.       Physical Examination  Review of Systems   Vitals: /66 (BP Location: Left arm, Patient Position: Sitting, Cuff Size: Adult Large)   Pulse 60   Resp 17   Ht 1.778 m (5' 10\")   Wt 100.7 kg (222 lb)   BMI 31.85 kg/m    BMI= Body mass index is 31.85 kg/m .  Wt Readings from Last 3 Encounters:   03/27/25 100.7 kg (222 lb)   10/29/24 97.5 kg (215 lb)   09/30/24 97.5 kg (214 lb 15.2 oz)           ENT/Mouth: membranes moist, no oral lesions or bleeding gums.      EYES:  no scleral icterus, normal conjunctivae       Chest/Lungs:   lungs are clear to auscultation, no rales or wheezing, equal chest wall expansion    Cardiovascular:   Regular. Normal first and second heart sounds with no murmurs, rubs, or gallops; the radial  pulses are intact, trace edema bilaterally        Extremities: no cyanosis or clubbing   Skin: no xanthelasma, warm.    Neurologic:  no tremors     Psychiatric: alert and oriented x3, calm        Please refer above for cardiac ROS details.        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 Added automatically from request for surgery [id number]     Past Surgical History:   Procedure Laterality Date    AMPUTATE TOE(S) Left 2/20/2020    Procedure: AMPUTATION, third digit left foot;  Surgeon: Wai Armstrong DPM;  Location: [facility] OR;  Service: Podiatry    AMPUTATE TOE(S) Right 7/7/2020    Procedure: AMPUTATION, second digit right foot;  Surgeon: Wai Armstrong DPM;  Location: [facility] OR;  Service: Podiatry    EP PACEMAKER INSERT N/A 2/12/2019    Procedure: EP Pacemaker Insertion;  Surgeon: Micheal Mahoney MD;  Location: Health system Cath Lab;  Service: Cardiology    EYE SURGERY Bilateral     HIP SURGERY Right     total hip    INSERT / REPLACE / REMOVE PACEMAKER  02/12/2019    PACEMAKER/ICD CHECK  2019    low heart rate - 35    PICC TRIPLE LUMEN PLACEMENT  9/22/2024    TOTAL HIP ARTHROPLASTY Right 2/2/2015    Procedure: #3   HIP TOTAL ARTHROPLASTY, RIGHT;  Surgeon: Aryan Glass MD;  Location: [facility] OR;  Service:      Family History   Problem Relation Age of Onset    LUNG DISEASE Mother     Diabetes Mother     Esophageal Cancer Father     Tremor Father     Alcoholism Father     Tremor Sister     Neuropathy Sister     Arthritis Brother     Other - See Comments Brother     Other - See Comments Brother     Tremor Sister     Lupus Daughter     Tremor Daughter     Thyroid Disease Daughter     Chronic Obstructive Pulmonary Disease Mother         Social History     Socioeconomic History    Marital status:      Spouse name: Not on file    Number of children: Not on file    Years of education: Not on file    Highest education level: Not on file   Occupational History    Not on file   Tobacco Use    Smoking status: Former     Types: Cigars, cigarillos or filtered cigars     Passive exposure: Past (Parents were  smokers)    Smokeless tobacco: Never   Vaping Use    Vaping status: Never Used   Substance and Sexual Activity    Alcohol use: Not Currently    Drug use: Never     Comment: cigars    Sexual activity: Not Currently   Other Topics Concern    Parent/sibling w/ CABG, MI or angioplasty before 65F 55M? No   Social History Narrative    . lives in [place]. spouse sania     Social Drivers of Health     Financial Resource Strain: Low Risk  (9/22/2024)    Financial Resource Strain     Within the past 12 months, have you or your family members you live with been unable to get utilities (heat, electricity) when it was really needed?: No   Food Insecurity: Low Risk  (9/22/2024)    Food Insecurity     Within the past 12 months, did you worry that your food would run out before you got money to buy more?: No     Within the past 12 months, did the food you bought just not last and you didn t have money to get more?: No   Transportation Needs: Low Risk  (9/22/2024)    Transportation Needs     Within the past 12 months, has lack of transportation kept you from medical appointments, getting your medicines, non-medical meetings or appointments, work, or from getting things that you need?: No   Physical Activity: Not on file   Stress: Not on file   Social Connections: Unknown (12/29/2021)    [facility]    Social Connections     Frequency of Communication with Friends and Family: Not on file   Interpersonal Safety: High Risk (9/22/2024)    Interpersonal Safety     Do you feel physically and emotionally safe where you currently live?: No     Within the past 12 months, have you been hit, slapped, kicked or otherwise physically hurt by someone?: No     Within the past 12 months, have you been humiliated or emotionally abused in other ways by your partner or ex-partner?: No   Housing Stability: Low Risk  (9/22/2024)    Housing Stability     Do you  have housing? : Yes     Are you worried about losing your housing?: No           Medications  Allergies   Current Outpatient Medications   Medication Sig Dispense Refill    amoxicillin-clavulanate (AUGMENTIN) 875-125 MG tablet Take 1 tablet by mouth 2 times daily.      betamethasone dipropionate (DIPROSONE) 0.05 % external cream Apply 1 application externally twice daily*      calcium carbonate-vitamin D (OSCAL W/D) 500-200 MG-UNIT tablet Take 1 tablet by mouth daily       carbidopa-levodopa (SINEMET)  MG tablet Take 1 tablet by mouth 3 times daily.      clobetasol (TEMOVATE) 0.05 % external ointment Apply topically 2 times daily as needed. 60 g 11    DULoxetine (CYMBALTA) 30 MG capsule Take 60 mg by mouth every morning.      fluocinonide (LIDEX) 0.05 % external cream Apply topically 2 times daily as needed.      Fluticasone-Umeclidin-Vilanterol (TRELEGY ELLIPTA) 200-62.5-25 MCG/ACT oral inhaler Inhale 1 puff into the lungs daily. 60 each 11    HYDROcodone-acetaminophen (NORCO) 5-325 MG tablet Take 1-2 tablets by mouth nightly as needed.      ibuprofen (ADVIL/MOTRIN) 200 MG tablet Take 200 mg by mouth every 8 hours as needed for mild pain.      levocetirizine (XYZAL) 5 MG tablet Take 5 mg by mouth every evening.      methadone (DOLOPHINE) 5 MG tablet Take 7.5 mg by mouth every 12 hours.      mupirocin (BACTROBAN) 2 % external ointment Apply carefully with q-tip to bilateral nares twice a day as directed x 10 days*      naloxone (NARCAN) 4 MG/0.1ML nasal spray Spray 4 mg into one nostril alternating nostrils once as needed.      nystatin-triamcinolone (MYCOLOG II) 948370-9.1 UNIT/GM-% external cream Apply topically 2 times daily as needed.      Omega-3 Fatty Acids (FISH OIL BURP-LESS) 1000 MG CAPS Take 2 capsules by mouth every morning.      polyethylene glycol (MIRALAX) 17 GM/Dose powder Take 1 Capful by mouth daily as needed for constipation.      pregabalin (LYRICA) 100 MG capsule Take 1 capsule (100 mg) by  "mouth 3 times daily. (Patient taking differently: Take 100 mg by mouth 3 times daily.) 90 capsule 0    topiramate (TOPAMAX) 100 MG tablet Take 1 tablet by mouth 2 times daily.      vitamin (B COMPLEX) tablet Take 1 tablet by mouth every morning.      vitamin B complex with vitamin C (VITAMIN  B COMPLEX) tablet Take 1 tablet by mouth daily.      Vitamin D3 (CHOLECALCIFEROL) 25 mcg (1000 units) tablet Take 1 tablet by mouth every morning.       No Known Allergies       Lab Results    Chemistry/lipid CBC Cardiac Enzymes/BNP/TSH/INR   Recent Labs   Lab Test 11/16/23  1634   CHOL 201*   HDL 48   *   TRIG 115     Recent Labs   Lab Test 11/16/23  1634 06/20/22  1424 07/07/17  0858   * 121 116     Recent Labs   Lab Test 11/16/23  1634      POTASSIUM 4.7   CHLORIDE 103   CO2 27   GLC 90   BUN 16.4   CR 1.18*   GFRESTIMATED 63   JOSSELYN 9.8     Recent Labs   Lab Test 11/16/23  1634 12/27/22  0733 12/26/22  1613   CR 1.18* 1.07 1.30*     No results for input(s): \"A1C\" in the last 77372 hours.       Recent Labs   Lab Test 12/26/22  1613   WBC 11.3*   HGB 15.8   HCT 49.9   MCV 91        Recent Labs   Lab Test 12/26/22  1613 12/14/22  0247 11/11/22  1131   HGB 15.8 16.0 16.0    Recent Labs   Lab Test 10/04/19  1936 02/11/19  1124   TROPONINI <0.01 <0.01     Recent Labs   Lab Test 12/26/22  1613   NTBNPI 266     Recent Labs   Lab Test 12/26/22  1613   TSH 1.33     Recent Labs   Lab Test 02/11/19  1124   INR 1.15*          This note has been dictated using voice recognition software. Any grammatical, typographical, or context distortions are unintentional and inherent to the software    Joann Stevesn PA-C                                       "

## 2025-03-27 ENCOUNTER — OFFICE VISIT (OUTPATIENT)
Dept: CARDIOLOGY | Facility: CLINIC | Age: 80
End: 2025-03-27
Attending: INTERNAL MEDICINE
Payer: COMMERCIAL

## 2025-03-27 VITALS
WEIGHT: 222 LBS | HEIGHT: 70 IN | HEART RATE: 60 BPM | DIASTOLIC BLOOD PRESSURE: 66 MMHG | SYSTOLIC BLOOD PRESSURE: 135 MMHG | RESPIRATION RATE: 17 BRPM | BODY MASS INDEX: 31.78 KG/M2

## 2025-03-27 DIAGNOSIS — Z95.0 CARDIAC PACEMAKER IN SITU: Primary | ICD-10-CM

## 2025-03-27 DIAGNOSIS — R94.39 ABNORMAL CARDIOVASCULAR STRESS TEST: ICD-10-CM

## 2025-03-27 RX ORDER — MUPIROCIN 20 MG/G
OINTMENT TOPICAL
COMMUNITY
Start: 2025-03-21

## 2025-03-27 NOTE — LETTER
3/27/2025    Simon Schmidt MD  [address]  [facility] [address]    RE: Romaine Farah       Dear Colleague,     I had the [facility] of [facility].    HEART CARE ENCOUNTER CONSULTATON NOTE      [facility]  [phone]      Assessment/Recommendations   Assessment:   Sick sinus syndrome: Normal device function, AP 96%, 4.5 years battery. Device check shows 5 hours suggestive of atrial fibrillation 9/24/2024 while in hospital with septic shock/pneumonia, appears nearly all AHR during this hospitalization at which time chronic methadone therapy was also briefly interrupted according to patient.  Abnormal exercise echo stress test 1/2023: a small size of inducible myocardial in anteroseptal wall, LVEF 60-65%, good exercise capacity. With primary cardiologist Dr. Hinojosa determined no further workup at that time as he was asymptomatic.  Continues to deny chest pain and exertion, some stable dyspnea with exertion over the last year with hx COPD.  Compensated on exam.  Patient would like to continue monitoring with symptoms.  History of hypertension: Controlled, not currently on antihypertensive medication  Opioid dependence on methadone for neuropathy      Plan:   Continue regular device checks  Discussed monitoring for development of chest discomfort/pain, particularly with exertion, progression of shortness of breath.  Return to exercise on stationary bike.      Follow up in 1 year or sooner as needed     History of Present Illness/Subjective    HPI: Romaine Farah is a 78 year old male with PMHx of sick sinus syndrome with pacemaker, history of HTN, opioid dependence, neuropathy presents for follow up.    Patient has no new cardiac concerns today.  Continues to be without chest pain or shortness of breath.  His device check today shows 34 episodes of AHR, approximately 5 hours of atrial fibrillation is suggested on 9/24/2024.  All of these  "episodes correlate with hospitalization for sepsis and pneumonia, as well as a brief discontinuation of his chronic methadone therapy for neuropathy.  There has been no recurrence since.  Denies any palpitations, new lower extremity edema, lightheadedness.  Feels he has made a full recovery to his baseline following this hospitalization.  Echocardiogram at that time showed preserved ejection fraction and no new valve disease.       TTE 9/24/2024  1. Normal left ventricular size and systolic performance with a visually  estimated ejection fraction of 60%.  2. No significant valvular heart disease is identified on this study.  3. Probable normal right ventricular size and systolic performance though  right-sided structures are not clearly visualized on all views on this study.       Physical Examination  Review of Systems   Vitals: /66 (BP Location: Left arm, Patient Position: Sitting, Cuff Size: Adult Large)   Pulse 60   Resp 17   Ht 1.778 m (5' 10\")   Wt 100.7 kg (222 lb)   BMI 31.85 kg/m    BMI= Body mass index is 31.85 kg/m .  Wt Readings from Last 3 Encounters:   03/27/25 100.7 kg (222 lb)   10/29/24 97.5 kg (215 lb)   09/30/24 97.5 kg (214 lb 15.2 oz)           ENT/Mouth: membranes moist, no oral lesions or bleeding gums.      EYES:  no scleral icterus, normal conjunctivae       Chest/Lungs:   lungs are clear to auscultation, no rales or wheezing, equal chest wall expansion    Cardiovascular:   Regular. Normal first and second heart sounds with no murmurs, rubs, or gallops; the radial  pulses are intact, trace edema bilaterally        Extremities: no cyanosis or clubbing   Skin: no xanthelasma, warm.    Neurologic:  no tremors     Psychiatric: alert and oriented x3, calm        Please refer above for cardiac ROS details.        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 Added automatically from request for surgery [id number]     Past Surgical History:   Procedure Laterality Date     AMPUTATE TOE(S) Left 2/20/2020    Procedure: AMPUTATION, third digit left foot;  Surgeon: Wai Armstrong DPM;  Location: [facility] OR;  Service: Podiatry     AMPUTATE TOE(S) Right 7/7/2020    Procedure: AMPUTATION, second digit right foot;  Surgeon: Wai Armstrong DPM;  Location: [facility] OR;  Service: Podiatry     EP PACEMAKER INSERT N/A 2/12/2019    Procedure: EP Pacemaker Insertion;  Surgeon: Micheal Mahoney MD;  Location: [facility] Lab;  Service: Cardiology     EYE SURGERY Bilateral      HIP SURGERY Right     total hip     INSERT / REPLACE / REMOVE PACEMAKER  02/12/2019     PACEMAKER/ICD CHECK  2019    low heart rate - 35     PICC TRIPLE LUMEN PLACEMENT  9/22/2024     TOTAL HIP ARTHROPLASTY Right 2/2/2015    Procedure: #3   HIP TOTAL ARTHROPLASTY, RIGHT;  Surgeon: Aryan Glass MD;  Location: [facility] OR;  Service:      Family History   Problem Relation Age of Onset     LUNG DISEASE Mother      Diabetes Mother      Esophageal Cancer Father      Tremor Father      Alcoholism Father      Tremor Sister      Neuropathy Sister      Arthritis Brother      Other - See Comments Brother      Other - See Comments Brother      Tremor Sister      Lupus Daughter      Tremor Daughter      Thyroid Disease Daughter      Chronic Obstructive Pulmonary Disease Mother         Social History     Socioeconomic History     Marital status:      Spouse name: Not on file     Number of children:  Not on file     Years of education: Not on file     Highest education level: Not on file   Occupational History     Not on file   Tobacco Use     Smoking status: Former     Types: Cigars, cigarillos or filtered cigars     Passive exposure: Past (Parents were smokers)     Smokeless tobacco: Never   Vaping Use     Vaping status: Never Used   Substance and Sexual Activity     Alcohol use: Not Currently     Drug use: Never     Comment: cigars     Sexual activity: Not Currently   Other Topics Concern     Parent/sibling w/ CABG, MI or angioplasty before 65F 55M? No   Social History Narrative    . lives in [place]. spouse sania     Social Drivers of Health     Financial Resource Strain: Low Risk  (9/22/2024)    Financial Resource Strain      Within the past 12 months, have you or your family members you live with been unable to get utilities (heat, electricity) when it was really needed?: No   Food Insecurity: Low Risk  (9/22/2024)    Food Insecurity      Within the past 12 months, did you worry that your food would run out before you got money to buy more?: No      Within the past 12 months, did the food you bought just not last and you didn t have money to get more?: No   Transportation Needs: Low Risk  (9/22/2024)    Transportation Needs      Within the past 12 months, has lack of transportation kept you from medical appointments, getting your medicines, non-medical meetings or appointments, work, or from getting things that you need?: No   Physical Activity: Not on file   Stress: Not on file   Social Connections: Unknown (12/29/2021)    [facility]    Social Connections      Frequency of Communication with Friends and Family: Not on file   Interpersonal Safety: High Risk (9/22/2024)    Interpersonal Safety      Do you feel physically and emotionally safe where you currently live?: No      Within the past 12 months, have you  been hit, slapped, kicked or otherwise physically hurt by someone?: No      Within the past 12 months, have you been humiliated or emotionally abused in other ways by your partner or ex-partner?: No   Housing Stability: Low Risk  (9/22/2024)    Housing Stability      Do you have housing? : Yes      Are you worried about losing your housing?: No           Medications  Allergies   Current Outpatient Medications   Medication Sig Dispense Refill     amoxicillin-clavulanate (AUGMENTIN) 875-125 MG tablet Take 1 tablet by mouth 2 times daily.       betamethasone dipropionate (DIPROSONE) 0.05 % external cream Apply 1 application externally twice daily*       calcium carbonate-vitamin D (OSCAL W/D) 500-200 MG-UNIT tablet Take 1 tablet by mouth daily        carbidopa-levodopa (SINEMET)  MG tablet Take 1 tablet by mouth 3 times daily.       clobetasol (TEMOVATE) 0.05 % external ointment Apply topically 2 times daily as needed. 60 g 11     DULoxetine (CYMBALTA) 30 MG capsule Take 60 mg by mouth every morning.       fluocinonide (LIDEX) 0.05 % external cream Apply topically 2 times daily as needed.       Fluticasone-Umeclidin-Vilanterol (TRELEGY ELLIPTA) 200-62.5-25 MCG/ACT oral inhaler Inhale 1 puff into the lungs daily. 60 each 11     HYDROcodone-acetaminophen (NORCO) 5-325 MG tablet Take 1-2 tablets by mouth nightly as needed.       ibuprofen (ADVIL/MOTRIN) 200 MG tablet Take 200 mg by mouth every 8 hours as needed for mild pain.       levocetirizine (XYZAL) 5 MG tablet Take 5 mg by mouth every evening.       methadone (DOLOPHINE) 5 MG tablet Take 7.5 mg by mouth every 12 hours.       mupirocin (BACTROBAN) 2 % external ointment Apply carefully with q-tip to bilateral nares twice a day as directed x 10 days*       naloxone (NARCAN) 4 MG/0.1ML nasal spray Spray 4 mg into one nostril alternating nostrils once as needed.       nystatin-triamcinolone (MYCOLOG II) 419685-9.1 UNIT/GM-% external cream Apply topically 2 times  "daily as needed.       Omega-3 Fatty Acids (FISH OIL BURP-LESS) 1000 MG CAPS Take 2 capsules by mouth every morning.       polyethylene glycol (MIRALAX) 17 GM/Dose powder Take 1 Capful by mouth daily as needed for constipation.       pregabalin (LYRICA) 100 MG capsule Take 1 capsule (100 mg) by mouth 3 times daily. (Patient taking differently: Take 100 mg by mouth 3 times daily.) 90 capsule 0     topiramate (TOPAMAX) 100 MG tablet Take 1 tablet by mouth 2 times daily.       vitamin (B COMPLEX) tablet Take 1 tablet by mouth every morning.       vitamin B complex with vitamin C (VITAMIN  B COMPLEX) tablet Take 1 tablet by mouth daily.       Vitamin D3 (CHOLECALCIFEROL) 25 mcg (1000 units) tablet Take 1 tablet by mouth every morning.       No Known Allergies       Lab Results    Chemistry/lipid CBC Cardiac Enzymes/BNP/TSH/INR   Recent Labs   Lab Test 11/16/23  1634   CHOL 201*   HDL 48   *   TRIG 115     Recent Labs   Lab Test 11/16/23  1634 06/20/22  1424 07/07/17  0858   * 121 116     Recent Labs   Lab Test 11/16/23  1634      POTASSIUM 4.7   CHLORIDE 103   CO2 27   GLC 90   BUN 16.4   CR 1.18*   GFRESTIMATED 63   JOSSELYN 9.8     Recent Labs   Lab Test 11/16/23  1634 12/27/22  0733 12/26/22  1613   CR 1.18* 1.07 1.30*     No results for input(s): \"A1C\" in the last 34933 hours.       Recent Labs   Lab Test 12/26/22  1613   WBC 11.3*   HGB 15.8   HCT 49.9   MCV 91        Recent Labs   Lab Test 12/26/22  1613 12/14/22  0247 11/11/22  1131   HGB 15.8 16.0 16.0    Recent Labs   Lab Test 10/04/19  1936 02/11/19  1124   TROPONINI <0.01 <0.01     Recent Labs   Lab Test 12/26/22  1613   NTBNPI 266     Recent Labs   Lab Test 12/26/22  1613   TSH 1.33     Recent Labs   Lab Test 02/11/19  1124   INR 1.15*          This note has been dictated using voice recognition software. Any grammatical, typographical, or context distortions are unintentional and inherent to the software    Joann Stevens, " RADHA                                         Thank you for allowing me to participate in the care of your patient.      Sincerely,     Joann Cates PA-C     [facility]  cc:   Dustin Porras MD  [address] [address]

## 2025-06-09 ENCOUNTER — LAB REQUISITION (OUTPATIENT)
Dept: LAB | Facility: CLINIC | Age: 80
End: 2025-06-09

## 2025-06-09 DIAGNOSIS — Z12.5 ENCOUNTER FOR SCREENING FOR MALIGNANT NEOPLASM OF PROSTATE: ICD-10-CM

## 2025-06-09 DIAGNOSIS — I10 ESSENTIAL (PRIMARY) HYPERTENSION: ICD-10-CM

## 2025-06-09 PROCEDURE — 80061 LIPID PANEL: CPT | Performed by: FAMILY MEDICINE

## 2025-06-09 PROCEDURE — 82435 ASSAY OF BLOOD CHLORIDE: CPT | Performed by: FAMILY MEDICINE

## 2025-06-09 PROCEDURE — G0103 PSA SCREENING: HCPCS | Performed by: FAMILY MEDICINE

## 2025-06-10 LAB
ALBUMIN SERPL BCG-MCNC: 4.1 G/DL (ref 3.5–5.2)
ALP SERPL-CCNC: 66 U/L (ref 40–150)
ALT SERPL W P-5'-P-CCNC: 13 U/L (ref 0–70)
ANION GAP SERPL CALCULATED.3IONS-SCNC: 12 MMOL/L (ref 7–15)
AST SERPL W P-5'-P-CCNC: 22 U/L (ref 0–45)
BILIRUB SERPL-MCNC: 0.3 MG/DL
BUN SERPL-MCNC: 18.3 MG/DL (ref 8–23)
CALCIUM SERPL-MCNC: 9.8 MG/DL (ref 8.8–10.4)
CHLORIDE SERPL-SCNC: 105 MMOL/L (ref 98–107)
CHOLEST SERPL-MCNC: 208 MG/DL
CREAT SERPL-MCNC: 1.05 MG/DL (ref 0.51–1.17)
EGFRCR SERPLBLD CKD-EPI 2021: 72 ML/MIN/1.73M2
FASTING STATUS PATIENT QL REPORTED: NO
FASTING STATUS PATIENT QL REPORTED: NO
GLUCOSE SERPL-MCNC: 105 MG/DL (ref 70–99)
HCO3 SERPL-SCNC: 24 MMOL/L (ref 22–29)
HDLC SERPL-MCNC: 49 MG/DL
LDLC SERPL CALC-MCNC: 135 MG/DL
NONHDLC SERPL-MCNC: 159 MG/DL
POTASSIUM SERPL-SCNC: 4 MMOL/L (ref 3.4–5.3)
PROT SERPL-MCNC: 7.2 G/DL (ref 6.4–8.3)
PSA SERPL DL<=0.01 NG/ML-MCNC: 0.5 NG/ML (ref 0–6.5)
SODIUM SERPL-SCNC: 141 MMOL/L (ref 135–145)
TRIGL SERPL-MCNC: 121 MG/DL

## 2025-07-01 ENCOUNTER — ANCILLARY PROCEDURE (OUTPATIENT)
Dept: CARDIOLOGY | Facility: CLINIC | Age: 80
End: 2025-07-01
Attending: INTERNAL MEDICINE
Payer: COMMERCIAL

## 2025-07-01 DIAGNOSIS — Z95.0 CARDIAC PACEMAKER IN SITU: ICD-10-CM

## 2025-07-01 DIAGNOSIS — I49.5 SICK SINUS SYNDROME (H): ICD-10-CM

## 2025-07-01 LAB
MDC_IDC_EPISODE_DTM: NORMAL
MDC_IDC_EPISODE_DURATION: 16 S
MDC_IDC_EPISODE_ID: NORMAL
MDC_IDC_EPISODE_TYPE: NORMAL
MDC_IDC_EPISODE_TYPE_INDUCED: NO
MDC_IDC_LEAD_CONNECTION_STATUS: NORMAL
MDC_IDC_LEAD_IMPLANT_DT: NORMAL
MDC_IDC_LEAD_LOCATION: NORMAL
MDC_IDC_LEAD_LOCATION_DETAIL_1: NORMAL
MDC_IDC_LEAD_MFG: NORMAL
MDC_IDC_LEAD_MODEL: NORMAL
MDC_IDC_LEAD_POLARITY_TYPE: NORMAL
MDC_IDC_LEAD_SERIAL: NORMAL
MDC_IDC_LEAD_SPECIAL_FUNCTION: NORMAL
MDC_IDC_MSMT_BATTERY_DTM: NORMAL
MDC_IDC_MSMT_BATTERY_REMAINING_LONGEVITY: 48 MO
MDC_IDC_MSMT_BATTERY_REMAINING_PERCENTAGE: 61 %
MDC_IDC_MSMT_BATTERY_STATUS: NORMAL
MDC_IDC_MSMT_LEADCHNL_RA_IMPEDANCE_VALUE: 731 OHM
MDC_IDC_PG_IMPLANT_DTM: NORMAL
MDC_IDC_PG_MFG: NORMAL
MDC_IDC_PG_MODEL: NORMAL
MDC_IDC_PG_SERIAL: NORMAL
MDC_IDC_PG_TYPE: NORMAL
MDC_IDC_SESS_CLINIC_NAME: NORMAL
MDC_IDC_SESS_DTM: NORMAL
MDC_IDC_SESS_TYPE: NORMAL
MDC_IDC_SET_BRADY_LOWRATE: 60 {BEATS}/MIN
MDC_IDC_SET_BRADY_MAX_SENSOR_RATE: 130 {BEATS}/MIN
MDC_IDC_SET_BRADY_MODE: NORMAL
MDC_IDC_SET_LEADCHNL_RA_PACING_AMPLITUDE: 1.8 V
MDC_IDC_SET_LEADCHNL_RA_PACING_CAPTURE_MODE: NORMAL
MDC_IDC_SET_LEADCHNL_RA_PACING_POLARITY: NORMAL
MDC_IDC_SET_LEADCHNL_RA_PACING_PULSEWIDTH: 0.4 MS
MDC_IDC_SET_LEADCHNL_RA_SENSING_ADAPTATION_MODE: NORMAL
MDC_IDC_SET_LEADCHNL_RA_SENSING_POLARITY: NORMAL
MDC_IDC_SET_LEADCHNL_RA_SENSING_SENSITIVITY: 0.25 MV
MDC_IDC_SET_ZONE_DETECTION_INTERVAL: 375 MS
MDC_IDC_SET_ZONE_STATUS: NORMAL
MDC_IDC_SET_ZONE_TYPE: NORMAL
MDC_IDC_SET_ZONE_VENDOR_TYPE: NORMAL
MDC_IDC_STAT_BRADY_DTM_END: NORMAL
MDC_IDC_STAT_BRADY_DTM_START: NORMAL
MDC_IDC_STAT_BRADY_RA_PERCENT_PACED: 98 %
MDC_IDC_STAT_EPISODE_RECENT_COUNT: 0
MDC_IDC_STAT_EPISODE_RECENT_COUNT: 1
MDC_IDC_STAT_EPISODE_RECENT_COUNT_DTM_END: NORMAL
MDC_IDC_STAT_EPISODE_RECENT_COUNT_DTM_END: NORMAL
MDC_IDC_STAT_EPISODE_RECENT_COUNT_DTM_START: NORMAL
MDC_IDC_STAT_EPISODE_RECENT_COUNT_DTM_START: NORMAL
MDC_IDC_STAT_EPISODE_TYPE: NORMAL
MDC_IDC_STAT_EPISODE_TYPE: NORMAL
MDC_IDC_STAT_EPISODE_VENDOR_TYPE: NORMAL

## 2025-07-01 PROCEDURE — 93296 REM INTERROG EVL PM/IDS: CPT | Performed by: INTERNAL MEDICINE

## 2025-07-01 PROCEDURE — 93294 REM INTERROG EVL PM/LDLS PM: CPT | Performed by: INTERNAL MEDICINE

## 2025-07-13 ENCOUNTER — HEALTH MAINTENANCE LETTER (OUTPATIENT)
Age: 80
End: 2025-07-13

## 2025-07-29 ENCOUNTER — OFFICE VISIT (OUTPATIENT)
Dept: PULMONOLOGY | Facility: CLINIC | Age: 80
End: 2025-07-29
Attending: NURSE PRACTITIONER
Payer: COMMERCIAL

## 2025-07-29 VITALS
BODY MASS INDEX: 31.85 KG/M2 | SYSTOLIC BLOOD PRESSURE: 127 MMHG | WEIGHT: 222 LBS | DIASTOLIC BLOOD PRESSURE: 81 MMHG | HEART RATE: 62 BPM | OXYGEN SATURATION: 95 %

## 2025-07-29 DIAGNOSIS — J44.9 CHRONIC OBSTRUCTIVE PULMONARY DISEASE, UNSPECIFIED COPD TYPE (H): ICD-10-CM

## 2025-07-29 DIAGNOSIS — R06.09 DYSPNEA ON EXERTION: ICD-10-CM

## 2025-07-29 DIAGNOSIS — J41.0 SIMPLE CHRONIC BRONCHITIS (H): Primary | ICD-10-CM

## 2025-07-29 PROCEDURE — 99213 OFFICE O/P EST LOW 20 MIN: CPT | Performed by: NURSE PRACTITIONER

## 2025-07-29 PROCEDURE — 3074F SYST BP LT 130 MM HG: CPT | Performed by: NURSE PRACTITIONER

## 2025-07-29 PROCEDURE — 3079F DIAST BP 80-89 MM HG: CPT | Performed by: NURSE PRACTITIONER

## 2025-07-29 NOTE — PATIENT INSTRUCTIONS
It was a pleasure seeing you in clinic today. This is what we discussed:    Continue the Trelegy daily as you have been.   Use your albuterol every 4 hours as needed for cough, shortness of breath, wheeze, or chest tightness.   Try and get some exercise daily.   Follow up 6 months, sooner if needed   If you have worsening symptoms, questions, or need to speak with the nurse please call [phone], then ask to speak to lung clinic nurse.

## 2025-07-29 NOTE — PROGRESS NOTES
Outpatient Pulmonary Clinic Visit   July 29, 2025     Assessment/Plan:     80 year old M with history of chronic bronchitis, sick sinus syndrome s/p pacemaker, abnormal stress echo, HTN, neuropathy/chronic pain on multiple sedating medications here for follow-up.    PFTs last done in 2019 show shows moderate-severe pathophysiology, primarily restrictive but with a likely obstructive component.     #. Chronic bronchitis, COPD:  No exacerbations in the last year. His CAT is much improved today at 15.  His symptoms are baseline. Hospital stay in 2024 for E. coli and Klebsiella pneumonia.   -Continue Trelegy, 1 puff daily.  He was instructed to rinse and gargle after each use  -If cough, mucus, or shortness of breath increase he was instructed to start DuoNebs 4 times daily followed by flutter valve and contact clinic.  -Supplied with action plan to have on hand.  They were instructed to call the clinic if used.  #. HCM: UTD with COVID-vaccine (10/2024), seasonal flu, PCV13, PPSV23, Tdap (2016).     If his symptoms exacerbate: Prednisone 40 mg daily x 5 days.  If sputum amount or thickness increased add azithromycin Z-Silvestre x 5 days    Follow-up: 6 months, sooner if needed     Saige Naik, CNP  Pulmonary Medicine  [facility]   [phone]     CC:     Chief Complaint   Patient presents with    Follow Up     Here with Wife Archana      HPI:   Rom was last seen in clinic via virtual visit in 01/2025.  Since that time he has remained stable and has no complaints today.     Has a minimal cough in the AM to clear to some phlegm. No cough the rest of the day.   Has not been using nebulizer or flutter valve at home.    Shortness of breath with activity is at baseline.  He denies SOB, chest pain, muscle aches, fevers, sinus pain/congestion.      Previously followed in this clinic by Dr. Dejesus.    Hospital stay from 9/22-10/2 with acute respiratory failure, e. Coli and klebsiella pneumonia, and sepsis.  Initially  required BiPAP/high flow oxygen.  Decreased to 6 L via nasal cannula and eventually weaned off oxygen altogether prior to discharge.  He did have desaturation to 89% with activity but that did not meet requirement for oxygen at discharge.    Reviewed medications d/t lethargy:  He is on lyrica, methadone QID, prn hydrocodone and topamax.        7/29/2021     3:00 PM 2/16/2022     1:00 PM 2/6/2023    11:00 AM 2/27/2024     1:54 PM 9/11/2024    10:48 AM 10/29/2024    11:06 AM 7/29/2025    11:29 AM   COPD assessment test (CAT)   Cough 2 3 0 1 1 2 2    Phlegm 2 3 1 1 1 1 2    Chest tightness 1 0 1 0 2 0 1    Walk up hill 4 3 4 4 4 2 4    Limited activities 4 5 4 3 4 3 3    Leaving my home 1  0 4 2 3 1 2    Sleep 3 3 1 4 3 3 1    Energy 1 4 4 3 4 3 3    Total Score 18 21 19 18 22 15  18        Patient-reported    Proxy-reported    Data saved with a previous flowsheet row definition       ROS:     10-point ROS performed and is negative aside from those listed in HPI.    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 Added automatically from request for surgery [id number]     Past Surgical History:   Procedure Laterality Date    AMPUTATE TOE(S) Left 2/20/2020    Procedure: AMPUTATION, third digit left foot;  Surgeon: Wai Armstrong DPM;  Location: [facility] OR;  Service: Podiatry    AMPUTATE TOE(S) Right 7/7/2020    Procedure: AMPUTATION, second digit right foot;  Surgeon: Wai Armstrong DPM;  Location: [facility] OR;  Service: Podiatry    EP PACEMAKER INSERT N/A 2/12/2019     Procedure: EP Pacemaker Insertion;  Surgeon: Micheal Mahoney MD;  Location: [facility] Cath Lab;  Service: Cardiology    EYE SURGERY Bilateral     HIP SURGERY Right     total hip    INSERT / REPLACE / REMOVE PACEMAKER  02/12/2019    PACEMAKER/ICD CHECK  2019    low heart rate - 35    PICC TRIPLE LUMEN PLACEMENT  9/22/2024    TOTAL HIP ARTHROPLASTY Right 2/2/2015    Procedure: #3   HIP TOTAL ARTHROPLASTY, RIGHT;  Surgeon: Aryan Glass MD;  Location: [facility] OR;  Service:      Social History     Tobacco Use    Smoking status: Former     Types: Cigars, cigarillos or filtered cigars     Passive exposure: Past (Parents were smokers)    Smokeless tobacco: Never   Vaping Use    Vaping status: Never Used   Substance Use Topics    Alcohol use: Not Currently    Drug use: Never     Comment: cigars      No Known Allergies     Physical Exam:     /81 (BP Location: Left arm, Patient Position: Sitting, Cuff Size: Adult Large)   Pulse 62   Wt 100.7 kg (222 lb)   SpO2 95%   BMI 31.85 kg/m      Physical Exam  Constitutional:       General: Rom is not in acute distress.     Appearance: Rom is not ill-appearing.   Cardiovascular:      Rate and Rhythm: Normal rate and regular rhythm.   Pulmonary:      Effort: Pulmonary effort is normal. No respiratory distress.      Breath sounds: Decreased breath sounds present. No wheezing or rhonchi.   Musculoskeletal:      Right lower leg: No edema.      Left lower leg: No edema.   Neurological:      Mental Status: Rom is alert.   Psychiatric:         Behavior: Behavior normal.           Data:     XR CHEST PORT 1 VIEW, DATE: 9/26/2024  INDICATION: increasing O2 needs  COMPARISON: 9/24/2024                                                                IMPRESSION: Stable right PICC. Increasing consolidations in the mid to lower lungs. Increasing small pleural effusions. Stable heart size. Cardiac pacemaker.    CT CHEST WO CONTRAST, DATE/TIME: 2/10/2021 11:58 AM  INDICATION:  Follow-up pulmonary nodules.  COMPARISON: 2/19/2020 and 10/17/2019  TECHNIQUE: CT chest without IV contrast. Multiplanar reformats were obtained. Dose reduction techniques were used.  CONTRAST: None.  FINDINGS:   LUNGS AND PLEURA: Stable smooth chronic pleural thickening with mild atelectasis in the posterior lower lobes as well as the right middle lobe and lingula all stable.  Stable faint 3 mm nodule left upper lobe image 27. The nodule lower in the left upper lobe seen previously is no longer present. The should be considered benign.  No significant findings to recommend follow-up.  MEDIASTINUM/AXILLAE: No lymphadenopathy. Some minimal mucous in the trachea   UPPER ABDOMEN: No significant finding.  MUSCULOSKELETAL: Unremarkable.  IMPRESSION:  1.  Stable small chronic pleural thickening with some mild atelectasis in the lower lungs.  2.  Stable tiny nodule in the left upper lobe with the second tiny nodule not clearly seen today. The should be considered benign.  3.  No additional follow-up recommended.    PFTs 11/27/2019:  FEV1/FVC is 0.66 and is normal (less than 0.7 but greater than the demographically adjusted lower limit of normal).  FEV1 is 59% predicted and is reduced.  FVC is 67% predicted and is reduced.  There was no improvement in spirometry after a single inhaled dose of bronchodilator.  TLC is 64% predicted and is reduced.  RV is 73% predicted and is reduced.  DLCO is 57% predicted and is reduced when it   is corrected for hemoglobin.  The flow volume loop shows some expiratory concavity.     Impression:  Pulmonary function testing shows moderate-severe pathophysiology, primarily restrictive but with a likely obstructive component.  Spirometry is not consistent with reversibility.  Diffusion capacity when corrected for hemoglobin is moderately reduced.